# Patient Record
Sex: FEMALE | Race: WHITE | NOT HISPANIC OR LATINO | Employment: FULL TIME | URBAN - METROPOLITAN AREA
[De-identification: names, ages, dates, MRNs, and addresses within clinical notes are randomized per-mention and may not be internally consistent; named-entity substitution may affect disease eponyms.]

---

## 2017-02-01 ENCOUNTER — PATIENT MESSAGE (OUTPATIENT)
Dept: DERMATOLOGY | Facility: CLINIC | Age: 32
End: 2017-02-01

## 2017-03-14 ENCOUNTER — OFFICE VISIT (OUTPATIENT)
Dept: PODIATRY | Facility: CLINIC | Age: 32
End: 2017-03-14
Payer: COMMERCIAL

## 2017-03-14 ENCOUNTER — OFFICE VISIT (OUTPATIENT)
Dept: DERMATOLOGY | Facility: CLINIC | Age: 32
End: 2017-03-14
Payer: COMMERCIAL

## 2017-03-14 VITALS
HEART RATE: 87 BPM | WEIGHT: 171 LBS | BODY MASS INDEX: 29.19 KG/M2 | DIASTOLIC BLOOD PRESSURE: 85 MMHG | HEIGHT: 64 IN | RESPIRATION RATE: 18 BRPM | SYSTOLIC BLOOD PRESSURE: 136 MMHG

## 2017-03-14 DIAGNOSIS — M79.671 PAIN OF RIGHT HEEL: ICD-10-CM

## 2017-03-14 DIAGNOSIS — L70.0 ACNE VULGARIS: ICD-10-CM

## 2017-03-14 DIAGNOSIS — Q82.8 POROKERATOSIS: Primary | ICD-10-CM

## 2017-03-14 PROCEDURE — 99212 OFFICE O/P EST SF 10 MIN: CPT | Mod: S$GLB,,, | Performed by: PATHOLOGY

## 2017-03-14 PROCEDURE — 99203 OFFICE O/P NEW LOW 30 MIN: CPT | Mod: S$GLB,,, | Performed by: PODIATRIST

## 2017-03-14 PROCEDURE — 99999 PR PBB SHADOW E&M-EST. PATIENT-LVL III: CPT | Mod: PBBFAC,,, | Performed by: PODIATRIST

## 2017-03-14 PROCEDURE — 99999 PR PBB SHADOW E&M-EST. PATIENT-LVL II: CPT | Mod: PBBFAC,,, | Performed by: PATHOLOGY

## 2017-03-14 RX ORDER — DOXYCYCLINE HYCLATE 100 MG
100 TABLET ORAL DAILY
Qty: 30 TABLET | Refills: 3 | Status: SHIPPED | OUTPATIENT
Start: 2017-03-14 | End: 2017-09-08 | Stop reason: ALTCHOICE

## 2017-03-14 NOTE — PROGRESS NOTES
Subjective:       Patient ID:  Bushra Olmos is a 31 y.o. female who presents for   Chief Complaint   Patient presents with    Acne     Refill on doxycycline      HPI Comments: Patient reported that flares are not related to her periods.  She started flaring after stopping the doxycycline.  She has regular periods, no hirsutism, not on OCP.    Acne  - Follow-up  Symptom course: improving (has occasional flares)  Currently using: doxycycline 100 mg daily, now stopped 1 month ago, retin A 0.025% cream three times a week, and still doing the BP cream although she is allergic, using obagi with salicylic acid as a wash.  Affected locations: face, chest and back  Signs / symptoms: asymptomatic        Review of Systems   Constitutional: Negative for fever, chills, weight loss, weight gain, fatigue, night sweats and malaise.   Skin: Negative for itching, rash, daily sunscreen use, activity-related sunscreen use and recent sunburn.   Hematologic/Lymphatic: Does not bruise/bleed easily.        Objective:    Physical Exam   Constitutional: She appears well-developed and well-nourished. No distress.   Neurological: She is alert and oriented to person, place, and time. She is not disoriented.   Psychiatric: She has a normal mood and affect.   Skin:   Areas Examined (abnormalities noted in diagram):   Head / Face Inspection Performed  Neck Inspection Performed  Chest / Axilla Inspection Performed  Back Inspection Performed  RUE Inspected  LUE Inspection Performed                   Diagram Legend     Erythematous scaling macule/papule c/w actinic keratosis       Vascular papule c/w angioma      Pigmented verrucoid papule/plaque c/w seborrheic keratosis      Yellow umbilicated papule c/w sebaceous hyperplasia      Irregularly shaped tan macule c/w lentigo     1-2 mm smooth white papules consistent with Milia      Movable subcutaneous cyst with punctum c/w epidermal inclusion cyst      Subcutaneous movable cyst c/w pilar cyst       Firm pink to brown papule c/w dermatofibroma      Pedunculated fleshy papule(s) c/w skin tag(s)      Evenly pigmented macule c/w junctional nevus     Mildly variegated pigmented, slightly irregular-bordered macule c/w mildly atypical nevus      Flesh colored to evenly pigmented papule c/w intradermal nevus       Pink pearly papule/plaque c/w basal cell carcinoma      Erythematous hyperkeratotic cursted plaque c/w SCC      Surgical scar with no sign of skin cancer recurrence      Open and closed comedones      Inflammatory papules and pustules      Verrucoid papule consistent consistent with wart     Erythematous eczematous patches and plaques     Dystrophic onycholytic nail with subungual debris c/w onychomycosis     Umbilicated papule    Erythematous-base heme-crusted tan verrucoid plaque consistent with inflamed seborrheic keratosis     Erythematous Silvery Scaling Plaque c/w Psoriasis     See annotation      Assessment / Plan:        Acne vulgaris - inflammatory.  -     doxycycline (VIBRA-TABS) 100 MG tablet; Take 1 tablet (100 mg total) by mouth once daily.  Dispense: 30 tablet; Refill: 3    - continue tretinoin 0.025% cream qhs for now.  Will likely increase strength of retinoid, add aczone or switch to epiduo forte at f/u visit when we eventually d/c doxy.         Return in about 3 months (around 6/14/2017), or if symptoms worsen or fail to improve.

## 2017-03-14 NOTE — MR AVS SNAPSHOT
[facility]  [address]  [place] [address]  Phone: [phone]  Fax: [phone]                  Bushra Olmos   3/14/2017 9:00 AM   Office Visit    Description:  Female : 1985   Provider:  Costa Renteria MD   Department:  [facility]           Reason for Visit     Acne           Diagnoses this Visit        Comments    Acne vulgaris                To Do List           Future Appointments        Provider Department Dept Phone    2017 9:20 AM Costa Renteria MD [facility] [phone]      Goals (5 Years of Data)     None      Follow-Up and Disposition     Return in about 3 months (around 2017), or if symptoms worsen or fail to improve.       These Medications        Disp Refills Start End    doxycycline (VIBRA-TABS) 100 MG tablet 30 tablet 3 3/14/2017     Take 1 tablet (100 mg total) by mouth once daily. - Oral    Pharmacy: [facility] - [place], [place] - [address] Ph #: [phone]         [facility]     [facility] Nurse Care Line -  Assistance  Registered nurses in the [facility] provide clinical advisement, health education, appointment booking, and other advisory services.  Call for this free service at [phone].             Medications           Message regarding Medications     Verify the changes and/or additions to your medication regime listed below are the same as discussed with your clinician today.  If any of these changes or additions are incorrect, please notify your healthcare provider.        START taking these NEW medications        Refills    doxycycline (VIBRA-TABS) 100 MG tablet 3    Sig: Take 1 tablet (100 mg total) by mouth once daily.    Class: Normal    Route: Oral           Verify that the below list of medications is an accurate representation of the medications you are currently taking.  If none reported, the list may be blank. If  incorrect, please contact your healthcare provider. Carry this list with you in case of emergency.           Current Medications     doxycycline (VIBRA-TABS) 100 MG tablet Take 1 tablet (100 mg total) by mouth once daily.           Clinical Reference Information           Allergies as of 3/14/2017     Benzoyl Peroxide    Benzyl Salicylate    Penicillins      Immunizations Administered on Date of Encounter - 3/14/2017     None      Smoking Cessation     If you would like to quit smoking:   You may be eligible for free services if you are a [place] resident and started smoking cigarettes before September 1, 1988.  Call the Smoking Cessation Trust ([facility]) toll free at ([phone] [phone] or ([phone] 673-5291.   Call 3-512-QUIT-NOW if you do not meet the above criteria.            Language Assistance Services     ATTENTION: Language assistance services are available, free of charge. Please call [phone].      ATENCIÓN: Si chad aceves, tiene a pierson disposición servicios gratuitos de asistencia lingüística. Llame al [phone].     CHÚ Ý: N?u b?n nói Ti?ng Vi?t, có các d?ch v? h? tr? ngôn ng? mi?n phí dành cho b?n. G?i s? [phone].         [facility] complies with applicable Federal civil rights laws and does not discriminate on the basis of race, color, national origin, age, disability, or sex.

## 2017-03-14 NOTE — PROGRESS NOTES
Subjective:      Patient ID: Bushra Olmos is a 31 y.o. female.    Chief Complaint: PCP ( Dominguez Hitchcock MD 4/12/16); Foot Problem (Rt ffoot pain); and Callouses    Bushra is a 31 y.o. female who presents to the podiatry clinic  with complaint of  R foot pain. Onset of the symptoms was several months ago. Precipitating event: none known, but possibly stepping on glass in december . Current symptoms include: ability to bear weight, but with some pain and worsening symptoms after a period of activity. Aggravating factors: any weight bearing. Symptoms have been intermittent. Patient has had no prior foot problems. Evaluation to date: none. Treatment to date: none.         There is no problem list on file for this patient.      Current Outpatient Prescriptions on File Prior to Visit   Medication Sig Dispense Refill    [DISCONTINUED] doxycycline (VIBRA-TABS) 100 MG tablet Take 1 tablet once daily with food and water. Take at least 1 hour before lying down. 30 tablet 0    [DISCONTINUED] GLUC/CHND/OM3/DHA/EPA/FISH/STR (GLUCOSAMINE CHONDROITIN PLUS ORAL) Take by mouth.      [DISCONTINUED] multivitamin (THERAGRAN) per tablet Take 1 tablet by mouth once daily.      [DISCONTINUED] tretinoin (RETIN-A) 0.025 % cream Apply pea-sized amount to face nightly 45 g 1     No current facility-administered medications on file prior to visit.        Review of patient's allergies indicates:   Allergen Reactions    Benzoyl peroxide Swelling     whelps    Benzyl salicylate Rash     acne    Penicillins Other (See Comments)     M,om stated       Past Surgical History:   Procedure Laterality Date    ADENOIDECTOMY  age 16    biopsy of breast  Right 09/2014    CHOLECYSTECTOMY  2013    Laparoscopic       Family History   Problem Relation Age of Onset    Lupus Mother     Kidney disease Mother     Rheum arthritis Mother     Hypertension Father     Breast cancer Paternal Aunt     Cataracts Paternal Grandmother     Colon cancer  "Paternal Grandmother     Cataracts Paternal Grandfather     Cancer Paternal Grandfather      Lung cancer- smoker    Amblyopia Neg Hx     Blindness Neg Hx     Glaucoma Neg Hx     Macular degeneration Neg Hx     Retinal detachment Neg Hx     Strabismus Neg Hx     Stroke Neg Hx     Ovarian cancer Neg Hx     Diabetes Neg Hx     Melanoma Neg Hx     Psoriasis Neg Hx        Social History     Social History    Marital status: Single     Spouse name: N/A    Number of children: N/A    Years of education: N/A     Occupational History    Not on file.     Social History Main Topics    Smoking status: Current Some Day Smoker     Packs/day: 0.50     Years: 13.00     Types: Cigarettes    Smokeless tobacco: Never Used    Alcohol use 0.6 oz/week     1 Glasses of wine per week    Drug use: No    Sexual activity: Not Currently     Partners: Male     Birth control/ protection: None, Condom      Comment: Liza of 4 years: Huang     Other Topics Concern    Not on file     Social History Narrative             Review of Systems   Constitution: Negative for chills, decreased appetite and fever.   Cardiovascular: Negative for leg swelling.   Musculoskeletal: Negative for arthritis, joint pain, joint swelling and myalgias.   Gastrointestinal: Negative for nausea and vomiting.   Neurological: Negative for loss of balance, numbness and paresthesias.           Objective:       Vitals:    03/14/17 0728   BP: 136/85   Pulse: 87   Resp: 18   Weight: 77.6 kg (171 lb)   Height: 5' 4" (1.626 m)   PainSc:   2   PainLoc: Foot        Physical Exam   Constitutional: She is oriented to person, place, and time. She appears well-developed and well-nourished.   Cardiovascular:   Pulses:       Dorsalis pedis pulses are 2+ on the right side, and 2+ on the left side.        Posterior tibial pulses are 2+ on the right side, and 2+ on the left side.   Musculoskeletal: She exhibits no edema or tenderness.        Right ankle: Normal.       "  Left ankle: Normal.        Right foot: There is no swelling, no crepitus and no deformity.        Left foot: There is no swelling, no crepitus and no deformity.   Adequate joint range of motion without pain, limitation, nor crepitation Bilateral feet and ankle joints. Muscle strength is 5/5 in all groups bilaterally.         Lymphadenopathy:   No palpable lymph nodes   Neurological: She is alert and oriented to person, place, and time. She has normal strength.   Skin: Skin is warm, dry and intact. No rash noted. No erythema. Nails show no clubbing.   Nucleated solitary  hyperkeratotic lesion noted to Plantar R heel . There is TTP to lesion . Divergent skin lines are noted      Psychiatric: She has a normal mood and affect. Her behavior is normal.   Vitals reviewed.            Assessment:       Encounter Diagnoses   Name Primary?    Porokeratosis Yes    Pain of left heel          Plan:       Bushra was seen today for pcp, foot problem and callouses.    Diagnoses and all orders for this visit:    Porokeratosis    Pain of left heel      I counseled the patient on her conditions, their implications and medical management.    The affected area was cleansed with an alcohol prep pad. Next utilizing a mechanical   the hyperkeratotic tissues were filed. Attention was then directed to the nucleated center where this area was carefully excised. No pinpoint bleeding was encountered.      Discussed use of OTC wart medications as per packing instructions     Use pumice stone to region after bathing 2-3x/week to decrease callus build up       .

## 2017-03-25 ENCOUNTER — PATIENT MESSAGE (OUTPATIENT)
Dept: PODIATRY | Facility: CLINIC | Age: 32
End: 2017-03-25

## 2017-03-27 ENCOUNTER — OFFICE VISIT (OUTPATIENT)
Dept: PODIATRY | Facility: CLINIC | Age: 32
End: 2017-03-27
Payer: COMMERCIAL

## 2017-03-27 VITALS
DIASTOLIC BLOOD PRESSURE: 71 MMHG | HEIGHT: 64 IN | SYSTOLIC BLOOD PRESSURE: 125 MMHG | WEIGHT: 169 LBS | HEART RATE: 77 BPM | RESPIRATION RATE: 18 BRPM | BODY MASS INDEX: 28.85 KG/M2

## 2017-03-27 DIAGNOSIS — B07.0 PLANTAR WART OF BOTH FEET: Primary | ICD-10-CM

## 2017-03-27 DIAGNOSIS — M79.671 PAIN OF BOTH HEELS: ICD-10-CM

## 2017-03-27 DIAGNOSIS — M79.672 PAIN OF BOTH HEELS: ICD-10-CM

## 2017-03-27 PROCEDURE — 99999 PR PBB SHADOW E&M-EST. PATIENT-LVL III: CPT | Mod: PBBFAC,,, | Performed by: PODIATRIST

## 2017-03-27 PROCEDURE — 99213 OFFICE O/P EST LOW 20 MIN: CPT | Mod: S$GLB,,, | Performed by: PODIATRIST

## 2017-03-27 NOTE — PROGRESS NOTES
Subjective:      Patient ID: Bushra Olmos is a 31 y.o. female.    Chief Complaint: Follow-up (foot pain both feet ) and Callouses (both feet )    uBshra is a 31 y.o. female who presents to the podiatry clinic  with complaint of  R foot pain. Onset of the symptoms was several months ago. Precipitating event: none known, but possibly stepping on glass in december . Current symptoms include: ability to bear weight, but with some pain and worsening symptoms after a period of activity. Aggravating factors: any weight bearing. Symptoms have been intermittent. Patient has had no prior foot problems. Evaluation to date: none. Treatment to date: none.     3/27/17: pt reports continued pain and now new onset L heel callus    Patient Active Problem List   Diagnosis    Acne vulgaris       Current Outpatient Prescriptions on File Prior to Visit   Medication Sig Dispense Refill    doxycycline (VIBRA-TABS) 100 MG tablet Take 1 tablet (100 mg total) by mouth once daily. 30 tablet 3     No current facility-administered medications on file prior to visit.        Review of patient's allergies indicates:   Allergen Reactions    Benzoyl peroxide Swelling     whelps    Benzyl salicylate Rash     acne    Penicillins Other (See Comments)     M,om stated       Past Surgical History:   Procedure Laterality Date    ADENOIDECTOMY  age 16    biopsy of breast  Right 09/2014    CHOLECYSTECTOMY  2013    Laparoscopic       Family History   Problem Relation Age of Onset    Lupus Mother     Kidney disease Mother     Rheum arthritis Mother     Hypertension Father     Breast cancer Paternal Aunt     Cataracts Paternal Grandmother     Colon cancer Paternal Grandmother     Cataracts Paternal Grandfather     Cancer Paternal Grandfather      Lung cancer- smoker    Amblyopia Neg Hx     Blindness Neg Hx     Glaucoma Neg Hx     Macular degeneration Neg Hx     Retinal detachment Neg Hx     Strabismus Neg Hx     Stroke Neg Hx      "Ovarian cancer Neg Hx     Diabetes Neg Hx     Melanoma Neg Hx     Psoriasis Neg Hx        Social History     Social History    Marital status: Single     Spouse name: N/A    Number of children: N/A    Years of education: N/A     Occupational History    Not on file.     Social History Main Topics    Smoking status: Current Some Day Smoker     Packs/day: 0.50     Years: 13.00     Types: Cigarettes    Smokeless tobacco: Never Used    Alcohol use 0.6 oz/week     1 Glasses of wine per week    Drug use: No    Sexual activity: Not Currently     Partners: Male     Birth control/ protection: None, Condom      Comment: Liza of 4 years: Huang     Other Topics Concern    Not on file     Social History Narrative             Review of Systems   Constitution: Negative for chills, decreased appetite and fever.   Cardiovascular: Negative for leg swelling.   Skin: Negative for color change, itching and poor wound healing.   Musculoskeletal: Negative for arthritis, joint pain, joint swelling and myalgias.   Gastrointestinal: Negative for nausea and vomiting.   Neurological: Negative for loss of balance, numbness and paresthesias.           Objective:       Vitals:    03/27/17 1117   BP: 125/71   Pulse: 77   Resp: 18   Weight: 76.7 kg (169 lb)   Height: 5' 4" (1.626 m)   PainSc:   4   PainLoc: Foot        Physical Exam   Constitutional: She is oriented to person, place, and time. She appears well-developed and well-nourished.   Cardiovascular:   Pulses:       Dorsalis pedis pulses are 2+ on the right side, and 2+ on the left side.        Posterior tibial pulses are 2+ on the right side, and 2+ on the left side.   Musculoskeletal: She exhibits no edema or tenderness.        Right ankle: Normal.        Left ankle: Normal.        Right foot: There is no swelling, no crepitus and no deformity.        Left foot: There is no swelling, no crepitus and no deformity.   Adequate joint range of motion without pain, limitation, " nor crepitation Bilateral feet and ankle joints. Muscle strength is 5/5 in all groups bilaterally.         Lymphadenopathy:   No palpable lymph nodes   Neurological: She is alert and oriented to person, place, and time. She has normal strength.   Skin: Skin is warm, dry and intact. No rash noted. No erythema. Nails show no clubbing.   Nucleated solitary  hyperkeratotic lesion noted to Plantar b/l heel . There is TTP to lesion . Divergent skin lines are noted      Psychiatric: She has a normal mood and affect. Her behavior is normal.   Vitals reviewed.            Assessment:       Encounter Diagnoses   Name Primary?    Plantar wart of both feet Yes    Pain of both heels          Plan:       Bushra was seen today for follow-up and callouses.    Diagnoses and all orders for this visit:    Plantar wart of both feet    Pain of both heels      I counseled the patient on her conditions, their implications and medical management.      With patients verbal permission the area was cleansed with alcohol prep pad. Next with a No 15 scalpel the wart was debrided until pin point bleeding was noted. This was controlled with silver nitrate. Patient will begin topical sal acid treatments       Discussed use of OTC wart medications as per packing instructions     .

## 2017-04-17 ENCOUNTER — PATIENT MESSAGE (OUTPATIENT)
Dept: PODIATRY | Facility: CLINIC | Age: 32
End: 2017-04-17

## 2017-05-24 ENCOUNTER — PATIENT MESSAGE (OUTPATIENT)
Dept: DERMATOLOGY | Facility: CLINIC | Age: 32
End: 2017-05-24

## 2017-05-24 DIAGNOSIS — L70.0 ACNE VULGARIS: Primary | ICD-10-CM

## 2017-05-24 RX ORDER — TRETINOIN 0.1 MG/G
GEL TOPICAL NIGHTLY
Qty: 45 G | Refills: 3 | Status: SHIPPED | OUTPATIENT
Start: 2017-05-24 | End: 2017-06-02 | Stop reason: SDUPTHER

## 2017-05-30 ENCOUNTER — TELEPHONE (OUTPATIENT)
Dept: DERMATOLOGY | Facility: CLINIC | Age: 32
End: 2017-05-30

## 2017-05-30 NOTE — TELEPHONE ENCOUNTER
I did a PA for Tretinoin 0.1% micro gel and it was approved by [facility] from 05-25-17 to 5-24-18. I faxed the approval letter to [facility].  (Pt ins id # [id number])

## 2017-06-02 DIAGNOSIS — L70.0 ACNE VULGARIS: ICD-10-CM

## 2017-06-02 RX ORDER — TRETINOIN 0.1 MG/G
GEL TOPICAL NIGHTLY
Qty: 45 G | Refills: 3 | Status: SHIPPED | OUTPATIENT
Start: 2017-06-02 | End: 2017-09-08 | Stop reason: ALTCHOICE

## 2017-06-12 ENCOUNTER — PATIENT MESSAGE (OUTPATIENT)
Dept: DERMATOLOGY | Facility: CLINIC | Age: 32
End: 2017-06-12

## 2017-06-16 DIAGNOSIS — L70.0 ACNE VULGARIS: Primary | ICD-10-CM

## 2017-06-16 RX ORDER — DAPSONE 75 MG/G
GEL TOPICAL
Qty: 60 G | Refills: 3 | Status: SHIPPED | OUTPATIENT
Start: 2017-06-16 | End: 2018-03-09

## 2017-08-17 ENCOUNTER — PATIENT MESSAGE (OUTPATIENT)
Dept: DERMATOLOGY | Facility: CLINIC | Age: 32
End: 2017-08-17

## 2017-08-17 DIAGNOSIS — L70.0 ACNE VULGARIS: Primary | ICD-10-CM

## 2017-08-17 RX ORDER — SPIRONOLACTONE 50 MG/1
TABLET, FILM COATED ORAL
Qty: 60 TABLET | Refills: 3 | Status: SHIPPED | OUTPATIENT
Start: 2017-08-17 | End: 2017-08-18 | Stop reason: SDUPTHER

## 2017-08-18 DIAGNOSIS — L70.0 ACNE VULGARIS: ICD-10-CM

## 2017-08-18 RX ORDER — SPIRONOLACTONE 50 MG/1
TABLET, FILM COATED ORAL
Qty: 60 TABLET | Refills: 3 | Status: SHIPPED | OUTPATIENT
Start: 2017-08-18 | End: 2017-09-08 | Stop reason: ALTCHOICE

## 2017-09-08 ENCOUNTER — OFFICE VISIT (OUTPATIENT)
Dept: URGENT CARE | Facility: CLINIC | Age: 32
End: 2017-09-08
Payer: COMMERCIAL

## 2017-09-08 VITALS
TEMPERATURE: 97 F | BODY MASS INDEX: 28.68 KG/M2 | RESPIRATION RATE: 18 BRPM | HEIGHT: 64 IN | WEIGHT: 168 LBS | DIASTOLIC BLOOD PRESSURE: 81 MMHG | OXYGEN SATURATION: 98 % | HEART RATE: 79 BPM | SYSTOLIC BLOOD PRESSURE: 128 MMHG

## 2017-09-08 DIAGNOSIS — B34.9 VIRAL SYNDROME: Primary | ICD-10-CM

## 2017-09-08 PROCEDURE — 3008F BODY MASS INDEX DOCD: CPT | Mod: S$GLB,,, | Performed by: NURSE PRACTITIONER

## 2017-09-08 PROCEDURE — 96372 THER/PROPH/DIAG INJ SC/IM: CPT | Mod: S$GLB,,, | Performed by: NURSE PRACTITIONER

## 2017-09-08 PROCEDURE — 99213 OFFICE O/P EST LOW 20 MIN: CPT | Mod: 25,S$GLB,, | Performed by: NURSE PRACTITIONER

## 2017-09-08 RX ORDER — BETAMETHASONE SODIUM PHOSPHATE AND BETAMETHASONE ACETATE 3; 3 MG/ML; MG/ML
6 INJECTION, SUSPENSION INTRA-ARTICULAR; INTRALESIONAL; INTRAMUSCULAR; SOFT TISSUE
Status: COMPLETED | OUTPATIENT
Start: 2017-09-08 | End: 2017-09-08

## 2017-09-08 RX ORDER — FLUTICASONE PROPIONATE 50 MCG
2 SPRAY, SUSPENSION (ML) NASAL DAILY
Qty: 1 BOTTLE | Refills: 2 | Status: SHIPPED | OUTPATIENT
Start: 2017-09-08 | End: 2018-03-09

## 2017-09-08 RX ADMIN — BETAMETHASONE SODIUM PHOSPHATE AND BETAMETHASONE ACETATE 6 MG: 3; 3 INJECTION, SUSPENSION INTRA-ARTICULAR; INTRALESIONAL; INTRAMUSCULAR; SOFT TISSUE at 10:09

## 2017-09-08 NOTE — PROGRESS NOTES
"Subjective:       Patient ID: Bushra Kahn is a 32 y.o. female.    Vitals:  height is 5' 4" (1.626 m) and weight is 76.2 kg (168 lb). Her temperature is 97.1 °F (36.2 °C). Her blood pressure is 128/81 and her pulse is 79. Her respiration is 18 and oxygen saturation is 98%.     Chief Complaint: Sore Throat    This is a 32 y.o. female with Past Medical History:  No date: Acid reflux   who presents today with a chief complaint of Sore Throat.  Reports having suffering on and off x 2 weeks. Taking otc meds with no relief.       Sore Throat    This is a new problem. The current episode started 1 to 4 weeks ago. The problem has been gradually improving. There has been no fever. The pain is mild. Associated symptoms include congestion, coughing and a hoarse voice. Pertinent negatives include no abdominal pain, ear pain, headaches or shortness of breath.     Review of Systems   Constitution: Positive for malaise/fatigue. Negative for chills and fever.   HENT: Positive for congestion, hoarse voice and sore throat. Negative for ear pain.    Eyes: Negative for discharge and redness.   Cardiovascular: Negative for chest pain, dyspnea on exertion and leg swelling.   Respiratory: Positive for cough. Negative for shortness of breath, sputum production and wheezing.    Musculoskeletal: Negative for myalgias.   Gastrointestinal: Negative for abdominal pain and nausea.   Neurological: Negative for headaches.       Objective:      Physical Exam   Constitutional: She is oriented to person, place, and time. She appears well-developed and well-nourished.   HENT:   Head: Normocephalic and atraumatic.   Right Ear: Hearing, tympanic membrane, external ear and ear canal normal.   Left Ear: Hearing, tympanic membrane, external ear and ear canal normal.   Nose: Mucosal edema and rhinorrhea present.   Mouth/Throat: Oropharynx is clear and moist. No oropharyngeal exudate.   Cardiovascular: Normal rate, regular rhythm and normal heart " sounds.    Pulmonary/Chest: Effort normal and breath sounds normal.   Abdominal: Soft. Bowel sounds are normal. There is no tenderness.   Musculoskeletal: She exhibits no edema.   Neurological: She is alert and oriented to person, place, and time.   Skin: Skin is warm and dry.   Psychiatric: She has a normal mood and affect. Her behavior is normal. Judgment and thought content normal.   Nursing note and vitals reviewed.      Assessment:       1. Viral syndrome        Plan:         1. Viral syndrome  No a/b need. Advised on fluids, otc.   - betamethasone acetate-betamethasone sodium phosphate injection 6 mg; Inject 1 mL (6 mg total) into the muscle one time.  - fluticasone (FLONASE) 50 mcg/actuation nasal spray; 2 sprays by Each Nare route once daily.  Dispense: 1 Bottle; Refill: 2

## 2017-09-11 ENCOUNTER — TELEPHONE (OUTPATIENT)
Dept: URGENT CARE | Facility: CLINIC | Age: 32
End: 2017-09-11

## 2017-10-29 ENCOUNTER — PATIENT MESSAGE (OUTPATIENT)
Dept: OBSTETRICS AND GYNECOLOGY | Facility: CLINIC | Age: 32
End: 2017-10-29

## 2018-02-07 ENCOUNTER — OFFICE VISIT (OUTPATIENT)
Dept: OBSTETRICS AND GYNECOLOGY | Facility: CLINIC | Age: 33
End: 2018-02-07
Payer: COMMERCIAL

## 2018-02-07 VITALS
BODY MASS INDEX: 31.09 KG/M2 | DIASTOLIC BLOOD PRESSURE: 74 MMHG | WEIGHT: 182.13 LBS | HEIGHT: 64 IN | SYSTOLIC BLOOD PRESSURE: 126 MMHG

## 2018-02-07 DIAGNOSIS — Z01.419 ENCOUNTER FOR GYNECOLOGICAL EXAMINATION WITHOUT ABNORMAL FINDING: Primary | ICD-10-CM

## 2018-02-07 PROCEDURE — 99395 PREV VISIT EST AGE 18-39: CPT | Mod: S$GLB,,, | Performed by: OBSTETRICS & GYNECOLOGY

## 2018-02-07 PROCEDURE — 99999 PR PBB SHADOW E&M-EST. PATIENT-LVL III: CPT | Mod: PBBFAC,,, | Performed by: OBSTETRICS & GYNECOLOGY

## 2018-02-07 RX ORDER — PHENTERMINE HYDROCHLORIDE 37.5 MG/1
37.5 TABLET ORAL
COMMUNITY
End: 2018-03-09

## 2018-02-07 NOTE — PROGRESS NOTES
Subjective:       Patient ID: Bushra Kahn is a 32 y.o. female.    Chief Complaint:  Well Woman      History of Present Illness  HPI    Bushra Kahn is a 32 y.o. female  here for her annual GYN exam.    She describes her periods as regular, normal flow, lasting 3 days.   denies break through bleeding.   denies vaginal itching or irritation.  Denies vaginal discharge.  She is sexually active. She has had 1 partner for 5 years ( in ).  She uses condoms for contraception.   History of abnormal pap: No  Last Pap: approximate date  and was normal  denies domestic violence. She does feel safe at home.     Past Medical History:   Diagnosis Date    Acid reflux      Past Surgical History:   Procedure Laterality Date    ADENOIDECTOMY  age 16    biopsy of breast  Right 2014    CHOLECYSTECTOMY  2013    Laparoscopic     Social History     Social History    Marital status: Single     Spouse name: N/A    Number of children: N/A    Years of education: N/A     Occupational History    Not on file.     Social History Main Topics    Smoking status: Current Some Day Smoker     Packs/day: 0.10     Years: 13.00     Types: Cigarettes    Smokeless tobacco: Never Used    Alcohol use 0.6 oz/week     1 Glasses of wine per week    Drug use: No    Sexual activity: Yes     Partners: Male     Birth control/ protection: None, Condom      Comment:  2016, together since : Huang     Other Topics Concern    Not on file     Social History Narrative    No narrative on file     Family History   Problem Relation Age of Onset    Lupus Mother     Kidney disease Mother     Rheum arthritis Mother     Hypertension Father     Breast cancer Paternal Aunt 50    Cataracts Paternal Grandmother     Colon cancer Paternal Grandmother     Cataracts Paternal Grandfather     Cancer Paternal Grandfather      Lung cancer- smoker    Amblyopia Neg Hx     Blindness Neg Hx     Glaucoma Neg Hx   "   Macular degeneration Neg Hx     Retinal detachment Neg Hx     Strabismus Neg Hx     Stroke Neg Hx     Ovarian cancer Neg Hx     Diabetes Neg Hx     Melanoma Neg Hx     Psoriasis Neg Hx      OB History      Para Term  AB Living    0 0 0 0 0 0    SAB TAB Ectopic Multiple Live Births    0 0 0 0            /74   Ht 5' 4" (1.626 m)   Wt 82.6 kg (182 lb 1.6 oz)   LMP 2018 (Within Days)   BMI 31.26 kg/m²         GYN & OB History  Patient's last menstrual period was 2018 (within days).   Date of Last Pap: 10/12/2016    OB History    Para Term  AB Living   0 0 0 0 0 0   SAB TAB Ectopic Multiple Live Births   0 0 0 0               Review of Systems  Review of Systems   Constitutional: Negative for activity change, appetite change, fatigue and unexpected weight change.   HENT: Negative.    Eyes: Negative for visual disturbance.   Respiratory: Negative for shortness of breath and wheezing.    Cardiovascular: Negative for chest pain, palpitations and leg swelling.   Gastrointestinal: Negative for abdominal pain, bloating and blood in stool.   Endocrine: Negative for diabetes and hair loss.   Genitourinary: Negative for decreased libido and dyspareunia.   Musculoskeletal: Negative for back pain and joint swelling.   Skin:  Negative for no acne and hair changes.   Neurological: Negative for headaches.   Hematological: Does not bruise/bleed easily.   Psychiatric/Behavioral: Negative for depression and sleep disturbance. The patient is not nervous/anxious.    Breast: Negative for breast pain and nipple discharge          Objective:    Physical Exam:   Constitutional: She is oriented to person, place, and time. She appears well-developed and well-nourished.    HENT:   Head: Normocephalic and atraumatic.    Eyes: EOM are normal. Pupils are equal, round, and reactive to light.    Neck: Normal range of motion. Neck supple.    Cardiovascular: Normal rate and regular rhythm.   "   Pulmonary/Chest: Effort normal and breath sounds normal.   BREASTS: Symmetrical, no skin changes or visible lesions.  No palpable masses, nipple discharge bilaterally.          Abdominal: Soft. Bowel sounds are normal.     Genitourinary: Pelvic exam was performed with patient supine.   Genitourinary Comments: PELVIC: Normal external genitalia without lesions.  Normal hair distribution.  Adequate perineal body, normal urethral meatus.  Vagina moist and well rugated without lesions or discharge.  Cervix pink, without lesions, discharge or tenderness.  No significant cystocele or rectocele.  Bimanual exam shows uterus to be normal size, regular, mobile and nontender.  Adnexa without masses or tenderness.               Musculoskeletal: Normal range of motion and moves all extremeties.       Neurological: She is alert and oriented to person, place, and time.    Skin: Skin is warm and dry.    Psychiatric: She has a normal mood and affect.          Assessment:        1. Encounter for gynecological examination without abnormal finding                Plan:        1. Encounter for gynecological examination without abnormal finding     COUNSELING:  The patient was counseled today on osteoporosis prevention, calcium supplementation, and regular weight bearing exercise. Advised to take PNV with 800mcg Folic acid if not using regular contraception.  The patient was also counseled today on ACS PAP guidelines, with recommendations for yearly pelvic exams unless their uterus, cervix, and ovaries were removed for benign reasons; in that case, examinations every 3-5 years are recommended. The patient was also counseled regarding monthly breast self-examination, routine STD screening for at-risk populations, prophylactic immunizations for transmitted infections such as HPV, Pertussis, or Influenza as appropriate, and yearly mammograms when indicated by ACS guidelines. She was advised to see her primary care physician for all other  health maintenance.   FOLLOW-UP with me for next routine visit.         Follow-up in about 1 year (around 2/7/2019).

## 2018-03-09 ENCOUNTER — LAB VISIT (OUTPATIENT)
Dept: LAB | Facility: HOSPITAL | Age: 33
End: 2018-03-09
Attending: INTERNAL MEDICINE
Payer: COMMERCIAL

## 2018-03-09 ENCOUNTER — OFFICE VISIT (OUTPATIENT)
Dept: INTERNAL MEDICINE | Facility: CLINIC | Age: 33
End: 2018-03-09
Payer: COMMERCIAL

## 2018-03-09 VITALS
WEIGHT: 172.19 LBS | SYSTOLIC BLOOD PRESSURE: 125 MMHG | DIASTOLIC BLOOD PRESSURE: 63 MMHG | HEART RATE: 68 BPM | BODY MASS INDEX: 29.4 KG/M2 | TEMPERATURE: 98 F | HEIGHT: 64 IN

## 2018-03-09 DIAGNOSIS — Z00.00 WELLNESS EXAMINATION: ICD-10-CM

## 2018-03-09 DIAGNOSIS — Z00.00 WELLNESS EXAMINATION: Primary | ICD-10-CM

## 2018-03-09 DIAGNOSIS — M25.511 PAIN OF BOTH SHOULDER JOINTS: ICD-10-CM

## 2018-03-09 DIAGNOSIS — M25.512 PAIN OF BOTH SHOULDER JOINTS: ICD-10-CM

## 2018-03-09 LAB
25(OH)D3+25(OH)D2 SERPL-MCNC: 35 NG/ML
ALBUMIN SERPL BCP-MCNC: 4.5 G/DL
ALP SERPL-CCNC: 47 U/L
ALT SERPL W/O P-5'-P-CCNC: 41 U/L
ANION GAP SERPL CALC-SCNC: 12 MMOL/L
AST SERPL-CCNC: 26 U/L
B-HCG UR QL: NEGATIVE
BASOPHILS # BLD AUTO: 0.03 K/UL
BASOPHILS NFR BLD: 0.6 %
BILIRUB SERPL-MCNC: 0.7 MG/DL
BUN SERPL-MCNC: 13 MG/DL
CALCIUM SERPL-MCNC: 9.7 MG/DL
CCP AB SER IA-ACNC: <0.5 U/ML
CHLORIDE SERPL-SCNC: 106 MMOL/L
CHOLEST SERPL-MCNC: 148 MG/DL
CHOLEST/HDLC SERPL: 3 {RATIO}
CO2 SERPL-SCNC: 21 MMOL/L
CREAT SERPL-MCNC: 0.9 MG/DL
CRP SERPL-MCNC: 1.5 MG/L
CTP QC/QA: YES
DIFFERENTIAL METHOD: NORMAL
EOSINOPHIL # BLD AUTO: 0.2 K/UL
EOSINOPHIL NFR BLD: 3.4 %
ERYTHROCYTE [DISTWIDTH] IN BLOOD BY AUTOMATED COUNT: 12.6 %
ERYTHROCYTE [SEDIMENTATION RATE] IN BLOOD BY WESTERGREN METHOD: 1 MM/HR
EST. GFR  (AFRICAN AMERICAN): >60 ML/MIN/1.73 M^2
EST. GFR  (NON AFRICAN AMERICAN): >60 ML/MIN/1.73 M^2
ESTIMATED AVG GLUCOSE: 88 MG/DL
GLUCOSE SERPL-MCNC: 88 MG/DL
HBA1C MFR BLD HPLC: 4.7 %
HCG INTACT+B SERPL-ACNC: <1.2 MIU/ML
HCT VFR BLD AUTO: 37.6 %
HDLC SERPL-MCNC: 49 MG/DL
HDLC SERPL: 33.1 %
HGB BLD-MCNC: 12.3 G/DL
IMM GRANULOCYTES # BLD AUTO: 0.02 K/UL
IMM GRANULOCYTES NFR BLD AUTO: 0.4 %
LDLC SERPL CALC-MCNC: 84.2 MG/DL
LYMPHOCYTES # BLD AUTO: 1.6 K/UL
LYMPHOCYTES NFR BLD: 31 %
MCH RBC QN AUTO: 27.9 PG
MCHC RBC AUTO-ENTMCNC: 32.7 G/DL
MCV RBC AUTO: 85 FL
MONOCYTES # BLD AUTO: 0.3 K/UL
MONOCYTES NFR BLD: 5.7 %
NEUTROPHILS # BLD AUTO: 3.1 K/UL
NEUTROPHILS NFR BLD: 58.9 %
NONHDLC SERPL-MCNC: 99 MG/DL
NRBC BLD-RTO: 0 /100 WBC
PLATELET # BLD AUTO: 199 K/UL
PMV BLD AUTO: 11.5 FL
POTASSIUM SERPL-SCNC: 3.8 MMOL/L
PROT SERPL-MCNC: 7.3 G/DL
RBC # BLD AUTO: 4.41 M/UL
RHEUMATOID FACT SERPL-ACNC: <10 IU/ML
SODIUM SERPL-SCNC: 139 MMOL/L
TRIGL SERPL-MCNC: 74 MG/DL
TSH SERPL DL<=0.005 MIU/L-ACNC: 0.79 UIU/ML
WBC # BLD AUTO: 5.23 K/UL

## 2018-03-09 PROCEDURE — 84702 CHORIONIC GONADOTROPIN TEST: CPT

## 2018-03-09 PROCEDURE — 86235 NUCLEAR ANTIGEN ANTIBODY: CPT | Mod: 59

## 2018-03-09 PROCEDURE — 80053 COMPREHEN METABOLIC PANEL: CPT

## 2018-03-09 PROCEDURE — 81025 URINE PREGNANCY TEST: CPT | Mod: S$GLB,,, | Performed by: INTERNAL MEDICINE

## 2018-03-09 PROCEDURE — 86140 C-REACTIVE PROTEIN: CPT

## 2018-03-09 PROCEDURE — 85651 RBC SED RATE NONAUTOMATED: CPT

## 2018-03-09 PROCEDURE — 83036 HEMOGLOBIN GLYCOSYLATED A1C: CPT

## 2018-03-09 PROCEDURE — 85025 COMPLETE CBC W/AUTO DIFF WBC: CPT

## 2018-03-09 PROCEDURE — 84443 ASSAY THYROID STIM HORMONE: CPT

## 2018-03-09 PROCEDURE — 86039 ANTINUCLEAR ANTIBODIES (ANA): CPT

## 2018-03-09 PROCEDURE — 86038 ANTINUCLEAR ANTIBODIES: CPT

## 2018-03-09 PROCEDURE — 82306 VITAMIN D 25 HYDROXY: CPT

## 2018-03-09 PROCEDURE — 99395 PREV VISIT EST AGE 18-39: CPT | Mod: S$GLB,,, | Performed by: INTERNAL MEDICINE

## 2018-03-09 PROCEDURE — 86431 RHEUMATOID FACTOR QUANT: CPT

## 2018-03-09 PROCEDURE — 99999 PR PBB SHADOW E&M-EST. PATIENT-LVL III: CPT | Mod: PBBFAC,,, | Performed by: INTERNAL MEDICINE

## 2018-03-09 PROCEDURE — 36415 COLL VENOUS BLD VENIPUNCTURE: CPT | Mod: PO

## 2018-03-09 PROCEDURE — 80061 LIPID PANEL: CPT

## 2018-03-09 PROCEDURE — 86200 CCP ANTIBODY: CPT

## 2018-03-09 RX ORDER — GLUCOSAMINE/CHONDRO SU A 500-400 MG
2 TABLET ORAL DAILY
COMMUNITY
End: 2018-07-26

## 2018-03-12 LAB
ANA SER QL IF: POSITIVE
ANA TITR SER IF: NORMAL {TITER}

## 2018-03-13 ENCOUNTER — TELEPHONE (OUTPATIENT)
Dept: INTERNAL MEDICINE | Facility: CLINIC | Age: 33
End: 2018-03-13

## 2018-03-13 LAB
ANTI SM ANTIBODY: 2.98 EU
ANTI SM/RNP ANTIBODY: 2.61 EU
ANTI-SM INTERPRETATION: NEGATIVE
ANTI-SM/RNP INTERPRETATION: NEGATIVE
ANTI-SSA ANTIBODY: 3.66 EU
ANTI-SSA INTERPRETATION: NEGATIVE
ANTI-SSB ANTIBODY: 0.86 EU
ANTI-SSB INTERPRETATION: NEGATIVE
DSDNA AB SER-ACNC: NORMAL [IU]/ML

## 2018-03-13 NOTE — TELEPHONE ENCOUNTER
----- Message from Ysabel Chacon sent at 3/13/2018  9:47 AM CDT -----  Contact: [facility] [phone]  Requesting a call about a B27 test that was ordered.    Please call and advise.    Thank You

## 2018-03-19 NOTE — PROGRESS NOTES
Subjective:       Patient ID: Bushra Kahn is a 32 y.o. female.    Chief Complaint: Temporomandibular Joint Pain (shoulders, elbows, knees, wrist/ feels like back is bruised)    HPIPt with pain in joints and back - mother has rheumatoid arthritis, brother with ankylosing spondylitis.  She has postive HLA B27 in past.  Pt also reports increased GERD sx - take s PPI never scoped.  Review of Systems   Constitutional: Negative for activity change and unexpected weight change.   HENT: Negative for hearing loss, rhinorrhea and trouble swallowing.    Eyes: Negative for discharge and visual disturbance.   Respiratory: Negative for chest tightness, shortness of breath (PND or orthopnea) and wheezing.    Cardiovascular: Negative for chest pain and palpitations.   Gastrointestinal: Negative for abdominal pain, blood in stool, constipation, diarrhea, nausea and vomiting.   Endocrine: Negative for polydipsia and polyuria.   Genitourinary: Negative for difficulty urinating, dysuria, hematuria and menstrual problem.   Musculoskeletal: Positive for arthralgias and back pain. Negative for joint swelling and neck pain.   Neurological: Negative for seizures, syncope, weakness and headaches.   Psychiatric/Behavioral: Negative for confusion and dysphoric mood.       Objective:      Physical Exam   Constitutional: She is oriented to person, place, and time. She appears well-developed and well-nourished. No distress.   HENT:   Head: Normocephalic.   Mouth/Throat: Oropharynx is clear and moist.   Eyes: EOM are normal. Pupils are equal, round, and reactive to light.   Neck: Neck supple. No JVD present. No thyromegaly present.   Cardiovascular: Normal rate, regular rhythm, normal heart sounds and intact distal pulses.  Exam reveals no gallop and no friction rub.    No murmur heard.  Pulmonary/Chest: Effort normal and breath sounds normal. She has no wheezes. She has no rales.   Abdominal: Soft. Bowel sounds are normal. She exhibits no  distension and no mass. There is no tenderness. There is no rebound and no guarding.   Musculoskeletal: She exhibits no edema.   Lymphadenopathy:     She has no cervical adenopathy.   Neurological: She is alert and oriented to person, place, and time. She has normal reflexes.   Skin: Skin is warm and dry.   Psychiatric: She has a normal mood and affect. Her behavior is normal. Judgment and thought content normal.       Assessment:       1. Wellness examination    2. Pain of both shoulder joints        Plan:   Wellness examination  -     CBC auto differential; Future; Expected date: 03/09/2018  -     Comprehensive metabolic panel; Future; Expected date: 03/09/2018  -     Lipid panel; Future; Expected date: 03/09/2018  -     TSH; Future; Expected date: 03/09/2018  -     Hemoglobin A1c; Future; Expected date: 03/09/2018  -     Vitamin D; Future; Expected date: 03/09/2018  -     Sedimentation rate, manual; Future; Expected date: 03/09/2018  -     C-reactive protein; Future; Expected date: 03/09/2018  -     BK; Future; Expected date: 03/09/2018  -     Rheumatoid factor; Future; Expected date: 03/09/2018  -     Cyclic citrul peptide antibody, IgG; Future; Expected date: 03/09/2018  -     POCT Urine Pregnancy  -     hCG, quantitative; Future; Expected date: 03/09/2018  -     HLA B27 ANTIGEN; Future; Expected date: 03/09/2018  -     Case request GI: ESOPHAGOGASTRODUODENOSCOPY (EGD)    Pain of both shoulder joints    Other orders  -     PNV133-ferrous fumarate-FA (PRENATAL)  mg-mcg Tab; Take 1 tablet by mouth once daily.  Dispense: 30 tablet; Refill: 6    Probably needs to see Rheum again due to persistent sx

## 2018-03-29 ENCOUNTER — TELEPHONE (OUTPATIENT)
Dept: ENDOSCOPY | Facility: HOSPITAL | Age: 33
End: 2018-03-29

## 2018-07-02 ENCOUNTER — SURGERY (OUTPATIENT)
Age: 33
End: 2018-07-02

## 2018-07-02 ENCOUNTER — ANESTHESIA EVENT (OUTPATIENT)
Dept: ENDOSCOPY | Facility: HOSPITAL | Age: 33
End: 2018-07-02
Payer: COMMERCIAL

## 2018-07-02 ENCOUNTER — HOSPITAL ENCOUNTER (OUTPATIENT)
Facility: HOSPITAL | Age: 33
Discharge: HOME | End: 2018-07-02
Attending: INTERNAL MEDICINE | Admitting: INTERNAL MEDICINE
Payer: COMMERCIAL

## 2018-07-02 ENCOUNTER — LAB VISIT (OUTPATIENT)
Dept: LAB | Facility: HOSPITAL | Age: 33
End: 2018-07-02
Attending: INTERNAL MEDICINE
Payer: COMMERCIAL

## 2018-07-02 ENCOUNTER — TELEPHONE (OUTPATIENT)
Dept: ENDOSCOPY | Facility: HOSPITAL | Age: 33
End: 2018-07-02

## 2018-07-02 ENCOUNTER — ANESTHESIA (OUTPATIENT)
Dept: ENDOSCOPY | Facility: HOSPITAL | Age: 33
End: 2018-07-02
Payer: COMMERCIAL

## 2018-07-02 VITALS
BODY MASS INDEX: 29.02 KG/M2 | HEART RATE: 69 BPM | RESPIRATION RATE: 16 BRPM | TEMPERATURE: 98 F | HEIGHT: 64 IN | OXYGEN SATURATION: 100 % | DIASTOLIC BLOOD PRESSURE: 69 MMHG | WEIGHT: 170 LBS | SYSTOLIC BLOOD PRESSURE: 134 MMHG

## 2018-07-02 DIAGNOSIS — Z79.899 ENCOUNTER FOR MONITORING LONG-TERM PROTON PUMP INHIBITOR THERAPY: ICD-10-CM

## 2018-07-02 DIAGNOSIS — Z51.81 ENCOUNTER FOR MONITORING LONG-TERM PROTON PUMP INHIBITOR THERAPY: ICD-10-CM

## 2018-07-02 DIAGNOSIS — K22.10 EROSIVE ESOPHAGITIS: ICD-10-CM

## 2018-07-02 DIAGNOSIS — K44.9 HIATAL HERNIA: ICD-10-CM

## 2018-07-02 DIAGNOSIS — R10.13 EPIGASTRIC PAIN: ICD-10-CM

## 2018-07-02 DIAGNOSIS — R10.13 EPIGASTRIC PAIN: Primary | ICD-10-CM

## 2018-07-02 DIAGNOSIS — Z00.00 WELLNESS EXAMINATION: ICD-10-CM

## 2018-07-02 LAB
25(OH)D3+25(OH)D2 SERPL-MCNC: 33 NG/ML
ALBUMIN SERPL BCP-MCNC: 3.9 G/DL
ALP SERPL-CCNC: 50 U/L
ALT SERPL W/O P-5'-P-CCNC: 19 U/L
ANION GAP SERPL CALC-SCNC: 6 MMOL/L
AST SERPL-CCNC: 19 U/L
B-HCG UR QL: NEGATIVE
BASOPHILS # BLD AUTO: 0.03 K/UL
BASOPHILS NFR BLD: 0.4 %
BILIRUB DIRECT SERPL-MCNC: 0.2 MG/DL
BILIRUB SERPL-MCNC: 0.5 MG/DL
BUN SERPL-MCNC: 9 MG/DL
CALCIUM SERPL-MCNC: 9.1 MG/DL
CHLORIDE SERPL-SCNC: 109 MMOL/L
CO2 SERPL-SCNC: 25 MMOL/L
CREAT SERPL-MCNC: 0.8 MG/DL
CTP QC/QA: YES
DIFFERENTIAL METHOD: NORMAL
EOSINOPHIL # BLD AUTO: 0.4 K/UL
EOSINOPHIL NFR BLD: 5.6 %
ERYTHROCYTE [DISTWIDTH] IN BLOOD BY AUTOMATED COUNT: 13 %
EST. GFR  (AFRICAN AMERICAN): >60 ML/MIN/1.73 M^2
EST. GFR  (NON AFRICAN AMERICAN): >60 ML/MIN/1.73 M^2
FERRITIN SERPL-MCNC: 124 NG/ML
GLUCOSE SERPL-MCNC: 87 MG/DL
HCT VFR BLD AUTO: 40.3 %
HGB BLD-MCNC: 13.4 G/DL
IMM GRANULOCYTES # BLD AUTO: 0.02 K/UL
IMM GRANULOCYTES NFR BLD AUTO: 0.3 %
IRON SERPL-MCNC: 69 UG/DL
LIPASE SERPL-CCNC: 17 U/L
LYMPHOCYTES # BLD AUTO: 2.2 K/UL
LYMPHOCYTES NFR BLD: 30 %
MAGNESIUM SERPL-MCNC: 2.2 MG/DL
MCH RBC QN AUTO: 29.5 PG
MCHC RBC AUTO-ENTMCNC: 33.3 G/DL
MCV RBC AUTO: 89 FL
MONOCYTES # BLD AUTO: 0.4 K/UL
MONOCYTES NFR BLD: 5.3 %
NEUTROPHILS # BLD AUTO: 4.2 K/UL
NEUTROPHILS NFR BLD: 58.4 %
NRBC BLD-RTO: 0 /100 WBC
PLATELET # BLD AUTO: 220 K/UL
PMV BLD AUTO: 10.5 FL
POTASSIUM SERPL-SCNC: 4 MMOL/L
PROT SERPL-MCNC: 6.8 G/DL
RBC # BLD AUTO: 4.55 M/UL
SATURATED IRON: 18 %
SODIUM SERPL-SCNC: 140 MMOL/L
TOTAL IRON BINDING CAPACITY: 391 UG/DL
TRANSFERRIN SERPL-MCNC: 264 MG/DL
VIT B12 SERPL-MCNC: 475 PG/ML
WBC # BLD AUTO: 7.16 K/UL

## 2018-07-02 PROCEDURE — 25000003 PHARM REV CODE 250: Performed by: INTERNAL MEDICINE

## 2018-07-02 PROCEDURE — D9220A PRA ANESTHESIA: Mod: ANES,,, | Performed by: ANESTHESIOLOGY

## 2018-07-02 PROCEDURE — 82607 VITAMIN B-12: CPT

## 2018-07-02 PROCEDURE — 83735 ASSAY OF MAGNESIUM: CPT

## 2018-07-02 PROCEDURE — 37000008 HC ANESTHESIA 1ST 15 MINUTES: Performed by: INTERNAL MEDICINE

## 2018-07-02 PROCEDURE — 43239 EGD BIOPSY SINGLE/MULTIPLE: CPT | Mod: ,,, | Performed by: INTERNAL MEDICINE

## 2018-07-02 PROCEDURE — 85025 COMPLETE CBC W/AUTO DIFF WBC: CPT

## 2018-07-02 PROCEDURE — D9220A PRA ANESTHESIA: Mod: CRNA,,, | Performed by: NURSE ANESTHETIST, CERTIFIED REGISTERED

## 2018-07-02 PROCEDURE — 80048 BASIC METABOLIC PNL TOTAL CA: CPT

## 2018-07-02 PROCEDURE — 37000009 HC ANESTHESIA EA ADD 15 MINS: Performed by: INTERNAL MEDICINE

## 2018-07-02 PROCEDURE — 88305 TISSUE EXAM BY PATHOLOGIST: CPT | Performed by: PATHOLOGY

## 2018-07-02 PROCEDURE — 83690 ASSAY OF LIPASE: CPT

## 2018-07-02 PROCEDURE — 82728 ASSAY OF FERRITIN: CPT

## 2018-07-02 PROCEDURE — 27201012 HC FORCEPS, HOT/COLD, DISP: Performed by: INTERNAL MEDICINE

## 2018-07-02 PROCEDURE — 43239 EGD BIOPSY SINGLE/MULTIPLE: CPT | Performed by: INTERNAL MEDICINE

## 2018-07-02 PROCEDURE — 80076 HEPATIC FUNCTION PANEL: CPT

## 2018-07-02 PROCEDURE — 88305 TISSUE EXAM BY PATHOLOGIST: CPT | Mod: 26,,, | Performed by: PATHOLOGY

## 2018-07-02 PROCEDURE — 63600175 PHARM REV CODE 636 W HCPCS: Performed by: NURSE ANESTHETIST, CERTIFIED REGISTERED

## 2018-07-02 PROCEDURE — 83540 ASSAY OF IRON: CPT

## 2018-07-02 PROCEDURE — 82306 VITAMIN D 25 HYDROXY: CPT

## 2018-07-02 PROCEDURE — 81025 URINE PREGNANCY TEST: CPT | Performed by: INTERNAL MEDICINE

## 2018-07-02 PROCEDURE — 36415 COLL VENOUS BLD VENIPUNCTURE: CPT

## 2018-07-02 RX ORDER — PROPOFOL 10 MG/ML
VIAL (ML) INTRAVENOUS CONTINUOUS PRN
Status: DISCONTINUED | OUTPATIENT
Start: 2018-07-02 | End: 2018-07-02

## 2018-07-02 RX ORDER — PANTOPRAZOLE SODIUM 40 MG/1
40 TABLET, DELAYED RELEASE ORAL
Qty: 90 TABLET | Refills: 3 | Status: SHIPPED | OUTPATIENT
Start: 2018-07-02 | End: 2019-09-04 | Stop reason: SDUPTHER

## 2018-07-02 RX ORDER — PROPOFOL 10 MG/ML
VIAL (ML) INTRAVENOUS
Status: DISCONTINUED | OUTPATIENT
Start: 2018-07-02 | End: 2018-07-02

## 2018-07-02 RX ORDER — LIDOCAINE HCL/PF 100 MG/5ML
SYRINGE (ML) INTRAVENOUS
Status: DISCONTINUED | OUTPATIENT
Start: 2018-07-02 | End: 2018-07-02

## 2018-07-02 RX ORDER — SODIUM CHLORIDE 9 MG/ML
INJECTION, SOLUTION INTRAVENOUS CONTINUOUS
Status: DISCONTINUED | OUTPATIENT
Start: 2018-07-02 | End: 2018-07-02 | Stop reason: HOSPADM

## 2018-07-02 RX ORDER — PANTOPRAZOLE SODIUM 40 MG/1
40 TABLET, DELAYED RELEASE ORAL
Qty: 90 TABLET | Refills: 3 | Status: SHIPPED | OUTPATIENT
Start: 2018-07-02 | End: 2018-07-02 | Stop reason: SDUPTHER

## 2018-07-02 RX ADMIN — PROPOFOL 100 MG: 10 INJECTION, EMULSION INTRAVENOUS at 11:07

## 2018-07-02 RX ADMIN — PROPOFOL 125 MCG/KG/MIN: 10 INJECTION, EMULSION INTRAVENOUS at 11:07

## 2018-07-02 RX ADMIN — LIDOCAINE HYDROCHLORIDE 40 MG: 20 INJECTION, SOLUTION INTRAVENOUS at 11:07

## 2018-07-02 RX ADMIN — SODIUM CHLORIDE: 0.9 INJECTION, SOLUTION INTRAVENOUS at 10:07

## 2018-07-02 NOTE — ANESTHESIA POSTPROCEDURE EVALUATION
"Anesthesia Post Evaluation    Patient: Bushra Kahn    Procedure(s) Performed: Procedure(s) (LRB):  ESOPHAGOGASTRODUODENOSCOPY (EGD) (N/A)    Final Anesthesia Type: general  Patient location during evaluation: GI PACU  Patient participation: Yes- Able to Participate  Level of consciousness: awake and alert, awake and oriented  Post-procedure vital signs: reviewed and stable  Pain management: adequate  Airway patency: patent  PONV status at discharge: No PONV  Anesthetic complications: no      Cardiovascular status: blood pressure returned to baseline, stable and hemodynamically stable  Respiratory status: unassisted, spontaneous ventilation and room air  Hydration status: euvolemic  Follow-up not needed.        Visit Vitals  /69   Pulse 69   Temp 36.8 °C (98.2 °F)   Resp 16   Ht 5' 4" (1.626 m)   Wt 77.1 kg (170 lb)   LMP 06/17/2018   SpO2 100%   Breastfeeding? No   BMI 29.18 kg/m²       Pain/Caity Score: Pain Assessment Performed: Yes (7/2/2018 12:20 PM)  Presence of Pain: denies (7/2/2018 12:20 PM)  Caity Score: 10 (7/2/2018 12:02 PM)      "

## 2018-07-02 NOTE — TELEPHONE ENCOUNTER
Labs scheduled for today.  's assistant, Katlyn, will be in contact with her regarding an appointment.

## 2018-07-02 NOTE — ANESTHESIA PREPROCEDURE EVALUATION
07/02/2018  Bushra Kahn is a 33 y.o., female.   Past Medical History:   Diagnosis Date    Acid reflux      Past Surgical History:   Procedure Laterality Date    ADENOIDECTOMY  age 16    biopsy of breast  Right 09/2014    CHOLECYSTECTOMY  2013    Laparoscopic         Anesthesia Evaluation    I have reviewed the Patient Summary Reports.     I have reviewed the Medications.     Review of Systems  Anesthesia Hx:  Denies Hx of Anesthetic complications  Neg history of prior surgery. Denies Family Hx of Anesthesia complications.   Denies Personal Hx of Anesthesia complications.   Cardiovascular:   Exercise tolerance: good        Physical Exam  General:  Well nourished    Airway/Jaw/Neck:  Airway Findings: Mouth Opening: Normal Tongue: Normal  General Airway Assessment: Adult  Mallampati: I  TM Distance: Normal, at least 6 cm         Dental:  DENTAL FINDINGS: Normal   Chest/Lungs:  Chest/Lungs Clear    Heart/Vascular:  Heart Findings: Normal       Mental Status:  Mental Status Findings:  Alert and Oriented         Anesthesia Plan  Type of Anesthesia, risks & benefits discussed:  Anesthesia Type:  general  Patient's Preference:   Intra-op Monitoring Plan: standard ASA monitors  Intra-op Monitoring Plan Comments:   Post Op Pain Control Plan:   Post Op Pain Control Plan Comments:   Induction:   IV  Beta Blocker:  Patient is not currently on a Beta-Blocker (No further documentation required).       Informed Consent: Patient understands risks and agrees with Anesthesia plan.  Questions answered. Anesthesia consent signed with patient.  ASA Score: 1     Day of Surgery Review of History & Physical:    H&P update referred to the provider.         Ready For Surgery From Anesthesia Perspective.

## 2018-07-02 NOTE — H&P
[facility]  History & Physical    Subjective:      Chief Complaint/Reason for Admission:     EGD    Bushra Kahn is a 33 y.o. female.    Past Medical History:   Diagnosis Date    Acid reflux      Past Surgical History:   Procedure Laterality Date    ADENOIDECTOMY  age 16    biopsy of breast  Right 09/2014    CHOLECYSTECTOMY  2013    Laparoscopic     Family History   Problem Relation Age of Onset    Lupus Mother     Kidney disease Mother     Rheum arthritis Mother     Hypertension Father     Sleep apnea Father     Breast cancer Paternal Aunt 50    Cataracts Paternal Grandmother     Colon cancer Paternal Grandmother     Cataracts Paternal Grandfather     Cancer Paternal Grandfather         Lung cancer- smoker    Amblyopia Neg Hx     Blindness Neg Hx     Glaucoma Neg Hx     Macular degeneration Neg Hx     Retinal detachment Neg Hx     Strabismus Neg Hx     Stroke Neg Hx     Ovarian cancer Neg Hx     Diabetes Neg Hx     Melanoma Neg Hx     Psoriasis Neg Hx      Social History   Substance Use Topics    Smoking status: Current Some Day Smoker     Packs/day: 0.10     Years: 13.00     Types: Cigarettes    Smokeless tobacco: Never Used    Alcohol use 0.6 oz/week     1 Glasses of wine per week       PTA Medications   Medication Sig    glucosamine-chondroitin 500-400 mg tablet Take 2 tablets by mouth once daily.    PNV133-ferrous fumarate-FA (PRENATAL)  mg-mcg Tab Take 1 tablet by mouth once daily.     Review of patient's allergies indicates:   Allergen Reactions    Benzyl salicylate Rash     acne    Penicillins Other (See Comments)     M,om stated        Review of Systems   Constitutional: Negative for chills, fever and weight loss.   Respiratory: Negative for shortness of breath and wheezing.    Cardiovascular: Negative for chest pain.   Gastrointestinal: Negative for abdominal pain, constipation and diarrhea.       Objective:      Vital Signs (Most  Recent)  Temp: 99.1 °F (37.3 °C) (07/02/18 1019)  Pulse: 81 (07/02/18 1019)  Resp: 18 (07/02/18 1019)  BP: 112/67 (07/02/18 1019)  SpO2: 99 % (07/02/18 1019)    Vital Signs Range (Last 24H):  Temp:  [99.1 °F (37.3 °C)]   Pulse:  [81]   Resp:  [18]   BP: (112)/(67)   SpO2:  [99 %]     Physical Exam   Constitutional: She appears well-developed and well-nourished.   Cardiovascular: Normal rate.    Pulmonary/Chest: Effort normal and breath sounds normal.   Abdominal: Soft. Bowel sounds are normal.   Skin: Skin is warm and dry.   Psychiatric: She has a normal mood and affect. Her behavior is normal. Judgment and thought content normal.         Assessment:      Active Hospital Problems    Diagnosis  POA    Wellness examination [Z00.00]  Not Applicable      Resolved Hospital Problems    Diagnosis Date Resolved POA   No resolved problems to display.       Plan:    EGD direct access for epigastric pain and GERD

## 2018-07-02 NOTE — TRANSFER OF CARE
"Anesthesia Transfer of Care Note    Patient: Bushra Kahn    Procedure(s) Performed: Procedure(s) (LRB):  ESOPHAGOGASTRODUODENOSCOPY (EGD) (N/A)    Patient location: PACU    Anesthesia Type: general    Transport from OR: Transported from OR on room air with adequate spontaneous ventilation    Post pain: adequate analgesia    Post assessment: no apparent anesthetic complications and tolerated procedure well    Post vital signs: stable    Level of consciousness: awake, alert and oriented    Nausea/Vomiting: no nausea/vomiting    Complications: none    Transfer of care protocol was followed      Last vitals:   Visit Vitals  /67 (BP Location: Left arm, Patient Position: Lying)   Pulse 81   Temp 37.3 °C (99.1 °F) (Temporal)   Resp 18   Ht 5' 4" (1.626 m)   Wt 77.1 kg (170 lb)   LMP 06/17/2018   SpO2 99%   Breastfeeding? No   BMI 29.18 kg/m²     "

## 2018-07-02 NOTE — [employer] PATIENT INSTRUCTIONS
Discharge Summary/Instructions after an Endoscopic Procedure  Patient Name: Bushra Kahn  Patient MRN: [id number]  Patient YOB: 1985 Monday, July 02, 2018  Aurelio James MD  RESTRICTIONS:  During your procedure today, you received medications for sedation.  These   medications may affect your judgment, balance and coordination.  Therefore,   for 24 hours, you have the following restrictions:   - DO NOT drive a car, operate machinery, make legal/financial decisions,   sign important papers or drink alcohol.    ACTIVITY:  Today: no heavy lifting, straining or running due to procedural   sedation/anesthesia.  The following day: return to full activity including work.  DIET:  Eat and drink normally unless instructed otherwise.     TREATMENT FOR COMMON SIDE EFFECTS:  - Mild abdominal pain, nausea, belching, bloating or excessive gas:  rest,   eat lightly and use a heating pad.  - Sore Throat: treat with throat lozenges and/or gargle with warm salt   water.  - Because air was used during the procedure, expelling large amounts of air   from your rectum or belching is normal.  - If a bowel prep was taken, you may not have a bowel movement for 1-3 days.    This is normal.  SYMPTOMS TO WATCH FOR AND REPORT TO YOUR PHYSICIAN:  1. Abdominal pain or bloating, other than gas cramps.  2. Chest pain.  3. Back pain.  4. Signs of infection such as: chills or fever occurring within 24 hours   after the procedure.  5. Rectal bleeding, which would show as bright red, maroon, or black stools.   (A tablespoon of blood from the rectum is not serious, especially if   hemorrhoids are present.)  6. Vomiting.  7. Weakness or dizziness.  GO DIRECTLY TO THE NEAREST EMERGENCY ROOM IF YOU HAVE ANY OF THE FOLLOWING:      Difficulty breathing              Chills and/or fever over 101 F   Persistent vomiting and/or vomiting blood   Severe abdominal pain   Severe chest pain   Black, tarry stools   Bleeding- more than one  tablespoon   Any other symptom or condition that you feel may need urgent attention  Your doctor recommends these additional instructions:  If any biopsies were taken, your doctors clinic will contact you in 1 to 2   weeks with any results.  - Discharge patient to home.   - Follow an antireflux regimen.   - Await pathology results.   - Telephone endoscopist for pathology results in 2 weeks.   - Use Protonix (pantoprazole) 40 mg by mouth onec daily. Best taken 45   minutes before your first protein meal of the day - Breakfast.  - Repeat upper endoscopy in 8 weeks to check healing.   - Refer to a gastroenterologist at the next available appointment.   - The findings and recommendations were discussed with the patient.   - Return to referring physician.  For questions, problems or results please call your physician - Aurelio James MD at Work:  [phone].  [facility], EMERGENCY ROOM PHONE NUMBER: [phone]  IF A COMPLICATION OR EMERGENCY SITUATION ARISES AND YOU ARE UNABLE TO REACH   YOUR PHYSICIAN - GO DIRECTLY TO THE EMERGENCY ROOM.  Aurelio James MD  7/2/2018 11:51:21 AM  This report has been verified and signed electronically.  [employer]

## 2018-07-09 ENCOUNTER — TELEPHONE (OUTPATIENT)
Dept: ENDOSCOPY | Facility: HOSPITAL | Age: 33
End: 2018-07-09

## 2018-07-21 DIAGNOSIS — E61.1 IRON DEFICIENCY: Primary | ICD-10-CM

## 2018-07-21 RX ORDER — FERROUS SULFATE 325(65) MG
325 TABLET ORAL EVERY 12 HOURS
Qty: 60 TABLET | Refills: 4 | Status: SHIPPED | OUTPATIENT
Start: 2018-07-21 | End: 2020-10-05

## 2018-07-24 ENCOUNTER — TELEPHONE (OUTPATIENT)
Dept: GASTROENTEROLOGY | Facility: CLINIC | Age: 33
End: 2018-07-24

## 2018-07-24 NOTE — TELEPHONE ENCOUNTER
----- Message from Aurelio James MD sent at 7/21/2018  3:48 PM CDT -----  Katlyn - please tell patient that they are iron deficient but not anaemic and recommend that they take ferrous sulfate one 325mg pill every 12 hours for next 4 months and repeat fasting hemoglobin and Ferritin in 8 weeks - Orders placed.

## 2018-07-25 ENCOUNTER — TELEPHONE (OUTPATIENT)
Dept: GASTROENTEROLOGY | Facility: CLINIC | Age: 33
End: 2018-07-25

## 2018-07-25 NOTE — TELEPHONE ENCOUNTER
----- Message from Denita Casiano sent at 7/25/2018 10:55 AM CDT -----  Contact: self [phone]  Patient Returning Call from [facility]    Who Left Message for Patient: Katlyn  Communication Preference: self [phone]  Additional Information:

## 2018-07-26 ENCOUNTER — OFFICE VISIT (OUTPATIENT)
Dept: GASTROENTEROLOGY | Facility: CLINIC | Age: 33
End: 2018-07-26
Payer: COMMERCIAL

## 2018-07-26 VITALS
HEART RATE: 71 BPM | DIASTOLIC BLOOD PRESSURE: 78 MMHG | HEIGHT: 64 IN | SYSTOLIC BLOOD PRESSURE: 113 MMHG | WEIGHT: 165.81 LBS | BODY MASS INDEX: 28.31 KG/M2

## 2018-07-26 DIAGNOSIS — K20.90 ESOPHAGITIS: Primary | ICD-10-CM

## 2018-07-26 PROCEDURE — 99213 OFFICE O/P EST LOW 20 MIN: CPT | Mod: S$GLB,,, | Performed by: INTERNAL MEDICINE

## 2018-07-26 PROCEDURE — 99999 PR PBB SHADOW E&M-EST. PATIENT-LVL III: CPT | Mod: PBBFAC,,, | Performed by: INTERNAL MEDICINE

## 2018-07-26 PROCEDURE — 3008F BODY MASS INDEX DOCD: CPT | Mod: CPTII,S$GLB,, | Performed by: INTERNAL MEDICINE

## 2018-07-26 NOTE — PROGRESS NOTES
[facility]ology Clinic    Reason for visit: There were no encounter diagnoses.  Referring Provider/PCP: Hector Pedroza Jr, MD    History of Present Illness:  Bushra Kahn is a 33 y.o. female with a history of esophagitis who is presenting for initial evaluation of esophagitis.    She was seen by her PCP on 3/9/18 with lab work due to complaint of arthralgia of multiple joints remarkable for hgb 12.3, MCV 85. Due to GERD symptoms she was referred for endoscopy. She underwent EGD on 7/2/18 with Dr. James which was remarkable for grade C esophagitis for which she was recommended for PPI daily and follow-up EGD in 8 weeks. Lab work was remarkable for Ferritin 124, Fe 69, TIBC 391, Iron Sat 18% - for which she was placed on iron supplementation (has not yet started taking).    She notes that her symptoms first began ~3/2018 when she had a 2 week episode of abdominal pain (mid-epigastric 3-4/10) nausea and vomiting. She was seen by her PCP and referred for EGD evaluation. She notes that her symptoms improved since then but does continue to have intermittent dull aching mid-epigastric pain.    She does not feel she has had significant improvement since her recent EGD. She notes that her symptoms will be worsened intermittently, but can be provoked by fatty food or spicy meals (including yesterday with meatballs). She denies any other specific food trigger such as lactose. She notes that she is continuing to have heartburn symptoms 4-5/week for which she is taking the PPI daily and ~daily zantac. She will take the PPI first thing in the AM (4AM) but will not eat breakfast until 9AM (will drink sparking water and coffee in the AM (consuming 1-2c/day coffee). Denies any significant NSAIDS with exception of ibuprofen hansa-menses.    On review of history, she notes that she had a similar, but more intense, pain in 2013 with associated elevated LFT for which she was evaluated and underwent CCY with improvement  of her symptoms.    PEndoHx:  - EGD: (7/2/18) Dr. James. Patchy mildly erythematous mucosa in gastric body, pre-pyloric and pylorus. 2 cm hiatal hernia. LA Grade C esophagitis. Negative celiac. Negative h.pylori. Stomach benign mucosa.    Review of Systems:  Constitutional: no fever, chills or change in weight   Eyes: no visual changes   ENT: no sore throat or dysphagia  Respiratory: no cough or shortness of breath   Cardiovascular: no chest pain or palpitations   Gastrointestinal: as per HPI  Hematologic/Lymphatic: no easy bruising or lymphadenopathy   Musculoskeletal: no arthralgias or myalgias   Neurological: no change in mental status  Behavioral/Psych: no change in mood    Medical History:  Past Medical History:   Diagnosis Date    Acid reflux        Past Surgical History:   Procedure Laterality Date    ADENOIDECTOMY  age 16    biopsy of breast  Right 09/2014    CHOLECYSTECTOMY  2013    Laparoscopic    ESOPHAGOGASTRODUODENOSCOPY N/A 7/2/2018    Procedure: ESOPHAGOGASTRODUODENOSCOPY (EGD);  Surgeon: Aurelio James MD;  Location: [address]);  Service: Endoscopy;  Laterality: N/A;       Family History   Problem Relation Age of Onset    Lupus Mother     Kidney disease Mother     Rheum arthritis Mother     Hypertension Father     Sleep apnea Father     Breast cancer Paternal Aunt 50    Cataracts Paternal Grandmother     Colon cancer Paternal Grandmother     Cataracts Paternal Grandfather     Cancer Paternal Grandfather         Lung cancer- smoker    Amblyopia Neg Hx     Blindness Neg Hx     Glaucoma Neg Hx     Macular degeneration Neg Hx     Retinal detachment Neg Hx     Strabismus Neg Hx     Stroke Neg Hx     Ovarian cancer Neg Hx     Diabetes Neg Hx     Melanoma Neg Hx     Psoriasis Neg Hx    Paternal grandmother with colon cancer (70-80 years), father with polyp (is unaware of the details), paternal aunt with breast cancer (50s). No family history of esophageal, gastric,  or SI cancer. No history of pancreatic, kidney, ureter, bladder cancer.     Social History     Social History    Marital status:      Spouse name: N/A    Number of children: N/A    Years of education: N/A     Occupational History    Not on file.     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 No drug use, no smoking.    Current Outpatient Prescriptions on File Prior to Visit   Medication Sig Dispense Refill    ferrous sulfate 325 mg (65 mg iron) Tab tablet Take 1 tablet (325 mg total) by mouth every 12 (twelve) hours. 60 tablet 4    pantoprazole (PROTONIX) 40 MG tablet Take 1 tablet (40 mg total) by mouth before breakfast. 90 tablet 3    PNV133-ferrous fumarate-FA (PRENATAL)  mg-mcg Tab Take 1 tablet by mouth once daily. 30 tablet 6    [DISCONTINUED] glucosamine-chondroitin 500-400 mg tablet Take 2 tablets by mouth once daily.       No current facility-administered medications on file prior to visit.        Review of patient's allergies indicates:   Allergen Reactions    Benzyl salicylate Rash     acne    Penicillins Other (See Comments)     M,om stated       Physical Exam:  General: Alert and Oriented x3, no distress. Pleasant, wearing och[employer]ner scrubs  Vitals:    07/26/18 1343   BP: 113/78   Pulse: 71     HEENT: Normocephalic, Atraumatic. No scleral icterus.  Lymph: No cervical lymphadenopathy  Resp: Good air entry bilaterally, no adventitious sounds  Cardiac: S1 and S2  normal  Abdomen: Normoactive bowel sounds. Non-distended. Normal tympany. Soft. Tender to palpation mildly tender mid-epigastric. No peritoneal signs.  Extremities: No peripheral edema. Normal bilateral pedal and radial pulses.  Neurologic: No gross neurological Deficits  Psych: Calm, cooperative. Normal mood and affect.        Laboratory:  Lab Results   Component Value Date     07/02/2018    K 4.0 07/02/2018     07/02/2018    CO2 25 07/02/2018    BUN 9 07/02/2018    CREATININE 0.8 07/02/2018    CALCIUM 9.1 07/02/2018    ANIONGAP 6 (L) 07/02/2018    ESTGFRAFRICA >60.0 07/02/2018    EGFRNONAA >60.0 07/02/2018       Lab Results   Component Value Date    ALT 19 07/02/2018    AST 19 07/02/2018    ALKPHOS 50 (L) 07/02/2018    BILITOT 0.5 07/02/2018       Lab Results   Component Value Date    WBC 7.16 07/02/2018    HGB 13.4 07/02/2018    HCT 40.3 07/02/2018    MCV 89 07/02/2018     07/02/2018     Lab Results   Component Value Date    FERRITIN 124 07/02/2018     Lab Results   Component Value Date    IRON 69 07/02/2018    TIBC 391 07/02/2018    FERRITIN 124 07/02/2018         Microbiology:  No Pertinent Microbiology    Imaging:  No Pertinent Imaging    Assessment:  Bushra Kahn is a 33 y.o. female with GERD and mid-epigastric pain seen for direct-access EGD 7/2/18 which was remarkable for Grade C esophagitis.    Plan:  1. Repeat EGD in 8 weeks to re-evaluate esophagitis as greater than grade B and to exclude underlying Brooks's  2. Discussed proper technique of taking PPI (45 minutes prior to protein meal). As she has not been taking properly will start by proper technique before considering increased frequency.  3. Discussed that depending the type of polyp her father had on his colonoscopy she may require earlier screening (i.e. If 1st degree relative <60 with advanced polyp). She will speak with her father and try to obtain path report/colonoscopy report and bring to us for review.  4. Follow-up  in 6 months      Fredo Krishnan MD  Gastroenterology Fellow (PGY IV)  Pager: [phone]    No orders of the defined types were placed in this encounter.

## 2018-07-27 ENCOUNTER — PATIENT MESSAGE (OUTPATIENT)
Dept: GASTROENTEROLOGY | Facility: CLINIC | Age: 33
End: 2018-07-27

## 2018-07-27 ENCOUNTER — DOCUMENTATION ONLY (OUTPATIENT)
Dept: ENDOSCOPY | Facility: HOSPITAL | Age: 33
End: 2018-07-27

## 2018-07-27 ENCOUNTER — TELEPHONE (OUTPATIENT)
Dept: ENDOSCOPY | Facility: HOSPITAL | Age: 33
End: 2018-07-27

## 2018-07-27 NOTE — PROGRESS NOTES
Sound like you and your sibling should start Colorectal Cancer screening at age 40 with colonoscopies and every 5 years if normal.  ===View-only below this line===      ----- Message -----     From: Bushra Kahn     Sent: 7/27/2018  4:09 AM CDT       To: Aurelio James MD  Subject: Non-Urgent Medical    Following up with father's history. He had multiple polyps years ago - all removed and none were cancerous. He had to go for screening every 2 years, but since there were none found on the most recent scope, he now has to go every 3 years. Thanks!

## 2018-07-27 NOTE — PROGRESS NOTES
I was present with Fredo Krishnan MD the fellow during the above evaluation, including history and exam.  I discussed the case with the fellow and agree with the findings and plan as documented in the fellow's note.

## 2018-07-30 ENCOUNTER — PATIENT MESSAGE (OUTPATIENT)
Dept: HEPATOLOGY | Facility: HOSPITAL | Age: 33
End: 2018-07-30

## 2018-08-10 ENCOUNTER — PATIENT MESSAGE (OUTPATIENT)
Dept: INTERNAL MEDICINE | Facility: CLINIC | Age: 33
End: 2018-08-10

## 2018-08-27 ENCOUNTER — PATIENT MESSAGE (OUTPATIENT)
Dept: INTERNAL MEDICINE | Facility: CLINIC | Age: 33
End: 2018-08-27

## 2018-08-27 ENCOUNTER — LAB VISIT (OUTPATIENT)
Dept: LAB | Facility: HOSPITAL | Age: 33
End: 2018-08-27
Attending: INTERNAL MEDICINE
Payer: COMMERCIAL

## 2018-08-27 ENCOUNTER — OFFICE VISIT (OUTPATIENT)
Dept: INTERNAL MEDICINE | Facility: CLINIC | Age: 33
End: 2018-08-27
Payer: COMMERCIAL

## 2018-08-27 VITALS
HEART RATE: 69 BPM | WEIGHT: 169.31 LBS | DIASTOLIC BLOOD PRESSURE: 74 MMHG | HEIGHT: 64 IN | OXYGEN SATURATION: 99 % | BODY MASS INDEX: 28.91 KG/M2 | TEMPERATURE: 98 F | SYSTOLIC BLOOD PRESSURE: 136 MMHG

## 2018-08-27 DIAGNOSIS — F41.9 ANXIETY: ICD-10-CM

## 2018-08-27 DIAGNOSIS — F41.9 ANXIETY: Primary | ICD-10-CM

## 2018-08-27 LAB
HCG INTACT+B SERPL-ACNC: <1.2 MIU/ML
TSH SERPL DL<=0.005 MIU/L-ACNC: 1.41 UIU/ML

## 2018-08-27 PROCEDURE — 99999 PR PBB SHADOW E&M-EST. PATIENT-LVL IV: CPT | Mod: PBBFAC,,, | Performed by: INTERNAL MEDICINE

## 2018-08-27 PROCEDURE — 99213 OFFICE O/P EST LOW 20 MIN: CPT | Mod: S$GLB,,, | Performed by: INTERNAL MEDICINE

## 2018-08-27 PROCEDURE — 36415 COLL VENOUS BLD VENIPUNCTURE: CPT | Mod: PO

## 2018-08-27 PROCEDURE — 84443 ASSAY THYROID STIM HORMONE: CPT

## 2018-08-27 PROCEDURE — 84702 CHORIONIC GONADOTROPIN TEST: CPT

## 2018-08-27 PROCEDURE — 3008F BODY MASS INDEX DOCD: CPT | Mod: CPTII,S$GLB,, | Performed by: INTERNAL MEDICINE

## 2018-08-27 RX ORDER — ALPRAZOLAM 0.25 MG/1
TABLET ORAL
Qty: 20 TABLET | Refills: 0 | Status: SHIPPED | OUTPATIENT
Start: 2018-08-27 | End: 2021-02-11

## 2018-08-27 RX ORDER — SERTRALINE HYDROCHLORIDE 50 MG/1
50 TABLET, FILM COATED ORAL DAILY
Qty: 30 TABLET | Refills: 6 | Status: SHIPPED | OUTPATIENT
Start: 2018-08-27 | End: 2019-04-02 | Stop reason: SDUPTHER

## 2018-08-28 ENCOUNTER — TELEPHONE (OUTPATIENT)
Dept: INTERNAL MEDICINE | Facility: CLINIC | Age: 33
End: 2018-08-28

## 2018-08-28 NOTE — PROGRESS NOTES
Subjective:       Patient ID: Bushra Kahn is a 33 y.o. female.    Chief Complaint: Anxiety    HPIPt is anxious as  is working and going to school.  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  Feels panic attacks a few days per week - shaky, sweaty can't make a decision. Some trouble sleeping.  Review of Systems   Constitutional: Negative for activity change and unexpected weight change.   HENT: Negative for hearing loss, rhinorrhea and trouble swallowing.    Eyes: Negative for discharge and visual disturbance.   Respiratory: Negative for chest tightness, shortness of breath (PND or orthopnea) and wheezing.    Cardiovascular: Negative for chest pain and palpitations.   Gastrointestinal: Negative for abdominal pain, blood in stool, constipation, diarrhea, nausea and vomiting.   Endocrine: Negative for polydipsia and polyuria.   Genitourinary: Negative for difficulty urinating, dysuria, hematuria and menstrual problem.   Musculoskeletal: Negative for arthralgias, joint swelling and neck pain.   Neurological: Negative for seizures, syncope, weakness and headaches.   Psychiatric/Behavioral: Negative for confusion and dysphoric mood.       Objective:      Physical Exam   Constitutional: She is oriented to person, place, and time. She appears well-developed and well-nourished. No distress.   HENT:   Head: Normocephalic.   Mouth/Throat: Oropharynx is clear and moist.   Neck: Neck supple. No JVD present. No thyromegaly present.   Cardiovascular: Normal rate, regular rhythm, normal heart sounds and intact distal pulses. Exam reveals no gallop and no friction rub.   No murmur heard.  Pulmonary/Chest: Effort normal and breath sounds normal. She has no wheezes. She has no rales.   Abdominal: Soft. Bowel sounds are normal. She exhibits no distension and no mass. There is no tenderness. There is no rebound and no guarding.   Musculoskeletal: She exhibits no edema.    Lymphadenopathy:     She has no cervical adenopathy.   Neurological: She is alert and oriented to person, place, and time. She has normal reflexes.   Skin: Skin is warm and dry.   Psychiatric: She has a normal mood and affect. Her behavior is normal. Judgment and thought content normal.       Assessment:       1. Anxiety        Plan:   Anxiety  -     TSH; Future; Expected date: 08/27/2018  -     hCG, quantitative; Future; Expected date: 08/27/2018  -     Ambulatory consult to Psychiatry    -     Start sertraline (ZOLOFT) 50 MG tablet; Take 1 tablet (50 mg total) by mouth once daily.  Dispense: 30 tablet; Refill: 6  -     ALPRAZolam (XANAX) 0.25 MG tablet; Half - one tablet daily at outset of panic attack  Dispense: 20 tablet; Refill: 0

## 2018-10-01 PROBLEM — Z00.00 WELLNESS EXAMINATION: Status: RESOLVED | Noted: 2018-07-02 | Resolved: 2018-10-01

## 2018-11-01 ENCOUNTER — OFFICE VISIT (OUTPATIENT)
Dept: PSYCHIATRY | Facility: CLINIC | Age: 33
End: 2018-11-01
Payer: COMMERCIAL

## 2018-11-01 DIAGNOSIS — R69 DIAGNOSIS UNKNOWN: Primary | ICD-10-CM

## 2018-11-01 PROCEDURE — 90834 PSYTX W PT 45 MINUTES: CPT | Mod: S$GLB,,, | Performed by: SOCIAL WORKER

## 2018-11-06 ENCOUNTER — OFFICE VISIT (OUTPATIENT)
Dept: INTERNAL MEDICINE | Facility: CLINIC | Age: 33
End: 2018-11-06
Payer: COMMERCIAL

## 2018-11-06 VITALS
OXYGEN SATURATION: 98 % | WEIGHT: 178.56 LBS | BODY MASS INDEX: 30.48 KG/M2 | HEIGHT: 64 IN | TEMPERATURE: 98 F | SYSTOLIC BLOOD PRESSURE: 124 MMHG | HEART RATE: 62 BPM | DIASTOLIC BLOOD PRESSURE: 72 MMHG

## 2018-11-06 DIAGNOSIS — J40 BRONCHITIS: Primary | ICD-10-CM

## 2018-11-06 PROCEDURE — 99213 OFFICE O/P EST LOW 20 MIN: CPT | Mod: S$GLB,,, | Performed by: INTERNAL MEDICINE

## 2018-11-06 PROCEDURE — 99999 PR PBB SHADOW E&M-EST. PATIENT-LVL IV: CPT | Mod: PBBFAC,,, | Performed by: INTERNAL MEDICINE

## 2018-11-06 PROCEDURE — 3008F BODY MASS INDEX DOCD: CPT | Mod: CPTII,S$GLB,, | Performed by: INTERNAL MEDICINE

## 2018-11-06 RX ORDER — AZITHROMYCIN 250 MG/1
TABLET, FILM COATED ORAL
Qty: 6 TABLET | Refills: 0 | Status: SHIPPED | OUTPATIENT
Start: 2018-11-06 | End: 2019-02-08 | Stop reason: ALTCHOICE

## 2018-11-09 NOTE — PROGRESS NOTES
Subjective:       Patient ID: Bushra Kahn is a 33 y.o. female.    Chief Complaint: Follow-up    HPIPt feeling much better with lexapro - not having the panic attacks.  She is seeing a counselor.  Not suicidal or homicidal.  No CP or SOB.  Plans pregnancy next year.  Cough in the last two weeks.  Quit tobacco.  Gained weight probably from stopping smoking - will monitor.  Review of Systems   Respiratory: Negative for shortness of breath (PND or orthopnea).    Cardiovascular: Negative for chest pain (arm pain or jaw pain).   Gastrointestinal: Negative for abdominal pain, diarrhea, nausea and vomiting.   Genitourinary: Negative for dysuria.   Neurological: Negative for seizures, syncope and headaches.       Objective:      Physical Exam   Constitutional: She is oriented to person, place, and time. She appears well-developed and well-nourished. No distress.   HENT:   Head: Normocephalic.   Mouth/Throat: Oropharynx is clear and moist.   Neck: Neck supple. No JVD present. No thyromegaly present.   Cardiovascular: Normal rate, regular rhythm, normal heart sounds and intact distal pulses. Exam reveals no gallop and no friction rub.   No murmur heard.  Pulmonary/Chest: Effort normal and breath sounds normal. She has no wheezes. She has no rales.   Abdominal: Soft. Bowel sounds are normal. She exhibits no distension and no mass. There is no tenderness. There is no rebound and no guarding.   Musculoskeletal: She exhibits no edema.   Lymphadenopathy:     She has no cervical adenopathy.   Neurological: She is alert and oriented to person, place, and time. She has normal reflexes.   Skin: Skin is warm and dry.   Psychiatric: She has a normal mood and affect. Her behavior is normal. Judgment and thought content normal.       Assessment:       1. Bronchitis        Plan:   Bronchitis  -     azithromycin (Z-HOLGER) 250 MG tablet; Two today then one daily thereafter  Dispense: 6 tablet; Refill: 0    Anxiety  Continue sertraline for  6 months and counseling   Start prenatals or at least folic acid in prep for conception next fall  Keep gyn appt in Feb to discuss sertraline and any risks with pregnancy should she need it then    RTC 3 month to discuss weaning off of it

## 2018-11-13 ENCOUNTER — PATIENT MESSAGE (OUTPATIENT)
Dept: INTERNAL MEDICINE | Facility: CLINIC | Age: 33
End: 2018-11-13

## 2019-02-08 ENCOUNTER — OFFICE VISIT (OUTPATIENT)
Dept: OBSTETRICS AND GYNECOLOGY | Facility: CLINIC | Age: 34
End: 2019-02-08
Payer: COMMERCIAL

## 2019-02-08 VITALS
DIASTOLIC BLOOD PRESSURE: 72 MMHG | HEIGHT: 64 IN | BODY MASS INDEX: 31.44 KG/M2 | WEIGHT: 184.13 LBS | SYSTOLIC BLOOD PRESSURE: 120 MMHG

## 2019-02-08 DIAGNOSIS — Z01.419 ENCOUNTER FOR GYNECOLOGICAL EXAMINATION WITHOUT ABNORMAL FINDING: Primary | ICD-10-CM

## 2019-02-08 DIAGNOSIS — Z12.4 PAP SMEAR FOR CERVICAL CANCER SCREENING: ICD-10-CM

## 2019-02-08 PROCEDURE — 88175 CYTOPATH C/V AUTO FLUID REDO: CPT

## 2019-02-08 PROCEDURE — 87624 HPV HI-RISK TYP POOLED RSLT: CPT

## 2019-02-08 PROCEDURE — 99999 PR PBB SHADOW E&M-EST. PATIENT-LVL III: ICD-10-PCS | Mod: PBBFAC,,, | Performed by: OBSTETRICS & GYNECOLOGY

## 2019-02-08 PROCEDURE — 99395 PR PREVENTIVE VISIT,EST,18-39: ICD-10-PCS | Mod: S$GLB,,, | Performed by: OBSTETRICS & GYNECOLOGY

## 2019-02-08 PROCEDURE — 99395 PREV VISIT EST AGE 18-39: CPT | Mod: S$GLB,,, | Performed by: OBSTETRICS & GYNECOLOGY

## 2019-02-08 PROCEDURE — 99999 PR PBB SHADOW E&M-EST. PATIENT-LVL III: CPT | Mod: PBBFAC,,, | Performed by: OBSTETRICS & GYNECOLOGY

## 2019-02-08 NOTE — PATIENT INSTRUCTIONS
Preparing for Pregnancy  Even before you become pregnant, your health matters to your future baby. Adopt good health habits today. And take care of any medical problems you have before becoming pregnant.  Remember: As soon as you know you are pregnant, get regular prenatal care.   Things to consider  Read through the list below. The more items that describe you, the healthier you may be.  · I eat a balanced diet with fruits, vegetables, and whole grains  · I keep physically active.  · I have my health problems under control.  · My weight is about right.  · I dont smoke.  · I dont use recreational drugs.  · I dont have a drinking problem.  Think about the following:  · Who will help you through pregnancy and with childcare?  · Do you have health insurance?  · Do you have the money needed to cover childcare and other day-to-day child expenses?  · Will you be able to take the time you need away from your job for maternity needs and childcare?  Adopt a healthy lifestyle  Each of the following tips can improve your health as you prepare for pregnancy:  · Take a daily vitamin supplement that contains iron and folic acid (a vitamin that reduces the chance of some birth defects)   · Eat a high-fiber diet rich in fruits and vegetables.  · Exercise 3 or more times a week and at least 150 minutes weekly.  · Get within 15 pounds of your ideal weight.  The first weeks of pregnancy are the most important time in a babys development. Before you become pregnant:  · Dont use recreational drugs.  · Dont drink alcohol.  · Dont smoke.  Working with your healthcare provider  Your healthcare provider can help answer any questions you may have. Do you know when to stop taking birth control pills? Are any over-the-counter medicines safe for pregnant women? You can also ask about special care you may need if you have any of the following:  · Sexually transmitted diseases (STDs), like herpes or chlamydia  · Diabetes  · High blood  pressure  · Other chronic health problems   Date Last Reviewed: 8/16/2015  © 7759-2356 [employer]. [address] [place] [address], [place] [address]. All rights reserved. This information is not intended as a substitute for professional medical care. Always follow your healthcare professional's instructions.

## 2019-02-08 NOTE — PROGRESS NOTES
Subjective:       Patient ID: Bushra Kahn is a 33 y.o. female.    Chief Complaint:  Well Woman      History of Present Illness  HPI     Bushra Kahn is a 33 y.o. female  here for her annual GYN exam.  She and her spouse are considering trying for pregnancy soon.  She describes her periods as regular, light flow, lasting 7 days.   denies break through bleeding.   denies vaginal itching or irritation.  Denies vaginal discharge.  She is sexually active. She has had 1 partner for 7 years .  She uses condoms for contraception.   History of abnormal pap: No  Last Pap: approximate date  and was normal  Last MMG: None  Last Colonoscopy:  None  denies domestic violence. She does feel safe at home.     Past Medical History:   Diagnosis Date    Acid reflux      Past Surgical History:   Procedure Laterality Date    ADENOIDECTOMY  age 16    biopsy of breast  Right 2014    CHOLECYSTECTOMY      Laparoscopic    ESOPHAGOGASTRODUODENOSCOPY (EGD) N/A 2018    Performed by [facility] at [facility] ([facility]     Social History     Socioeconomic History    Marital status:      Spouse name: Not on file    Number of children: Not on file    Years of education: Not on file    Highest education level: Not on file   Social Needs    Financial resource strain: Not on file    Food insecurity - worry: Not on file    Food insecurity - inability: Not on file    Transportation needs - medical: Not on file    Transportation needs - non-medical: Not on file   Occupational History    Not on file   Tobacco Use    Smoking status: Former Smoker     Packs/day: 0.10     Years: 13.00     Pack years: 1.30     Types: Cigarettes     Last attempt to quit: 8/15/2018     Years since quittin.4    Smokeless tobacco: Never Used   Substance and Sexual Activity    Alcohol use: Yes     Alcohol/week: 0.6 oz     Types: 1 Glasses of wine per week    Drug use: No    Sexual activity: Yes     Partners:  "Male     Birth control/protection: None, Condom     Comment:  2016, together since : Huang   Other Topics Concern    Are you pregnant or think you may be? Not Asked    Breast-feeding Not Asked   Social History Narrative    Not on file     Family History   Problem Relation Age of Onset    Lupus Mother     Kidney disease Mother     Rheum arthritis Mother     Hypertension Father     Sleep apnea Father     Breast cancer Paternal Aunt 50    Cataracts Paternal Grandmother     Colon cancer Paternal Grandmother     Cataracts Paternal Grandfather     Cancer Paternal Grandfather         Lung cancer- smoker    Amblyopia Neg Hx     Blindness Neg Hx     Glaucoma Neg Hx     Macular degeneration Neg Hx     Retinal detachment Neg Hx     Strabismus Neg Hx     Stroke Neg Hx     Ovarian cancer Neg Hx     Diabetes Neg Hx     Melanoma Neg Hx     Psoriasis Neg Hx      OB History      Para Term  AB Living    0 0 0 0 0 0    SAB TAB Ectopic Multiple Live Births    0 0 0 0            /72 (BP Location: Left arm)   Ht 5' 4" (1.626 m)   Wt 83.5 kg (184 lb 1.6 oz)   LMP 2019 (Exact Date)   BMI 31.60 kg/m²         GYN & OB History  Patient's last menstrual period was 2019 (exact date).   Date of Last Pap: 10/12/2016    OB History    Para Term  AB Living   0 0 0 0 0 0   SAB TAB Ectopic Multiple Live Births   0 0 0 0               Review of Systems  Review of Systems   Constitutional: Negative for activity change, appetite change, fatigue and unexpected weight change.   HENT: Negative.    Eyes: Negative for visual disturbance.   Respiratory: Negative for shortness of breath and wheezing.    Cardiovascular: Negative for chest pain, palpitations and leg swelling.   Gastrointestinal: Negative for abdominal pain, bloating and blood in stool.   Endocrine: Negative for diabetes and hair loss.   Genitourinary: Negative for decreased libido, dyspareunia, " menorrhagia and menstrual problem.   Musculoskeletal: Negative for back pain and joint swelling.   Neurological: Negative for headaches.   Hematological: Does not bruise/bleed easily.   Psychiatric/Behavioral: Negative for depression and sleep disturbance. The patient is not nervous/anxious.    Breast: Negative for mastodynia and nipple discharge          Objective:      Physical Exam:   Constitutional: She is oriented to person, place, and time. She appears well-developed and well-nourished.    HENT:   Head: Normocephalic and atraumatic.    Eyes: EOM are normal. Pupils are equal, round, and reactive to light.    Neck: Normal range of motion. Neck supple.    Cardiovascular: Normal rate and regular rhythm.     Pulmonary/Chest: Effort normal and breath sounds normal.   BREASTS:  no mass, no tenderness, no deformity and no retraction. Right breast exhibits no inverted nipple, no mass, no nipple discharge, no skin change, no tenderness, no bleeding and no swelling. Left breast exhibits no inverted nipple, no mass, no nipple discharge, no skin change, no tenderness, no bleeding and no swelling. Breasts are symmetrical.              Abdominal: Soft. Bowel sounds are normal.     Genitourinary: Pelvic exam was performed with patient supine.   Genitourinary Comments: PELVIC: Normal external genitalia without lesions.  Normal hair distribution.  Adequate perineal body, normal urethral meatus.  Vagina moist and well rugated without lesions or discharge.  Cervix pink, without lesions, discharge or tenderness.  No significant cystocele or rectocele.  Bimanual exam shows uterus to be normal size, regular, mobile and nontender.  Adnexa without masses or tenderness.               Musculoskeletal: Normal range of motion and moves all extremeties.       Neurological: She is alert and oriented to person, place, and time.    Skin: Skin is warm and dry.    Psychiatric: She has a normal mood and affect.              Assessment:        1.  Encounter for gynecological examination without abnormal finding    2. Pap smear for cervical cancer screening                Plan:        1. Encounter for gynecological examination without abnormal finding  COUNSELING:  The patient was counseled today on osteoporosis prevention, calcium supplementation, and regular weight bearing exercise. The patient was also counseled today on ACS PAP guidelines, with recommendations for yearly pelvic exams unless their uterus, cervix, and ovaries were removed for benign reasons; in that case, examinations every 3-5 years are recommended. The patient was also counseled regarding monthly breast self-examination, routine STD screening for at-risk populations, prophylactic immunizations for transmitted infections such as HPV, Pertussis, or Influenza as appropriate, and yearly mammograms when indicated by ACS guidelines. She was advised to see her primary care physician for all other health maintenance.   FOLLOW-UP with me for next routine visit.     Counseled on optimal timing for pregnancy, rubella screen,  decreased alcohol and caffeine consumption, and decreased intake of seafood most likely to contain mercury.  Recommend daily 800mcg of folic acid.advised to stop Xanax, ok to continue Zoloft.      2. Pap smear for cervical cancer screening  - Liquid-based pap smear, screening  - HPV High Risk Genotypes, PCR       Follow-up in about 1 year (around 2/8/2020), or if symptoms worsen or fail to improve.

## 2019-02-13 LAB
HPV HR 12 DNA CVX QL NAA+PROBE: NEGATIVE
HPV16 AG SPEC QL: NEGATIVE
HPV18 DNA SPEC QL NAA+PROBE: NEGATIVE

## 2019-02-14 ENCOUNTER — PATIENT MESSAGE (OUTPATIENT)
Dept: OBSTETRICS AND GYNECOLOGY | Facility: CLINIC | Age: 34
End: 2019-02-14

## 2019-02-21 ENCOUNTER — OFFICE VISIT (OUTPATIENT)
Dept: OPTOMETRY | Facility: CLINIC | Age: 34
End: 2019-02-21
Payer: COMMERCIAL

## 2019-02-21 DIAGNOSIS — Z46.0 FITTING AND ADJUSTMENT OF SPECTACLES AND CONTACT LENSES: Primary | ICD-10-CM

## 2019-02-21 DIAGNOSIS — H52.13 MYOPIA OF BOTH EYES: Primary | ICD-10-CM

## 2019-02-21 PROCEDURE — 99999 PR PBB SHADOW E&M-EST. PATIENT-LVL II: ICD-10-PCS | Mod: PBBFAC,,, | Performed by: OPTOMETRIST

## 2019-02-21 PROCEDURE — 92310 CONTACT LENS FITTING OU: CPT | Mod: S$GLB,,, | Performed by: OPTOMETRIST

## 2019-02-21 PROCEDURE — 92015 PR REFRACTION: ICD-10-PCS | Mod: S$GLB,,, | Performed by: OPTOMETRIST

## 2019-02-21 PROCEDURE — 92004 COMPRE OPH EXAM NEW PT 1/>: CPT | Mod: S$GLB,,, | Performed by: OPTOMETRIST

## 2019-02-21 PROCEDURE — 92310 PR CONTACT LENS FITTING (NO CHANGE): ICD-10-PCS | Mod: S$GLB,,, | Performed by: OPTOMETRIST

## 2019-02-21 PROCEDURE — 92004 PR EYE EXAM, NEW PATIENT,COMPREHESV: ICD-10-PCS | Mod: S$GLB,,, | Performed by: OPTOMETRIST

## 2019-02-21 PROCEDURE — 92015 DETERMINE REFRACTIVE STATE: CPT | Mod: S$GLB,,, | Performed by: OPTOMETRIST

## 2019-02-21 PROCEDURE — 99999 PR PBB SHADOW E&M-EST. PATIENT-LVL II: CPT | Mod: PBBFAC,,, | Performed by: OPTOMETRIST

## 2019-02-21 NOTE — PROGRESS NOTES
HPI     BANDAR 01/2015  Patient wears contacts and needs updated RX today.  Contacts   comfortable and vision good.  Glasses about 8 yrs. Old still seem fine.    Not using any drops. Wearing AV2    Last edited by Mauro Caraballo, OD on 2/21/2019  9:01 AM. (History)        ROS     Negative for: Constitutional, Gastrointestinal, Neurological, Skin,   Genitourinary, Musculoskeletal, HENT, Endocrine, Cardiovascular, Eyes,   Respiratory, Psychiatric, Allergic/Imm, Heme/Lymph    Last edited by Mauro Caraballo, OD on 2/21/2019  9:01 AM. (History)        Assessment /Plan     For exam results, see Encounter Report.    Myopia of both eyes      1. Pt wearing AV2--discussed that brand to be discontinued, so switched her today to OASYS w good fit  2. Think she is over-minussed in current power, so will give less minus today  3. Hx overwear/SHANNON (see my prev notes).  Discussed importance of DW ONLY, and exchanging Cls MONTHLY    PLAN:    1. DISP trial CLs--OASYS in new [place]  2. Continue Daily Wear schedule.  NO SLEEPING IN CONTACT LENSES. Clean and disinfect nightly.  exchange monthly.    3. Pt will message me if she prefers her old chase, and I will rewrite CLRx in computer.  Otherwise if happy will call for CLRx, rtc 1 yr

## 2019-04-02 ENCOUNTER — PATIENT MESSAGE (OUTPATIENT)
Dept: INTERNAL MEDICINE | Facility: CLINIC | Age: 34
End: 2019-04-02

## 2019-04-02 RX ORDER — SERTRALINE HYDROCHLORIDE 50 MG/1
50 TABLET, FILM COATED ORAL DAILY
Qty: 30 TABLET | Refills: 6 | Status: SHIPPED | OUTPATIENT
Start: 2019-04-02 | End: 2019-11-04 | Stop reason: SDUPTHER

## 2019-04-02 NOTE — TELEPHONE ENCOUNTER
Hi Dr. Ferrari,   I went to refill my Zoloft 50 mg and there are no refills. Can you please approve the renewal? I know we were waiting to see what the gyno said about it with pregnancy (for when and if I decided to get pregnant) and she advised me to stay on it. Thank you!   Bushra Kahn

## 2019-04-04 ENCOUNTER — PATIENT MESSAGE (OUTPATIENT)
Dept: OPTOMETRY | Facility: CLINIC | Age: 34
End: 2019-04-04

## 2019-05-14 ENCOUNTER — PATIENT MESSAGE (OUTPATIENT)
Dept: INTERNAL MEDICINE | Facility: CLINIC | Age: 34
End: 2019-05-14

## 2019-05-14 ENCOUNTER — TELEPHONE (OUTPATIENT)
Dept: INTERNAL MEDICINE | Facility: CLINIC | Age: 34
End: 2019-05-14

## 2019-05-14 RX ORDER — AZITHROMYCIN 500 MG/1
500 TABLET, FILM COATED ORAL DAILY
Qty: 10 TABLET | Refills: 0 | Status: SHIPPED | OUTPATIENT
Start: 2019-05-14 | End: 2019-05-25

## 2019-08-20 ENCOUNTER — TELEPHONE (OUTPATIENT)
Dept: INTERNAL MEDICINE | Facility: CLINIC | Age: 34
End: 2019-08-20

## 2019-08-20 ENCOUNTER — HOSPITAL ENCOUNTER (OUTPATIENT)
Dept: RADIOLOGY | Facility: HOSPITAL | Age: 34
Discharge: HOME | End: 2019-08-20
Attending: INTERNAL MEDICINE
Payer: COMMERCIAL

## 2019-08-20 ENCOUNTER — PATIENT MESSAGE (OUTPATIENT)
Dept: INTERNAL MEDICINE | Facility: CLINIC | Age: 34
End: 2019-08-20

## 2019-08-20 DIAGNOSIS — T14.90XA INJURY: ICD-10-CM

## 2019-08-20 DIAGNOSIS — T14.90XA INJURY: Primary | ICD-10-CM

## 2019-08-20 PROCEDURE — 73110 X-RAY EXAM OF WRIST: CPT | Mod: TC,RT

## 2019-08-20 PROCEDURE — 73130 XR HAND COMPLETE 3 VIEW RIGHT: ICD-10-PCS | Mod: 26,RT,, | Performed by: RADIOLOGY

## 2019-08-20 PROCEDURE — 73130 X-RAY EXAM OF HAND: CPT | Mod: TC,RT

## 2019-08-20 PROCEDURE — 73110 XR WRIST COMPLETE 3 VIEWS RIGHT: ICD-10-PCS | Mod: 26,RT,, | Performed by: RADIOLOGY

## 2019-08-20 PROCEDURE — 73110 X-RAY EXAM OF WRIST: CPT | Mod: 26,RT,, | Performed by: RADIOLOGY

## 2019-08-20 PROCEDURE — 73130 X-RAY EXAM OF HAND: CPT | Mod: 26,RT,, | Performed by: RADIOLOGY

## 2019-08-20 NOTE — TELEPHONE ENCOUNTER
I spoke with patient her LMP wast around 07/28/19, she does not think she is pregnant. I scheduled the x-Ray for today at 2:00pmat the imaging center

## 2019-09-04 DIAGNOSIS — K22.10 EROSIVE ESOPHAGITIS: Primary | ICD-10-CM

## 2019-09-04 DIAGNOSIS — R10.13 EPIGASTRIC PAIN: ICD-10-CM

## 2019-09-04 DIAGNOSIS — K44.9 HIATAL HERNIA: ICD-10-CM

## 2019-09-04 DIAGNOSIS — K22.10 EROSIVE ESOPHAGITIS: ICD-10-CM

## 2019-09-04 DIAGNOSIS — Z79.899 ENCOUNTER FOR MONITORING LONG-TERM PROTON PUMP INHIBITOR THERAPY: ICD-10-CM

## 2019-09-04 DIAGNOSIS — Z51.81 ENCOUNTER FOR MONITORING LONG-TERM PROTON PUMP INHIBITOR THERAPY: ICD-10-CM

## 2019-09-04 RX ORDER — PANTOPRAZOLE SODIUM 40 MG/1
40 TABLET, DELAYED RELEASE ORAL
Qty: 90 TABLET | Refills: 3 | Status: SHIPPED | OUTPATIENT
Start: 2019-09-04 | End: 2020-02-10

## 2019-09-04 NOTE — TELEPHONE ENCOUNTER
Called pt re scheduling (past due for her yearly PPI labs orders placed and for her follow up EGD to check for her erosive esophagitis healing), no answer.  Lm for pt to return call.

## 2019-09-17 ENCOUNTER — PATIENT MESSAGE (OUTPATIENT)
Dept: INTERNAL MEDICINE | Facility: CLINIC | Age: 34
End: 2019-09-17

## 2019-09-18 ENCOUNTER — PATIENT MESSAGE (OUTPATIENT)
Dept: INTERNAL MEDICINE | Facility: CLINIC | Age: 34
End: 2019-09-18

## 2019-09-18 RX ORDER — SULFAMETHOXAZOLE AND TRIMETHOPRIM 800; 160 MG/1; MG/1
1 TABLET ORAL 2 TIMES DAILY
Qty: 14 TABLET | Refills: 0 | Status: SHIPPED | OUTPATIENT
Start: 2019-09-18 | End: 2019-09-25

## 2019-09-18 RX ORDER — PHENAZOPYRIDINE HYDROCHLORIDE 100 MG/1
100 TABLET, FILM COATED ORAL 3 TIMES DAILY PRN
Qty: 6 TABLET | Refills: 0 | Status: SHIPPED | OUTPATIENT
Start: 2019-09-18 | End: 2019-09-28

## 2019-09-18 NOTE — TELEPHONE ENCOUNTER
Burinng with urination - no back pain or fever - just finished cycle yesterday.  Bactrim and pyridium sent - call for lack of improvement.

## 2019-11-04 RX ORDER — SERTRALINE HYDROCHLORIDE 50 MG/1
50 TABLET, FILM COATED ORAL DAILY
Qty: 30 TABLET | Refills: 6 | Status: CANCELLED | OUTPATIENT
Start: 2019-11-04 | End: 2020-11-03

## 2019-11-05 ENCOUNTER — PATIENT MESSAGE (OUTPATIENT)
Dept: INTERNAL MEDICINE | Facility: CLINIC | Age: 34
End: 2019-11-05

## 2019-11-05 RX ORDER — SERTRALINE HYDROCHLORIDE 50 MG/1
50 TABLET, FILM COATED ORAL DAILY
Qty: 30 TABLET | Refills: 1 | Status: SHIPPED | OUTPATIENT
Start: 2019-11-05 | End: 2020-01-16 | Stop reason: SDUPTHER

## 2020-01-15 ENCOUNTER — PATIENT MESSAGE (OUTPATIENT)
Dept: INTERNAL MEDICINE | Facility: CLINIC | Age: 35
End: 2020-01-15

## 2020-01-15 RX ORDER — SERTRALINE HYDROCHLORIDE 50 MG/1
50 TABLET, FILM COATED ORAL DAILY
Qty: 30 TABLET | Refills: 1 | Status: CANCELLED | OUTPATIENT
Start: 2020-01-15 | End: 2021-01-14

## 2020-01-16 ENCOUNTER — PATIENT MESSAGE (OUTPATIENT)
Dept: INTERNAL MEDICINE | Facility: CLINIC | Age: 35
End: 2020-01-16

## 2020-01-16 RX ORDER — SERTRALINE HYDROCHLORIDE 50 MG/1
50 TABLET, FILM COATED ORAL DAILY
Qty: 30 TABLET | Refills: 1 | Status: CANCELLED | OUTPATIENT
Start: 2020-01-16 | End: 2021-01-15

## 2020-01-16 RX ORDER — SERTRALINE HYDROCHLORIDE 50 MG/1
50 TABLET, FILM COATED ORAL DAILY
Qty: 30 TABLET | Refills: 1 | Status: SHIPPED | OUTPATIENT
Start: 2020-01-16 | End: 2020-03-16 | Stop reason: SDUPTHER

## 2020-01-23 ENCOUNTER — PATIENT OUTREACH (OUTPATIENT)
Dept: ADMINISTRATIVE | Facility: HOSPITAL | Age: 35
End: 2020-01-23

## 2020-02-03 ENCOUNTER — OFFICE VISIT (OUTPATIENT)
Dept: OBSTETRICS AND GYNECOLOGY | Facility: CLINIC | Age: 35
End: 2020-02-03
Attending: OBSTETRICS & GYNECOLOGY
Payer: COMMERCIAL

## 2020-02-03 ENCOUNTER — PATIENT OUTREACH (OUTPATIENT)
Dept: ADMINISTRATIVE | Facility: OTHER | Age: 35
End: 2020-02-03

## 2020-02-03 VITALS
WEIGHT: 194.69 LBS | DIASTOLIC BLOOD PRESSURE: 62 MMHG | SYSTOLIC BLOOD PRESSURE: 120 MMHG | BODY MASS INDEX: 33.24 KG/M2 | HEIGHT: 64 IN

## 2020-02-03 DIAGNOSIS — Z01.419 ENCOUNTER FOR GYNECOLOGICAL EXAMINATION WITHOUT ABNORMAL FINDING: Primary | ICD-10-CM

## 2020-02-03 PROCEDURE — 99395 PREV VISIT EST AGE 18-39: CPT | Mod: S$GLB,,, | Performed by: OBSTETRICS & GYNECOLOGY

## 2020-02-03 PROCEDURE — 99395 PR PREVENTIVE VISIT,EST,18-39: ICD-10-PCS | Mod: S$GLB,,, | Performed by: OBSTETRICS & GYNECOLOGY

## 2020-02-03 NOTE — PATIENT INSTRUCTIONS
Preparing for Pregnancy  Even before you become pregnant, your health matters to your future baby. Adopt good health habits today. And take care of any medical problems you have before becoming pregnant.  Remember: As soon as you know you are pregnant, get regular prenatal care.   Things to consider  Read through the list below. The more items that describe you, the healthier you may be.  · I eat a balanced diet with fruits, vegetables, and whole grains  · I keep physically active.  · I have my health problems under control.  · My weight is about right.  · I dont smoke.  · I dont use recreational drugs.  · I dont have a drinking problem.  Think about the following:  · Who will help you through pregnancy and with childcare?  · Do you have health insurance?  · Do you have the money needed to cover childcare and other day-to-day child expenses?  · Will you be able to take the time you need away from your job for maternity needs and childcare?  Adopt a healthy lifestyle  Each of the following tips can improve your health as you prepare for pregnancy:  · Take a daily vitamin supplement that contains iron and folic acid (a vitamin that reduces the chance of some birth defects)   · Eat a high-fiber diet rich in fruits and vegetables.  · Exercise 3 or more times a week and at least 150 minutes weekly.  · Get within 15 pounds of your ideal weight.  The first weeks of pregnancy are the most important time in a babys development. Before you become pregnant:  · Dont use recreational drugs.  · Dont drink alcohol.  · Dont smoke.  Working with your healthcare provider  Your healthcare provider can help answer any questions you may have. Do you know when to stop taking birth control pills? Are any over-the-counter medicines safe for pregnant women? You can also ask about special care you may need if you have any of the following:  · Sexually transmitted diseases (STDs), like herpes or chlamydia  · Diabetes  · High blood  pressure  · Other chronic health problems   Date Last Reviewed: 8/16/2015  © 8195-5382 [employer]. [address] [place] [address], [place] [address]. All rights reserved. This information is not intended as a substitute for professional medical care. Always follow your healthcare professional's instructions.        Preparing for Pregnancy  Even before you become pregnant, your health matters to your future baby. Adopt good health habits today. And take care of any medical problems you have before becoming pregnant.  Remember: As soon as you know you are pregnant, get regular prenatal care.   Things to consider  Read through the list below. The more items that describe you, the healthier you may be.  · I eat a balanced diet with fruits, vegetables, and whole grains  · I keep physically active.  · I have my health problems under control.  · My weight is about right.  · I dont smoke.  · I dont use recreational drugs.  · I dont have a drinking problem.  Think about the following:  · Who will help you through pregnancy and with childcare?  · Do you have health insurance?  · Do you have the money needed to cover childcare and other day-to-day child expenses?  · Will you be able to take the time you need away from your job for maternity needs and childcare?  Adopt a healthy lifestyle  Each of the following tips can improve your health as you prepare for pregnancy:  · Take a daily vitamin supplement that contains iron and folic acid (a vitamin that reduces the chance of some birth defects)   · Eat a high-fiber diet rich in fruits and vegetables.  · Exercise 3 or more times a week and at least 150 minutes weekly.  · Get within 15 pounds of your ideal weight.  The first weeks of pregnancy are the most important time in a babys development. Before you become pregnant:  · Dont use recreational drugs.  · Dont drink alcohol.  · Dont smoke.  Working with your healthcare provider  Your healthcare provider can help  answer any questions you may have. Do you know when to stop taking birth control pills? Are any over-the-counter medicines safe for pregnant women? You can also ask about special care you may need if you have any of the following:  · Sexually transmitted diseases (STDs), like herpes or chlamydia  · Diabetes  · High blood pressure  · Other chronic health problems   Date Last Reviewed: 8/16/2015  © 4613-9175 [employer]. [address] [place] [address], [place] [address]. All rights reserved. This information is not intended as a substitute for professional medical care. Always follow your healthcare professional's instructions.

## 2020-02-03 NOTE — PROGRESS NOTES
Subjective:       Patient ID: Busrha Kahn is a 34 y.o. female.    Chief Complaint:  Annual Exam      History of Present Illness  HPI    Bushra Kahn is a 34 y.o. female  here for her annual GYN exam. She and her spouse are planning on trying for pregnancy, stopped preventing last month..   She describes her periods as regular, normal flow, lasting 7 days.   denies break through bleeding.   denies vaginal itching or irritation.  Denies vaginal discharge.  She is sexually active. She has had 1 partner for the past 8 years .  She uses no method for contraception.   History of abnormal pap: No  Last Pap: approximate date 2019 and was normal  denies domestic violence. She does feel safe at home.     Past Medical History:   Diagnosis Date    Acid reflux      Past Surgical History:   Procedure Laterality Date    ADENOIDECTOMY  age 16    biopsy of breast  Right 2014    CHOLECYSTECTOMY  2013    Laparoscopic    ESOPHAGOGASTRODUODENOSCOPY N/A 2018    Procedure: ESOPHAGOGASTRODUODENOSCOPY (EGD);  Surgeon: Aurelio James MD;  Location: [address];  Service: Endoscopy;  Laterality: N/A;     Social History     Socioeconomic History    Marital status:      Spouse name: Not on file    Number of children: Not on file    Years of education: Not on file    Highest education level: Not on file   Occupational History    Not on file   Social Needs    Financial resource strain: Not on file    Food insecurity:     Worry: Not on file     Inability: Not on file    Transportation needs:     Medical: Not on file     Non-medical: Not on file   Tobacco Use    Smoking status: Former Smoker     Packs/day: 0.10     Years: 13.00     Pack years: 1.30     Types: Cigarettes     Last attempt to quit: 8/15/2018     Years since quittin.4    Smokeless tobacco: Never Used   Substance and Sexual Activity    Alcohol use: Yes     Alcohol/week: 1.0 standard drinks     Types: 1 Glasses of wine per  "week    Drug use: No    Sexual activity: Yes     Partners: Male     Birth control/protection: None     Comment:  2016, together since : Huang   Lifestyle    Physical activity:     Days per week: Not on file     Minutes per session: Not on file    Stress: Not on file   Relationships    Social connections:     Talks on phone: Not on file     Gets together: Not on file     Attends Shinto service: Not on file     Active member of club or organization: Not on file     Attends meetings of clubs or organizations: Not on file     Relationship status: Not on file   Other Topics Concern    Are you pregnant or think you may be? Not Asked    Breast-feeding Not Asked   Social History Narrative    Not on file     Family History   Problem Relation Age of Onset    Lupus Mother     Kidney disease Mother     Rheum arthritis Mother     Hypertension Father     Sleep apnea Father     Breast cancer Paternal Aunt 50    Cataracts Paternal Grandmother     Colon cancer Paternal Grandmother     Cataracts Paternal Grandfather     Cancer Paternal Grandfather         Lung cancer- smoker    Amblyopia Neg Hx     Blindness Neg Hx     Glaucoma Neg Hx     Macular degeneration Neg Hx     Retinal detachment Neg Hx     Strabismus Neg Hx     Stroke Neg Hx     Ovarian cancer Neg Hx     Diabetes Neg Hx     Melanoma Neg Hx     Psoriasis Neg Hx      OB History        0    Para   0    Term   0       0    AB   0    Living   0       SAB   0    TAB   0    Ectopic   0    Multiple   0    Live Births                     /62   Ht 5' 4" (1.626 m)   Wt 88.3 kg (194 lb 10.7 oz)   LMP 2020   BMI 33.41 kg/m²         GYN & OB History  Patient's last menstrual period was 2020.   Date of Last Pap: 2/15/2019    OB History    Para Term  AB Living   0 0 0 0 0 0   SAB TAB Ectopic Multiple Live Births   0 0 0 0         Review of Systems  Review of Systems   Constitutional: " Negative for activity change, appetite change, fatigue and unexpected weight change.   HENT: Negative.    Eyes: Negative for visual disturbance.   Respiratory: Negative for shortness of breath and wheezing.    Cardiovascular: Negative for chest pain, palpitations and leg swelling.   Gastrointestinal: Negative for abdominal pain, bloating and blood in stool.   Endocrine: Negative for diabetes and hair loss.   Genitourinary: Negative for decreased libido, dyspareunia, menorrhagia and menstrual problem.   Musculoskeletal: Negative for back pain and joint swelling.   Integumentary:  Negative for acne, hair changes and nipple discharge.   Neurological: Negative for headaches.   Hematological: Does not bruise/bleed easily.   Psychiatric/Behavioral: Negative for depression and sleep disturbance. The patient is nervous/anxious.    Breast: Negative for mastodynia and nipple discharge          Objective:      Physical Exam:   Constitutional: She is oriented to person, place, and time. She appears well-developed and well-nourished.    HENT:   Head: Normocephalic and atraumatic.    Eyes: Pupils are equal, round, and reactive to light. EOM are normal.    Neck: Normal range of motion. Neck supple.    Cardiovascular: Normal rate and regular rhythm.     Pulmonary/Chest: Effort normal and breath sounds normal.   BREASTS:  no mass, no tenderness, no deformity and no retraction. Right breast exhibits no inverted nipple, no mass, no nipple discharge, no skin change, no tenderness, no bleeding and no swelling. Left breast exhibits no inverted nipple, no mass, no nipple discharge, no skin change, no tenderness, no bleeding and no swelling. Breasts are symmetrical.              Abdominal: Soft. Bowel sounds are normal.     Genitourinary: Pelvic exam was performed with patient supine.   Genitourinary Comments: PELVIC: Normal external genitalia without lesions.  Normal hair distribution.  Adequate perineal body, normal urethral meatus.   Vagina moist and well rugated without lesions or discharge.  Cervix pink, without lesions, discharge or tenderness.  No significant cystocele or rectocele.  Bimanual exam shows uterus to be normal size, regular, mobile and nontender.  Adnexa without masses or tenderness.               Musculoskeletal: Normal range of motion and moves all extremeties.       Neurological: She is alert and oriented to person, place, and time.    Skin: Skin is warm and dry.    Psychiatric: She has a normal mood and affect.              Assessment:        1. Encounter for gynecological examination without abnormal finding                Plan:        1. Encounter for gynecological examination without abnormal finding    Counseled on optimal timing for pregnancy, rubella screen, cystic fibrosis carrier screening, decreased alcohol and caffeine consumption, and decreased intake of seafood most likely to contain mercury.  Recommend daily 800mcg of folic acid.         Follow up in about 1 year (around 2/3/2021).

## 2020-02-04 ENCOUNTER — OFFICE VISIT (OUTPATIENT)
Dept: URGENT CARE | Facility: CLINIC | Age: 35
End: 2020-02-04
Payer: COMMERCIAL

## 2020-02-04 VITALS
RESPIRATION RATE: 17 BRPM | OXYGEN SATURATION: 98 % | DIASTOLIC BLOOD PRESSURE: 72 MMHG | WEIGHT: 194 LBS | HEIGHT: 64 IN | TEMPERATURE: 98 F | BODY MASS INDEX: 33.12 KG/M2 | SYSTOLIC BLOOD PRESSURE: 106 MMHG | HEART RATE: 84 BPM

## 2020-02-04 DIAGNOSIS — J02.9 SORE THROAT: Primary | ICD-10-CM

## 2020-02-04 LAB
CTP QC/QA: YES
MOLECULAR STREP A: NEGATIVE

## 2020-02-04 PROCEDURE — 87651 POCT STREP A MOLECULAR: ICD-10-PCS | Mod: QW,S$GLB,, | Performed by: FAMILY MEDICINE

## 2020-02-04 PROCEDURE — 87651 STREP A DNA AMP PROBE: CPT | Mod: QW,S$GLB,, | Performed by: FAMILY MEDICINE

## 2020-02-04 PROCEDURE — 99214 OFFICE O/P EST MOD 30 MIN: CPT | Mod: S$GLB,,, | Performed by: FAMILY MEDICINE

## 2020-02-04 PROCEDURE — 99214 PR OFFICE/OUTPT VISIT, EST, LEVL IV, 30-39 MIN: ICD-10-PCS | Mod: S$GLB,,, | Performed by: FAMILY MEDICINE

## 2020-02-04 NOTE — PROGRESS NOTES
"Subjective:       Patient ID: Bushra Kahn is a 34 y.o. female.    Vitals:  height is 5' 4" (1.626 m) and weight is 88 kg (194 lb). Her oral temperature is 97.5 °F (36.4 °C). Her blood pressure is 106/72 and her pulse is 84. Her respiration is 17 and oxygen saturation is 98%.     Chief Complaint: Sore Throat    This is a 34 y.o. female who presents today with a chief complaint of   Sore throat, and sinus drainage. Started yesterday. Pt taking Advil and warm salt water gargles     Sore Throat    This is a new problem. The current episode started yesterday. The problem has been unchanged. Neither side of throat is experiencing more pain than the other. There has been no fever. The pain is at a severity of 4/10. The pain is moderate. Associated symptoms include a hoarse voice. Pertinent negatives include no congestion, coughing, ear pain, shortness of breath, stridor or vomiting. She has had no exposure to strep or mono. She has tried NSAIDs and gargles for the symptoms. The treatment provided mild relief.       Constitution: Negative for chills, sweating, fatigue and fever.   HENT: Positive for postnasal drip and sore throat. Negative for ear pain, congestion, sinus pain, sinus pressure and voice change.    Neck: Negative for painful lymph nodes.   Eyes: Negative for eye redness.   Respiratory: Negative for chest tightness, cough, sputum production, bloody sputum, COPD, shortness of breath, stridor, wheezing and asthma.    Gastrointestinal: Negative for nausea and vomiting.   Musculoskeletal: Negative for muscle ache.   Skin: Negative for rash.   Allergic/Immunologic: Negative for seasonal allergies and asthma.   Hematologic/Lymphatic: Negative for swollen lymph nodes.       Objective:      Physical Exam   Constitutional: She appears well-developed and well-nourished.   HENT:   Head: Normocephalic and atraumatic.   Mouth/Throat: Posterior oropharyngeal erythema present. No oropharyngeal exudate.   Eyes: Pupils " are equal, round, and reactive to light. EOM are normal.   Neck: Normal range of motion. Neck supple.   Cardiovascular: Normal rate, regular rhythm and normal heart sounds.   Pulmonary/Chest: Effort normal and breath sounds normal.   Lymphadenopathy:     She has cervical adenopathy.   Nursing note and vitals reviewed.    Results for orders placed or performed in visit on 02/04/20   POCT Strep A, Molecular   Result Value Ref Range    Molecular Strep A, POC Negative Negative     Acceptable Yes          Assessment:       1. Sore throat        Plan:         Sore throat  -     POCT Strep A, Molecular  -     benzocaine-menthol (SORE THROAT, BENZOCAINE-MENTH,) 6-10 mg lozenge; Take 1 lozenge by mouth every 2 (two) hours as needed for Pain.  Dispense: 18 tablet; Refill: 0

## 2020-02-04 NOTE — PATIENT INSTRUCTIONS

## 2020-02-07 ENCOUNTER — TELEPHONE (OUTPATIENT)
Dept: URGENT CARE | Facility: CLINIC | Age: 35
End: 2020-02-07

## 2020-02-10 ENCOUNTER — OFFICE VISIT (OUTPATIENT)
Dept: INTERNAL MEDICINE | Facility: CLINIC | Age: 35
End: 2020-02-10
Payer: COMMERCIAL

## 2020-02-10 VITALS
WEIGHT: 195.13 LBS | SYSTOLIC BLOOD PRESSURE: 108 MMHG | BODY MASS INDEX: 33.31 KG/M2 | OXYGEN SATURATION: 95 % | TEMPERATURE: 98 F | DIASTOLIC BLOOD PRESSURE: 68 MMHG | HEART RATE: 101 BPM | HEIGHT: 64 IN

## 2020-02-10 DIAGNOSIS — Z00.00 WELLNESS EXAMINATION: Primary | ICD-10-CM

## 2020-02-10 LAB
BILIRUB UR QL STRIP: NEGATIVE
CLARITY UR REFRACT.AUTO: ABNORMAL
COLOR UR AUTO: YELLOW
GLUCOSE UR QL STRIP: NEGATIVE
HGB UR QL STRIP: NEGATIVE
KETONES UR QL STRIP: NEGATIVE
LEUKOCYTE ESTERASE UR QL STRIP: NEGATIVE
MICROSCOPIC COMMENT: NORMAL
NITRITE UR QL STRIP: NEGATIVE
PH UR STRIP: 5 [PH] (ref 5–8)
PROT UR QL STRIP: NEGATIVE
RBC #/AREA URNS AUTO: 0 /HPF (ref 0–4)
SP GR UR STRIP: 1.01 (ref 1–1.03)
SQUAMOUS #/AREA URNS AUTO: 0 /HPF
URN SPEC COLLECT METH UR: ABNORMAL
WBC #/AREA URNS AUTO: 0 /HPF (ref 0–5)

## 2020-02-10 PROCEDURE — 99395 PREV VISIT EST AGE 18-39: CPT | Mod: S$GLB,,, | Performed by: INTERNAL MEDICINE

## 2020-02-10 PROCEDURE — 99999 PR PBB SHADOW E&M-EST. PATIENT-LVL IV: CPT | Mod: PBBFAC,,, | Performed by: INTERNAL MEDICINE

## 2020-02-10 PROCEDURE — 81001 URINALYSIS AUTO W/SCOPE: CPT

## 2020-02-10 PROCEDURE — 99999 PR PBB SHADOW E&M-EST. PATIENT-LVL IV: ICD-10-PCS | Mod: PBBFAC,,, | Performed by: INTERNAL MEDICINE

## 2020-02-10 PROCEDURE — 99395 PR PREVENTIVE VISIT,EST,18-39: ICD-10-PCS | Mod: S$GLB,,, | Performed by: INTERNAL MEDICINE

## 2020-02-10 RX ORDER — AZITHROMYCIN 250 MG/1
TABLET, FILM COATED ORAL
Qty: 6 TABLET | Refills: 0 | Status: SHIPPED | OUTPATIENT
Start: 2020-02-10 | End: 2020-02-20 | Stop reason: ALTCHOICE

## 2020-02-10 NOTE — PROGRESS NOTES
Subjective:       Patient ID: Bushra Kahn is a 34 y.o. female.    Chief Complaint: Follow-up    HPIPt is trying to get pregnant - last month is the first month not using condoms.  Cycle is due this week- NEg home UPT yesterday.  Had cold with sore throat now just L ear pain.  Has pain across her feet and in lower back and R hip. No numbness or weakness - no bowel or bladder changes.  Some stress urinary incontinence with coughing.  Has looser bowels since hortensia and occasionally has bowel incontinence if she cannot make it to the restroom particularly after a fatty meal.    Review of Systems   Constitutional: Negative for activity change.   Eyes: Negative for discharge.   Respiratory: Negative for shortness of breath (PND or orthopnea) and wheezing.    Cardiovascular: Negative for chest pain and palpitations.   Gastrointestinal: Negative for abdominal pain, blood in stool, constipation, diarrhea, nausea and vomiting.   Genitourinary: Negative for difficulty urinating, dysuria and hematuria.   Neurological: Negative for seizures, syncope and headaches.   Psychiatric/Behavioral: Negative for dysphoric mood.       Objective:      Physical Exam   Constitutional: She is oriented to person, place, and time. She appears well-developed and well-nourished. No distress.   HENT:   Head: Normocephalic.   Mouth/Throat: Oropharynx is clear and moist.   Neck: Neck supple. No JVD present. No thyromegaly present.   Cardiovascular: Normal rate, regular rhythm, normal heart sounds and intact distal pulses. Exam reveals no gallop and no friction rub.   No murmur heard.  Pulmonary/Chest: Effort normal and breath sounds normal. She has no wheezes. She has no rales.   Abdominal: Soft. Bowel sounds are normal. She exhibits no distension and no mass. There is no tenderness. There is no rebound and no guarding.   Musculoskeletal: She exhibits no edema.   Some mild pain on flexion and ROM of R hip   Lymphadenopathy:     She has no  cervical adenopathy.   Neurological: She is alert and oriented to person, place, and time. She has normal reflexes.   Skin: Skin is warm and dry.   Psychiatric: She has a normal mood and affect. Her behavior is normal. Judgment and thought content normal.       Assessment:       1. Wellness examination        Plan:   Wellness examination  -     CBC auto differential; Future; Expected date: 02/10/2020  -     Comprehensive metabolic panel; Future; Expected date: 02/10/2020  -     Lipid panel; Future; Expected date: 02/10/2020  -     TSH; Future; Expected date: 02/10/2020  -     Hemoglobin A1c; Future; Expected date: 02/10/2020  -     Vitamin D; Future; Expected date: 02/10/2020  -     hCG, quantitative; Future; Expected date: 02/10/2020  -     Ambulatory referral/consult to ENT; Future; Expected date: 02/17/2020  -     Sedimentation rate; Future; Expected date: 02/10/2020  -     C-reactive protein; Future; Expected date: 02/10/2020  -     BK Screen w/Reflex; Future; Expected date: 02/10/2020  -     Rheumatoid factor; Future; Expected date: 02/10/2020  -     Cyclic citrul peptide antibody, IgG; Future; Expected date: 02/10/2020  -     Urinalysis  Otitis MEdia  -     azithromycin (Z-HOLGER) 250 MG tablet; Two today then one daily thereafter  Dispense: 6 tablet; Refill: 0

## 2020-02-11 ENCOUNTER — LAB VISIT (OUTPATIENT)
Dept: LAB | Facility: HOSPITAL | Age: 35
End: 2020-02-11
Attending: INTERNAL MEDICINE
Payer: COMMERCIAL

## 2020-02-11 DIAGNOSIS — Z00.00 WELLNESS EXAMINATION: ICD-10-CM

## 2020-02-11 LAB
25(OH)D3+25(OH)D2 SERPL-MCNC: 30 NG/ML (ref 30–96)
ALBUMIN SERPL BCP-MCNC: 3.9 G/DL (ref 3.5–5.2)
ALP SERPL-CCNC: 60 U/L (ref 55–135)
ALT SERPL W/O P-5'-P-CCNC: 27 U/L (ref 10–44)
ANION GAP SERPL CALC-SCNC: 8 MMOL/L (ref 8–16)
AST SERPL-CCNC: 19 U/L (ref 10–40)
BASOPHILS # BLD AUTO: 0.04 K/UL (ref 0–0.2)
BASOPHILS NFR BLD: 0.6 % (ref 0–1.9)
BILIRUB SERPL-MCNC: 0.4 MG/DL (ref 0.1–1)
BUN SERPL-MCNC: 12 MG/DL (ref 6–20)
CALCIUM SERPL-MCNC: 9.3 MG/DL (ref 8.7–10.5)
CCP AB SER IA-ACNC: 0.5 U/ML
CHLORIDE SERPL-SCNC: 106 MMOL/L (ref 95–110)
CHOLEST SERPL-MCNC: 188 MG/DL (ref 120–199)
CHOLEST/HDLC SERPL: 3.3 {RATIO} (ref 2–5)
CO2 SERPL-SCNC: 25 MMOL/L (ref 23–29)
CREAT SERPL-MCNC: 0.8 MG/DL (ref 0.5–1.4)
CRP SERPL-MCNC: 4.3 MG/L (ref 0–8.2)
DIFFERENTIAL METHOD: ABNORMAL
EOSINOPHIL # BLD AUTO: 0.5 K/UL (ref 0–0.5)
EOSINOPHIL NFR BLD: 6.7 % (ref 0–8)
ERYTHROCYTE [DISTWIDTH] IN BLOOD BY AUTOMATED COUNT: 12.5 % (ref 11.5–14.5)
ERYTHROCYTE [SEDIMENTATION RATE] IN BLOOD BY WESTERGREN METHOD: 15 MM/HR (ref 0–36)
EST. GFR  (AFRICAN AMERICAN): >60 ML/MIN/1.73 M^2
EST. GFR  (NON AFRICAN AMERICAN): >60 ML/MIN/1.73 M^2
ESTIMATED AVG GLUCOSE: 97 MG/DL (ref 68–131)
GLUCOSE SERPL-MCNC: 90 MG/DL (ref 70–110)
HBA1C MFR BLD HPLC: 5 % (ref 4–5.6)
HCG INTACT+B SERPL-ACNC: <1.2 MIU/ML
HCT VFR BLD AUTO: 40.2 % (ref 37–48.5)
HDLC SERPL-MCNC: 57 MG/DL (ref 40–75)
HDLC SERPL: 30.3 % (ref 20–50)
HGB BLD-MCNC: 12.7 G/DL (ref 12–16)
IMM GRANULOCYTES # BLD AUTO: 0.02 K/UL (ref 0–0.04)
IMM GRANULOCYTES NFR BLD AUTO: 0.3 % (ref 0–0.5)
LDLC SERPL CALC-MCNC: 96.6 MG/DL (ref 63–159)
LYMPHOCYTES # BLD AUTO: 1.8 K/UL (ref 1–4.8)
LYMPHOCYTES NFR BLD: 27.2 % (ref 18–48)
MCH RBC QN AUTO: 28.6 PG (ref 27–31)
MCHC RBC AUTO-ENTMCNC: 31.6 G/DL (ref 32–36)
MCV RBC AUTO: 91 FL (ref 82–98)
MONOCYTES # BLD AUTO: 0.4 K/UL (ref 0.3–1)
MONOCYTES NFR BLD: 5.4 % (ref 4–15)
NEUTROPHILS # BLD AUTO: 4 K/UL (ref 1.8–7.7)
NEUTROPHILS NFR BLD: 59.8 % (ref 38–73)
NONHDLC SERPL-MCNC: 131 MG/DL
NRBC BLD-RTO: 0 /100 WBC
PLATELET # BLD AUTO: 254 K/UL (ref 150–350)
PMV BLD AUTO: 10.2 FL (ref 9.2–12.9)
POTASSIUM SERPL-SCNC: 3.9 MMOL/L (ref 3.5–5.1)
PROT SERPL-MCNC: 7.4 G/DL (ref 6–8.4)
RBC # BLD AUTO: 4.44 M/UL (ref 4–5.4)
RHEUMATOID FACT SERPL-ACNC: <10 IU/ML (ref 0–15)
SODIUM SERPL-SCNC: 139 MMOL/L (ref 136–145)
TRIGL SERPL-MCNC: 172 MG/DL (ref 30–150)
TSH SERPL DL<=0.005 MIU/L-ACNC: 1.03 UIU/ML (ref 0.4–4)
WBC # BLD AUTO: 6.7 K/UL (ref 3.9–12.7)

## 2020-02-11 PROCEDURE — 84443 ASSAY THYROID STIM HORMONE: CPT

## 2020-02-11 PROCEDURE — 80061 LIPID PANEL: CPT

## 2020-02-11 PROCEDURE — 85652 RBC SED RATE AUTOMATED: CPT

## 2020-02-11 PROCEDURE — 82306 VITAMIN D 25 HYDROXY: CPT

## 2020-02-11 PROCEDURE — 86039 ANTINUCLEAR ANTIBODIES (ANA): CPT

## 2020-02-11 PROCEDURE — 86140 C-REACTIVE PROTEIN: CPT

## 2020-02-11 PROCEDURE — 86200 CCP ANTIBODY: CPT

## 2020-02-11 PROCEDURE — 85025 COMPLETE CBC W/AUTO DIFF WBC: CPT

## 2020-02-11 PROCEDURE — 84702 CHORIONIC GONADOTROPIN TEST: CPT

## 2020-02-11 PROCEDURE — 36415 COLL VENOUS BLD VENIPUNCTURE: CPT

## 2020-02-11 PROCEDURE — 86431 RHEUMATOID FACTOR QUANT: CPT

## 2020-02-11 PROCEDURE — 86038 ANTINUCLEAR ANTIBODIES: CPT

## 2020-02-11 PROCEDURE — 80053 COMPREHEN METABOLIC PANEL: CPT

## 2020-02-11 PROCEDURE — 86235 NUCLEAR ANTIGEN ANTIBODY: CPT | Mod: 59

## 2020-02-11 PROCEDURE — 83036 HEMOGLOBIN GLYCOSYLATED A1C: CPT

## 2020-02-12 ENCOUNTER — TELEPHONE (OUTPATIENT)
Dept: INTERNAL MEDICINE | Facility: CLINIC | Age: 35
End: 2020-02-12

## 2020-02-12 ENCOUNTER — PATIENT MESSAGE (OUTPATIENT)
Dept: OBSTETRICS AND GYNECOLOGY | Facility: CLINIC | Age: 35
End: 2020-02-12

## 2020-02-12 ENCOUNTER — PATIENT MESSAGE (OUTPATIENT)
Dept: INTERNAL MEDICINE | Facility: CLINIC | Age: 35
End: 2020-02-12

## 2020-02-12 ENCOUNTER — HOSPITAL ENCOUNTER (OUTPATIENT)
Dept: RADIOLOGY | Facility: HOSPITAL | Age: 35
Discharge: HOME | End: 2020-02-12
Attending: INTERNAL MEDICINE
Payer: COMMERCIAL

## 2020-02-12 DIAGNOSIS — Z51.6 ALLERGY DESENSITIZATION THERAPY: ICD-10-CM

## 2020-02-12 DIAGNOSIS — M25.551 PAIN OF RIGHT HIP JOINT: ICD-10-CM

## 2020-02-12 DIAGNOSIS — M25.551 PAIN OF RIGHT HIP JOINT: Primary | ICD-10-CM

## 2020-02-12 PROCEDURE — 73502 XR HIP 2 VIEW RIGHT: ICD-10-PCS | Mod: 26,RT,, | Performed by: RADIOLOGY

## 2020-02-12 PROCEDURE — 73502 X-RAY EXAM HIP UNI 2-3 VIEWS: CPT | Mod: TC,RT

## 2020-02-12 PROCEDURE — 73502 X-RAY EXAM HIP UNI 2-3 VIEWS: CPT | Mod: 26,RT,, | Performed by: RADIOLOGY

## 2020-02-13 LAB
ANA SER QL IF: POSITIVE
ANA TITR SER IF: NORMAL {TITER}

## 2020-02-17 ENCOUNTER — OFFICE VISIT (OUTPATIENT)
Dept: OTOLARYNGOLOGY | Facility: CLINIC | Age: 35
End: 2020-02-17
Payer: COMMERCIAL

## 2020-02-17 VITALS
SYSTOLIC BLOOD PRESSURE: 119 MMHG | WEIGHT: 196.63 LBS | TEMPERATURE: 97 F | DIASTOLIC BLOOD PRESSURE: 86 MMHG | HEART RATE: 71 BPM | HEIGHT: 64 IN | BODY MASS INDEX: 33.57 KG/M2

## 2020-02-17 DIAGNOSIS — H69.92 EUSTACHIAN TUBE DYSFUNCTION, LEFT: Primary | ICD-10-CM

## 2020-02-17 DIAGNOSIS — T75.3XXA MOTION SICKNESS, INITIAL ENCOUNTER: ICD-10-CM

## 2020-02-17 LAB
ANTI SM ANTIBODY: 0.25 RATIO (ref 0–0.99)
ANTI SM/RNP ANTIBODY: 0.18 RATIO (ref 0–0.99)
ANTI-SM INTERPRETATION: NEGATIVE
ANTI-SM/RNP INTERPRETATION: NEGATIVE
ANTI-SSA ANTIBODY: 0.12 RATIO (ref 0–0.99)
ANTI-SSA INTERPRETATION: NEGATIVE
ANTI-SSB ANTIBODY: 0.09 RATIO (ref 0–0.99)
ANTI-SSB INTERPRETATION: NEGATIVE
DSDNA AB SER-ACNC: NORMAL [IU]/ML

## 2020-02-17 PROCEDURE — 99999 PR PBB SHADOW E&M-EST. PATIENT-LVL III: ICD-10-PCS | Mod: PBBFAC,,, | Performed by: OTOLARYNGOLOGY

## 2020-02-17 PROCEDURE — 99999 PR PBB SHADOW E&M-EST. PATIENT-LVL III: CPT | Mod: PBBFAC,,, | Performed by: OTOLARYNGOLOGY

## 2020-02-17 PROCEDURE — 3008F BODY MASS INDEX DOCD: CPT | Mod: CPTII,S$GLB,, | Performed by: OTOLARYNGOLOGY

## 2020-02-17 PROCEDURE — 99203 OFFICE O/P NEW LOW 30 MIN: CPT | Mod: S$GLB,,, | Performed by: OTOLARYNGOLOGY

## 2020-02-17 PROCEDURE — 3008F PR BODY MASS INDEX (BMI) DOCUMENTED: ICD-10-PCS | Mod: CPTII,S$GLB,, | Performed by: OTOLARYNGOLOGY

## 2020-02-17 PROCEDURE — 99203 PR OFFICE/OUTPT VISIT, NEW, LEVL III, 30-44 MIN: ICD-10-PCS | Mod: S$GLB,,, | Performed by: OTOLARYNGOLOGY

## 2020-02-17 NOTE — PROGRESS NOTES
Otolaryngology Clinic Note    Bushra Kahn  Encounter Date: 2/17/2020   YOB: 1985  Referring Physician: Elsa Ferrari Md  [address] [place] [address]   PCP: Elsa Ferrari MD    Chief Complaint:   Chief Complaint   Patient presents with    Establish Care    Dizziness     occurs when turning head       HPI: Bushra Kahn is a 34 y.o. female here for dizziness. Reports she has always had problems with motion sickness her entire life.Triggers including riding in a plane, car, boat. Other triggers include scrolling through things quickly or watching moving car. Associated nausea and vomiting. Denies any associated tinnitus, hearing loss, aural fullness. Does report ear infection last week. Given z-pack. No headaches. She denies any sensation of motion or spinning. No issues unless in the above vehicles/modes of transportation or not looking at quickly moving objects. Does not have dizziness other than with motion or when watching things move quickly. Primarily here today to check ears. Last time she was on a plane she wore in ear ear plugs and did not get sick. First time she did not get sick.    Review of Systems   Constitutional: Negative for chills, fever and weight loss.   HENT: Negative for congestion, ear pain, hearing loss and sinus pain.    Eyes: Negative for blurred vision and double vision.   Respiratory: Negative for cough and shortness of breath.    Cardiovascular: Negative for chest pain and palpitations.   Gastrointestinal: Negative for nausea and vomiting.   Musculoskeletal: Negative for joint pain and neck pain.   Skin: Negative for rash.   Neurological: Positive for dizziness (motion sickness). Negative for focal weakness and headaches.   Psychiatric/Behavioral: Negative for depression. The patient is not nervous/anxious.         Review of patient's allergies indicates:   Allergen Reactions    Benzyl salicylate Rash     acne    Penicillins Other (See  Comments)     M,om stated    Sudafed [pseudoephedrine hcl] Other (See Comments)     Emotional lability       Past Medical History:   Diagnosis Date    Acid reflux        Past Surgical History:   Procedure Laterality Date    ADENOIDECTOMY  age 16    biopsy of breast  Right 2014    CHOLECYSTECTOMY      Laparoscopic    ESOPHAGOGASTRODUODENOSCOPY N/A 2018    Procedure: ESOPHAGOGASTRODUODENOSCOPY (EGD);  Surgeon: Aurelio James MD;  Location: [address];  Service: Endoscopy;  Laterality: N/A;       Social History     Socioeconomic History    Marital status:      Spouse name: Not on file    Number of children: Not on file    Years of education: Not on file    Highest education level: Not on file   Occupational History    Not on file   Social Needs    Financial resource strain: Not very hard    Food insecurity:     Worry: Never true     Inability: Never true    Transportation needs:     Medical: No     Non-medical: No   Tobacco Use    Smoking status: Former Smoker     Packs/day: 0.10     Years: 13.00     Pack years: 1.30     Types: Cigarettes     Last attempt to quit: 8/15/2018     Years since quittin.5    Smokeless tobacco: Never Used   Substance and Sexual Activity    Alcohol use: Yes     Alcohol/week: 1.0 standard drinks     Types: 1 Glasses of wine per week     Frequency: Monthly or less     Drinks per session: 3 or 4     Binge frequency: Less than monthly    Drug use: No    Sexual activity: Yes     Partners: Male     Birth control/protection: None     Comment:  2016, together since : [place]   Lifestyle    Physical activity:     Days per week: 1 day     Minutes per session: 10 min    Stress: Not at all   Relationships    Social connections:     Talks on phone: More than three times a week     Gets together: Once a week     Attends Alevism service: Not on file     Active member of club or organization: No     Attends meetings of clubs or  organizations: 1 to 4 times per year     Relationship status:    Other Topics Concern    Are you pregnant or think you may be? Not Asked    Breast-feeding Not Asked   Social History Narrative    Not on file       Family History   Problem Relation Age of Onset    Lupus Mother     Kidney disease Mother     Rheum arthritis Mother     Hypertension Father     Sleep apnea Father     Breast cancer Paternal Aunt 50    Cataracts Paternal Grandmother     Colon cancer Paternal Grandmother     Cataracts Paternal Grandfather     Cancer Paternal Grandfather         Lung cancer- smoker    Amblyopia Neg Hx     Blindness Neg Hx     Glaucoma Neg Hx     Macular degeneration Neg Hx     Retinal detachment Neg Hx     Strabismus Neg Hx     Stroke Neg Hx     Ovarian cancer Neg Hx     Diabetes Neg Hx     Melanoma Neg Hx     Psoriasis Neg Hx        Outpatient Encounter Medications as of 2/17/2020   Medication Sig Dispense Refill    PNV133-ferrous fumarate-FA (PRENATAL)  mg-mcg Tab Take 1 tablet by mouth once daily. 30 tablet 6    sertraline (ZOLOFT) 50 MG tablet Take 1 tablet (50 mg total) by mouth once daily. 30 tablet 1    ALPRAZolam (XANAX) 0.25 MG tablet take ½ to 1 tablet by mouth daily at outset of panic attack (Patient not taking: Reported on 2/17/2020) 20 tablet 0    azithromycin (Z-HOLGER) 250 MG tablet Two today then one daily thereafter (Patient not taking: Reported on 2/17/2020) 6 tablet 0    benzocaine-menthol (SORE THROAT, BENZOCAINE-MENTH,) 6-10 mg lozenge Take 1 lozenge by mouth every 2 (two) hours as needed for Pain. (Patient not taking: Reported on 2/10/2020) 18 tablet 0    ferrous sulfate 325 mg (65 mg iron) Tab tablet Take 1 tablet (325 mg total) by mouth every 12 (twelve) hours. 60 tablet 4     No facility-administered encounter medications on file as of 2/17/2020.        Physical Exam:    Vitals:    02/17/20 1304   BP: 119/86   Pulse: 71   Temp: 97.1 °F (36.2 °C)        Constitutional  General Appearance: well nourished, well-developed, alert, oriented, in no acute distress  Communication: ability, understanding, voice quality normal  Head and Face  Inspection: normocephalic, atraumatic, no scars, lesions or masses    Palpation: no stepoffs, sinus tenderness or masses  Parotid glands: no masses, stones, swelling or tenderness  Eyes  Ocular Motility / Alignment: normal alignment, motility, no proptosis, enophthalmus or nystagmus  Conjunctiva: not injected  Eyelids: no entropion or ectropion, no edema  Ears  Hearing: speech reception thresholds grossly normal; Pritchard: left Rinne: AC>BC  bilaterally  Vestibular exam: no nystagmus, negative Romberg, negative Fakuda step test, negative head thrust, negative Grimes-Hallpike bilaterally  External Ears: no auricle lesions, non-tender, mobile to palpation  Otoscopy:  Right Ear: no tympanic membrane lesions, perforations, or effusion, normal EAC  Left Ear: no tympanic membrane lesions, perforations, or effusion, normal EAC  Nose  External Nose: no lesions, tenderness, trauma or deformity  Intranasal Exam: no edema, erythema, discharge, mass or obstruction  Oral Cavity / Oropharynx  Lips: upper and lower lips pink and moist  Teeth: dentition good  Gingiva: healthy  Oral Mucosa: moist, no mucosal lesions  Floor of Mouth: normal, no lesions  Tongue: moist, normal mobility, no lesions  Palate: soft and hard palates without lesions or ulcers  Oropharynx: tonsils and walls without erythema, exudate, lesions, fullness or obstruction  Neck  Inspection and Palpation: no erythema, induration, emphysema, tenderness or masses  Larynx and Trachea: normal position and mobility   Thyroid: no tenderness, enlargement or nodules  Submandibular Glands: no masses or tenderness  Lymphatic:  Anterior, Posterior, Submandibular, Submental, Supraclavicular: no lymphadenopathy present  Chest / Respiratory  Chest: no stridor or retractions, normal effort and  expansion  Cardiovascular:  Pulses: 2+ radial pulses, regular rhythm and rate  Auscultation: deferred  Neurological  Cranial Nerves: grossly intact  General: no focal deficits  Psychiatric  Orientation: oriented to time, place and person  Mood and Affect: no depression, anxiety or agitation  Extremities  Inspection: moves all extremities well    Procedures:  No notes on file    Pertinent Data:  ? LABS:   ? AUDIO:  ? PATH:      I personally reviewed the following pertinent data at today's visit:    Imaging:   ? XRAY:  ? Ultrasound:  ? CT Scan:  ? MRI Scan:  ? PET/CT Scan:    I personally reviewed the following images:    Summary of Outside Records:      Assessment and Plan:  Bushra Kahn is a 34 y.o. female with     Eustachian tube dysfunction, left    Motion sickness, initial encounter  -     Ambulatory referral/consult to ENT    Tymps today with slight negative pressure on the left. Audiogram deferred since no otologic symptoms. Discussed symptoms that seem consistent with motion intolerance. Recommended trial of home vestibular exercises to see if they help. Recommend meclizine or scopolamine patches as needed for travel. If she has dizziness or develops vertigo that is not from motion, recommend return for re-evaluation.           MD Cristina HermanNorthern [facility]

## 2020-02-17 NOTE — PATIENT INSTRUCTIONS
Gaetano's Head Exercises    Cawthorne exercises are used to reinforce the associations between your balance mechanism and your eyes. The following exercises may help decrease the sensation of unsteadiness for certain types of dizziness. Virtually all patients using these exercises will note improved balance, but it may take a few weeks. Just as Physical Therapy may increase strength, balance can also be strengthened by doing these exercises repeatedly. It is important to start slowly because quick head movements can make anyone lightheaded at first. Slowly increase the speed and duration of exercises as tolerated.    These exercises are to be carried out for 15 minutes twice a day, increasing to 30 minutes.    Eye Exercises:  Look up, then down - at first slowly, then quickly - 20 times  Look from one side to the other - at first slowly, then quickly - 20 times  Focus on finger at arm's length moving one foot closer and back again - 20 times    Head Exercises:  Bend head forward then backward with eyes open - slowly, later quickly - 20 times  Turn head from one side to other side - slowly, then quickly - 20 times  As dizziness decreases these exercises should be done with eyes closed.    Sitting:  While sitting shrug shoulders - 20 times  Turn shoulders to right, then to left - 20 times  Bend forward and  objects from ground and sit up - 20 times    Standing:  Change from sitting to standing and back again - 20 times with eyes open  Repeat with eyes closed  Throw a small rubber ball from hand to hand above eye level.  Throw ball from hand to hand under one knee    Moving About:  Walk across room with eyes open, then closed - 10 times  Walk up and down a slope with eyes open, then closed - 10 times  Walk up and down steps with eyes open, then closed - 10 times  Any game involving stooping or turning is good

## 2020-02-18 ENCOUNTER — PATIENT OUTREACH (OUTPATIENT)
Dept: ADMINISTRATIVE | Facility: OTHER | Age: 35
End: 2020-02-18

## 2020-02-20 ENCOUNTER — OFFICE VISIT (OUTPATIENT)
Dept: ALLERGY | Facility: CLINIC | Age: 35
End: 2020-02-20
Payer: COMMERCIAL

## 2020-02-20 ENCOUNTER — PATIENT MESSAGE (OUTPATIENT)
Dept: ALLERGY | Facility: CLINIC | Age: 35
End: 2020-02-20

## 2020-02-20 VITALS — HEIGHT: 64 IN | WEIGHT: 195.31 LBS | BODY MASS INDEX: 33.34 KG/M2

## 2020-02-20 DIAGNOSIS — L30.9 DERMATITIS: Primary | ICD-10-CM

## 2020-02-20 DIAGNOSIS — Z88.0 PENICILLIN ALLERGY: ICD-10-CM

## 2020-02-20 PROCEDURE — 99999 PR PBB SHADOW E&M-EST. PATIENT-LVL III: CPT | Mod: PBBFAC,,, | Performed by: ALLERGY & IMMUNOLOGY

## 2020-02-20 PROCEDURE — 99243 PR OFFICE CONSULTATION,LEVEL III: ICD-10-PCS | Mod: S$GLB,,, | Performed by: ALLERGY & IMMUNOLOGY

## 2020-02-20 PROCEDURE — 99999 PR PBB SHADOW E&M-EST. PATIENT-LVL III: ICD-10-PCS | Mod: PBBFAC,,, | Performed by: ALLERGY & IMMUNOLOGY

## 2020-02-20 PROCEDURE — 99243 OFF/OP CNSLTJ NEW/EST LOW 30: CPT | Mod: S$GLB,,, | Performed by: ALLERGY & IMMUNOLOGY

## 2020-02-20 NOTE — PROGRESS NOTES
Subjective:       Patient ID: Bushra Kahn is a 34 y.o. female.    Chief Complaint:  Allergies (evaluate PCN allergy)      35 yo woman presents for consult from Dr Elsa Ferrari for PCN allergy. She does not know details abut her mom told her that as a child she had a rash, thinks was hives from PCN. she does not know if had any swelling or if needed to be hospitalized. Does not think was any blisters. She has no other antibiotic allergies. She had crazy emotional behavior from sudafed so avoids. She has no food, insect or latex allergy. No asthma or eczema. occ has rhinitis but no chronic sneeze, runny nose or congestion. No other medical issues but she is trying to get pregnant so would like to know if she can take PCN if needed.      Environmental History: see history section for home environment  Review of Systems   Constitutional: Negative for appetite change, chills, fatigue and fever.   HENT: Negative for congestion, ear discharge, ear pain, facial swelling, nosebleeds, postnasal drip, rhinorrhea, sinus pressure, sneezing, sore throat, trouble swallowing and voice change.    Eyes: Negative for discharge, redness, itching and visual disturbance.   Respiratory: Negative for cough, choking, chest tightness, shortness of breath and wheezing.    Cardiovascular: Negative for chest pain, palpitations and leg swelling.   Gastrointestinal: Negative for abdominal distention, abdominal pain, constipation, diarrhea, nausea and vomiting.   Genitourinary: Negative for difficulty urinating.   Musculoskeletal: Negative for arthralgias, gait problem, joint swelling and myalgias.   Skin: Negative for color change and rash.   Neurological: Negative for dizziness, syncope, weakness, light-headedness and headaches.   Hematological: Negative for adenopathy. Does not bruise/bleed easily.   Psychiatric/Behavioral: Negative for agitation, behavioral problems, confusion and sleep disturbance. The patient is not  nervous/anxious.         Objective:      Physical Exam   Constitutional: She is oriented to person, place, and time. She appears well-developed and well-nourished. No distress.   HENT:   Head: Normocephalic and atraumatic.   Right Ear: Hearing, tympanic membrane, external ear and ear canal normal.   Left Ear: Hearing, tympanic membrane, external ear and ear canal normal.   Nose: No mucosal edema, rhinorrhea, sinus tenderness or septal deviation. No epistaxis. Right sinus exhibits no maxillary sinus tenderness and no frontal sinus tenderness. Left sinus exhibits no maxillary sinus tenderness and no frontal sinus tenderness.   Mouth/Throat: Uvula is midline, oropharynx is clear and moist and mucous membranes are normal. No uvula swelling.   Eyes: Conjunctivae are normal. Right eye exhibits no discharge. Left eye exhibits no discharge.   Neck: Normal range of motion. No thyromegaly present.   Cardiovascular: Normal rate, regular rhythm and normal heart sounds.   No murmur heard.  Pulmonary/Chest: Effort normal and breath sounds normal. No respiratory distress. She has no wheezes.   Abdominal: Soft. She exhibits no distension. There is no tenderness.   Musculoskeletal: Normal range of motion. She exhibits no edema or tenderness.   Lymphadenopathy:     She has no cervical adenopathy.   Neurological: She is alert and oriented to person, place, and time.   Skin: Skin is warm and dry. No rash noted. No erythema.   Psychiatric: She has a normal mood and affect. Her behavior is normal. Judgment and thought content normal.   Nursing note and vitals reviewed.      Laboratory:   none performed   Assessment:       1. Dermatitis    2. Penicillin allergy         Plan:       1. advised pt unclear history to know if was IgE mediated allergy but after avoiding for over 10 years high chance no longer sensitized to PCN. will plan for skin test and if negative challenge. Risks and benefits reviewed. Pt will call to schedule when she has  her work schedule available  2. Dr Ferrari notified of completed consult via [employer]

## 2020-02-20 NOTE — LETTER
February 20, 2020      Elsa Ferrari MD  [address]  [facility] [address]           [place] - All[facility]  [address]  [place] [address]  Phone: [phone]          Patient: Bushra Kahn   MR Number: [id number]   YOB: 1985   Date of Visit: 2/20/2020       Dear Dr. Elsa Ferrari:    Thank you for referring Bushra Kahn to me for evaluation. Attached you will find relevant portions of my assessment and plan of care.    If you have questions, please do not hesitate to call me. I look forward to following Bushra Kahn along with you.    Sincerely,    Hillary Bergman MD    Enclosure  CC:  No Recipients    If you would like to receive this communication electronically, please contact [email or web address] or [phone] to request more information on [employer] access.    For providers and/or their staff who would like to refer a patient to [facility], please contact us through our one-stop-shop provider referral line, [facility], at [phone].    If you feel you have received this communication in error or would no longer like to receive these types of communications, please e-mail [email or web address]

## 2020-02-21 ENCOUNTER — PATIENT MESSAGE (OUTPATIENT)
Dept: ALLERGY | Facility: CLINIC | Age: 35
End: 2020-02-21

## 2020-02-27 ENCOUNTER — PATIENT OUTREACH (OUTPATIENT)
Dept: ADMINISTRATIVE | Facility: OTHER | Age: 35
End: 2020-02-27

## 2020-02-28 ENCOUNTER — OFFICE VISIT (OUTPATIENT)
Dept: ALLERGY | Facility: CLINIC | Age: 35
End: 2020-02-28
Payer: COMMERCIAL

## 2020-02-28 VITALS — HEART RATE: 82 BPM | OXYGEN SATURATION: 98 % | HEIGHT: 64 IN | BODY MASS INDEX: 33.91 KG/M2 | WEIGHT: 198.63 LBS

## 2020-02-28 DIAGNOSIS — Z88.0 PENICILLIN ALLERGY: ICD-10-CM

## 2020-02-28 DIAGNOSIS — L30.9 DERMATITIS: Primary | ICD-10-CM

## 2020-02-28 PROCEDURE — 95018 ALL TSTG PERQ&IQ DRUGS/BIOL: CPT | Mod: S$GLB,,, | Performed by: ALLERGY & IMMUNOLOGY

## 2020-02-28 PROCEDURE — 95018 PR PERQ&IC ALLG TEST DRUGS/BIOL: ICD-10-PCS | Mod: S$GLB,,, | Performed by: ALLERGY & IMMUNOLOGY

## 2020-02-28 PROCEDURE — 95076 INGEST CHALLENGE INI 120 MIN: CPT | Mod: S$GLB,,, | Performed by: ALLERGY & IMMUNOLOGY

## 2020-02-28 PROCEDURE — 95076 PR INGESTION CHALLENGE TEST; INITIAL 120 MIN: ICD-10-PCS | Mod: S$GLB,,, | Performed by: ALLERGY & IMMUNOLOGY

## 2020-02-28 PROCEDURE — 99213 OFFICE O/P EST LOW 20 MIN: CPT | Mod: S$GLB,,, | Performed by: ALLERGY & IMMUNOLOGY

## 2020-02-28 PROCEDURE — 99999 PR PBB SHADOW E&M-EST. PATIENT-LVL III: CPT | Mod: PBBFAC,,, | Performed by: ALLERGY & IMMUNOLOGY

## 2020-02-28 PROCEDURE — 99999 PR PBB SHADOW E&M-EST. PATIENT-LVL III: ICD-10-PCS | Mod: PBBFAC,,, | Performed by: ALLERGY & IMMUNOLOGY

## 2020-02-28 PROCEDURE — 99213 PR OFFICE/OUTPT VISIT, EST, LEVL III, 20-29 MIN: ICD-10-PCS | Mod: S$GLB,,, | Performed by: ALLERGY & IMMUNOLOGY

## 2020-02-28 NOTE — PROGRESS NOTES
Subjective:       Patient ID: Bushra Kahn is a 34 y.o. female.    Chief Complaint:  Allergy Testing (pcn challenge)      33 yo woman presents for continued evaluation of PCN allergy. She was last seen 2/20/2020. She does not know details abut her mom told her that as a child she had a rash, thinks was hives from PCN. she does not know if had any swelling or if needed to be hospitalized. Does not think was any blisters. She has no other antibiotic allergies.    Prior History taken 2/20/2020: consult from Dr Elsa Ferrari for PCN allergy. She does not know details abut her mom told her that as a child she had a rash, thinks was hives from PCN. she does not know if had any swelling or if needed to be hospitalized. Does not think was any blisters. She has no other antibiotic allergies. She had crazy emotional behavior from sudafed so avoids. She has no food, insect or latex allergy. No asthma or eczema. occ has rhinitis but no chronic sneeze, runny nose or congestion. No other medical issues but she is trying to get pregnant so would like to know if she can take PCN if needed.      Environmental History: see history section for home environment  Review of Systems   Constitutional: Negative for appetite change, chills, fatigue and fever.   HENT: Negative for congestion, ear discharge, ear pain, facial swelling, nosebleeds, postnasal drip, rhinorrhea, sinus pressure, sneezing, sore throat, trouble swallowing and voice change.    Eyes: Negative for discharge, redness, itching and visual disturbance.   Respiratory: Negative for cough, choking, chest tightness, shortness of breath and wheezing.    Cardiovascular: Negative for chest pain, palpitations and leg swelling.   Gastrointestinal: Negative for abdominal distention, abdominal pain, constipation, diarrhea, nausea and vomiting.   Genitourinary: Negative for difficulty urinating.   Musculoskeletal: Negative for arthralgias, gait problem, joint swelling and  myalgias.   Skin: Negative for color change and rash.   Neurological: Negative for dizziness, syncope, weakness, light-headedness and headaches.   Hematological: Negative for adenopathy. Does not bruise/bleed easily.   Psychiatric/Behavioral: Negative for agitation, behavioral problems, confusion and sleep disturbance. The patient is not nervous/anxious.         Objective:      Physical Exam   Constitutional: She is oriented to person, place, and time. She appears well-developed and well-nourished. No distress.   HENT:   Head: Normocephalic and atraumatic.   Right Ear: Hearing, tympanic membrane, external ear and ear canal normal.   Left Ear: Hearing, tympanic membrane, external ear and ear canal normal.   Nose: No mucosal edema, rhinorrhea, sinus tenderness or septal deviation. No epistaxis. Right sinus exhibits no maxillary sinus tenderness and no frontal sinus tenderness. Left sinus exhibits no maxillary sinus tenderness and no frontal sinus tenderness.   Mouth/Throat: Uvula is midline, oropharynx is clear and moist and mucous membranes are normal. No uvula swelling.   Eyes: Conjunctivae are normal. Right eye exhibits no discharge. Left eye exhibits no discharge.   Neck: Normal range of motion. No thyromegaly present.   Cardiovascular: Normal rate, regular rhythm and normal heart sounds.   No murmur heard.  Pulmonary/Chest: Effort normal and breath sounds normal. No respiratory distress. She has no wheezes.   Abdominal: Soft. She exhibits no distension. There is no tenderness.   Musculoskeletal: Normal range of motion. She exhibits no edema or tenderness.   Lymphadenopathy:     She has no cervical adenopathy.   Neurological: She is alert and oriented to person, place, and time.   Skin: Skin is warm and dry. No rash noted. No erythema.   Psychiatric: She has a normal mood and affect. Her behavior is normal. Judgment and thought content normal.   Nursing note and vitals reviewed.      Laboratory:   none performed       Skin testing with penicillin: This type of testing assesses the risk for an IgE-mediated, immediate-type hypersensitivity reaction following administration of penicillin. The spectrum of symptoms that this test will assess the risk for includes: urticaria, angioedema, laryngeal edema, bronchospasm and shock. This test does not assess the risk for symptoms produced by non-immunologic mechanisms or other immunologic mechanisms including serum sickness, interstitial nephritis, immunologic cytopenias, Damon-Grant syndrome, erythema multiforme, toxic epidermal necrolysis, etc.    Prick test at 1405 with 3+ histamine and negative prepen, penG, and saline   Intradermals done at 1425 with 3+ histamine and negative prepen, penG, and saline    Amoxicillin challenge  Total time: 70 minutes  The risks and benefits of an ingestion challenge with amoxicillin was reviewed with the patient. After verbal consent was obtained, 750mg of amoxicillin PO was administered. The patient was observed for an hour with no change in physical exam.    Results:  No immediate type hypersensitivity to amoxicillin  Interpretation: The patient has no evidence of immediate-type hypersensitivity to amoxicillin, and should be able to tolerate penicillin-class antibiotics without an increased risk of anaphylactic reaction. The patient was counseled that delayed-type allergy may take more than 24 hours to develop. While these reactions are not life-threatening, please call the on-call allergy fellow in the event that any symptoms develop.   Assessment:       1. Dermatitis    2. Penicillin allergy         Plan:       1. The patient has no evidence of immediate-type hypersensitivity to amoxicillin, and should be able to tolerate penicillin-class antibiotics without an increased risk of anaphylactic reaction. The patient was counseled that delayed-type allergy may take more than 24 hours to develop. While these reactions are not life-threatening,  please call the on-call allergy fellow in the event that any symptoms develop. Note: this does not determine the patient's risk for future development of penicillin-specific IgE

## 2020-03-16 RX ORDER — SERTRALINE HYDROCHLORIDE 50 MG/1
50 TABLET, FILM COATED ORAL DAILY
Qty: 30 TABLET | Refills: 1 | Status: SHIPPED | OUTPATIENT
Start: 2020-03-16 | End: 2020-05-18 | Stop reason: SDUPTHER

## 2020-03-23 ENCOUNTER — PATIENT MESSAGE (OUTPATIENT)
Dept: OBSTETRICS AND GYNECOLOGY | Facility: CLINIC | Age: 35
End: 2020-03-23

## 2020-04-02 ENCOUNTER — TELEPHONE (OUTPATIENT)
Dept: INTERNAL MEDICINE | Facility: CLINIC | Age: 35
End: 2020-04-02

## 2020-04-02 ENCOUNTER — PATIENT MESSAGE (OUTPATIENT)
Dept: INTERNAL MEDICINE | Facility: CLINIC | Age: 35
End: 2020-04-02

## 2020-04-02 RX ORDER — BENZONATATE 100 MG/1
100 CAPSULE ORAL 3 TIMES DAILY PRN
Qty: 30 CAPSULE | Refills: 0 | Status: SHIPPED | OUTPATIENT
Start: 2020-04-02 | End: 2020-04-12

## 2020-04-02 RX ORDER — AZITHROMYCIN 250 MG/1
TABLET, FILM COATED ORAL
Qty: 6 TABLET | Refills: 0 | Status: SHIPPED | OUTPATIENT
Start: 2020-04-02 | End: 2020-10-05

## 2020-04-02 NOTE — TELEPHONE ENCOUNTER
Pt with ear ache and slight cough had similar a few months ago that resolved with reymundo jones - call for SOB.  No fever or fatigue - denies pregnancy - not trying this month due to COVID.

## 2020-04-21 DIAGNOSIS — Z01.84 ANTIBODY RESPONSE EXAMINATION: ICD-10-CM

## 2020-04-22 ENCOUNTER — LAB VISIT (OUTPATIENT)
Dept: LAB | Facility: HOSPITAL | Age: 35
End: 2020-04-22
Attending: INTERNAL MEDICINE
Payer: COMMERCIAL

## 2020-04-22 DIAGNOSIS — Z01.84 ANTIBODY RESPONSE EXAMINATION: ICD-10-CM

## 2020-04-22 LAB — SARS-COV-2 IGG SERPL QL IA: NEGATIVE

## 2020-04-22 PROCEDURE — 86769 SARS-COV-2 COVID-19 ANTIBODY: CPT

## 2020-04-22 PROCEDURE — 36415 COLL VENOUS BLD VENIPUNCTURE: CPT

## 2020-05-18 RX ORDER — SERTRALINE HYDROCHLORIDE 50 MG/1
50 TABLET, FILM COATED ORAL DAILY
Qty: 30 TABLET | Refills: 1 | Status: SHIPPED | OUTPATIENT
Start: 2020-05-18 | End: 2020-07-13 | Stop reason: SDUPTHER

## 2020-05-18 NOTE — TELEPHONE ENCOUNTER
Care Due:                  Date            Visit Type   Department     Provider  --------------------------------------------------------------------------------    Last Visit: None Found      None         None Found  Next Visit: None Scheduled  None         None Found                                                            Last  Test          Frequency    Reason                     Performed    Due Date  --------------------------------------------------------------------------------    Office Visit  6 months...  ALPRAZolam, ferrous,       Not Found    Overdue                             sertraline...............    Powered by [employer]. Reference number: [id number]. 5/18/2020 11:28:55 AM   [place]

## 2020-06-16 ENCOUNTER — TELEPHONE (OUTPATIENT)
Dept: OBSTETRICS AND GYNECOLOGY | Facility: CLINIC | Age: 35
End: 2020-06-16

## 2020-06-16 DIAGNOSIS — N92.6 MENSTRUAL DISORDER: Primary | ICD-10-CM

## 2020-06-16 NOTE — TELEPHONE ENCOUNTER
----- Message from Lora Chavarria sent at 6/16/2020  3:08 PM CDT -----  Regarding: missed call  Pt is returning a missed call.    Pt call [phone]

## 2020-06-17 ENCOUNTER — LAB VISIT (OUTPATIENT)
Dept: LAB | Facility: HOSPITAL | Age: 35
End: 2020-06-17
Attending: OBSTETRICS & GYNECOLOGY
Payer: COMMERCIAL

## 2020-06-17 DIAGNOSIS — N92.6 MENSTRUAL DISORDER: ICD-10-CM

## 2020-06-17 LAB — HCG INTACT+B SERPL-ACNC: <1.2 MIU/ML

## 2020-06-17 PROCEDURE — 36415 COLL VENOUS BLD VENIPUNCTURE: CPT

## 2020-06-17 PROCEDURE — 84702 CHORIONIC GONADOTROPIN TEST: CPT

## 2020-07-13 RX ORDER — SERTRALINE HYDROCHLORIDE 50 MG/1
50 TABLET, FILM COATED ORAL DAILY
Qty: 30 TABLET | Refills: 1 | Status: SHIPPED | OUTPATIENT
Start: 2020-07-13 | End: 2020-09-24 | Stop reason: SDUPTHER

## 2020-08-12 ENCOUNTER — PATIENT OUTREACH (OUTPATIENT)
Dept: ADMINISTRATIVE | Facility: OTHER | Age: 35
End: 2020-08-12

## 2020-08-12 ENCOUNTER — OFFICE VISIT (OUTPATIENT)
Dept: OBSTETRICS AND GYNECOLOGY | Facility: CLINIC | Age: 35
End: 2020-08-12
Payer: COMMERCIAL

## 2020-08-12 DIAGNOSIS — N91.2 AMENORRHEA: Primary | ICD-10-CM

## 2020-08-12 DIAGNOSIS — Z32.01 POSITIVE PREGNANCY TEST: ICD-10-CM

## 2020-08-12 PROCEDURE — 99213 OFFICE O/P EST LOW 20 MIN: CPT | Mod: 95,,, | Performed by: NURSE PRACTITIONER

## 2020-08-12 PROCEDURE — 99213 PR OFFICE/OUTPT VISIT, EST, LEVL III, 20-29 MIN: ICD-10-PCS | Mod: 95,,, | Performed by: NURSE PRACTITIONER

## 2020-08-12 NOTE — PROGRESS NOTES
The patient location is: work  The chief complaint leading to consultation is: pregnancy questions     Visit type: audiovisual    Face to Face time with patient: 15 minutes   20 minutes of total time spent on the encounter, which includes face to face time and non-face to face time preparing to see the patient (eg, review of tests), Obtaining and/or reviewing separately obtained history, Documenting clinical information in the electronic or other health record, Independently interpreting results (not separately reported) and communicating results to the patient/family/caregiver, or Care coordination (not separately reported).         Each patient to whom he or she provides medical services by telemedicine is:  (1) informed of the relationship between the physician and patient and the respective role of any other health care provider with respect to management of the patient; and (2) notified that he or she may decline to receive medical services by telemedicine and may withdraw from such care at any time.    Notes:     CC: Positive Pregnancy Test    HISTORY OF PRESENT ILLNESS:    Bushra Kahn is a 35 y.o. female, ,  Presents today for a routine exam complaining of amenorrhea and positive home urine pregnancy test.  No LMP recorded.   She is not currently on any contraception.  Pt with h/o acid reflux.  She has some questions regarding facial products and vitamin supplementation.   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       ROS:  GENERAL: No weight changes. No swelling. No fatigue. No fever.  CARDIOVASCULAR: No chest pain. No shortness of breath. No leg cramps.   NEUROLOGICAL: No headaches. No vision changes.  BREASTS: No pain. No lumps. No discharge.  ABDOMEN: No pain. No diarrhea. No constipation.  REPRODUCTIVE: No abnormal bleeding.   VULVA: No pain. No lesions. No itching.  VAGINA: No relaxation. No itching. No odor. No discharge. No lesions.  URINARY: No incontinence. No nocturia. No  frequency. No dysuria.    MEDICATIONS AND ALLERGIES:  Reviewed    PE:  AFFECT: Calm, alert and oriented X 3. Interactive during exam  GENERAL: Appears well-nourished, well-developed, in no acute distress.  Deferred - telemedicine visit         ASSESSMENT/PLAN:  Amenorrhea  Positive urine pregnancy test (TED: 4/7/20, EGA: 6w0d based on LMP)    -  Routine prenatal care      Discussed avoidance of Retional containing products   Okay to use Benzol peroxide and salicylic acid products sparingly     Dating US in 2 weeks   FOLLOW-UP in 6 weeks with Dr.Parise Bushra Beatty, NP    OB/GYN

## 2020-08-17 RX ORDER — PYRIDOXINE HCL (VITAMIN B6) 25 MG
25 TABLET ORAL DAILY
Qty: 30 TABLET | Refills: 6 | Status: SHIPPED | OUTPATIENT
Start: 2020-08-17 | End: 2020-10-05

## 2020-08-18 ENCOUNTER — TELEPHONE (OUTPATIENT)
Dept: OBSTETRICS AND GYNECOLOGY | Facility: CLINIC | Age: 35
End: 2020-08-18

## 2020-08-18 DIAGNOSIS — Z36.3 ENCOUNTER FOR ANTENATAL SCREENING FOR MALFORMATION USING ULTRASOUND: Primary | ICD-10-CM

## 2020-09-01 ENCOUNTER — LAB VISIT (OUTPATIENT)
Dept: LAB | Facility: OTHER | Age: 35
End: 2020-09-01
Attending: NURSE PRACTITIONER
Payer: COMMERCIAL

## 2020-09-01 ENCOUNTER — OFFICE VISIT (OUTPATIENT)
Dept: OBSTETRICS AND GYNECOLOGY | Facility: CLINIC | Age: 35
End: 2020-09-01
Payer: COMMERCIAL

## 2020-09-01 ENCOUNTER — PROCEDURE VISIT (OUTPATIENT)
Dept: OBSTETRICS AND GYNECOLOGY | Facility: CLINIC | Age: 35
End: 2020-09-01
Payer: COMMERCIAL

## 2020-09-01 VITALS
HEIGHT: 64 IN | BODY MASS INDEX: 36.66 KG/M2 | WEIGHT: 214.75 LBS | DIASTOLIC BLOOD PRESSURE: 70 MMHG | SYSTOLIC BLOOD PRESSURE: 116 MMHG

## 2020-09-01 DIAGNOSIS — Z32.01 POSITIVE PREGNANCY TEST: ICD-10-CM

## 2020-09-01 DIAGNOSIS — Z36.3 ENCOUNTER FOR ANTENATAL SCREENING FOR MALFORMATION USING ULTRASOUND: ICD-10-CM

## 2020-09-01 DIAGNOSIS — N91.5 OLIGOMENORRHEA, UNSPECIFIED TYPE: Primary | ICD-10-CM

## 2020-09-01 DIAGNOSIS — Z36.89 ESTABLISH GESTATIONAL AGE, ULTRASOUND: ICD-10-CM

## 2020-09-01 DIAGNOSIS — N91.5 OLIGOMENORRHEA, UNSPECIFIED TYPE: ICD-10-CM

## 2020-09-01 LAB
ABO + RH BLD: NORMAL
B-HCG UR QL: POSITIVE
BASOPHILS # BLD AUTO: 0.04 K/UL (ref 0–0.2)
BASOPHILS NFR BLD: 0.4 % (ref 0–1.9)
BLD GP AB SCN CELLS X3 SERPL QL: NORMAL
CTP QC/QA: YES
DIFFERENTIAL METHOD: ABNORMAL
EOSINOPHIL # BLD AUTO: 0.2 K/UL (ref 0–0.5)
EOSINOPHIL NFR BLD: 2.2 % (ref 0–8)
ERYTHROCYTE [DISTWIDTH] IN BLOOD BY AUTOMATED COUNT: 13.3 % (ref 11.5–14.5)
HCT VFR BLD AUTO: 40.3 % (ref 37–48.5)
HGB BLD-MCNC: 12.8 G/DL (ref 12–16)
IMM GRANULOCYTES # BLD AUTO: 0.05 K/UL (ref 0–0.04)
IMM GRANULOCYTES NFR BLD AUTO: 0.5 % (ref 0–0.5)
LYMPHOCYTES # BLD AUTO: 1.6 K/UL (ref 1–4.8)
LYMPHOCYTES NFR BLD: 15.1 % (ref 18–48)
MCH RBC QN AUTO: 28.3 PG (ref 27–31)
MCHC RBC AUTO-ENTMCNC: 31.8 G/DL (ref 32–36)
MCV RBC AUTO: 89 FL (ref 82–98)
MONOCYTES # BLD AUTO: 0.6 K/UL (ref 0.3–1)
MONOCYTES NFR BLD: 5.2 % (ref 4–15)
NEUTROPHILS # BLD AUTO: 8.3 K/UL (ref 1.8–7.7)
NEUTROPHILS NFR BLD: 76.6 % (ref 38–73)
NRBC BLD-RTO: 0 /100 WBC
PLATELET # BLD AUTO: 270 K/UL (ref 150–350)
PMV BLD AUTO: 10.5 FL (ref 9.2–12.9)
RBC # BLD AUTO: 4.53 M/UL (ref 4–5.4)
WBC # BLD AUTO: 10.77 K/UL (ref 3.9–12.7)

## 2020-09-01 PROCEDURE — 87086 URINE CULTURE/COLONY COUNT: CPT

## 2020-09-01 PROCEDURE — 87491 CHLMYD TRACH DNA AMP PROBE: CPT

## 2020-09-01 PROCEDURE — 87088 URINE BACTERIA CULTURE: CPT

## 2020-09-01 PROCEDURE — 76801 OB US < 14 WKS SINGLE FETUS: CPT | Mod: S$GLB,,, | Performed by: OBSTETRICS & GYNECOLOGY

## 2020-09-01 PROCEDURE — 99214 PR OFFICE/OUTPT VISIT, EST, LEVL IV, 30-39 MIN: ICD-10-PCS | Mod: S$GLB,,, | Performed by: NURSE PRACTITIONER

## 2020-09-01 PROCEDURE — 86703 HIV-1/HIV-2 1 RESULT ANTBDY: CPT

## 2020-09-01 PROCEDURE — 99999 PR PBB SHADOW E&M-EST. PATIENT-LVL III: CPT | Mod: PBBFAC,,, | Performed by: NURSE PRACTITIONER

## 2020-09-01 PROCEDURE — 76801 PR US, OB <14WKS, TRANSABD, SINGLE GESTATION: ICD-10-PCS | Mod: S$GLB,,, | Performed by: OBSTETRICS & GYNECOLOGY

## 2020-09-01 PROCEDURE — 86850 RBC ANTIBODY SCREEN: CPT

## 2020-09-01 PROCEDURE — 3008F PR BODY MASS INDEX (BMI) DOCUMENTED: ICD-10-PCS | Mod: CPTII,S$GLB,, | Performed by: NURSE PRACTITIONER

## 2020-09-01 PROCEDURE — 87186 SC STD MICRODIL/AGAR DIL: CPT

## 2020-09-01 PROCEDURE — 3008F BODY MASS INDEX DOCD: CPT | Mod: CPTII,S$GLB,, | Performed by: NURSE PRACTITIONER

## 2020-09-01 PROCEDURE — 36415 COLL VENOUS BLD VENIPUNCTURE: CPT

## 2020-09-01 PROCEDURE — 99999 PR PBB SHADOW E&M-EST. PATIENT-LVL III: ICD-10-PCS | Mod: PBBFAC,,, | Performed by: NURSE PRACTITIONER

## 2020-09-01 PROCEDURE — 87077 CULTURE AEROBIC IDENTIFY: CPT

## 2020-09-01 PROCEDURE — 99214 OFFICE O/P EST MOD 30 MIN: CPT | Mod: S$GLB,,, | Performed by: NURSE PRACTITIONER

## 2020-09-01 PROCEDURE — 86762 RUBELLA ANTIBODY: CPT

## 2020-09-01 PROCEDURE — 87340 HEPATITIS B SURFACE AG IA: CPT

## 2020-09-01 PROCEDURE — 86592 SYPHILIS TEST NON-TREP QUAL: CPT

## 2020-09-01 PROCEDURE — 85025 COMPLETE CBC W/AUTO DIFF WBC: CPT

## 2020-09-01 RX ORDER — MAGNESIUM 30 MG
TABLET ORAL ONCE
COMMUNITY
End: 2020-10-05

## 2020-09-01 NOTE — PROGRESS NOTES
"  CC: Positive Pregnancy Test    HISTORY OF PRESENT ILLNESS:    Bushra Kahn is a 35 y.o. female, ,  Presents today for a routine exam complaining of amenorrhea and positive home urine pregnancy test.  Patient's last menstrual period was 2020 (exact date).   She is not currently on any contraception.  Reports nausea and vomiting daily. Reports breast tenderness. Denies vaginal bleeding and pelvic pain.  Medical h/o reflux and anxiety (on Zolofy 50 mg daily- she is considering weaning).   Works as RN cardiac short stay.       ROS:  GENERAL: No weight changes. No swelling. No fatigue. No fever.  CARDIOVASCULAR: No chest pain. No shortness of breath. No leg cramps.   NEUROLOGICAL: No headaches. No vision changes.  BREASTS: No pain. No lumps. No discharge.  ABDOMEN: No pain. No diarrhea. No constipation.  REPRODUCTIVE: No abnormal bleeding.   VULVA: No pain. No lesions. No itching.  VAGINA: No relaxation. No itching. No odor. No discharge. No lesions.  URINARY: No incontinence. No nocturia. No frequency. No dysuria.    MEDICATIONS AND ALLERGIES:  Reviewed        COMPREHENSIVE GYN HISTORY:  PAP History: Denies abnormal Paps.  Infection History: Denies STDs. Denies PID.  Benign History: Denies uterine fibroids. Denies ovarian cysts. Denies endometriosis. Denies other conditions.  Cancer History: Denies cervical cancer. Denies uterine cancer or hyperplasia. Denies ovarian cancer. Denies vulvar cancer or pre-cancer. Denies vaginal cancer or pre-cancer. Denies breast cancer. Denies colon cancer.  Sexual Activity History: Reports currently being sexually active  Menstrual History: None.  Contraception: None    /70   Ht 5' 4" (1.626 m)   Wt 97.4 kg (214 lb 11.7 oz)   LMP 2020 (Exact Date)   BMI 36.86 kg/m²     PE:  AFFECT: Calm, alert and oriented X 3. Interactive during exam  GENERAL: Appears well-nourished, well-developed, in no acute distress.  HEAD: Normocephalic, atruamatic  TEETH: Good " dentition.  THYROID: No thyromegally   BREASTS: No masses, skin changes, nipple discharge or adenopathy bilaterally.  SKIN: Normal for race, warm, & dry. No lesions or rashes.  LUNGS: Easy and unlabored, clear to auscultation bilaterally.  HEART: Regular rate and rhythm   ABDOMEN: Soft and nontender without masses or organomegally.  VULVA: No lesions, masses or tenderness.  VAGINA: Moist and well rugated without lesions or discharge.  CERVIX: Moist and pink without lesions, discharge or tenderness.      UTERUS SIZE: 8 week size, nontender and without masses.  ADNEXA: No masses or tenderness.  ESTIMATE OF PELVIC CAPACITY: Adequate  EXTREMITIES: No cyanosis, clubbing or edema. No calf tenderness.  LYMPH NODES: No axillary or inguinal adenopathy.    PROCEDURES:  UPT Positive  Genprobe        ASSESSMENT/PLAN:  Amenorrhea  Positive urine pregnancy test (TED: 21, EGA: 8w6d based on LMP)    -  Routine prenatal care    Nausea and vomiting in pregnancy    -  Education regarding lifestyle and dietary modifications    -  Advised use of B6/Unisom. Pt will notify us if no relief/worsening symptoms, will consider Zofran if needed.      1st TRIMESTER COUNSELING: Discussed all, booklet provided:  Common complaints of pregnancy  HIV and other routine prenatal tests including  genetic screening  Risk factors identified by prenatal history  Oriented to practice - discussed anticipated course of prenatal care & indications for Ultrasound  Childbirth classes/Hospital facilities   Nutrition and weight gain counseling  Toxoplasmosis precautions (Cats/Raw Meat)  Sexual activity and exercise  Environmental/Work hazards  Travel  Tobacco (Ask, Advise, Assess, Assist, and Arrange), as well as alcohol and drug use  Use of any medications (Including supplements, Vitamins, Herbs, or OTC Drugs)  Domestic violence  Seat belt use      TERATOLOGY COUNSELING:   Discussed indications and options for aneuploidy screening - pamphlets  given      -  Pt declines testing  Dating US later today   FOLLOW-UP in 4 weeks with Dr. Tyrese Beatty, NP    OB/GYN

## 2020-09-02 PROBLEM — O09.899 RUBELLA NON-IMMUNE STATUS, ANTEPARTUM: Status: ACTIVE | Noted: 2020-09-02

## 2020-09-02 PROBLEM — Z28.39 RUBELLA NON-IMMUNE STATUS, ANTEPARTUM: Status: ACTIVE | Noted: 2020-09-02

## 2020-09-02 LAB
HBV SURFACE AG SERPL QL IA: NEGATIVE
HIV 1+2 AB+HIV1 P24 AG SERPL QL IA: NEGATIVE
RPR SER QL: NORMAL
RUBV IGG SER-ACNC: 8.6 IU/ML
RUBV IGG SER-IMP: ABNORMAL

## 2020-09-04 ENCOUNTER — TELEPHONE (OUTPATIENT)
Dept: OBSTETRICS AND GYNECOLOGY | Facility: CLINIC | Age: 35
End: 2020-09-04

## 2020-09-04 LAB
BACTERIA UR CULT: ABNORMAL
BACTERIA UR CULT: ABNORMAL

## 2020-09-08 ENCOUNTER — PATIENT MESSAGE (OUTPATIENT)
Dept: OBSTETRICS AND GYNECOLOGY | Facility: CLINIC | Age: 35
End: 2020-09-08

## 2020-09-08 RX ORDER — NITROFURANTOIN 25; 75 MG/1; MG/1
100 CAPSULE ORAL 2 TIMES DAILY
Qty: 14 CAPSULE | Refills: 0 | Status: SHIPPED | OUTPATIENT
Start: 2020-09-08 | End: 2020-09-16

## 2020-09-16 ENCOUNTER — PATIENT MESSAGE (OUTPATIENT)
Dept: OPTOMETRY | Facility: CLINIC | Age: 35
End: 2020-09-16

## 2020-09-18 ENCOUNTER — PATIENT MESSAGE (OUTPATIENT)
Dept: OBSTETRICS AND GYNECOLOGY | Facility: CLINIC | Age: 35
End: 2020-09-18

## 2020-09-24 RX ORDER — SERTRALINE HYDROCHLORIDE 50 MG/1
50 TABLET, FILM COATED ORAL DAILY
Qty: 30 TABLET | Refills: 1 | Status: SHIPPED | OUTPATIENT
Start: 2020-09-24 | End: 2021-02-11

## 2020-09-28 ENCOUNTER — PATIENT MESSAGE (OUTPATIENT)
Dept: OBSTETRICS AND GYNECOLOGY | Facility: CLINIC | Age: 35
End: 2020-09-28

## 2020-10-01 LAB
C TRACH DNA SPEC QL NAA+PROBE: NOT DETECTED
N GONORRHOEA DNA SPEC QL NAA+PROBE: NOT DETECTED

## 2020-10-04 ENCOUNTER — PATIENT OUTREACH (OUTPATIENT)
Dept: ADMINISTRATIVE | Facility: OTHER | Age: 35
End: 2020-10-04

## 2020-10-04 NOTE — PROGRESS NOTES
Care Everywhere: updated  Immunization: updated  Health Maintenance: updated  Media Review:   Legacy Review:   Order placed:   Upcoming appts:

## 2020-10-05 ENCOUNTER — PATIENT MESSAGE (OUTPATIENT)
Dept: ADMINISTRATIVE | Facility: OTHER | Age: 35
End: 2020-10-05

## 2020-10-05 ENCOUNTER — PATIENT MESSAGE (OUTPATIENT)
Dept: OBSTETRICS AND GYNECOLOGY | Facility: CLINIC | Age: 35
End: 2020-10-05

## 2020-10-05 ENCOUNTER — INITIAL PRENATAL (OUTPATIENT)
Dept: OBSTETRICS AND GYNECOLOGY | Facility: CLINIC | Age: 35
End: 2020-10-05
Payer: COMMERCIAL

## 2020-10-05 ENCOUNTER — TELEPHONE (OUTPATIENT)
Dept: OBSTETRICS AND GYNECOLOGY | Facility: CLINIC | Age: 35
End: 2020-10-05

## 2020-10-05 VITALS
BODY MASS INDEX: 37.69 KG/M2 | SYSTOLIC BLOOD PRESSURE: 128 MMHG | WEIGHT: 219.56 LBS | DIASTOLIC BLOOD PRESSURE: 82 MMHG

## 2020-10-05 DIAGNOSIS — Z34.00 SUPERVISION OF NORMAL FIRST PREGNANCY, ANTEPARTUM: Primary | ICD-10-CM

## 2020-10-05 DIAGNOSIS — R05.9 COUGH: ICD-10-CM

## 2020-10-05 DIAGNOSIS — R05.9 COUGH: Primary | ICD-10-CM

## 2020-10-05 DIAGNOSIS — Z23 NEEDS FLU SHOT: ICD-10-CM

## 2020-10-05 PROCEDURE — 99999 PR PBB SHADOW E&M-EST. PATIENT-LVL III: CPT | Mod: PBBFAC,,, | Performed by: NURSE PRACTITIONER

## 2020-10-05 PROCEDURE — 0502F PR SUBSEQUENT PRENATAL CARE: ICD-10-PCS | Mod: CPTII,S$GLB,, | Performed by: NURSE PRACTITIONER

## 2020-10-05 PROCEDURE — 99999 PR PBB SHADOW E&M-EST. PATIENT-LVL III: ICD-10-PCS | Mod: PBBFAC,,, | Performed by: NURSE PRACTITIONER

## 2020-10-05 PROCEDURE — 87086 URINE CULTURE/COLONY COUNT: CPT

## 2020-10-05 PROCEDURE — 0502F SUBSEQUENT PRENATAL CARE: CPT | Mod: CPTII,S$GLB,, | Performed by: NURSE PRACTITIONER

## 2020-10-05 NOTE — PROGRESS NOTES
Here for routine OB appt at 13w2d, with complaints of sore throat, ear pain and cough.  Denies any fever or SOB.  Discussed OTC Zyrtec or Claritin.  Will get tested for COVID d/t cough. She is weaning off Zoloft- feels short tempered and overwhelmed at times. Discussed slowing the weaning process.  She is still having nausea and vomiting- has decreased from first trimester. Denies VB and cramping.  Pain, bleeding, and PTL precautions given.  [facility] anatomy scan ordered.   Enrolled in [facility].   Flu vaccine.   F/U scheduled 4 weeks

## 2020-10-05 NOTE — TELEPHONE ENCOUNTER
----- Message from Jarrett Flores sent at 10/5/2020 12:39 PM CDT -----  Name of Who is Calling: HERMILA VILLALOBOS  What is the request in detail: Patient is calling to speak to the nurse or doctor in regards to going take the Covid-19 testing and turned around because she was told it was not pre-procedure so she can not have it done there. Please advise Can the clinic reply by [facility]: yes What Number [facility]: [phone]

## 2020-10-05 NOTE — TELEPHONE ENCOUNTER
Called patient. No answer. Left voice message for patient to call the office.     COVID order has been changed to future.

## 2020-10-06 ENCOUNTER — LAB VISIT (OUTPATIENT)
Dept: INTERNAL MEDICINE | Facility: CLINIC | Age: 35
End: 2020-10-06
Payer: COMMERCIAL

## 2020-10-06 ENCOUNTER — TELEPHONE (OUTPATIENT)
Dept: MATERNAL FETAL MEDICINE | Facility: CLINIC | Age: 35
End: 2020-10-06

## 2020-10-06 ENCOUNTER — IMMUNIZATION (OUTPATIENT)
Dept: PHARMACY | Facility: CLINIC | Age: 35
End: 2020-10-06
Payer: COMMERCIAL

## 2020-10-06 DIAGNOSIS — R05.9 COUGH: ICD-10-CM

## 2020-10-06 LAB — SARS-COV-2 RNA RESP QL NAA+PROBE: NOT DETECTED

## 2020-10-06 PROCEDURE — U0003 INFECTIOUS AGENT DETECTION BY NUCLEIC ACID (DNA OR RNA); SEVERE ACUTE RESPIRATORY SYNDROME CORONAVIRUS 2 (SARS-COV-2) (CORONAVIRUS DISEASE [COVID-19]), AMPLIFIED PROBE TECHNIQUE, MAKING USE OF HIGH THROUGHPUT TECHNOLOGIES AS DESCRIBED BY CMS-2020-01-R: HCPCS

## 2020-10-07 ENCOUNTER — TELEPHONE (OUTPATIENT)
Dept: PRIMARY CARE CLINIC | Facility: CLINIC | Age: 35
End: 2020-10-07

## 2020-10-07 LAB
BACTERIA UR CULT: NORMAL
BACTERIA UR CULT: NORMAL

## 2020-10-16 ENCOUNTER — OFFICE VISIT (OUTPATIENT)
Dept: OPTOMETRY | Facility: CLINIC | Age: 35
End: 2020-10-16
Payer: COMMERCIAL

## 2020-10-16 DIAGNOSIS — H52.13 MYOPIA OF BOTH EYES: Primary | ICD-10-CM

## 2020-10-16 DIAGNOSIS — Z46.0 FITTING AND ADJUSTMENT OF SPECTACLES AND CONTACT LENSES: Primary | ICD-10-CM

## 2020-10-16 PROCEDURE — 92012 INTRM OPH EXAM EST PATIENT: CPT | Mod: S$GLB,,, | Performed by: OPTOMETRIST

## 2020-10-16 PROCEDURE — 99999 PR PBB SHADOW E&M-EST. PATIENT-LVL I: CPT | Mod: PBBFAC,,, | Performed by: OPTOMETRIST

## 2020-10-16 PROCEDURE — 92015 DETERMINE REFRACTIVE STATE: CPT | Mod: S$GLB,,, | Performed by: OPTOMETRIST

## 2020-10-16 PROCEDURE — 92310 CONTACT LENS FITTING OU: CPT | Mod: CSM,,, | Performed by: OPTOMETRIST

## 2020-10-16 PROCEDURE — 99999 PR PBB SHADOW E&M-EST. PATIENT-LVL I: ICD-10-PCS | Mod: PBBFAC,,, | Performed by: OPTOMETRIST

## 2020-10-16 PROCEDURE — 92015 PR REFRACTION: ICD-10-PCS | Mod: S$GLB,,, | Performed by: OPTOMETRIST

## 2020-10-16 PROCEDURE — 92310 PR CONTACT LENS FITTING (NO CHANGE): ICD-10-PCS | Mod: CSM,,, | Performed by: OPTOMETRIST

## 2020-10-16 PROCEDURE — 92012 PR EYE EXAM, EST PATIENT,INTERMED: ICD-10-PCS | Mod: S$GLB,,, | Performed by: OPTOMETRIST

## 2020-10-16 PROCEDURE — 99999 PR PBB SHADOW E&M-EST. PATIENT-LVL II: ICD-10-PCS | Mod: PBBFAC,,, | Performed by: OPTOMETRIST

## 2020-10-16 PROCEDURE — 99999 PR PBB SHADOW E&M-EST. PATIENT-LVL II: CPT | Mod: PBBFAC,,, | Performed by: OPTOMETRIST

## 2020-10-16 NOTE — PROGRESS NOTES
HPI     DLS; 2/21/19  Pt states she is 14 weeks pregnant.. pt states no [facility] problems with her   contacts ([facility]). But didn't not fill olivia glasses Rx from last year and   states the older ones make her dizzy but didn't bring with her.   No f/f  Visine gtts   Wearing [facility] CLs    Last edited by Mauro Caraballo, OD on 10/16/2020  9:44 AM. (History)        ROS     Negative for: Constitutional, Gastrointestinal, Neurological, Skin,   Genitourinary, Musculoskeletal, HENT, Endocrine, Cardiovascular, Eyes,   Respiratory, Psychiatric, Allergic/Imm, Heme/Lymph    Last edited by Mauro Caraballo, OD on 10/16/2020  9:44 AM. (History)        Assessment /Plan     For exam results, see Encounter Report.    Myopia of both eyes      1. Good fit/VA w [facility]--pt happy  2. DFE deferred today due to pregnancy    PLAN:    1. Wrote new spex/CLRx  2. Continue Daily Wear schedule.  NO SLEEPING IN CONTACT LENSES. Clean and disinfect nightly.  exchange monthly.    3. rtc 1 yr

## 2020-11-11 ENCOUNTER — ROUTINE PRENATAL (OUTPATIENT)
Dept: OBSTETRICS AND GYNECOLOGY | Facility: CLINIC | Age: 35
End: 2020-11-11
Payer: COMMERCIAL

## 2020-11-11 VITALS
BODY MASS INDEX: 39.47 KG/M2 | DIASTOLIC BLOOD PRESSURE: 76 MMHG | WEIGHT: 229.94 LBS | SYSTOLIC BLOOD PRESSURE: 118 MMHG

## 2020-11-11 DIAGNOSIS — F41.9 ANXIETY: ICD-10-CM

## 2020-11-11 DIAGNOSIS — Z3A.18 18 WEEKS GESTATION OF PREGNANCY: Primary | ICD-10-CM

## 2020-11-11 PROCEDURE — 99999 PR PBB SHADOW E&M-EST. PATIENT-LVL III: CPT | Mod: PBBFAC,,, | Performed by: OBSTETRICS & GYNECOLOGY

## 2020-11-11 PROCEDURE — 99999 PR PBB SHADOW E&M-EST. PATIENT-LVL III: ICD-10-PCS | Mod: PBBFAC,,, | Performed by: OBSTETRICS & GYNECOLOGY

## 2020-11-11 PROCEDURE — 0502F SUBSEQUENT PRENATAL CARE: CPT | Mod: CPTII,S$GLB,, | Performed by: OBSTETRICS & GYNECOLOGY

## 2020-11-11 PROCEDURE — 0502F PR SUBSEQUENT PRENATAL CARE: ICD-10-PCS | Mod: CPTII,S$GLB,, | Performed by: OBSTETRICS & GYNECOLOGY

## 2020-11-11 NOTE — PROGRESS NOTES
HERE for routine OB visit at 18 4/7 wks, with NO complaints.  Denies vaginal bleeding, cramping/ ctx, or LOF.   Anxiety and feeling sad after weaning down on zoloft.  May start back on zoloft Rx.   F/U in four weeks

## 2020-11-21 ENCOUNTER — PATIENT MESSAGE (OUTPATIENT)
Dept: OBSTETRICS AND GYNECOLOGY | Facility: CLINIC | Age: 35
End: 2020-11-21

## 2020-11-23 ENCOUNTER — PATIENT MESSAGE (OUTPATIENT)
Dept: OBSTETRICS AND GYNECOLOGY | Facility: CLINIC | Age: 35
End: 2020-11-23

## 2020-11-24 ENCOUNTER — PROCEDURE VISIT (OUTPATIENT)
Dept: MATERNAL FETAL MEDICINE | Facility: CLINIC | Age: 35
End: 2020-11-24
Payer: COMMERCIAL

## 2020-11-24 DIAGNOSIS — Z34.00 SUPERVISION OF NORMAL FIRST PREGNANCY, ANTEPARTUM: ICD-10-CM

## 2020-11-24 DIAGNOSIS — Z36.89 ENCOUNTER FOR ULTRASOUND TO ASSESS FETAL GROWTH: Primary | ICD-10-CM

## 2020-11-24 DIAGNOSIS — O99.210 OBESITY IN PREGNANCY: ICD-10-CM

## 2020-11-24 PROCEDURE — 76811 PR US, OB FETAL EVAL & EXAM, TRANSABDOM,FIRST GESTATION: ICD-10-PCS | Mod: S$GLB,,, | Performed by: OBSTETRICS & GYNECOLOGY

## 2020-11-24 PROCEDURE — 76811 OB US DETAILED SNGL FETUS: CPT | Mod: S$GLB,,, | Performed by: OBSTETRICS & GYNECOLOGY

## 2020-11-25 ENCOUNTER — CLINICAL SUPPORT (OUTPATIENT)
Dept: URGENT CARE | Facility: CLINIC | Age: 35
End: 2020-11-25
Payer: COMMERCIAL

## 2020-11-25 DIAGNOSIS — J02.9 SORE THROAT: ICD-10-CM

## 2020-11-25 DIAGNOSIS — R51.9 NONINTRACTABLE HEADACHE, UNSPECIFIED CHRONICITY PATTERN, UNSPECIFIED HEADACHE TYPE: Primary | ICD-10-CM

## 2020-11-25 DIAGNOSIS — R09.81 HEAD CONGESTION: ICD-10-CM

## 2020-11-25 DIAGNOSIS — R05.9 COUGH: ICD-10-CM

## 2020-11-25 DIAGNOSIS — R51.9 NONINTRACTABLE HEADACHE, UNSPECIFIED CHRONICITY PATTERN, UNSPECIFIED HEADACHE TYPE: ICD-10-CM

## 2020-11-25 LAB
CTP QC/QA: YES
SARS-COV-2 RDRP RESP QL NAA+PROBE: NEGATIVE

## 2020-11-25 PROCEDURE — U0002: ICD-10-PCS | Mod: QW,S$GLB,, | Performed by: INTERNAL MEDICINE

## 2020-11-25 PROCEDURE — U0002 COVID-19 LAB TEST NON-CDC: HCPCS | Mod: QW,S$GLB,, | Performed by: INTERNAL MEDICINE

## 2020-11-27 ENCOUNTER — TELEPHONE (OUTPATIENT)
Dept: PRIMARY CARE CLINIC | Facility: OTHER | Age: 35
End: 2020-11-27

## 2020-11-30 ENCOUNTER — PATIENT MESSAGE (OUTPATIENT)
Dept: INTERNAL MEDICINE | Facility: CLINIC | Age: 35
End: 2020-11-30

## 2020-12-04 ENCOUNTER — PATIENT MESSAGE (OUTPATIENT)
Dept: INTERNAL MEDICINE | Facility: CLINIC | Age: 35
End: 2020-12-04

## 2020-12-28 ENCOUNTER — PATIENT MESSAGE (OUTPATIENT)
Dept: OBSTETRICS AND GYNECOLOGY | Facility: CLINIC | Age: 35
End: 2020-12-28

## 2020-12-28 DIAGNOSIS — Z3A.25 25 WEEKS GESTATION OF PREGNANCY: Primary | ICD-10-CM

## 2020-12-30 ENCOUNTER — PROCEDURE VISIT (OUTPATIENT)
Dept: MATERNAL FETAL MEDICINE | Facility: CLINIC | Age: 35
End: 2020-12-30
Payer: COMMERCIAL

## 2020-12-30 DIAGNOSIS — Z36.89 ENCOUNTER FOR ULTRASOUND TO ASSESS FETAL GROWTH: ICD-10-CM

## 2020-12-30 DIAGNOSIS — O99.891 CURRENT MATERNAL CONDITION AFFECTING PREGNANCY: ICD-10-CM

## 2020-12-30 DIAGNOSIS — Z3A.25 25 WEEKS GESTATION OF PREGNANCY: ICD-10-CM

## 2020-12-30 PROCEDURE — 76816 PR  US,PREGNANT UTERUS,F/U,TRANSABD APP: ICD-10-PCS | Mod: S$GLB,,, | Performed by: OBSTETRICS & GYNECOLOGY

## 2020-12-30 PROCEDURE — 76816 OB US FOLLOW-UP PER FETUS: CPT | Mod: S$GLB,,, | Performed by: OBSTETRICS & GYNECOLOGY

## 2021-01-11 ENCOUNTER — LAB VISIT (OUTPATIENT)
Dept: LAB | Facility: OTHER | Age: 36
End: 2021-01-11
Attending: OBSTETRICS & GYNECOLOGY
Payer: COMMERCIAL

## 2021-01-11 ENCOUNTER — ROUTINE PRENATAL (OUTPATIENT)
Dept: OBSTETRICS AND GYNECOLOGY | Facility: CLINIC | Age: 36
End: 2021-01-11
Payer: COMMERCIAL

## 2021-01-11 VITALS
BODY MASS INDEX: 41.36 KG/M2 | WEIGHT: 240.94 LBS | DIASTOLIC BLOOD PRESSURE: 72 MMHG | SYSTOLIC BLOOD PRESSURE: 118 MMHG

## 2021-01-11 DIAGNOSIS — Z3A.27 27 WEEKS GESTATION OF PREGNANCY: Primary | ICD-10-CM

## 2021-01-11 DIAGNOSIS — Z23 NEED FOR TDAP VACCINATION: ICD-10-CM

## 2021-01-11 DIAGNOSIS — Z3A.25 25 WEEKS GESTATION OF PREGNANCY: ICD-10-CM

## 2021-01-11 LAB
BASOPHILS # BLD AUTO: 0.02 K/UL (ref 0–0.2)
BASOPHILS NFR BLD: 0.2 % (ref 0–1.9)
DIFFERENTIAL METHOD: ABNORMAL
EOSINOPHIL # BLD AUTO: 0.2 K/UL (ref 0–0.5)
EOSINOPHIL NFR BLD: 1.9 % (ref 0–8)
ERYTHROCYTE [DISTWIDTH] IN BLOOD BY AUTOMATED COUNT: 14.1 % (ref 11.5–14.5)
GLUCOSE SERPL-MCNC: 123 MG/DL (ref 70–140)
HCT VFR BLD AUTO: 35.7 % (ref 37–48.5)
HGB BLD-MCNC: 11.1 G/DL (ref 12–16)
IMM GRANULOCYTES # BLD AUTO: 0.11 K/UL (ref 0–0.04)
IMM GRANULOCYTES NFR BLD AUTO: 1 % (ref 0–0.5)
LYMPHOCYTES # BLD AUTO: 1.1 K/UL (ref 1–4.8)
LYMPHOCYTES NFR BLD: 10 % (ref 18–48)
MCH RBC QN AUTO: 27.8 PG (ref 27–31)
MCHC RBC AUTO-ENTMCNC: 31.1 G/DL (ref 32–36)
MCV RBC AUTO: 89 FL (ref 82–98)
MONOCYTES # BLD AUTO: 0.4 K/UL (ref 0.3–1)
MONOCYTES NFR BLD: 3.7 % (ref 4–15)
NEUTROPHILS # BLD AUTO: 9.1 K/UL (ref 1.8–7.7)
NEUTROPHILS NFR BLD: 83.2 % (ref 38–73)
NRBC BLD-RTO: 0 /100 WBC
PLATELET # BLD AUTO: 248 K/UL (ref 150–350)
PMV BLD AUTO: 10.3 FL (ref 9.2–12.9)
RBC # BLD AUTO: 4 M/UL (ref 4–5.4)
WBC # BLD AUTO: 10.99 K/UL (ref 3.9–12.7)

## 2021-01-11 PROCEDURE — 36415 COLL VENOUS BLD VENIPUNCTURE: CPT

## 2021-01-11 PROCEDURE — 90471 TDAP VACCINE GREATER THAN OR EQUAL TO 7YO IM: ICD-10-PCS | Mod: S$GLB,,, | Performed by: OBSTETRICS & GYNECOLOGY

## 2021-01-11 PROCEDURE — 99999 PR PBB SHADOW E&M-EST. PATIENT-LVL III: CPT | Mod: PBBFAC,,, | Performed by: OBSTETRICS & GYNECOLOGY

## 2021-01-11 PROCEDURE — 82950 GLUCOSE TEST: CPT

## 2021-01-11 PROCEDURE — 90715 TDAP VACCINE GREATER THAN OR EQUAL TO 7YO IM: ICD-10-PCS | Mod: S$GLB,,, | Performed by: OBSTETRICS & GYNECOLOGY

## 2021-01-11 PROCEDURE — 90715 TDAP VACCINE 7 YRS/> IM: CPT | Mod: S$GLB,,, | Performed by: OBSTETRICS & GYNECOLOGY

## 2021-01-11 PROCEDURE — 90471 IMMUNIZATION ADMIN: CPT | Mod: S$GLB,,, | Performed by: OBSTETRICS & GYNECOLOGY

## 2021-01-11 PROCEDURE — 99999 PR PBB SHADOW E&M-EST. PATIENT-LVL III: ICD-10-PCS | Mod: PBBFAC,,, | Performed by: OBSTETRICS & GYNECOLOGY

## 2021-01-11 PROCEDURE — 0502F SUBSEQUENT PRENATAL CARE: CPT | Mod: CPTII,S$GLB,, | Performed by: OBSTETRICS & GYNECOLOGY

## 2021-01-11 PROCEDURE — 0502F PR SUBSEQUENT PRENATAL CARE: ICD-10-PCS | Mod: CPTII,S$GLB,, | Performed by: OBSTETRICS & GYNECOLOGY

## 2021-01-11 PROCEDURE — 85025 COMPLETE CBC W/AUTO DIFF WBC: CPT

## 2021-01-21 ENCOUNTER — PATIENT MESSAGE (OUTPATIENT)
Dept: INTERNAL MEDICINE | Facility: CLINIC | Age: 36
End: 2021-01-21

## 2021-01-21 ENCOUNTER — PATIENT MESSAGE (OUTPATIENT)
Dept: OBSTETRICS AND GYNECOLOGY | Facility: CLINIC | Age: 36
End: 2021-01-21

## 2021-01-22 ENCOUNTER — PATIENT MESSAGE (OUTPATIENT)
Dept: OBSTETRICS AND GYNECOLOGY | Facility: CLINIC | Age: 36
End: 2021-01-22

## 2021-01-22 ENCOUNTER — LAB VISIT (OUTPATIENT)
Dept: LAB | Facility: HOSPITAL | Age: 36
End: 2021-01-22
Attending: OBSTETRICS & GYNECOLOGY
Payer: COMMERCIAL

## 2021-01-22 DIAGNOSIS — N39.0 URINARY TRACT INFECTION WITHOUT HEMATURIA, SITE UNSPECIFIED: ICD-10-CM

## 2021-01-22 DIAGNOSIS — N39.0 URINARY TRACT INFECTION WITHOUT HEMATURIA, SITE UNSPECIFIED: Primary | ICD-10-CM

## 2021-01-22 LAB
BILIRUB UR QL STRIP: NEGATIVE
CLARITY UR REFRACT.AUTO: CLEAR
COLOR UR AUTO: YELLOW
GLUCOSE UR QL STRIP: NEGATIVE
HGB UR QL STRIP: NEGATIVE
KETONES UR QL STRIP: NEGATIVE
LEUKOCYTE ESTERASE UR QL STRIP: NEGATIVE
NITRITE UR QL STRIP: NEGATIVE
PH UR STRIP: 5 [PH] (ref 5–8)
PROT UR QL STRIP: NEGATIVE
SP GR UR STRIP: 1.02 (ref 1–1.03)
URN SPEC COLLECT METH UR: NORMAL

## 2021-01-22 PROCEDURE — 87077 CULTURE AEROBIC IDENTIFY: CPT | Mod: 59

## 2021-01-22 PROCEDURE — 87086 URINE CULTURE/COLONY COUNT: CPT

## 2021-01-22 PROCEDURE — 87088 URINE BACTERIA CULTURE: CPT

## 2021-01-22 PROCEDURE — 87186 SC STD MICRODIL/AGAR DIL: CPT | Mod: 59

## 2021-01-22 PROCEDURE — 81003 URINALYSIS AUTO W/O SCOPE: CPT

## 2021-01-22 RX ORDER — NITROFURANTOIN 25; 75 MG/1; MG/1
100 CAPSULE ORAL 2 TIMES DAILY
Qty: 10 CAPSULE | Refills: 0 | Status: SHIPPED | OUTPATIENT
Start: 2021-01-22 | End: 2021-01-27

## 2021-01-26 ENCOUNTER — PATIENT MESSAGE (OUTPATIENT)
Dept: OBSTETRICS AND GYNECOLOGY | Facility: CLINIC | Age: 36
End: 2021-01-26

## 2021-01-26 ENCOUNTER — TELEPHONE (OUTPATIENT)
Dept: INTERNAL MEDICINE | Facility: CLINIC | Age: 36
End: 2021-01-26

## 2021-01-26 ENCOUNTER — PATIENT MESSAGE (OUTPATIENT)
Dept: INTERNAL MEDICINE | Facility: CLINIC | Age: 36
End: 2021-01-26

## 2021-01-26 DIAGNOSIS — O12.00 EDEMA DURING PREGNANCY, ANTEPARTUM: Primary | ICD-10-CM

## 2021-01-26 LAB
BACTERIA UR CULT: ABNORMAL
BACTERIA UR CULT: ABNORMAL

## 2021-01-27 ENCOUNTER — PATIENT MESSAGE (OUTPATIENT)
Dept: INTERNAL MEDICINE | Facility: CLINIC | Age: 36
End: 2021-01-27

## 2021-02-11 ENCOUNTER — ROUTINE PRENATAL (OUTPATIENT)
Dept: OBSTETRICS AND GYNECOLOGY | Facility: CLINIC | Age: 36
End: 2021-02-11
Payer: COMMERCIAL

## 2021-02-11 ENCOUNTER — TELEPHONE (OUTPATIENT)
Dept: OBSTETRICS AND GYNECOLOGY | Facility: CLINIC | Age: 36
End: 2021-02-11

## 2021-02-11 VITALS
SYSTOLIC BLOOD PRESSURE: 118 MMHG | WEIGHT: 252.44 LBS | BODY MASS INDEX: 43.33 KG/M2 | DIASTOLIC BLOOD PRESSURE: 70 MMHG

## 2021-02-11 DIAGNOSIS — Z34.00 SUPERVISION OF NORMAL FIRST PREGNANCY, ANTEPARTUM: Primary | ICD-10-CM

## 2021-02-11 DIAGNOSIS — O26.893 HEARTBURN DURING PREGNANCY IN THIRD TRIMESTER: ICD-10-CM

## 2021-02-11 DIAGNOSIS — R12 HEARTBURN DURING PREGNANCY IN THIRD TRIMESTER: ICD-10-CM

## 2021-02-11 PROCEDURE — 87086 URINE CULTURE/COLONY COUNT: CPT

## 2021-02-11 PROCEDURE — 87186 SC STD MICRODIL/AGAR DIL: CPT

## 2021-02-11 PROCEDURE — 99999 PR PBB SHADOW E&M-EST. PATIENT-LVL II: ICD-10-PCS | Mod: PBBFAC,,, | Performed by: NURSE PRACTITIONER

## 2021-02-11 PROCEDURE — 0502F SUBSEQUENT PRENATAL CARE: CPT | Mod: CPTII,S$GLB,, | Performed by: NURSE PRACTITIONER

## 2021-02-11 PROCEDURE — 99999 PR PBB SHADOW E&M-EST. PATIENT-LVL II: CPT | Mod: PBBFAC,,, | Performed by: NURSE PRACTITIONER

## 2021-02-11 PROCEDURE — 87077 CULTURE AEROBIC IDENTIFY: CPT

## 2021-02-11 PROCEDURE — 0502F PR SUBSEQUENT PRENATAL CARE: ICD-10-PCS | Mod: CPTII,S$GLB,, | Performed by: NURSE PRACTITIONER

## 2021-02-11 PROCEDURE — 87088 URINE BACTERIA CULTURE: CPT

## 2021-02-11 RX ORDER — PANTOPRAZOLE SODIUM 20 MG/1
20 TABLET, DELAYED RELEASE ORAL DAILY
Qty: 30 TABLET | Refills: 4 | Status: SHIPPED | OUTPATIENT
Start: 2021-02-11 | End: 2021-04-14

## 2021-02-12 ENCOUNTER — PATIENT MESSAGE (OUTPATIENT)
Dept: OBSTETRICS AND GYNECOLOGY | Facility: CLINIC | Age: 36
End: 2021-02-12

## 2021-02-12 ENCOUNTER — TELEPHONE (OUTPATIENT)
Dept: OBSTETRICS AND GYNECOLOGY | Facility: CLINIC | Age: 36
End: 2021-02-12

## 2021-02-14 LAB — BACTERIA UR CULT: ABNORMAL

## 2021-02-15 ENCOUNTER — PATIENT MESSAGE (OUTPATIENT)
Dept: OBSTETRICS AND GYNECOLOGY | Facility: CLINIC | Age: 36
End: 2021-02-15

## 2021-02-15 ENCOUNTER — PATIENT MESSAGE (OUTPATIENT)
Dept: MATERNAL FETAL MEDICINE | Facility: CLINIC | Age: 36
End: 2021-02-15

## 2021-02-15 DIAGNOSIS — N39.0 URINARY TRACT INFECTION WITHOUT HEMATURIA, SITE UNSPECIFIED: Primary | ICD-10-CM

## 2021-02-15 RX ORDER — NITROFURANTOIN 25; 75 MG/1; MG/1
100 CAPSULE ORAL 2 TIMES DAILY
Qty: 10 CAPSULE | Refills: 0 | Status: SHIPPED | OUTPATIENT
Start: 2021-02-15 | End: 2021-02-20

## 2021-02-17 ENCOUNTER — PROCEDURE VISIT (OUTPATIENT)
Dept: MATERNAL FETAL MEDICINE | Facility: CLINIC | Age: 36
End: 2021-02-17
Payer: COMMERCIAL

## 2021-02-17 DIAGNOSIS — Z36.89 ENCOUNTER FOR ULTRASOUND TO ASSESS FETAL GROWTH: ICD-10-CM

## 2021-02-17 PROCEDURE — 76816 PR  US,PREGNANT UTERUS,F/U,TRANSABD APP: ICD-10-PCS | Mod: S$GLB,,, | Performed by: OBSTETRICS & GYNECOLOGY

## 2021-02-17 PROCEDURE — 76816 OB US FOLLOW-UP PER FETUS: CPT | Mod: S$GLB,,, | Performed by: OBSTETRICS & GYNECOLOGY

## 2021-02-22 ENCOUNTER — PATIENT MESSAGE (OUTPATIENT)
Dept: OBSTETRICS AND GYNECOLOGY | Facility: CLINIC | Age: 36
End: 2021-02-22

## 2021-02-23 ENCOUNTER — PATIENT MESSAGE (OUTPATIENT)
Dept: INTERNAL MEDICINE | Facility: CLINIC | Age: 36
End: 2021-02-23

## 2021-03-05 ENCOUNTER — ROUTINE PRENATAL (OUTPATIENT)
Dept: OBSTETRICS AND GYNECOLOGY | Facility: CLINIC | Age: 36
End: 2021-03-05
Payer: COMMERCIAL

## 2021-03-05 VITALS
DIASTOLIC BLOOD PRESSURE: 82 MMHG | SYSTOLIC BLOOD PRESSURE: 128 MMHG | WEIGHT: 260.81 LBS | BODY MASS INDEX: 44.77 KG/M2

## 2021-03-05 DIAGNOSIS — Z34.00 SUPERVISION OF NORMAL FIRST PREGNANCY, ANTEPARTUM: Primary | ICD-10-CM

## 2021-03-05 DIAGNOSIS — Z36.89 ENCOUNTER FOR ULTRASOUND TO ASSESS FETAL GROWTH: ICD-10-CM

## 2021-03-05 PROCEDURE — 99999 PR PBB SHADOW E&M-EST. PATIENT-LVL II: ICD-10-PCS | Mod: PBBFAC,,, | Performed by: NURSE PRACTITIONER

## 2021-03-05 PROCEDURE — 0502F PR SUBSEQUENT PRENATAL CARE: ICD-10-PCS | Mod: CPTII,S$GLB,, | Performed by: NURSE PRACTITIONER

## 2021-03-05 PROCEDURE — 0502F SUBSEQUENT PRENATAL CARE: CPT | Mod: CPTII,S$GLB,, | Performed by: NURSE PRACTITIONER

## 2021-03-05 PROCEDURE — 99999 PR PBB SHADOW E&M-EST. PATIENT-LVL II: CPT | Mod: PBBFAC,,, | Performed by: NURSE PRACTITIONER

## 2021-03-05 PROCEDURE — 87086 URINE CULTURE/COLONY COUNT: CPT | Performed by: NURSE PRACTITIONER

## 2021-03-06 LAB — BACTERIA UR CULT: NORMAL

## 2021-03-11 ENCOUNTER — PATIENT MESSAGE (OUTPATIENT)
Dept: PEDIATRICS | Facility: CLINIC | Age: 36
End: 2021-03-11

## 2021-03-12 ENCOUNTER — ROUTINE PRENATAL (OUTPATIENT)
Dept: OBSTETRICS AND GYNECOLOGY | Facility: CLINIC | Age: 36
End: 2021-03-12
Payer: COMMERCIAL

## 2021-03-12 ENCOUNTER — LAB VISIT (OUTPATIENT)
Dept: INTERNAL MEDICINE | Facility: CLINIC | Age: 36
End: 2021-03-12
Payer: COMMERCIAL

## 2021-03-12 ENCOUNTER — OFFICE VISIT (OUTPATIENT)
Dept: PEDIATRICS | Facility: CLINIC | Age: 36
End: 2021-03-12
Payer: COMMERCIAL

## 2021-03-12 VITALS — BODY MASS INDEX: 44.5 KG/M2 | DIASTOLIC BLOOD PRESSURE: 84 MMHG | WEIGHT: 259.25 LBS | SYSTOLIC BLOOD PRESSURE: 132 MMHG

## 2021-03-12 DIAGNOSIS — Z76.81 EXPECTANT PARENT PREBIRTH PEDIATRICIAN VISIT: Primary | ICD-10-CM

## 2021-03-12 DIAGNOSIS — Z3A.35 35 WEEKS GESTATION OF PREGNANCY: Primary | ICD-10-CM

## 2021-03-12 DIAGNOSIS — Z3A.35 35 WEEKS GESTATION OF PREGNANCY: ICD-10-CM

## 2021-03-12 LAB
BASOPHILS # BLD AUTO: 0.03 K/UL (ref 0–0.2)
BASOPHILS NFR BLD: 0.2 % (ref 0–1.9)
DIFFERENTIAL METHOD: ABNORMAL
EOSINOPHIL # BLD AUTO: 0.2 K/UL (ref 0–0.5)
EOSINOPHIL NFR BLD: 1.4 % (ref 0–8)
ERYTHROCYTE [DISTWIDTH] IN BLOOD BY AUTOMATED COUNT: 15.3 % (ref 11.5–14.5)
HCT VFR BLD AUTO: 35.3 % (ref 37–48.5)
HGB BLD-MCNC: 11.5 G/DL (ref 12–16)
IMM GRANULOCYTES # BLD AUTO: 0.17 K/UL (ref 0–0.04)
IMM GRANULOCYTES NFR BLD AUTO: 1.2 % (ref 0–0.5)
LYMPHOCYTES # BLD AUTO: 1.2 K/UL (ref 1–4.8)
LYMPHOCYTES NFR BLD: 9 % (ref 18–48)
MCH RBC QN AUTO: 27.1 PG (ref 27–31)
MCHC RBC AUTO-ENTMCNC: 32.6 G/DL (ref 32–36)
MCV RBC AUTO: 83 FL (ref 82–98)
MONOCYTES # BLD AUTO: 0.6 K/UL (ref 0.3–1)
MONOCYTES NFR BLD: 4.1 % (ref 4–15)
NEUTROPHILS # BLD AUTO: 11.6 K/UL (ref 1.8–7.7)
NEUTROPHILS NFR BLD: 84.1 % (ref 38–73)
NRBC BLD-RTO: 0 /100 WBC
PLATELET # BLD AUTO: 245 K/UL (ref 150–350)
PMV BLD AUTO: 10.8 FL (ref 9.2–12.9)
RBC # BLD AUTO: 4.24 M/UL (ref 4–5.4)
WBC # BLD AUTO: 13.78 K/UL (ref 3.9–12.7)

## 2021-03-12 PROCEDURE — 99999 PR PBB SHADOW E&M-EST. PATIENT-LVL III: CPT | Mod: PBBFAC,,, | Performed by: OBSTETRICS & GYNECOLOGY

## 2021-03-12 PROCEDURE — 85025 COMPLETE CBC W/AUTO DIFF WBC: CPT | Performed by: OBSTETRICS & GYNECOLOGY

## 2021-03-12 PROCEDURE — 86592 SYPHILIS TEST NON-TREP QUAL: CPT | Performed by: OBSTETRICS & GYNECOLOGY

## 2021-03-12 PROCEDURE — 86703 HIV-1/HIV-2 1 RESULT ANTBDY: CPT | Performed by: OBSTETRICS & GYNECOLOGY

## 2021-03-12 PROCEDURE — 99999 PR PBB SHADOW E&M-EST. PATIENT-LVL III: ICD-10-PCS | Mod: PBBFAC,,, | Performed by: OBSTETRICS & GYNECOLOGY

## 2021-03-12 PROCEDURE — 0502F SUBSEQUENT PRENATAL CARE: CPT | Mod: CPTII,S$GLB,, | Performed by: OBSTETRICS & GYNECOLOGY

## 2021-03-12 PROCEDURE — 99999 PR PBB SHADOW E&M-EST. PATIENT-LVL II: ICD-10-PCS | Mod: PBBFAC,,, | Performed by: PEDIATRICS

## 2021-03-12 PROCEDURE — 87081 CULTURE SCREEN ONLY: CPT | Performed by: OBSTETRICS & GYNECOLOGY

## 2021-03-12 PROCEDURE — 99499 UNLISTED E&M SERVICE: CPT | Mod: S$GLB,,, | Performed by: PEDIATRICS

## 2021-03-12 PROCEDURE — 0502F PR SUBSEQUENT PRENATAL CARE: ICD-10-PCS | Mod: CPTII,S$GLB,, | Performed by: OBSTETRICS & GYNECOLOGY

## 2021-03-12 PROCEDURE — 99499 NO LOS: ICD-10-PCS | Mod: S$GLB,,, | Performed by: PEDIATRICS

## 2021-03-12 PROCEDURE — 99999 PR PBB SHADOW E&M-EST. PATIENT-LVL II: CPT | Mod: PBBFAC,,, | Performed by: PEDIATRICS

## 2021-03-12 RX ORDER — OMEPRAZOLE 20 MG/1
20 CAPSULE, DELAYED RELEASE ORAL DAILY
Status: ON HOLD | COMMUNITY
End: 2022-10-11 | Stop reason: HOSPADM

## 2021-03-13 LAB — RPR SER QL: NORMAL

## 2021-03-15 LAB
BACTERIA SPEC AEROBE CULT: NORMAL
HIV 1+2 AB+HIV1 P24 AG SERPL QL IA: NEGATIVE

## 2021-03-17 ENCOUNTER — ROUTINE PRENATAL (OUTPATIENT)
Dept: OBSTETRICS AND GYNECOLOGY | Facility: CLINIC | Age: 36
End: 2021-03-17
Payer: COMMERCIAL

## 2021-03-17 VITALS
SYSTOLIC BLOOD PRESSURE: 130 MMHG | DIASTOLIC BLOOD PRESSURE: 80 MMHG | WEIGHT: 269.63 LBS | BODY MASS INDEX: 46.28 KG/M2

## 2021-03-17 DIAGNOSIS — O26.86 PUPP (PRURITIC URTICARIAL PAPULES AND PLAQUES OF PREGNANCY): ICD-10-CM

## 2021-03-17 DIAGNOSIS — Z34.00 SUPERVISION OF NORMAL FIRST PREGNANCY, ANTEPARTUM: Primary | ICD-10-CM

## 2021-03-17 PROCEDURE — 0502F PR SUBSEQUENT PRENATAL CARE: ICD-10-PCS | Mod: CPTII,S$GLB,, | Performed by: NURSE PRACTITIONER

## 2021-03-17 PROCEDURE — 99999 PR PBB SHADOW E&M-EST. PATIENT-LVL II: ICD-10-PCS | Mod: PBBFAC,,, | Performed by: NURSE PRACTITIONER

## 2021-03-17 PROCEDURE — 99999 PR PBB SHADOW E&M-EST. PATIENT-LVL II: CPT | Mod: PBBFAC,,, | Performed by: NURSE PRACTITIONER

## 2021-03-17 PROCEDURE — 0502F SUBSEQUENT PRENATAL CARE: CPT | Mod: CPTII,S$GLB,, | Performed by: NURSE PRACTITIONER

## 2021-03-17 RX ORDER — BETAMETHASONE DIPROPIONATE 0.5 MG/ML
LOTION, AUGMENTED TOPICAL 2 TIMES DAILY
Qty: 60 ML | Refills: 1 | Status: SHIPPED | OUTPATIENT
Start: 2021-03-17 | End: 2021-05-06

## 2021-03-18 ENCOUNTER — PATIENT MESSAGE (OUTPATIENT)
Dept: OBSTETRICS AND GYNECOLOGY | Facility: CLINIC | Age: 36
End: 2021-03-18

## 2021-03-19 ENCOUNTER — PATIENT MESSAGE (OUTPATIENT)
Dept: OBSTETRICS AND GYNECOLOGY | Facility: CLINIC | Age: 36
End: 2021-03-19

## 2021-03-22 ENCOUNTER — ROUTINE PRENATAL (OUTPATIENT)
Dept: OBSTETRICS AND GYNECOLOGY | Facility: CLINIC | Age: 36
End: 2021-03-22
Payer: COMMERCIAL

## 2021-03-22 ENCOUNTER — HOSPITAL ENCOUNTER (INPATIENT)
Facility: OTHER | Age: 36
LOS: 5 days | Discharge: HOME | End: 2021-03-27
Attending: OBSTETRICS & GYNECOLOGY | Admitting: OBSTETRICS & GYNECOLOGY
Payer: COMMERCIAL

## 2021-03-22 ENCOUNTER — ANESTHESIA (OUTPATIENT)
Dept: OBSTETRICS AND GYNECOLOGY | Facility: OTHER | Age: 36
End: 2021-03-22
Payer: COMMERCIAL

## 2021-03-22 ENCOUNTER — PATIENT MESSAGE (OUTPATIENT)
Dept: OBSTETRICS AND GYNECOLOGY | Facility: CLINIC | Age: 36
End: 2021-03-22

## 2021-03-22 ENCOUNTER — ANESTHESIA EVENT (OUTPATIENT)
Dept: OBSTETRICS AND GYNECOLOGY | Facility: OTHER | Age: 36
End: 2021-03-22
Payer: COMMERCIAL

## 2021-03-22 ENCOUNTER — PROCEDURE VISIT (OUTPATIENT)
Dept: MATERNAL FETAL MEDICINE | Facility: CLINIC | Age: 36
End: 2021-03-22
Payer: COMMERCIAL

## 2021-03-22 VITALS
WEIGHT: 272.25 LBS | SYSTOLIC BLOOD PRESSURE: 197 MMHG | BODY MASS INDEX: 46.73 KG/M2 | DIASTOLIC BLOOD PRESSURE: 92 MMHG | SYSTOLIC BLOOD PRESSURE: 160 MMHG | DIASTOLIC BLOOD PRESSURE: 92 MMHG

## 2021-03-22 DIAGNOSIS — O16.3 ELEVATED BLOOD PRESSURE AFFECTING PREGNANCY IN THIRD TRIMESTER, ANTEPARTUM: Primary | ICD-10-CM

## 2021-03-22 DIAGNOSIS — O16.3 SEVERE HYPERTENSION AFFECTING PREGNANCY IN THIRD TRIMESTER: ICD-10-CM

## 2021-03-22 DIAGNOSIS — Z36.89 ENCOUNTER FOR ULTRASOUND TO ASSESS FETAL GROWTH: ICD-10-CM

## 2021-03-22 DIAGNOSIS — O14.13 SEVERE PRE-ECLAMPSIA IN THIRD TRIMESTER: Primary | ICD-10-CM

## 2021-03-22 DIAGNOSIS — Z3A.37 37 WEEKS GESTATION OF PREGNANCY: ICD-10-CM

## 2021-03-22 LAB
ABO + RH BLD: NORMAL
ALBUMIN SERPL BCP-MCNC: 2.6 G/DL (ref 3.5–5.2)
ALP SERPL-CCNC: 178 U/L (ref 55–135)
ALT SERPL W/O P-5'-P-CCNC: 43 U/L (ref 10–44)
ANION GAP SERPL CALC-SCNC: 9 MMOL/L (ref 8–16)
AST SERPL-CCNC: 35 U/L (ref 10–40)
BASOPHILS # BLD AUTO: 0.02 K/UL (ref 0–0.2)
BASOPHILS NFR BLD: 0.2 % (ref 0–1.9)
BILIRUB SERPL-MCNC: 0.4 MG/DL (ref 0.1–1)
BLD GP AB SCN CELLS X3 SERPL QL: NORMAL
BUN SERPL-MCNC: 7 MG/DL (ref 6–20)
CALCIUM SERPL-MCNC: 8.2 MG/DL (ref 8.7–10.5)
CHLORIDE SERPL-SCNC: 108 MMOL/L (ref 95–110)
CO2 SERPL-SCNC: 20 MMOL/L (ref 23–29)
CREAT SERPL-MCNC: 0.6 MG/DL (ref 0.5–1.4)
CREAT UR-MCNC: 64.7 MG/DL (ref 15–325)
DIFFERENTIAL METHOD: ABNORMAL
EOSINOPHIL # BLD AUTO: 0.2 K/UL (ref 0–0.5)
EOSINOPHIL NFR BLD: 1.4 % (ref 0–8)
ERYTHROCYTE [DISTWIDTH] IN BLOOD BY AUTOMATED COUNT: 15.5 % (ref 11.5–14.5)
EST. GFR  (AFRICAN AMERICAN): >60 ML/MIN/1.73 M^2
EST. GFR  (NON AFRICAN AMERICAN): >60 ML/MIN/1.73 M^2
GLUCOSE SERPL-MCNC: 84 MG/DL (ref 70–110)
HCT VFR BLD AUTO: 30.2 % (ref 37–48.5)
HGB BLD-MCNC: 9.7 G/DL (ref 12–16)
IMM GRANULOCYTES # BLD AUTO: 0.24 K/UL (ref 0–0.04)
IMM GRANULOCYTES NFR BLD AUTO: 1.8 % (ref 0–0.5)
LYMPHOCYTES # BLD AUTO: 1 K/UL (ref 1–4.8)
LYMPHOCYTES NFR BLD: 7.7 % (ref 18–48)
MCH RBC QN AUTO: 26.8 PG (ref 27–31)
MCHC RBC AUTO-ENTMCNC: 32.1 G/DL (ref 32–36)
MCV RBC AUTO: 83 FL (ref 82–98)
MONOCYTES # BLD AUTO: 0.6 K/UL (ref 0.3–1)
MONOCYTES NFR BLD: 4.5 % (ref 4–15)
NEUTROPHILS # BLD AUTO: 11.2 K/UL (ref 1.8–7.7)
NEUTROPHILS NFR BLD: 84.4 % (ref 38–73)
NRBC BLD-RTO: 0 /100 WBC
PLATELET # BLD AUTO: 234 K/UL (ref 150–350)
PMV BLD AUTO: 10.2 FL (ref 9.2–12.9)
POTASSIUM SERPL-SCNC: 3.7 MMOL/L (ref 3.5–5.1)
PROT SERPL-MCNC: 6.2 G/DL (ref 6–8.4)
PROT UR-MCNC: 64 MG/DL (ref 0–15)
PROT/CREAT UR: 0.99 MG/G{CREAT} (ref 0–0.2)
RBC # BLD AUTO: 3.62 M/UL (ref 4–5.4)
SARS-COV-2 RDRP RESP QL NAA+PROBE: NEGATIVE
SODIUM SERPL-SCNC: 137 MMOL/L (ref 136–145)
WBC # BLD AUTO: 13.3 K/UL (ref 3.9–12.7)

## 2021-03-22 PROCEDURE — 63600175 PHARM REV CODE 636 W HCPCS: Performed by: STUDENT IN AN ORGANIZED HEALTH CARE EDUCATION/TRAINING PROGRAM

## 2021-03-22 PROCEDURE — U0002 COVID-19 LAB TEST NON-CDC: HCPCS | Performed by: OBSTETRICS & GYNECOLOGY

## 2021-03-22 PROCEDURE — 80053 COMPREHEN METABOLIC PANEL: CPT | Performed by: STUDENT IN AN ORGANIZED HEALTH CARE EDUCATION/TRAINING PROGRAM

## 2021-03-22 PROCEDURE — 99999 PR PBB SHADOW E&M-EST. PATIENT-LVL II: CPT | Mod: PBBFAC,,, | Performed by: NURSE PRACTITIONER

## 2021-03-22 PROCEDURE — 11000001 HC ACUTE MED/SURG PRIVATE ROOM

## 2021-03-22 PROCEDURE — 01968 PR INSERT CATH,ART,PERCUT,SHORTTERM: ICD-10-PCS | Mod: QY,,, | Performed by: ANESTHESIOLOGY

## 2021-03-22 PROCEDURE — 99999 PR PBB SHADOW E&M-EST. PATIENT-LVL II: ICD-10-PCS | Mod: PBBFAC,,, | Performed by: NURSE PRACTITIONER

## 2021-03-22 PROCEDURE — 25000003 PHARM REV CODE 250: Performed by: ANESTHESIOLOGY

## 2021-03-22 PROCEDURE — 36415 COLL VENOUS BLD VENIPUNCTURE: CPT | Performed by: OBSTETRICS & GYNECOLOGY

## 2021-03-22 PROCEDURE — 82570 ASSAY OF URINE CREATININE: CPT | Performed by: STUDENT IN AN ORGANIZED HEALTH CARE EDUCATION/TRAINING PROGRAM

## 2021-03-22 PROCEDURE — 85025 COMPLETE CBC W/AUTO DIFF WBC: CPT | Performed by: STUDENT IN AN ORGANIZED HEALTH CARE EDUCATION/TRAINING PROGRAM

## 2021-03-22 PROCEDURE — 76819 FETAL BIOPHYS PROFIL W/O NST: CPT | Mod: S$GLB,,, | Performed by: OBSTETRICS & GYNECOLOGY

## 2021-03-22 PROCEDURE — 25000003 PHARM REV CODE 250: Performed by: STUDENT IN AN ORGANIZED HEALTH CARE EDUCATION/TRAINING PROGRAM

## 2021-03-22 PROCEDURE — 76816 PR  US,PREGNANT UTERUS,F/U,TRANSABD APP: ICD-10-PCS | Mod: S$GLB,,, | Performed by: OBSTETRICS & GYNECOLOGY

## 2021-03-22 PROCEDURE — 99285 EMERGENCY DEPT VISIT HI MDM: CPT | Mod: 25

## 2021-03-22 PROCEDURE — 76820 UMBILICAL ARTERY ECHO: CPT | Mod: S$GLB,,, | Performed by: OBSTETRICS & GYNECOLOGY

## 2021-03-22 PROCEDURE — C1751 CATH, INF, PER/CENT/MIDLINE: HCPCS | Performed by: ANESTHESIOLOGY

## 2021-03-22 PROCEDURE — 0502F PR SUBSEQUENT PRENATAL CARE: ICD-10-PCS | Mod: CPTII,S$GLB,, | Performed by: NURSE PRACTITIONER

## 2021-03-22 PROCEDURE — 25000003 PHARM REV CODE 250

## 2021-03-22 PROCEDURE — 59514 CESAREAN DELIVERY ONLY: CPT | Mod: QY,,, | Performed by: ANESTHESIOLOGY

## 2021-03-22 PROCEDURE — 01968 ANES/ANALG CS DLVR NEURAXIAL: CPT | Mod: QY,,, | Performed by: ANESTHESIOLOGY

## 2021-03-22 PROCEDURE — 59514 PRA REAN DELIVERY ONLY: ICD-10-PCS | Mod: QY,,, | Performed by: ANESTHESIOLOGY

## 2021-03-22 PROCEDURE — 27200710 HC EPIDURAL INFUSION PUMP SET: Performed by: ANESTHESIOLOGY

## 2021-03-22 PROCEDURE — 76816 OB US FOLLOW-UP PER FETUS: CPT | Mod: S$GLB,,, | Performed by: OBSTETRICS & GYNECOLOGY

## 2021-03-22 PROCEDURE — 0502F SUBSEQUENT PRENATAL CARE: CPT | Mod: CPTII,S$GLB,, | Performed by: NURSE PRACTITIONER

## 2021-03-22 PROCEDURE — 59025 FETAL NON-STRESS TEST: CPT

## 2021-03-22 PROCEDURE — 76819 PR US, OB, FETAL BIOPHYSICAL, W/O NST: ICD-10-PCS | Mod: S$GLB,,, | Performed by: OBSTETRICS & GYNECOLOGY

## 2021-03-22 PROCEDURE — 86900 BLOOD TYPING SEROLOGIC ABO: CPT | Performed by: OBSTETRICS & GYNECOLOGY

## 2021-03-22 PROCEDURE — 76820 PR US, OB DOPPLER, FETAL UMBILICAL ARTERY ECHO: ICD-10-PCS | Mod: S$GLB,,, | Performed by: OBSTETRICS & GYNECOLOGY

## 2021-03-22 RX ORDER — NIFEDIPINE 10 MG/1
10 CAPSULE ORAL ONCE
Status: COMPLETED | OUTPATIENT
Start: 2021-03-22 | End: 2021-03-22

## 2021-03-22 RX ORDER — FENTANYL CITRATE 50 UG/ML
INJECTION, SOLUTION INTRAMUSCULAR; INTRAVENOUS
Status: DISCONTINUED | OUTPATIENT
Start: 2021-03-22 | End: 2021-03-24

## 2021-03-22 RX ORDER — DIPHENHYDRAMINE HYDROCHLORIDE 50 MG/ML
12.5 INJECTION INTRAMUSCULAR; INTRAVENOUS ONCE
Status: COMPLETED | OUTPATIENT
Start: 2021-03-22 | End: 2021-03-22

## 2021-03-22 RX ORDER — ACETAMINOPHEN 500 MG
TABLET ORAL
Status: COMPLETED
Start: 2021-03-22 | End: 2021-03-22

## 2021-03-22 RX ORDER — MAGNESIUM SULFATE HEPTAHYDRATE 40 MG/ML
2 INJECTION, SOLUTION INTRAVENOUS CONTINUOUS
Status: DISCONTINUED | OUTPATIENT
Start: 2021-03-22 | End: 2021-03-25

## 2021-03-22 RX ORDER — SODIUM CHLORIDE 9 MG/ML
INJECTION, SOLUTION INTRAVENOUS
Status: DISCONTINUED | OUTPATIENT
Start: 2021-03-22 | End: 2021-03-24

## 2021-03-22 RX ORDER — PANTOPRAZOLE SODIUM 40 MG/1
40 TABLET, DELAYED RELEASE ORAL DAILY
Status: DISCONTINUED | OUTPATIENT
Start: 2021-03-23 | End: 2021-03-22

## 2021-03-22 RX ORDER — OXYTOCIN/RINGER'S LACTATE 30/500 ML
95 PLASTIC BAG, INJECTION (ML) INTRAVENOUS ONCE
Status: COMPLETED | OUTPATIENT
Start: 2021-03-22 | End: 2021-03-24

## 2021-03-22 RX ORDER — NALBUPHINE HYDROCHLORIDE 10 MG/ML
2.5 INJECTION, SOLUTION INTRAMUSCULAR; INTRAVENOUS; SUBCUTANEOUS ONCE
Status: COMPLETED | OUTPATIENT
Start: 2021-03-22 | End: 2021-03-22

## 2021-03-22 RX ORDER — MAGNESIUM SULFATE HEPTAHYDRATE 40 MG/ML
4 INJECTION, SOLUTION INTRAVENOUS ONCE
Status: COMPLETED | OUTPATIENT
Start: 2021-03-22 | End: 2021-03-22

## 2021-03-22 RX ORDER — SODIUM CITRATE AND CITRIC ACID MONOHYDRATE 334; 500 MG/5ML; MG/5ML
30 SOLUTION ORAL ONCE
Status: COMPLETED | OUTPATIENT
Start: 2021-03-22 | End: 2021-03-24

## 2021-03-22 RX ORDER — CALCIUM CARBONATE 200(500)MG
500 TABLET,CHEWABLE ORAL 3 TIMES DAILY PRN
Status: DISCONTINUED | OUTPATIENT
Start: 2021-03-22 | End: 2021-03-24

## 2021-03-22 RX ORDER — SODIUM CHLORIDE, SODIUM LACTATE, POTASSIUM CHLORIDE, CALCIUM CHLORIDE 600; 310; 30; 20 MG/100ML; MG/100ML; MG/100ML; MG/100ML
INJECTION, SOLUTION INTRAVENOUS CONTINUOUS
Status: DISCONTINUED | OUTPATIENT
Start: 2021-03-22 | End: 2021-03-22

## 2021-03-22 RX ORDER — SIMETHICONE 80 MG
1 TABLET,CHEWABLE ORAL 4 TIMES DAILY PRN
Status: DISCONTINUED | OUTPATIENT
Start: 2021-03-22 | End: 2021-03-24

## 2021-03-22 RX ORDER — FENTANYL/BUPIVACAINE/NS/PF 2MCG/ML-.1
PLASTIC BAG, INJECTION (ML) INJECTION
Status: COMPLETED
Start: 2021-03-22 | End: 2021-03-23

## 2021-03-22 RX ORDER — SODIUM CHLORIDE, SODIUM LACTATE, POTASSIUM CHLORIDE, CALCIUM CHLORIDE 600; 310; 30; 20 MG/100ML; MG/100ML; MG/100ML; MG/100ML
INJECTION, SOLUTION INTRAVENOUS CONTINUOUS
Status: DISCONTINUED | OUTPATIENT
Start: 2021-03-22 | End: 2021-03-24

## 2021-03-22 RX ORDER — PROCHLORPERAZINE EDISYLATE 5 MG/ML
5 INJECTION INTRAMUSCULAR; INTRAVENOUS EVERY 6 HOURS PRN
Status: DISCONTINUED | OUTPATIENT
Start: 2021-03-22 | End: 2021-03-24

## 2021-03-22 RX ORDER — OXYTOCIN/RINGER'S LACTATE 30/500 ML
334 PLASTIC BAG, INJECTION (ML) INTRAVENOUS ONCE
Status: DISCONTINUED | OUTPATIENT
Start: 2021-03-22 | End: 2021-03-24

## 2021-03-22 RX ORDER — NALBUPHINE HYDROCHLORIDE 10 MG/ML
2.5 INJECTION, SOLUTION INTRAMUSCULAR; INTRAVENOUS; SUBCUTANEOUS ONCE
Status: DISCONTINUED | OUTPATIENT
Start: 2021-03-22 | End: 2021-03-27 | Stop reason: HOSPADM

## 2021-03-22 RX ORDER — FENTANYL CITRATE 50 UG/ML
INJECTION, SOLUTION INTRAMUSCULAR; INTRAVENOUS
Status: COMPLETED
Start: 2021-03-22 | End: 2021-03-22

## 2021-03-22 RX ORDER — CALCIUM GLUCONATE 98 MG/ML
1 INJECTION, SOLUTION INTRAVENOUS
Status: DISCONTINUED | OUTPATIENT
Start: 2021-03-22 | End: 2021-03-27 | Stop reason: HOSPADM

## 2021-03-22 RX ORDER — BUPIVACAINE HYDROCHLORIDE 2.5 MG/ML
INJECTION, SOLUTION EPIDURAL; INFILTRATION; INTRACAUDAL
Status: DISPENSED
Start: 2021-03-22 | End: 2021-03-23

## 2021-03-22 RX ORDER — FAMOTIDINE 10 MG/ML
20 INJECTION INTRAVENOUS ONCE
Status: COMPLETED | OUTPATIENT
Start: 2021-03-22 | End: 2021-03-23

## 2021-03-22 RX ORDER — ONDANSETRON 8 MG/1
8 TABLET, ORALLY DISINTEGRATING ORAL EVERY 8 HOURS PRN
Status: DISCONTINUED | OUTPATIENT
Start: 2021-03-22 | End: 2021-03-24

## 2021-03-22 RX ORDER — LABETALOL HYDROCHLORIDE 5 MG/ML
20 INJECTION, SOLUTION INTRAVENOUS ONCE
Status: DISCONTINUED | OUTPATIENT
Start: 2021-03-22 | End: 2021-03-23

## 2021-03-22 RX ORDER — ACETAMINOPHEN 500 MG
1000 TABLET ORAL ONCE
Status: COMPLETED | OUTPATIENT
Start: 2021-03-22 | End: 2021-03-24

## 2021-03-22 RX ORDER — LIDOCAINE HYDROCHLORIDE AND EPINEPHRINE 15; 5 MG/ML; UG/ML
INJECTION, SOLUTION EPIDURAL
Status: DISCONTINUED | OUTPATIENT
Start: 2021-03-22 | End: 2021-03-24

## 2021-03-22 RX ORDER — OXYTOCIN/RINGER'S LACTATE 30/500 ML
0-30 PLASTIC BAG, INJECTION (ML) INTRAVENOUS CONTINUOUS
Status: DISCONTINUED | OUTPATIENT
Start: 2021-03-23 | End: 2021-03-24

## 2021-03-22 RX ORDER — FENTANYL/BUPIVACAINE/NS/PF 2MCG/ML-.1
10 PLASTIC BAG, INJECTION (ML) INJECTION CONTINUOUS
Status: DISCONTINUED | OUTPATIENT
Start: 2021-03-22 | End: 2021-03-24

## 2021-03-22 RX ADMIN — LIDOCAINE HYDROCHLORIDE,EPINEPHRINE BITARTRATE 3 ML: 15; .005 INJECTION, SOLUTION EPIDURAL; INFILTRATION; INTRACAUDAL; PERINEURAL at 07:03

## 2021-03-22 RX ADMIN — Medication 2 MILLI-UNITS/MIN: at 11:03

## 2021-03-22 RX ADMIN — ACETAMINOPHEN 1000 MG: 500 TABLET ORAL at 06:03

## 2021-03-22 RX ADMIN — MAGNESIUM SULFATE HEPTAHYDRATE 2 G/HR: 40 INJECTION, SOLUTION INTRAVENOUS at 05:03

## 2021-03-22 RX ADMIN — FENTANYL CITRATE 100 MCG: 50 INJECTION, SOLUTION INTRAMUSCULAR; INTRAVENOUS at 07:03

## 2021-03-22 RX ADMIN — Medication 5 ML: at 07:03

## 2021-03-22 RX ADMIN — Medication 1000 MG: at 06:03

## 2021-03-22 RX ADMIN — SODIUM CHLORIDE, SODIUM LACTATE, POTASSIUM CHLORIDE, AND CALCIUM CHLORIDE 500 ML: .6; .31; .03; .02 INJECTION, SOLUTION INTRAVENOUS at 10:03

## 2021-03-22 RX ADMIN — MISOPROSTOL 50 MCG: 100 TABLET ORAL at 06:03

## 2021-03-22 RX ADMIN — DIPHENHYDRAMINE HYDROCHLORIDE 12.5 MG: 50 INJECTION INTRAMUSCULAR; INTRAVENOUS at 09:03

## 2021-03-22 RX ADMIN — NALBUPHINE HYDROCHLORIDE 2.5 MG: 10 INJECTION, SOLUTION INTRAMUSCULAR; INTRAVENOUS; SUBCUTANEOUS at 08:03

## 2021-03-22 RX ADMIN — MAGNESIUM SULFATE HEPTAHYDRATE 4 G: 40 INJECTION, SOLUTION INTRAVENOUS at 05:03

## 2021-03-22 RX ADMIN — NIFEDIPINE 10 MG: 10 CAPSULE ORAL at 04:03

## 2021-03-23 PROCEDURE — 25000003 PHARM REV CODE 250: Performed by: OBSTETRICS & GYNECOLOGY

## 2021-03-23 PROCEDURE — 25000003 PHARM REV CODE 250: Performed by: STUDENT IN AN ORGANIZED HEALTH CARE EDUCATION/TRAINING PROGRAM

## 2021-03-23 PROCEDURE — 63600175 PHARM REV CODE 636 W HCPCS: Performed by: STUDENT IN AN ORGANIZED HEALTH CARE EDUCATION/TRAINING PROGRAM

## 2021-03-23 PROCEDURE — 63600175 PHARM REV CODE 636 W HCPCS: Performed by: ANESTHESIOLOGY

## 2021-03-23 PROCEDURE — 25000003 PHARM REV CODE 250: Performed by: ANESTHESIOLOGY

## 2021-03-23 PROCEDURE — 11000001 HC ACUTE MED/SURG PRIVATE ROOM

## 2021-03-23 RX ORDER — DIPHENHYDRAMINE HYDROCHLORIDE 50 MG/ML
12.5 INJECTION INTRAMUSCULAR; INTRAVENOUS ONCE
Status: COMPLETED | OUTPATIENT
Start: 2021-03-23 | End: 2021-03-23

## 2021-03-23 RX ORDER — BUPIVACAINE HYDROCHLORIDE 2.5 MG/ML
INJECTION, SOLUTION EPIDURAL; INFILTRATION; INTRACAUDAL
Status: DISPENSED
Start: 2021-03-23 | End: 2021-03-24

## 2021-03-23 RX ORDER — LIDOCAINE HCL/EPINEPHRINE/PF 2%-1:200K
VIAL (ML) INJECTION
Status: DISCONTINUED | OUTPATIENT
Start: 2021-03-23 | End: 2021-03-24

## 2021-03-23 RX ORDER — FAMOTIDINE 10 MG/ML
20 INJECTION INTRAVENOUS DAILY
Status: DISCONTINUED | OUTPATIENT
Start: 2021-03-23 | End: 2021-03-27 | Stop reason: HOSPADM

## 2021-03-23 RX ORDER — BUPIVACAINE HYDROCHLORIDE 2.5 MG/ML
INJECTION, SOLUTION EPIDURAL; INFILTRATION; INTRACAUDAL
Status: DISPENSED
Start: 2021-03-23 | End: 2021-03-23

## 2021-03-23 RX ORDER — LABETALOL HYDROCHLORIDE 5 MG/ML
40 INJECTION, SOLUTION INTRAVENOUS ONCE
Status: COMPLETED | OUTPATIENT
Start: 2021-03-23 | End: 2021-03-23

## 2021-03-23 RX ORDER — DIPHENHYDRAMINE HYDROCHLORIDE 50 MG/ML
25 INJECTION INTRAMUSCULAR; INTRAVENOUS EVERY 6 HOURS PRN
Status: DISCONTINUED | OUTPATIENT
Start: 2021-03-23 | End: 2021-03-24

## 2021-03-23 RX ORDER — BUPIVACAINE HYDROCHLORIDE 2.5 MG/ML
INJECTION, SOLUTION INFILTRATION; PERINEURAL
Status: DISCONTINUED | OUTPATIENT
Start: 2021-03-23 | End: 2021-03-24

## 2021-03-23 RX ORDER — FENTANYL CITRATE 50 UG/ML
INJECTION, SOLUTION INTRAMUSCULAR; INTRAVENOUS
Status: COMPLETED
Start: 2021-03-23 | End: 2021-03-23

## 2021-03-23 RX ORDER — LABETALOL HYDROCHLORIDE 5 MG/ML
20 INJECTION, SOLUTION INTRAVENOUS ONCE
Status: COMPLETED | OUTPATIENT
Start: 2021-03-23 | End: 2021-03-23

## 2021-03-23 RX ORDER — NIFEDIPINE 10 MG/1
10 CAPSULE ORAL ONCE
Status: COMPLETED | OUTPATIENT
Start: 2021-03-23 | End: 2021-03-23

## 2021-03-23 RX ORDER — FENTANYL/BUPIVACAINE/NS/PF 2MCG/ML-.1
PLASTIC BAG, INJECTION (ML) INJECTION
Status: COMPLETED
Start: 2021-03-23 | End: 2021-03-23

## 2021-03-23 RX ORDER — MISOPROSTOL 100 MCG
25 TABLET ORAL
Status: DISPENSED | OUTPATIENT
Start: 2021-03-23 | End: 2021-03-24

## 2021-03-23 RX ADMIN — ONDANSETRON 8 MG: 8 TABLET, ORALLY DISINTEGRATING ORAL at 12:03

## 2021-03-23 RX ADMIN — BUPIVACAINE HYDROCHLORIDE 5 ML: 2.5 INJECTION, SOLUTION INFILTRATION; PERINEURAL at 02:03

## 2021-03-23 RX ADMIN — FENTANYL CITRATE 100 MCG: 50 INJECTION, SOLUTION INTRAMUSCULAR; INTRAVENOUS at 03:03

## 2021-03-23 RX ADMIN — BUPIVACAINE HYDROCHLORIDE 1 ML: 2.5 INJECTION, SOLUTION INFILTRATION; PERINEURAL at 12:03

## 2021-03-23 RX ADMIN — MAGNESIUM SULFATE HEPTAHYDRATE 2 G/HR: 40 INJECTION, SOLUTION INTRAVENOUS at 10:03

## 2021-03-23 RX ADMIN — Medication 12 ML/HR: at 03:03

## 2021-03-23 RX ADMIN — LABETALOL HYDROCHLORIDE 20 MG: 5 INJECTION INTRAVENOUS at 09:03

## 2021-03-23 RX ADMIN — PROCHLORPERAZINE EDISYLATE 5 MG: 5 INJECTION INTRAMUSCULAR; INTRAVENOUS at 02:03

## 2021-03-23 RX ADMIN — PROCHLORPERAZINE EDISYLATE 5 MG: 5 INJECTION INTRAMUSCULAR; INTRAVENOUS at 10:03

## 2021-03-23 RX ADMIN — Medication 10 ML: at 08:03

## 2021-03-23 RX ADMIN — Medication 8 ML: at 03:03

## 2021-03-23 RX ADMIN — LABETALOL HYDROCHLORIDE 40 MG: 5 INJECTION INTRAVENOUS at 09:03

## 2021-03-23 RX ADMIN — SODIUM CHLORIDE, SODIUM LACTATE, POTASSIUM CHLORIDE, AND CALCIUM CHLORIDE: .6; .31; .03; .02 INJECTION, SOLUTION INTRAVENOUS at 07:03

## 2021-03-23 RX ADMIN — FENTANYL CITRATE 100 MCG: 50 INJECTION, SOLUTION INTRAMUSCULAR; INTRAVENOUS at 10:03

## 2021-03-23 RX ADMIN — CALCIUM CARBONATE (ANTACID) CHEW TAB 500 MG 500 MG: 500 CHEW TAB at 12:03

## 2021-03-23 RX ADMIN — Medication 2 MILLI-UNITS/MIN: at 09:03

## 2021-03-23 RX ADMIN — Medication 25 MCG: at 04:03

## 2021-03-23 RX ADMIN — LIDOCAINE HYDROCHLORIDE,EPINEPHRINE BITARTRATE 10 ML: 20; .005 INJECTION, SOLUTION EPIDURAL; INFILTRATION; INTRACAUDAL; PERINEURAL at 10:03

## 2021-03-23 RX ADMIN — NIFEDIPINE 10 MG: 10 CAPSULE ORAL at 12:03

## 2021-03-23 RX ADMIN — FAMOTIDINE 20 MG: 10 INJECTION INTRAVENOUS at 02:03

## 2021-03-23 RX ADMIN — DIPHENHYDRAMINE HYDROCHLORIDE 25 MG: 50 INJECTION, SOLUTION INTRAMUSCULAR; INTRAVENOUS at 11:03

## 2021-03-23 RX ADMIN — DIPHENHYDRAMINE HYDROCHLORIDE 12.5 MG: 50 INJECTION INTRAMUSCULAR; INTRAVENOUS at 04:03

## 2021-03-24 PROBLEM — Z98.891 S/P CESAREAN SECTION: Status: ACTIVE | Noted: 2021-03-24

## 2021-03-24 LAB
ALBUMIN SERPL BCP-MCNC: 2.1 G/DL (ref 3.5–5.2)
ALP SERPL-CCNC: 179 U/L (ref 55–135)
ALT SERPL W/O P-5'-P-CCNC: 36 U/L (ref 10–44)
ANION GAP SERPL CALC-SCNC: 8 MMOL/L (ref 8–16)
AST SERPL-CCNC: 26 U/L (ref 10–40)
BASOPHILS # BLD AUTO: 0.04 K/UL (ref 0–0.2)
BASOPHILS NFR BLD: 0.2 % (ref 0–1.9)
BILIRUB SERPL-MCNC: 0.7 MG/DL (ref 0.1–1)
BUN SERPL-MCNC: 7 MG/DL (ref 6–20)
CALCIUM SERPL-MCNC: 6.8 MG/DL (ref 8.7–10.5)
CHLORIDE SERPL-SCNC: 102 MMOL/L (ref 95–110)
CO2 SERPL-SCNC: 21 MMOL/L (ref 23–29)
CREAT SERPL-MCNC: 0.7 MG/DL (ref 0.5–1.4)
DIFFERENTIAL METHOD: ABNORMAL
EOSINOPHIL # BLD AUTO: 0.1 K/UL (ref 0–0.5)
EOSINOPHIL NFR BLD: 0.4 % (ref 0–8)
ERYTHROCYTE [DISTWIDTH] IN BLOOD BY AUTOMATED COUNT: 15.8 % (ref 11.5–14.5)
EST. GFR  (AFRICAN AMERICAN): >60 ML/MIN/1.73 M^2
EST. GFR  (NON AFRICAN AMERICAN): >60 ML/MIN/1.73 M^2
GLUCOSE SERPL-MCNC: 87 MG/DL (ref 70–110)
HCT VFR BLD AUTO: 31.4 % (ref 37–48.5)
HGB BLD-MCNC: 10.1 G/DL (ref 12–16)
IMM GRANULOCYTES # BLD AUTO: 0.13 K/UL (ref 0–0.04)
IMM GRANULOCYTES NFR BLD AUTO: 0.6 % (ref 0–0.5)
LYMPHOCYTES # BLD AUTO: 0.6 K/UL (ref 1–4.8)
LYMPHOCYTES NFR BLD: 2.7 % (ref 18–48)
MCH RBC QN AUTO: 26.7 PG (ref 27–31)
MCHC RBC AUTO-ENTMCNC: 32.2 G/DL (ref 32–36)
MCV RBC AUTO: 83 FL (ref 82–98)
MONOCYTES # BLD AUTO: 0.5 K/UL (ref 0.3–1)
MONOCYTES NFR BLD: 2.4 % (ref 4–15)
NEUTROPHILS # BLD AUTO: 20 K/UL (ref 1.8–7.7)
NEUTROPHILS NFR BLD: 93.7 % (ref 38–73)
NRBC BLD-RTO: 0 /100 WBC
PLATELET # BLD AUTO: 250 K/UL (ref 150–350)
PMV BLD AUTO: 10 FL (ref 9.2–12.9)
POTASSIUM SERPL-SCNC: 3.8 MMOL/L (ref 3.5–5.1)
PROT SERPL-MCNC: 5.4 G/DL (ref 6–8.4)
RBC # BLD AUTO: 3.78 M/UL (ref 4–5.4)
SODIUM SERPL-SCNC: 131 MMOL/L (ref 136–145)
WBC # BLD AUTO: 21.35 K/UL (ref 3.9–12.7)

## 2021-03-24 PROCEDURE — 63600175 PHARM REV CODE 636 W HCPCS: Performed by: STUDENT IN AN ORGANIZED HEALTH CARE EDUCATION/TRAINING PROGRAM

## 2021-03-24 PROCEDURE — 25000003 PHARM REV CODE 250: Performed by: ANESTHESIOLOGY

## 2021-03-24 PROCEDURE — 25000003 PHARM REV CODE 250: Performed by: STUDENT IN AN ORGANIZED HEALTH CARE EDUCATION/TRAINING PROGRAM

## 2021-03-24 PROCEDURE — 36004724 HC OB OR TIME LEV III - 1ST 15 MIN: Performed by: OBSTETRICS & GYNECOLOGY

## 2021-03-24 PROCEDURE — 80053 COMPREHEN METABOLIC PANEL: CPT | Performed by: STUDENT IN AN ORGANIZED HEALTH CARE EDUCATION/TRAINING PROGRAM

## 2021-03-24 PROCEDURE — 63600175 PHARM REV CODE 636 W HCPCS: Performed by: OBSTETRICS & GYNECOLOGY

## 2021-03-24 PROCEDURE — 25000003 PHARM REV CODE 250: Performed by: OBSTETRICS & GYNECOLOGY

## 2021-03-24 PROCEDURE — 27000181 HC CABLE, IUPC

## 2021-03-24 PROCEDURE — 36415 COLL VENOUS BLD VENIPUNCTURE: CPT | Performed by: STUDENT IN AN ORGANIZED HEALTH CARE EDUCATION/TRAINING PROGRAM

## 2021-03-24 PROCEDURE — 59510 PR FULL ROUT OBSTE CARE,CESAREAN DELIV: ICD-10-PCS | Mod: AT,,, | Performed by: OBSTETRICS & GYNECOLOGY

## 2021-03-24 PROCEDURE — 71000039 HC RECOVERY, EACH ADD'L HOUR: Performed by: OBSTETRICS & GYNECOLOGY

## 2021-03-24 PROCEDURE — 11000001 HC ACUTE MED/SURG PRIVATE ROOM

## 2021-03-24 PROCEDURE — 62326 NJX INTERLAMINAR LMBR/SAC: CPT | Performed by: ANESTHESIOLOGY

## 2021-03-24 PROCEDURE — 25000003 PHARM REV CODE 250

## 2021-03-24 PROCEDURE — 36004725 HC OB OR TIME LEV III - EA ADD 15 MIN: Performed by: OBSTETRICS & GYNECOLOGY

## 2021-03-24 PROCEDURE — 72100003 HC LABOR CARE, EA. ADDL. 8 HRS

## 2021-03-24 PROCEDURE — 37000008 HC ANESTHESIA 1ST 15 MINUTES: Performed by: OBSTETRICS & GYNECOLOGY

## 2021-03-24 PROCEDURE — 71000033 HC RECOVERY, INTIAL HOUR: Performed by: OBSTETRICS & GYNECOLOGY

## 2021-03-24 PROCEDURE — 37000009 HC ANESTHESIA EA ADD 15 MINS: Performed by: OBSTETRICS & GYNECOLOGY

## 2021-03-24 PROCEDURE — 72100002 HC LABOR CARE, 1ST 8 HOURS

## 2021-03-24 PROCEDURE — 59510 CESAREAN DELIVERY: CPT | Mod: AT,,, | Performed by: OBSTETRICS & GYNECOLOGY

## 2021-03-24 PROCEDURE — 85025 COMPLETE CBC W/AUTO DIFF WBC: CPT | Performed by: STUDENT IN AN ORGANIZED HEALTH CARE EDUCATION/TRAINING PROGRAM

## 2021-03-24 RX ORDER — FENTANYL CITRATE 50 UG/ML
INJECTION, SOLUTION INTRAMUSCULAR; INTRAVENOUS
Status: COMPLETED
Start: 2021-03-24 | End: 2021-03-24

## 2021-03-24 RX ORDER — MORPHINE SULFATE 0.5 MG/ML
INJECTION, SOLUTION EPIDURAL; INTRATHECAL; INTRAVENOUS
Status: DISCONTINUED | OUTPATIENT
Start: 2021-03-24 | End: 2021-03-24

## 2021-03-24 RX ORDER — OXYCODONE AND ACETAMINOPHEN 10; 325 MG/1; MG/1
1 TABLET ORAL EVERY 4 HOURS PRN
Status: DISCONTINUED | OUTPATIENT
Start: 2021-03-25 | End: 2021-03-27 | Stop reason: HOSPADM

## 2021-03-24 RX ORDER — MISOPROSTOL 200 UG/1
800 TABLET ORAL
Status: DISCONTINUED | OUTPATIENT
Start: 2021-03-24 | End: 2021-03-27 | Stop reason: HOSPADM

## 2021-03-24 RX ORDER — ONDANSETRON 2 MG/ML
4 INJECTION INTRAMUSCULAR; INTRAVENOUS EVERY 6 HOURS PRN
Status: DISPENSED | OUTPATIENT
Start: 2021-03-24 | End: 2021-03-25

## 2021-03-24 RX ORDER — OXYTOCIN/RINGER'S LACTATE 30/500 ML
95 PLASTIC BAG, INJECTION (ML) INTRAVENOUS ONCE
Status: DISCONTINUED | OUTPATIENT
Start: 2021-03-24 | End: 2021-03-27 | Stop reason: HOSPADM

## 2021-03-24 RX ORDER — DEXAMETHASONE SODIUM PHOSPHATE 4 MG/ML
INJECTION, SOLUTION INTRA-ARTICULAR; INTRALESIONAL; INTRAMUSCULAR; INTRAVENOUS; SOFT TISSUE
Status: DISCONTINUED | OUTPATIENT
Start: 2021-03-24 | End: 2021-03-24

## 2021-03-24 RX ORDER — FENTANYL/BUPIVACAINE/NS/PF 2MCG/ML-.1
PLASTIC BAG, INJECTION (ML) INJECTION
Status: COMPLETED
Start: 2021-03-24 | End: 2021-03-24

## 2021-03-24 RX ORDER — OXYTOCIN 10 [USP'U]/ML
INJECTION, SOLUTION INTRAMUSCULAR; INTRAVENOUS
Status: DISCONTINUED | OUTPATIENT
Start: 2021-03-24 | End: 2021-03-24

## 2021-03-24 RX ORDER — KETOROLAC TROMETHAMINE 30 MG/ML
INJECTION, SOLUTION INTRAMUSCULAR; INTRAVENOUS
Status: DISCONTINUED
Start: 2021-03-24 | End: 2021-03-24 | Stop reason: WASHOUT

## 2021-03-24 RX ORDER — AMOXICILLIN 250 MG
1 CAPSULE ORAL NIGHTLY PRN
Status: DISCONTINUED | OUTPATIENT
Start: 2021-03-24 | End: 2021-03-27 | Stop reason: HOSPADM

## 2021-03-24 RX ORDER — ACETAMINOPHEN 500 MG
TABLET ORAL
Status: COMPLETED
Start: 2021-03-24 | End: 2021-03-24

## 2021-03-24 RX ORDER — MISOPROSTOL 100 UG/1
TABLET ORAL
Status: DISCONTINUED | OUTPATIENT
Start: 2021-03-24 | End: 2021-03-24

## 2021-03-24 RX ORDER — FENTANYL/BUPIVACAINE/NS/PF 2MCG/ML-.1
PLASTIC BAG, INJECTION (ML) INJECTION CONTINUOUS PRN
Status: DISCONTINUED | OUTPATIENT
Start: 2021-03-24 | End: 2021-03-24

## 2021-03-24 RX ORDER — OXYCODONE AND ACETAMINOPHEN 5; 325 MG/1; MG/1
1 TABLET ORAL EVERY 4 HOURS PRN
Status: DISCONTINUED | OUTPATIENT
Start: 2021-03-25 | End: 2021-03-27 | Stop reason: HOSPADM

## 2021-03-24 RX ORDER — DOCUSATE SODIUM 100 MG/1
200 CAPSULE, LIQUID FILLED ORAL 2 TIMES DAILY
Status: DISCONTINUED | OUTPATIENT
Start: 2021-03-24 | End: 2021-03-27 | Stop reason: HOSPADM

## 2021-03-24 RX ORDER — SODIUM CHLORIDE, SODIUM LACTATE, POTASSIUM CHLORIDE, CALCIUM CHLORIDE 600; 310; 30; 20 MG/100ML; MG/100ML; MG/100ML; MG/100ML
INJECTION, SOLUTION INTRAVENOUS CONTINUOUS
Status: ACTIVE | OUTPATIENT
Start: 2021-03-24 | End: 2021-03-25

## 2021-03-24 RX ORDER — HYDROCORTISONE 25 MG/G
CREAM TOPICAL 3 TIMES DAILY PRN
Status: DISCONTINUED | OUTPATIENT
Start: 2021-03-24 | End: 2021-03-27 | Stop reason: HOSPADM

## 2021-03-24 RX ORDER — DIPHENOXYLATE HYDROCHLORIDE AND ATROPINE SULFATE 2.5; .025 MG/1; MG/1
2 TABLET ORAL 4 TIMES DAILY PRN
Status: DISCONTINUED | OUTPATIENT
Start: 2021-03-24 | End: 2021-03-27 | Stop reason: HOSPADM

## 2021-03-24 RX ORDER — DIPHENOXYLATE HYDROCHLORIDE AND ATROPINE SULFATE 2.5; .025 MG/1; MG/1
1 TABLET ORAL 4 TIMES DAILY PRN
Status: DISCONTINUED | OUTPATIENT
Start: 2021-03-24 | End: 2021-03-27 | Stop reason: HOSPADM

## 2021-03-24 RX ORDER — ACETAMINOPHEN 325 MG/1
650 TABLET ORAL
Status: COMPLETED | OUTPATIENT
Start: 2021-03-24 | End: 2021-03-25

## 2021-03-24 RX ORDER — SIMETHICONE 80 MG
1 TABLET,CHEWABLE ORAL EVERY 6 HOURS PRN
Status: DISCONTINUED | OUTPATIENT
Start: 2021-03-24 | End: 2021-03-27 | Stop reason: HOSPADM

## 2021-03-24 RX ORDER — OXYCODONE HYDROCHLORIDE 5 MG/1
5 TABLET ORAL EVERY 4 HOURS PRN
Status: DISPENSED | OUTPATIENT
Start: 2021-03-24 | End: 2021-03-25

## 2021-03-24 RX ORDER — CARBOPROST TROMETHAMINE 250 UG/ML
250 INJECTION, SOLUTION INTRAMUSCULAR
Status: DISCONTINUED | OUTPATIENT
Start: 2021-03-24 | End: 2021-03-27 | Stop reason: HOSPADM

## 2021-03-24 RX ORDER — ADHESIVE BANDAGE
30 BANDAGE TOPICAL 2 TIMES DAILY PRN
Status: DISCONTINUED | OUTPATIENT
Start: 2021-03-25 | End: 2021-03-27 | Stop reason: HOSPADM

## 2021-03-24 RX ORDER — IBUPROFEN 600 MG/1
600 TABLET ORAL EVERY 6 HOURS
Status: DISCONTINUED | OUTPATIENT
Start: 2021-03-25 | End: 2021-03-27 | Stop reason: HOSPADM

## 2021-03-24 RX ORDER — KETOROLAC TROMETHAMINE 30 MG/ML
30 INJECTION, SOLUTION INTRAMUSCULAR; INTRAVENOUS EVERY 6 HOURS
Status: DISPENSED | OUTPATIENT
Start: 2021-03-24 | End: 2021-03-25

## 2021-03-24 RX ORDER — TRANEXAMIC ACID 100 MG/ML
INJECTION, SOLUTION INTRAVENOUS
Status: DISCONTINUED | OUTPATIENT
Start: 2021-03-24 | End: 2021-03-24

## 2021-03-24 RX ORDER — PROCHLORPERAZINE EDISYLATE 5 MG/ML
5 INJECTION INTRAMUSCULAR; INTRAVENOUS EVERY 6 HOURS PRN
Status: DISCONTINUED | OUTPATIENT
Start: 2021-03-25 | End: 2021-03-27 | Stop reason: HOSPADM

## 2021-03-24 RX ORDER — ONDANSETRON HYDROCHLORIDE 2 MG/ML
INJECTION, SOLUTION INTRAMUSCULAR; INTRAVENOUS
Status: DISCONTINUED | OUTPATIENT
Start: 2021-03-24 | End: 2021-03-24

## 2021-03-24 RX ORDER — FENTANYL CITRATE 50 UG/ML
INJECTION, SOLUTION INTRAMUSCULAR; INTRAVENOUS
Status: DISCONTINUED | OUTPATIENT
Start: 2021-03-24 | End: 2021-03-24

## 2021-03-24 RX ORDER — CARBOPROST TROMETHAMINE 250 UG/ML
INJECTION, SOLUTION INTRAMUSCULAR
Status: DISCONTINUED | OUTPATIENT
Start: 2021-03-24 | End: 2021-03-24

## 2021-03-24 RX ORDER — MUPIROCIN 20 MG/G
1 OINTMENT TOPICAL 2 TIMES DAILY
Status: DISCONTINUED | OUTPATIENT
Start: 2021-03-24 | End: 2021-03-27 | Stop reason: HOSPADM

## 2021-03-24 RX ORDER — MISOPROSTOL 200 UG/1
200 TABLET ORAL EVERY 6 HOURS
Status: COMPLETED | OUTPATIENT
Start: 2021-03-24 | End: 2021-03-25

## 2021-03-24 RX ORDER — CEFAZOLIN SODIUM 1 G/3ML
INJECTION, POWDER, FOR SOLUTION INTRAMUSCULAR; INTRAVENOUS
Status: DISCONTINUED | OUTPATIENT
Start: 2021-03-24 | End: 2021-03-24

## 2021-03-24 RX ORDER — OXYCODONE HYDROCHLORIDE 5 MG/1
10 TABLET ORAL EVERY 4 HOURS PRN
Status: DISPENSED | OUTPATIENT
Start: 2021-03-24 | End: 2021-03-25

## 2021-03-24 RX ORDER — ONDANSETRON 8 MG/1
8 TABLET, ORALLY DISINTEGRATING ORAL EVERY 8 HOURS PRN
Status: DISCONTINUED | OUTPATIENT
Start: 2021-03-25 | End: 2021-03-27 | Stop reason: HOSPADM

## 2021-03-24 RX ORDER — BISACODYL 10 MG
10 SUPPOSITORY, RECTAL RECTAL ONCE AS NEEDED
Status: DISCONTINUED | OUTPATIENT
Start: 2021-03-24 | End: 2021-03-27 | Stop reason: HOSPADM

## 2021-03-24 RX ORDER — SODIUM CHLORIDE, SODIUM LACTATE, POTASSIUM CHLORIDE, CALCIUM CHLORIDE 600; 310; 30; 20 MG/100ML; MG/100ML; MG/100ML; MG/100ML
INJECTION, SOLUTION INTRAVENOUS CONTINUOUS PRN
Status: DISCONTINUED | OUTPATIENT
Start: 2021-03-24 | End: 2021-03-24

## 2021-03-24 RX ORDER — DIPHENHYDRAMINE HCL 25 MG
25 CAPSULE ORAL EVERY 4 HOURS PRN
Status: DISCONTINUED | OUTPATIENT
Start: 2021-03-24 | End: 2021-03-27 | Stop reason: HOSPADM

## 2021-03-24 RX ORDER — PRENATAL WITH FERROUS FUM AND FOLIC ACID 3080; 920; 120; 400; 22; 1.84; 3; 20; 10; 1; 12; 200; 27; 25; 2 [IU]/1; [IU]/1; MG/1; [IU]/1; MG/1; MG/1; MG/1; MG/1; MG/1; MG/1; UG/1; MG/1; MG/1; MG/1; MG/1
1 TABLET ORAL DAILY
Status: DISCONTINUED | OUTPATIENT
Start: 2021-03-25 | End: 2021-03-27 | Stop reason: HOSPADM

## 2021-03-24 RX ORDER — MISOPROSTOL 100 MCG
50 TABLET ORAL EVERY 6 HOURS
Status: DISCONTINUED | OUTPATIENT
Start: 2021-03-24 | End: 2021-03-24

## 2021-03-24 RX ADMIN — MISOPROSTOL 200 MCG: 200 TABLET ORAL at 08:03

## 2021-03-24 RX ADMIN — FENTANYL CITRATE 100 MCG: 50 INJECTION, SOLUTION INTRAMUSCULAR; INTRAVENOUS at 10:03

## 2021-03-24 RX ADMIN — SODIUM CHLORIDE, SODIUM LACTATE, POTASSIUM CHLORIDE, AND CALCIUM CHLORIDE: .6; .31; .03; .02 INJECTION, SOLUTION INTRAVENOUS at 07:03

## 2021-03-24 RX ADMIN — Medication 2 ML/HR: at 12:03

## 2021-03-24 RX ADMIN — OXYCODONE 10 MG: 5 TABLET ORAL at 04:03

## 2021-03-24 RX ADMIN — PROMETHAZINE HYDROCHLORIDE 12.5 MG: 25 INJECTION INTRAMUSCULAR; INTRAVENOUS at 11:03

## 2021-03-24 RX ADMIN — MAGNESIUM SULFATE HEPTAHYDRATE 2 G/HR: 40 INJECTION, SOLUTION INTRAVENOUS at 12:03

## 2021-03-24 RX ADMIN — Medication 10 ML: at 03:03

## 2021-03-24 RX ADMIN — DIPHENOXYLATE HYDROCHLORIDE AND ATROPINE SULFATE 2 TABLET: 2.5; .025 TABLET ORAL at 12:03

## 2021-03-24 RX ADMIN — ONDANSETRON 4 MG: 2 INJECTION INTRAMUSCULAR; INTRAVENOUS at 11:03

## 2021-03-24 RX ADMIN — MISOPROSTOL 800 MCG: 100 TABLET ORAL at 11:03

## 2021-03-24 RX ADMIN — CARBOPROST TROMETHAMINE 250 MCG: 250 INJECTION, SOLUTION INTRAMUSCULAR at 11:03

## 2021-03-24 RX ADMIN — LIDOCAINE HYDROCHLORIDE,EPINEPHRINE BITARTRATE 5 ML: 20; .005 INJECTION, SOLUTION EPIDURAL; INFILTRATION; INTRACAUDAL; PERINEURAL at 10:03

## 2021-03-24 RX ADMIN — DIPHENHYDRAMINE HYDROCHLORIDE 25 MG: 25 CAPSULE ORAL at 08:03

## 2021-03-24 RX ADMIN — LIDOCAINE HYDROCHLORIDE,EPINEPHRINE BITARTRATE 10 ML: 20; .005 INJECTION, SOLUTION EPIDURAL; INFILTRATION; INTRACAUDAL; PERINEURAL at 08:03

## 2021-03-24 RX ADMIN — FAMOTIDINE 20 MG: 10 INJECTION, SOLUTION INTRAVENOUS at 10:03

## 2021-03-24 RX ADMIN — DOCUSATE SODIUM 200 MG: 100 CAPSULE, LIQUID FILLED ORAL at 08:03

## 2021-03-24 RX ADMIN — SODIUM CHLORIDE, SODIUM LACTATE, POTASSIUM CHLORIDE, AND CALCIUM CHLORIDE: 600; 310; 30; 20 INJECTION, SOLUTION INTRAVENOUS at 10:03

## 2021-03-24 RX ADMIN — TRANEXAMIC ACID 500 MG: 100 INJECTION, SOLUTION INTRAVENOUS at 11:03

## 2021-03-24 RX ADMIN — Medication 95 MILLI-UNITS/MIN: at 12:03

## 2021-03-24 RX ADMIN — AZITHROMYCIN MONOHYDRATE 500 MG: 500 INJECTION, POWDER, LYOPHILIZED, FOR SOLUTION INTRAVENOUS at 10:03

## 2021-03-24 RX ADMIN — OXYTOCIN 15 UNITS: 10 INJECTION, SOLUTION INTRAMUSCULAR; INTRAVENOUS at 11:03

## 2021-03-24 RX ADMIN — OXYTOCIN 8 UNITS: 10 INJECTION, SOLUTION INTRAMUSCULAR; INTRAVENOUS at 11:03

## 2021-03-24 RX ADMIN — DIPHENHYDRAMINE HYDROCHLORIDE 25 MG: 50 INJECTION, SOLUTION INTRAMUSCULAR; INTRAVENOUS at 12:03

## 2021-03-24 RX ADMIN — CEFAZOLIN 3 G: 330 INJECTION, POWDER, FOR SOLUTION INTRAMUSCULAR; INTRAVENOUS at 11:03

## 2021-03-24 RX ADMIN — SODIUM CITRATE AND CITRIC ACID MONOHYDRATE 30 ML: 500; 334 SOLUTION ORAL at 10:03

## 2021-03-24 RX ADMIN — Medication 1000 MG: at 10:03

## 2021-03-24 RX ADMIN — MORPHINE SULFATE 1 MG: 0.5 INJECTION, SOLUTION EPIDURAL; INTRATHECAL; INTRAVENOUS at 11:03

## 2021-03-24 RX ADMIN — OXYTOCIN 16 UNITS: 10 INJECTION, SOLUTION INTRAMUSCULAR; INTRAVENOUS at 11:03

## 2021-03-24 RX ADMIN — LIDOCAINE HYDROCHLORIDE,EPINEPHRINE BITARTRATE 5 ML: 20; .005 INJECTION, SOLUTION EPIDURAL; INFILTRATION; INTRACAUDAL; PERINEURAL at 11:03

## 2021-03-24 RX ADMIN — KETOROLAC TROMETHAMINE 30 MG: 30 INJECTION, SOLUTION INTRAMUSCULAR; INTRAVENOUS at 08:03

## 2021-03-24 RX ADMIN — DEXAMETHASONE SODIUM PHOSPHATE 4 MG: 4 INJECTION INTRA-ARTICULAR; INTRALESIONAL; INTRAMUSCULAR; INTRAVENOUS; SOFT TISSUE at 11:03

## 2021-03-24 RX ADMIN — MAGNESIUM SULFATE HEPTAHYDRATE 2 G/HR: 40 INJECTION, SOLUTION INTRAVENOUS at 05:03

## 2021-03-24 RX ADMIN — OXYTOCIN 1 UNITS: 10 INJECTION, SOLUTION INTRAMUSCULAR; INTRAVENOUS at 11:03

## 2021-03-24 RX ADMIN — DIPHENHYDRAMINE HYDROCHLORIDE 25 MG: 50 INJECTION, SOLUTION INTRAMUSCULAR; INTRAVENOUS at 07:03

## 2021-03-24 RX ADMIN — ACETAMINOPHEN 650 MG: 325 TABLET, FILM COATED ORAL at 04:03

## 2021-03-24 RX ADMIN — ACETAMINOPHEN 1000 MG: 500 TABLET ORAL at 10:03

## 2021-03-25 LAB
BASOPHILS # BLD AUTO: 0.05 K/UL (ref 0–0.2)
BASOPHILS NFR BLD: 0.3 % (ref 0–1.9)
DIFFERENTIAL METHOD: ABNORMAL
EOSINOPHIL # BLD AUTO: 0.3 K/UL (ref 0–0.5)
EOSINOPHIL NFR BLD: 1.7 % (ref 0–8)
ERYTHROCYTE [DISTWIDTH] IN BLOOD BY AUTOMATED COUNT: 16 % (ref 11.5–14.5)
HCT VFR BLD AUTO: 29.6 % (ref 37–48.5)
HGB BLD-MCNC: 9.5 G/DL (ref 12–16)
IMM GRANULOCYTES # BLD AUTO: 0.14 K/UL (ref 0–0.04)
IMM GRANULOCYTES NFR BLD AUTO: 0.8 % (ref 0–0.5)
LYMPHOCYTES # BLD AUTO: 1.3 K/UL (ref 1–4.8)
LYMPHOCYTES NFR BLD: 7.7 % (ref 18–48)
MCH RBC QN AUTO: 26.8 PG (ref 27–31)
MCHC RBC AUTO-ENTMCNC: 32.1 G/DL (ref 32–36)
MCV RBC AUTO: 83 FL (ref 82–98)
MONOCYTES # BLD AUTO: 0.9 K/UL (ref 0.3–1)
MONOCYTES NFR BLD: 5.3 % (ref 4–15)
NEUTROPHILS # BLD AUTO: 14.2 K/UL (ref 1.8–7.7)
NEUTROPHILS NFR BLD: 84.2 % (ref 38–73)
NRBC BLD-RTO: 0 /100 WBC
PLATELET # BLD AUTO: 255 K/UL (ref 150–350)
PMV BLD AUTO: 9.5 FL (ref 9.2–12.9)
RBC # BLD AUTO: 3.55 M/UL (ref 4–5.4)
WBC # BLD AUTO: 16.91 K/UL (ref 3.9–12.7)

## 2021-03-25 PROCEDURE — 99024 PR POST-OP FOLLOW-UP VISIT: ICD-10-PCS | Mod: ,,, | Performed by: OBSTETRICS & GYNECOLOGY

## 2021-03-25 PROCEDURE — 25000003 PHARM REV CODE 250: Performed by: STUDENT IN AN ORGANIZED HEALTH CARE EDUCATION/TRAINING PROGRAM

## 2021-03-25 PROCEDURE — 36415 COLL VENOUS BLD VENIPUNCTURE: CPT | Performed by: STUDENT IN AN ORGANIZED HEALTH CARE EDUCATION/TRAINING PROGRAM

## 2021-03-25 PROCEDURE — 11000001 HC ACUTE MED/SURG PRIVATE ROOM

## 2021-03-25 PROCEDURE — 63600175 PHARM REV CODE 636 W HCPCS: Performed by: STUDENT IN AN ORGANIZED HEALTH CARE EDUCATION/TRAINING PROGRAM

## 2021-03-25 PROCEDURE — 99024 POSTOP FOLLOW-UP VISIT: CPT | Mod: ,,, | Performed by: OBSTETRICS & GYNECOLOGY

## 2021-03-25 PROCEDURE — 85025 COMPLETE CBC W/AUTO DIFF WBC: CPT | Performed by: STUDENT IN AN ORGANIZED HEALTH CARE EDUCATION/TRAINING PROGRAM

## 2021-03-25 RX ORDER — LABETALOL 200 MG/1
200 TABLET, FILM COATED ORAL EVERY 12 HOURS
Status: DISCONTINUED | OUTPATIENT
Start: 2021-03-25 | End: 2021-03-26

## 2021-03-25 RX ORDER — ENOXAPARIN SODIUM 100 MG/ML
40 INJECTION SUBCUTANEOUS EVERY 24 HOURS
Status: DISCONTINUED | OUTPATIENT
Start: 2021-03-25 | End: 2021-03-27 | Stop reason: HOSPADM

## 2021-03-25 RX ADMIN — MUPIROCIN 1 G: 20 OINTMENT TOPICAL at 08:03

## 2021-03-25 RX ADMIN — OXYCODONE HYDROCHLORIDE AND ACETAMINOPHEN 1 TABLET: 5; 325 TABLET ORAL at 06:03

## 2021-03-25 RX ADMIN — MAGNESIUM SULFATE HEPTAHYDRATE 2 G/HR: 40 INJECTION, SOLUTION INTRAVENOUS at 12:03

## 2021-03-25 RX ADMIN — LABETALOL HYDROCHLORIDE 200 MG: 200 TABLET, FILM COATED ORAL at 09:03

## 2021-03-25 RX ADMIN — ACETAMINOPHEN 650 MG: 325 TABLET, FILM COATED ORAL at 06:03

## 2021-03-25 RX ADMIN — ACETAMINOPHEN 650 MG: 325 TABLET, FILM COATED ORAL at 12:03

## 2021-03-25 RX ADMIN — PRENATAL VIT W/ FE FUMARATE-FA TAB 27-0.8 MG 1 TABLET: 27-0.8 TAB at 09:03

## 2021-03-25 RX ADMIN — FAMOTIDINE 20 MG: 10 INJECTION, SOLUTION INTRAVENOUS at 09:03

## 2021-03-25 RX ADMIN — ENOXAPARIN SODIUM 40 MG: 40 INJECTION SUBCUTANEOUS at 08:03

## 2021-03-25 RX ADMIN — OXYCODONE 5 MG: 5 TABLET ORAL at 02:03

## 2021-03-25 RX ADMIN — MISOPROSTOL 200 MCG: 200 TABLET ORAL at 02:03

## 2021-03-25 RX ADMIN — IBUPROFEN 600 MG: 600 TABLET ORAL at 06:03

## 2021-03-25 RX ADMIN — LABETALOL HYDROCHLORIDE 200 MG: 200 TABLET, FILM COATED ORAL at 08:03

## 2021-03-25 RX ADMIN — IBUPROFEN 600 MG: 600 TABLET ORAL at 11:03

## 2021-03-25 RX ADMIN — DOCUSATE SODIUM 200 MG: 100 CAPSULE, LIQUID FILLED ORAL at 09:03

## 2021-03-25 RX ADMIN — KETOROLAC TROMETHAMINE 30 MG: 30 INJECTION, SOLUTION INTRAMUSCULAR; INTRAVENOUS at 09:03

## 2021-03-25 RX ADMIN — MUPIROCIN 1 G: 20 OINTMENT TOPICAL at 09:03

## 2021-03-25 RX ADMIN — OXYCODONE HYDROCHLORIDE AND ACETAMINOPHEN 1 TABLET: 5; 325 TABLET ORAL at 10:03

## 2021-03-25 RX ADMIN — KETOROLAC TROMETHAMINE 30 MG: 30 INJECTION, SOLUTION INTRAMUSCULAR; INTRAVENOUS at 02:03

## 2021-03-25 RX ADMIN — MISOPROSTOL 200 MCG: 200 TABLET ORAL at 09:03

## 2021-03-26 PROCEDURE — 11000001 HC ACUTE MED/SURG PRIVATE ROOM

## 2021-03-26 PROCEDURE — 63600175 PHARM REV CODE 636 W HCPCS: Performed by: STUDENT IN AN ORGANIZED HEALTH CARE EDUCATION/TRAINING PROGRAM

## 2021-03-26 PROCEDURE — 99024 POSTOP FOLLOW-UP VISIT: CPT | Mod: ,,, | Performed by: OBSTETRICS & GYNECOLOGY

## 2021-03-26 PROCEDURE — 25000003 PHARM REV CODE 250: Performed by: STUDENT IN AN ORGANIZED HEALTH CARE EDUCATION/TRAINING PROGRAM

## 2021-03-26 PROCEDURE — 99024 PR POST-OP FOLLOW-UP VISIT: ICD-10-PCS | Mod: ,,, | Performed by: OBSTETRICS & GYNECOLOGY

## 2021-03-26 RX ORDER — OXYCODONE AND ACETAMINOPHEN 5; 325 MG/1; MG/1
1 TABLET ORAL EVERY 6 HOURS PRN
Qty: 30 TABLET | Refills: 0 | Status: SHIPPED | OUTPATIENT
Start: 2021-03-26 | End: 2021-04-14

## 2021-03-26 RX ORDER — IBUPROFEN 600 MG/1
600 TABLET ORAL EVERY 6 HOURS
Qty: 30 TABLET | Refills: 1 | Status: SHIPPED | OUTPATIENT
Start: 2021-03-26 | End: 2021-10-15

## 2021-03-26 RX ORDER — LABETALOL 200 MG/1
200 TABLET, FILM COATED ORAL EVERY 12 HOURS
Qty: 60 TABLET | Refills: 11 | Status: ON HOLD | OUTPATIENT
Start: 2021-03-26 | End: 2021-03-30 | Stop reason: HOSPADM

## 2021-03-26 RX ADMIN — ENOXAPARIN SODIUM 40 MG: 40 INJECTION SUBCUTANEOUS at 05:03

## 2021-03-26 RX ADMIN — MUPIROCIN 1 G: 20 OINTMENT TOPICAL at 08:03

## 2021-03-26 RX ADMIN — OXYCODONE HYDROCHLORIDE AND ACETAMINOPHEN 1 TABLET: 5; 325 TABLET ORAL at 01:03

## 2021-03-26 RX ADMIN — OXYCODONE HYDROCHLORIDE AND ACETAMINOPHEN 1 TABLET: 5; 325 TABLET ORAL at 07:03

## 2021-03-26 RX ADMIN — LABETALOL HYDROCHLORIDE 200 MG: 200 TABLET, FILM COATED ORAL at 08:03

## 2021-03-26 RX ADMIN — IBUPROFEN 600 MG: 600 TABLET ORAL at 06:03

## 2021-03-26 RX ADMIN — IBUPROFEN 600 MG: 600 TABLET ORAL at 01:03

## 2021-03-26 RX ADMIN — LABETALOL HYDROCHLORIDE 300 MG: 200 TABLET, FILM COATED ORAL at 09:03

## 2021-03-26 RX ADMIN — FAMOTIDINE 20 MG: 10 INJECTION, SOLUTION INTRAVENOUS at 08:03

## 2021-03-26 RX ADMIN — DOCUSATE SODIUM 200 MG: 100 CAPSULE, LIQUID FILLED ORAL at 09:03

## 2021-03-26 RX ADMIN — PRENATAL VIT W/ FE FUMARATE-FA TAB 27-0.8 MG 1 TABLET: 27-0.8 TAB at 08:03

## 2021-03-26 RX ADMIN — DOCUSATE SODIUM 200 MG: 100 CAPSULE, LIQUID FILLED ORAL at 08:03

## 2021-03-26 RX ADMIN — IBUPROFEN 600 MG: 600 TABLET ORAL at 11:03

## 2021-03-26 RX ADMIN — OXYCODONE HYDROCHLORIDE AND ACETAMINOPHEN 1 TABLET: 5; 325 TABLET ORAL at 05:03

## 2021-03-26 RX ADMIN — MUPIROCIN 1 G: 20 OINTMENT TOPICAL at 09:03

## 2021-03-26 RX ADMIN — OXYCODONE HYDROCHLORIDE AND ACETAMINOPHEN 1 TABLET: 5; 325 TABLET ORAL at 09:03

## 2021-03-26 RX ADMIN — OXYCODONE HYDROCHLORIDE AND ACETAMINOPHEN 1 TABLET: 5; 325 TABLET ORAL at 02:03

## 2021-03-27 VITALS
DIASTOLIC BLOOD PRESSURE: 82 MMHG | WEIGHT: 270.06 LBS | HEART RATE: 80 BPM | TEMPERATURE: 98 F | RESPIRATION RATE: 16 BRPM | HEIGHT: 64 IN | BODY MASS INDEX: 46.11 KG/M2 | OXYGEN SATURATION: 99 % | SYSTOLIC BLOOD PRESSURE: 134 MMHG

## 2021-03-27 PROBLEM — L70.0 ACNE VULGARIS: Status: RESOLVED | Noted: 2017-03-14 | Resolved: 2021-03-27

## 2021-03-27 PROCEDURE — 25000003 PHARM REV CODE 250: Performed by: STUDENT IN AN ORGANIZED HEALTH CARE EDUCATION/TRAINING PROGRAM

## 2021-03-27 PROCEDURE — 99024 PR POST-OP FOLLOW-UP VISIT: ICD-10-PCS | Mod: ,,, | Performed by: OBSTETRICS & GYNECOLOGY

## 2021-03-27 PROCEDURE — 99024 POSTOP FOLLOW-UP VISIT: CPT | Mod: ,,, | Performed by: OBSTETRICS & GYNECOLOGY

## 2021-03-27 PROCEDURE — 25000003 PHARM REV CODE 250: Performed by: OBSTETRICS & GYNECOLOGY

## 2021-03-27 RX ORDER — LABETALOL 200 MG/1
400 TABLET, FILM COATED ORAL 3 TIMES DAILY
Qty: 180 TABLET | Refills: 11 | Status: SHIPPED | OUTPATIENT
Start: 2021-03-27 | End: 2021-03-29 | Stop reason: SDUPTHER

## 2021-03-27 RX ORDER — LABETALOL 200 MG/1
400 TABLET, FILM COATED ORAL 3 TIMES DAILY
Status: DISCONTINUED | OUTPATIENT
Start: 2021-03-27 | End: 2021-03-27 | Stop reason: HOSPADM

## 2021-03-27 RX ADMIN — LABETALOL HYDROCHLORIDE 300 MG: 200 TABLET, FILM COATED ORAL at 08:03

## 2021-03-27 RX ADMIN — MUPIROCIN 1 G: 20 OINTMENT TOPICAL at 08:03

## 2021-03-27 RX ADMIN — DOCUSATE SODIUM 200 MG: 100 CAPSULE, LIQUID FILLED ORAL at 08:03

## 2021-03-27 RX ADMIN — OXYCODONE HYDROCHLORIDE AND ACETAMINOPHEN 1 TABLET: 5; 325 TABLET ORAL at 07:03

## 2021-03-27 RX ADMIN — LABETALOL HYDROCHLORIDE 400 MG: 200 TABLET, FILM COATED ORAL at 03:03

## 2021-03-27 RX ADMIN — FAMOTIDINE 20 MG: 10 INJECTION, SOLUTION INTRAVENOUS at 08:03

## 2021-03-27 RX ADMIN — OXYCODONE HYDROCHLORIDE AND ACETAMINOPHEN 1 TABLET: 5; 325 TABLET ORAL at 02:03

## 2021-03-27 RX ADMIN — IBUPROFEN 600 MG: 600 TABLET ORAL at 12:03

## 2021-03-27 RX ADMIN — OXYCODONE HYDROCHLORIDE AND ACETAMINOPHEN 1 TABLET: 5; 325 TABLET ORAL at 01:03

## 2021-03-27 RX ADMIN — IBUPROFEN 600 MG: 600 TABLET ORAL at 05:03

## 2021-03-28 ENCOUNTER — PATIENT MESSAGE (OUTPATIENT)
Dept: OBSTETRICS AND GYNECOLOGY | Facility: CLINIC | Age: 36
End: 2021-03-28

## 2021-03-29 ENCOUNTER — PATIENT MESSAGE (OUTPATIENT)
Dept: OBSTETRICS AND GYNECOLOGY | Facility: CLINIC | Age: 36
End: 2021-03-29

## 2021-03-29 ENCOUNTER — HOSPITAL ENCOUNTER (OUTPATIENT)
Facility: OTHER | Age: 36
Discharge: HOME | End: 2021-03-30
Attending: OBSTETRICS & GYNECOLOGY | Admitting: OBSTETRICS & GYNECOLOGY
Payer: COMMERCIAL

## 2021-03-29 LAB
ABO + RH BLD: NORMAL
ALBUMIN SERPL BCP-MCNC: 2.8 G/DL (ref 3.5–5.2)
ALP SERPL-CCNC: 122 U/L (ref 55–135)
ALT SERPL W/O P-5'-P-CCNC: 58 U/L (ref 10–44)
ANION GAP SERPL CALC-SCNC: 9 MMOL/L (ref 8–16)
AST SERPL-CCNC: 43 U/L (ref 10–40)
BASOPHILS # BLD AUTO: 0.03 K/UL (ref 0–0.2)
BASOPHILS NFR BLD: 0.3 % (ref 0–1.9)
BILIRUB SERPL-MCNC: 0.4 MG/DL (ref 0.1–1)
BLD GP AB SCN CELLS X3 SERPL QL: NORMAL
BUN SERPL-MCNC: 12 MG/DL (ref 6–20)
CALCIUM SERPL-MCNC: 8.6 MG/DL (ref 8.7–10.5)
CHLORIDE SERPL-SCNC: 107 MMOL/L (ref 95–110)
CO2 SERPL-SCNC: 22 MMOL/L (ref 23–29)
CREAT SERPL-MCNC: 0.7 MG/DL (ref 0.5–1.4)
DIFFERENTIAL METHOD: ABNORMAL
EOSINOPHIL # BLD AUTO: 0.4 K/UL (ref 0–0.5)
EOSINOPHIL NFR BLD: 4.3 % (ref 0–8)
ERYTHROCYTE [DISTWIDTH] IN BLOOD BY AUTOMATED COUNT: 15.7 % (ref 11.5–14.5)
EST. GFR  (AFRICAN AMERICAN): >60 ML/MIN/1.73 M^2
EST. GFR  (NON AFRICAN AMERICAN): >60 ML/MIN/1.73 M^2
GLUCOSE SERPL-MCNC: 78 MG/DL (ref 70–110)
HCT VFR BLD AUTO: 26.9 % (ref 37–48.5)
HGB BLD-MCNC: 8.3 G/DL (ref 12–16)
IMM GRANULOCYTES # BLD AUTO: 0.15 K/UL (ref 0–0.04)
IMM GRANULOCYTES NFR BLD AUTO: 1.6 % (ref 0–0.5)
LYMPHOCYTES # BLD AUTO: 1.2 K/UL (ref 1–4.8)
LYMPHOCYTES NFR BLD: 13.2 % (ref 18–48)
MCH RBC QN AUTO: 26.3 PG (ref 27–31)
MCHC RBC AUTO-ENTMCNC: 30.9 G/DL (ref 32–36)
MCV RBC AUTO: 85 FL (ref 82–98)
MONOCYTES # BLD AUTO: 0.4 K/UL (ref 0.3–1)
MONOCYTES NFR BLD: 4.3 % (ref 4–15)
NEUTROPHILS # BLD AUTO: 7 K/UL (ref 1.8–7.7)
NEUTROPHILS NFR BLD: 76.3 % (ref 38–73)
NRBC BLD-RTO: 0 /100 WBC
PLATELET # BLD AUTO: 303 K/UL (ref 150–450)
PMV BLD AUTO: 9 FL (ref 9.2–12.9)
POTASSIUM SERPL-SCNC: 4.3 MMOL/L (ref 3.5–5.1)
PROT SERPL-MCNC: 6.1 G/DL (ref 6–8.4)
RBC # BLD AUTO: 3.16 M/UL (ref 4–5.4)
SARS-COV-2 RDRP RESP QL NAA+PROBE: NEGATIVE
SODIUM SERPL-SCNC: 138 MMOL/L (ref 136–145)
WBC # BLD AUTO: 9.16 K/UL (ref 3.9–12.7)

## 2021-03-29 PROCEDURE — 96372 THER/PROPH/DIAG INJ SC/IM: CPT | Mod: 59 | Performed by: EMERGENCY MEDICINE

## 2021-03-29 PROCEDURE — 96376 TX/PRO/DX INJ SAME DRUG ADON: CPT | Performed by: EMERGENCY MEDICINE

## 2021-03-29 PROCEDURE — 25000003 PHARM REV CODE 250: Performed by: STUDENT IN AN ORGANIZED HEALTH CARE EDUCATION/TRAINING PROGRAM

## 2021-03-29 PROCEDURE — 99285 EMERGENCY DEPT VISIT HI MDM: CPT | Mod: 25

## 2021-03-29 PROCEDURE — 80053 COMPREHEN METABOLIC PANEL: CPT | Performed by: STUDENT IN AN ORGANIZED HEALTH CARE EDUCATION/TRAINING PROGRAM

## 2021-03-29 PROCEDURE — U0002 COVID-19 LAB TEST NON-CDC: HCPCS | Performed by: STUDENT IN AN ORGANIZED HEALTH CARE EDUCATION/TRAINING PROGRAM

## 2021-03-29 PROCEDURE — 86900 BLOOD TYPING SEROLOGIC ABO: CPT | Performed by: STUDENT IN AN ORGANIZED HEALTH CARE EDUCATION/TRAINING PROGRAM

## 2021-03-29 PROCEDURE — 63600175 PHARM REV CODE 636 W HCPCS: Performed by: STUDENT IN AN ORGANIZED HEALTH CARE EDUCATION/TRAINING PROGRAM

## 2021-03-29 PROCEDURE — 96374 THER/PROPH/DIAG INJ IV PUSH: CPT

## 2021-03-29 PROCEDURE — G0378 HOSPITAL OBSERVATION PER HR: HCPCS

## 2021-03-29 PROCEDURE — 85025 COMPLETE CBC W/AUTO DIFF WBC: CPT | Performed by: STUDENT IN AN ORGANIZED HEALTH CARE EDUCATION/TRAINING PROGRAM

## 2021-03-29 RX ORDER — PANTOPRAZOLE SODIUM 40 MG/1
40 TABLET, DELAYED RELEASE ORAL DAILY
Status: DISCONTINUED | OUTPATIENT
Start: 2021-03-30 | End: 2021-03-30 | Stop reason: HOSPADM

## 2021-03-29 RX ORDER — HYDROCORTISONE 25 MG/G
CREAM TOPICAL 3 TIMES DAILY PRN
Status: DISCONTINUED | OUTPATIENT
Start: 2021-03-29 | End: 2021-03-30 | Stop reason: HOSPADM

## 2021-03-29 RX ORDER — PROCHLORPERAZINE MALEATE 5 MG
10 TABLET ORAL ONCE
Status: COMPLETED | OUTPATIENT
Start: 2021-03-29 | End: 2021-03-29

## 2021-03-29 RX ORDER — ONDANSETRON 8 MG/1
8 TABLET, ORALLY DISINTEGRATING ORAL EVERY 8 HOURS PRN
Status: DISCONTINUED | OUTPATIENT
Start: 2021-03-29 | End: 2021-03-30 | Stop reason: HOSPADM

## 2021-03-29 RX ORDER — PRENATAL WITH FERROUS FUM AND FOLIC ACID 3080; 920; 120; 400; 22; 1.84; 3; 20; 10; 1; 12; 200; 27; 25; 2 [IU]/1; [IU]/1; MG/1; [IU]/1; MG/1; MG/1; MG/1; MG/1; MG/1; MG/1; UG/1; MG/1; MG/1; MG/1; MG/1
1 TABLET ORAL DAILY
Status: DISCONTINUED | OUTPATIENT
Start: 2021-03-29 | End: 2021-03-30 | Stop reason: HOSPADM

## 2021-03-29 RX ORDER — PANTOPRAZOLE SODIUM 20 MG/1
20 TABLET, DELAYED RELEASE ORAL DAILY
Status: DISCONTINUED | OUTPATIENT
Start: 2021-03-30 | End: 2021-03-29

## 2021-03-29 RX ORDER — LABETALOL 200 MG/1
400 TABLET, FILM COATED ORAL 3 TIMES DAILY
Status: DISCONTINUED | OUTPATIENT
Start: 2021-03-29 | End: 2021-03-30 | Stop reason: HOSPADM

## 2021-03-29 RX ORDER — SIMETHICONE 80 MG
1 TABLET,CHEWABLE ORAL EVERY 6 HOURS PRN
Status: DISCONTINUED | OUTPATIENT
Start: 2021-03-29 | End: 2021-03-30 | Stop reason: HOSPADM

## 2021-03-29 RX ORDER — DOCUSATE SODIUM 100 MG/1
200 CAPSULE, LIQUID FILLED ORAL 2 TIMES DAILY PRN
Status: DISCONTINUED | OUTPATIENT
Start: 2021-03-29 | End: 2021-03-30 | Stop reason: HOSPADM

## 2021-03-29 RX ORDER — HYDROCODONE BITARTRATE AND ACETAMINOPHEN 10; 325 MG/1; MG/1
1 TABLET ORAL EVERY 4 HOURS PRN
Status: DISCONTINUED | OUTPATIENT
Start: 2021-03-29 | End: 2021-03-30 | Stop reason: HOSPADM

## 2021-03-29 RX ORDER — HYDRALAZINE HYDROCHLORIDE 20 MG/ML
5 INJECTION INTRAMUSCULAR; INTRAVENOUS ONCE
Status: COMPLETED | OUTPATIENT
Start: 2021-03-29 | End: 2021-03-29

## 2021-03-29 RX ORDER — HYDRALAZINE HYDROCHLORIDE 20 MG/ML
10 INJECTION INTRAMUSCULAR; INTRAVENOUS ONCE
Status: COMPLETED | OUTPATIENT
Start: 2021-03-29 | End: 2021-03-29

## 2021-03-29 RX ORDER — NIFEDIPINE 30 MG/1
30 TABLET, EXTENDED RELEASE ORAL DAILY
Status: DISCONTINUED | OUTPATIENT
Start: 2021-03-29 | End: 2021-03-30 | Stop reason: HOSPADM

## 2021-03-29 RX ORDER — HYDRALAZINE HYDROCHLORIDE 20 MG/ML
5 INJECTION INTRAMUSCULAR; INTRAVENOUS ONCE
Status: DISCONTINUED | OUTPATIENT
Start: 2021-03-29 | End: 2021-03-29

## 2021-03-29 RX ORDER — BUTALBITAL, ACETAMINOPHEN AND CAFFEINE 50; 325; 40 MG/1; MG/1; MG/1
2 TABLET ORAL ONCE
Status: DISCONTINUED | OUTPATIENT
Start: 2021-03-29 | End: 2021-03-29

## 2021-03-29 RX ORDER — DIPHENHYDRAMINE HCL 25 MG
25 CAPSULE ORAL EVERY 4 HOURS PRN
Status: DISCONTINUED | OUTPATIENT
Start: 2021-03-29 | End: 2021-03-30 | Stop reason: HOSPADM

## 2021-03-29 RX ORDER — NIFEDIPINE 10 MG/1
10 CAPSULE ORAL EVERY 8 HOURS
Status: DISCONTINUED | OUTPATIENT
Start: 2021-03-29 | End: 2021-03-30

## 2021-03-29 RX ORDER — DIPHENHYDRAMINE HYDROCHLORIDE 50 MG/ML
25 INJECTION INTRAMUSCULAR; INTRAVENOUS EVERY 4 HOURS PRN
Status: DISCONTINUED | OUTPATIENT
Start: 2021-03-29 | End: 2021-03-30 | Stop reason: HOSPADM

## 2021-03-29 RX ORDER — LABETALOL 200 MG/1
400 TABLET, FILM COATED ORAL 3 TIMES DAILY
Qty: 180 TABLET | Refills: 11 | Status: SHIPPED | OUTPATIENT
Start: 2021-03-29 | End: 2021-08-02

## 2021-03-29 RX ORDER — PROCHLORPERAZINE EDISYLATE 5 MG/ML
5 INJECTION INTRAMUSCULAR; INTRAVENOUS EVERY 6 HOURS PRN
Status: DISCONTINUED | OUTPATIENT
Start: 2021-03-29 | End: 2021-03-30 | Stop reason: HOSPADM

## 2021-03-29 RX ORDER — HYDROCODONE BITARTRATE AND ACETAMINOPHEN 5; 325 MG/1; MG/1
1 TABLET ORAL EVERY 4 HOURS PRN
Status: DISCONTINUED | OUTPATIENT
Start: 2021-03-29 | End: 2021-03-30 | Stop reason: HOSPADM

## 2021-03-29 RX ORDER — LABETALOL 200 MG/1
600 TABLET, FILM COATED ORAL ONCE
Status: DISCONTINUED | OUTPATIENT
Start: 2021-03-29 | End: 2021-03-29

## 2021-03-29 RX ORDER — ACETAMINOPHEN 325 MG/1
650 TABLET ORAL EVERY 6 HOURS PRN
Status: DISCONTINUED | OUTPATIENT
Start: 2021-03-29 | End: 2021-03-30 | Stop reason: HOSPADM

## 2021-03-29 RX ORDER — OXYTOCIN/RINGER'S LACTATE 30/500 ML
95 PLASTIC BAG, INJECTION (ML) INTRAVENOUS ONCE
Status: DISCONTINUED | OUTPATIENT
Start: 2021-03-29 | End: 2021-03-29

## 2021-03-29 RX ORDER — IBUPROFEN 600 MG/1
600 TABLET ORAL EVERY 6 HOURS
Status: DISCONTINUED | OUTPATIENT
Start: 2021-03-29 | End: 2021-03-30 | Stop reason: HOSPADM

## 2021-03-29 RX ORDER — LABETALOL 200 MG/1
600 TABLET, FILM COATED ORAL 3 TIMES DAILY
Status: DISCONTINUED | OUTPATIENT
Start: 2021-03-29 | End: 2021-03-29

## 2021-03-29 RX ORDER — BUTALBITAL, ACETAMINOPHEN AND CAFFEINE 50; 325; 40 MG/1; MG/1; MG/1
1 TABLET ORAL ONCE
Status: COMPLETED | OUTPATIENT
Start: 2021-03-29 | End: 2021-03-29

## 2021-03-29 RX ORDER — ENOXAPARIN SODIUM 100 MG/ML
40 INJECTION SUBCUTANEOUS EVERY 12 HOURS
Status: DISCONTINUED | OUTPATIENT
Start: 2021-03-29 | End: 2021-03-30 | Stop reason: HOSPADM

## 2021-03-29 RX ADMIN — DIPHENHYDRAMINE HYDROCHLORIDE 25 MG: 25 CAPSULE ORAL at 04:03

## 2021-03-29 RX ADMIN — IBUPROFEN 600 MG: 600 TABLET ORAL at 11:03

## 2021-03-29 RX ADMIN — HYDRALAZINE HYDROCHLORIDE 10 MG: 20 INJECTION, SOLUTION INTRAMUSCULAR; INTRAVENOUS at 02:03

## 2021-03-29 RX ADMIN — PROCHLORPERAZINE MALEATE 10 MG: 5 TABLET ORAL at 07:03

## 2021-03-29 RX ADMIN — HYDROCODONE BITARTRATE AND ACETAMINOPHEN 1 TABLET: 10; 325 TABLET ORAL at 07:03

## 2021-03-29 RX ADMIN — ENOXAPARIN SODIUM 40 MG: 40 INJECTION SUBCUTANEOUS at 09:03

## 2021-03-29 RX ADMIN — LABETALOL HYDROCHLORIDE 400 MG: 200 TABLET, FILM COATED ORAL at 09:03

## 2021-03-29 RX ADMIN — HYDROCODONE BITARTRATE AND ACETAMINOPHEN 1 TABLET: 10; 325 TABLET ORAL at 11:03

## 2021-03-29 RX ADMIN — NIFEDIPINE 10 MG: 10 CAPSULE ORAL at 09:03

## 2021-03-29 RX ADMIN — NIFEDIPINE 30 MG: 30 TABLET, FILM COATED, EXTENDED RELEASE ORAL at 02:03

## 2021-03-29 RX ADMIN — HYDRALAZINE HYDROCHLORIDE 10 MG: 20 INJECTION, SOLUTION INTRAMUSCULAR; INTRAVENOUS at 01:03

## 2021-03-29 RX ADMIN — LABETALOL HYDROCHLORIDE 400 MG: 200 TABLET, FILM COATED ORAL at 02:03

## 2021-03-29 RX ADMIN — HYDRALAZINE HYDROCHLORIDE 5 MG: 20 INJECTION, SOLUTION INTRAMUSCULAR; INTRAVENOUS at 01:03

## 2021-03-29 RX ADMIN — NIFEDIPINE 10 MG: 10 CAPSULE ORAL at 04:03

## 2021-03-29 RX ADMIN — BUTALBITAL, ACETAMINOPHEN, AND CAFFEINE 1 TABLET: 50; 325; 40 TABLET ORAL at 03:03

## 2021-03-29 RX ADMIN — IBUPROFEN 600 MG: 600 TABLET ORAL at 05:03

## 2021-03-30 VITALS
SYSTOLIC BLOOD PRESSURE: 141 MMHG | RESPIRATION RATE: 18 BRPM | WEIGHT: 255.31 LBS | HEART RATE: 65 BPM | BODY MASS INDEX: 43.59 KG/M2 | OXYGEN SATURATION: 94 % | TEMPERATURE: 98 F | DIASTOLIC BLOOD PRESSURE: 69 MMHG | HEIGHT: 64 IN

## 2021-03-30 PROCEDURE — 25000003 PHARM REV CODE 250: Performed by: STUDENT IN AN ORGANIZED HEALTH CARE EDUCATION/TRAINING PROGRAM

## 2021-03-30 PROCEDURE — 96372 THER/PROPH/DIAG INJ SC/IM: CPT | Mod: 59 | Performed by: EMERGENCY MEDICINE

## 2021-03-30 PROCEDURE — 99024 POSTOP FOLLOW-UP VISIT: CPT | Mod: ,,, | Performed by: OBSTETRICS & GYNECOLOGY

## 2021-03-30 PROCEDURE — 99024 PR POST-OP FOLLOW-UP VISIT: ICD-10-PCS | Mod: ,,, | Performed by: OBSTETRICS & GYNECOLOGY

## 2021-03-30 PROCEDURE — G0378 HOSPITAL OBSERVATION PER HR: HCPCS

## 2021-03-30 PROCEDURE — 63600175 PHARM REV CODE 636 W HCPCS: Performed by: STUDENT IN AN ORGANIZED HEALTH CARE EDUCATION/TRAINING PROGRAM

## 2021-03-30 RX ORDER — NIFEDIPINE 30 MG/1
30 TABLET, EXTENDED RELEASE ORAL DAILY
Qty: 30 TABLET | Refills: 11 | Status: SHIPPED | OUTPATIENT
Start: 2021-03-30 | End: 2021-03-30

## 2021-03-30 RX ORDER — NIFEDIPINE 30 MG/1
30 TABLET, EXTENDED RELEASE ORAL DAILY
Qty: 30 TABLET | Refills: 1 | Status: SHIPPED | OUTPATIENT
Start: 2021-03-30 | End: 2021-04-27 | Stop reason: SDUPTHER

## 2021-03-30 RX ADMIN — IBUPROFEN 600 MG: 600 TABLET ORAL at 05:03

## 2021-03-30 RX ADMIN — ENOXAPARIN SODIUM 40 MG: 40 INJECTION SUBCUTANEOUS at 09:03

## 2021-03-30 RX ADMIN — LABETALOL HYDROCHLORIDE 400 MG: 200 TABLET, FILM COATED ORAL at 09:03

## 2021-03-30 RX ADMIN — PANTOPRAZOLE SODIUM 40 MG: 40 TABLET, DELAYED RELEASE ORAL at 09:03

## 2021-03-30 RX ADMIN — NIFEDIPINE 30 MG: 30 TABLET, FILM COATED, EXTENDED RELEASE ORAL at 09:03

## 2021-03-30 RX ADMIN — HYDROCODONE BITARTRATE AND ACETAMINOPHEN 1 TABLET: 10; 325 TABLET ORAL at 05:03

## 2021-04-01 ENCOUNTER — PATIENT MESSAGE (OUTPATIENT)
Dept: OBSTETRICS AND GYNECOLOGY | Facility: CLINIC | Age: 36
End: 2021-04-01

## 2021-04-05 ENCOUNTER — PATIENT MESSAGE (OUTPATIENT)
Dept: ADMINISTRATIVE | Facility: HOSPITAL | Age: 36
End: 2021-04-05

## 2021-04-07 ENCOUNTER — POSTPARTUM VISIT (OUTPATIENT)
Dept: OBSTETRICS AND GYNECOLOGY | Facility: CLINIC | Age: 36
End: 2021-04-07
Payer: COMMERCIAL

## 2021-04-07 ENCOUNTER — PATIENT MESSAGE (OUTPATIENT)
Dept: OBSTETRICS AND GYNECOLOGY | Facility: CLINIC | Age: 36
End: 2021-04-07

## 2021-04-07 VITALS
DIASTOLIC BLOOD PRESSURE: 76 MMHG | BODY MASS INDEX: 39.38 KG/M2 | HEIGHT: 64 IN | WEIGHT: 230.69 LBS | SYSTOLIC BLOOD PRESSURE: 124 MMHG

## 2021-04-07 DIAGNOSIS — Z01.30 BP CHECK: ICD-10-CM

## 2021-04-07 DIAGNOSIS — Z87.59 HISTORY OF SEVERE PRE-ECLAMPSIA: Primary | ICD-10-CM

## 2021-04-07 PROCEDURE — 99999 PR PBB SHADOW E&M-EST. PATIENT-LVL III: CPT | Mod: PBBFAC,,,

## 2021-04-07 PROCEDURE — 99999 PR PBB SHADOW E&M-EST. PATIENT-LVL III: ICD-10-PCS | Mod: PBBFAC,,,

## 2021-04-07 PROCEDURE — 99213 OFFICE O/P EST LOW 20 MIN: CPT | Mod: 24,S$GLB,, | Performed by: REGISTERED NURSE

## 2021-04-07 PROCEDURE — 99213 PR OFFICE/OUTPT VISIT, EST, LEVL III, 20-29 MIN: ICD-10-PCS | Mod: 24,S$GLB,, | Performed by: REGISTERED NURSE

## 2021-04-08 ENCOUNTER — PATIENT MESSAGE (OUTPATIENT)
Dept: OBSTETRICS AND GYNECOLOGY | Facility: CLINIC | Age: 36
End: 2021-04-08

## 2021-04-14 ENCOUNTER — PATIENT MESSAGE (OUTPATIENT)
Dept: OBSTETRICS AND GYNECOLOGY | Facility: CLINIC | Age: 36
End: 2021-04-14

## 2021-04-14 ENCOUNTER — TELEPHONE (OUTPATIENT)
Dept: OBSTETRICS AND GYNECOLOGY | Facility: CLINIC | Age: 36
End: 2021-04-14

## 2021-04-14 ENCOUNTER — LAB VISIT (OUTPATIENT)
Dept: LAB | Facility: OTHER | Age: 36
End: 2021-04-14
Payer: COMMERCIAL

## 2021-04-14 ENCOUNTER — OFFICE VISIT (OUTPATIENT)
Dept: OBSTETRICS AND GYNECOLOGY | Facility: CLINIC | Age: 36
End: 2021-04-14
Payer: COMMERCIAL

## 2021-04-14 VITALS
HEIGHT: 64 IN | WEIGHT: 228.19 LBS | BODY MASS INDEX: 38.96 KG/M2 | SYSTOLIC BLOOD PRESSURE: 124 MMHG | DIASTOLIC BLOOD PRESSURE: 74 MMHG

## 2021-04-14 DIAGNOSIS — Z87.59 HISTORY OF SEVERE PRE-ECLAMPSIA: ICD-10-CM

## 2021-04-14 DIAGNOSIS — Z87.59 HISTORY OF SEVERE PRE-ECLAMPSIA: Primary | ICD-10-CM

## 2021-04-14 DIAGNOSIS — Z01.30 BP CHECK: ICD-10-CM

## 2021-04-14 LAB
ALBUMIN SERPL BCP-MCNC: 4 G/DL (ref 3.5–5.2)
ALP SERPL-CCNC: 108 U/L (ref 55–135)
ALT SERPL W/O P-5'-P-CCNC: 99 U/L (ref 10–44)
ANION GAP SERPL CALC-SCNC: 9 MMOL/L (ref 8–16)
AST SERPL-CCNC: 60 U/L (ref 10–40)
BASOPHILS # BLD AUTO: 0.03 K/UL (ref 0–0.2)
BASOPHILS NFR BLD: 0.4 % (ref 0–1.9)
BILIRUB SERPL-MCNC: 0.6 MG/DL (ref 0.1–1)
BUN SERPL-MCNC: 12 MG/DL (ref 6–20)
CALCIUM SERPL-MCNC: 9.7 MG/DL (ref 8.7–10.5)
CHLORIDE SERPL-SCNC: 106 MMOL/L (ref 95–110)
CO2 SERPL-SCNC: 26 MMOL/L (ref 23–29)
CREAT SERPL-MCNC: 0.8 MG/DL (ref 0.5–1.4)
DIFFERENTIAL METHOD: ABNORMAL
EOSINOPHIL # BLD AUTO: 0.4 K/UL (ref 0–0.5)
EOSINOPHIL NFR BLD: 6.1 % (ref 0–8)
ERYTHROCYTE [DISTWIDTH] IN BLOOD BY AUTOMATED COUNT: 14.9 % (ref 11.5–14.5)
EST. GFR  (AFRICAN AMERICAN): >60 ML/MIN/1.73 M^2
EST. GFR  (NON AFRICAN AMERICAN): >60 ML/MIN/1.73 M^2
GLUCOSE SERPL-MCNC: 88 MG/DL (ref 70–110)
HCT VFR BLD AUTO: 37.9 % (ref 37–48.5)
HGB BLD-MCNC: 11.4 G/DL (ref 12–16)
IMM GRANULOCYTES # BLD AUTO: 0.03 K/UL (ref 0–0.04)
IMM GRANULOCYTES NFR BLD AUTO: 0.4 % (ref 0–0.5)
LYMPHOCYTES # BLD AUTO: 1.6 K/UL (ref 1–4.8)
LYMPHOCYTES NFR BLD: 22.1 % (ref 18–48)
MCH RBC QN AUTO: 24.8 PG (ref 27–31)
MCHC RBC AUTO-ENTMCNC: 30.1 G/DL (ref 32–36)
MCV RBC AUTO: 83 FL (ref 82–98)
MONOCYTES # BLD AUTO: 0.4 K/UL (ref 0.3–1)
MONOCYTES NFR BLD: 4.9 % (ref 4–15)
NEUTROPHILS # BLD AUTO: 4.7 K/UL (ref 1.8–7.7)
NEUTROPHILS NFR BLD: 66.1 % (ref 38–73)
NRBC BLD-RTO: 0 /100 WBC
PLATELET # BLD AUTO: 331 K/UL (ref 150–450)
PMV BLD AUTO: 9.9 FL (ref 9.2–12.9)
POTASSIUM SERPL-SCNC: 4.3 MMOL/L (ref 3.5–5.1)
PROT SERPL-MCNC: 7.2 G/DL (ref 6–8.4)
RBC # BLD AUTO: 4.59 M/UL (ref 4–5.4)
SODIUM SERPL-SCNC: 141 MMOL/L (ref 136–145)
WBC # BLD AUTO: 7.16 K/UL (ref 3.9–12.7)

## 2021-04-14 PROCEDURE — 99999 PR PBB SHADOW E&M-EST. PATIENT-LVL III: CPT | Mod: PBBFAC,,, | Performed by: REGISTERED NURSE

## 2021-04-14 PROCEDURE — 0503F PR POSTPARTUM CARE VISIT: ICD-10-PCS | Mod: CPTII,S$GLB,, | Performed by: REGISTERED NURSE

## 2021-04-14 PROCEDURE — 1126F AMNT PAIN NOTED NONE PRSNT: CPT | Mod: S$GLB,,, | Performed by: REGISTERED NURSE

## 2021-04-14 PROCEDURE — 3008F PR BODY MASS INDEX (BMI) DOCUMENTED: ICD-10-PCS | Mod: CPTII,S$GLB,, | Performed by: REGISTERED NURSE

## 2021-04-14 PROCEDURE — 0503F POSTPARTUM CARE VISIT: CPT | Mod: CPTII,S$GLB,, | Performed by: REGISTERED NURSE

## 2021-04-14 PROCEDURE — 36415 COLL VENOUS BLD VENIPUNCTURE: CPT | Performed by: REGISTERED NURSE

## 2021-04-14 PROCEDURE — 3008F BODY MASS INDEX DOCD: CPT | Mod: CPTII,S$GLB,, | Performed by: REGISTERED NURSE

## 2021-04-14 PROCEDURE — 1126F PR PAIN SEVERITY QUANTIFIED, NO PAIN PRESENT: ICD-10-PCS | Mod: S$GLB,,, | Performed by: REGISTERED NURSE

## 2021-04-14 PROCEDURE — 99999 PR PBB SHADOW E&M-EST. PATIENT-LVL III: ICD-10-PCS | Mod: PBBFAC,,, | Performed by: REGISTERED NURSE

## 2021-04-14 PROCEDURE — 85025 COMPLETE CBC W/AUTO DIFF WBC: CPT | Performed by: REGISTERED NURSE

## 2021-04-14 PROCEDURE — 80053 COMPREHEN METABOLIC PANEL: CPT | Performed by: REGISTERED NURSE

## 2021-04-16 ENCOUNTER — PATIENT MESSAGE (OUTPATIENT)
Dept: RESEARCH | Facility: HOSPITAL | Age: 36
End: 2021-04-16

## 2021-04-23 ENCOUNTER — PATIENT MESSAGE (OUTPATIENT)
Dept: OBSTETRICS AND GYNECOLOGY | Facility: CLINIC | Age: 36
End: 2021-04-23

## 2021-04-27 ENCOUNTER — PATIENT MESSAGE (OUTPATIENT)
Dept: OBSTETRICS AND GYNECOLOGY | Facility: CLINIC | Age: 36
End: 2021-04-27

## 2021-04-27 RX ORDER — NIFEDIPINE 30 MG/1
30 TABLET, EXTENDED RELEASE ORAL DAILY
Qty: 30 TABLET | Refills: 5 | Status: SHIPPED | OUTPATIENT
Start: 2021-04-27 | End: 2021-07-19

## 2021-05-01 ENCOUNTER — IMMUNIZATION (OUTPATIENT)
Dept: PRIMARY CARE CLINIC | Facility: CLINIC | Age: 36
End: 2021-05-01

## 2021-05-01 DIAGNOSIS — Z23 NEED FOR VACCINATION: Primary | ICD-10-CM

## 2021-05-01 PROCEDURE — 0001A PR IMMUNIZ ADMIN, SARS-COV-2 COVID-19 VACC, 30MCG/0.3ML, 1ST DOSE: ICD-10-PCS | Mod: CV19,S$GLB,, | Performed by: INTERNAL MEDICINE

## 2021-05-01 PROCEDURE — 91300 PR SARS-COV- 2 COVID-19 VACCINE, NO PRSV, 30MCG/0.3ML, IM: ICD-10-PCS | Mod: S$GLB,,, | Performed by: INTERNAL MEDICINE

## 2021-05-01 PROCEDURE — 0001A PR IMMUNIZ ADMIN, SARS-COV-2 COVID-19 VACC, 30MCG/0.3ML, 1ST DOSE: CPT | Mod: CV19,S$GLB,, | Performed by: INTERNAL MEDICINE

## 2021-05-01 PROCEDURE — 91300 PR SARS-COV- 2 COVID-19 VACCINE, NO PRSV, 30MCG/0.3ML, IM: CPT | Mod: S$GLB,,, | Performed by: INTERNAL MEDICINE

## 2021-05-01 RX ADMIN — Medication 0.3 ML: at 10:05

## 2021-05-06 ENCOUNTER — POSTPARTUM VISIT (OUTPATIENT)
Dept: OBSTETRICS AND GYNECOLOGY | Facility: CLINIC | Age: 36
End: 2021-05-06
Payer: COMMERCIAL

## 2021-05-06 VITALS
WEIGHT: 230.38 LBS | DIASTOLIC BLOOD PRESSURE: 72 MMHG | BODY MASS INDEX: 39.33 KG/M2 | HEIGHT: 64 IN | SYSTOLIC BLOOD PRESSURE: 124 MMHG

## 2021-05-06 PROCEDURE — 99999 PR PBB SHADOW E&M-EST. PATIENT-LVL III: ICD-10-PCS | Mod: PBBFAC,,, | Performed by: OBSTETRICS & GYNECOLOGY

## 2021-05-06 PROCEDURE — 99999 PR PBB SHADOW E&M-EST. PATIENT-LVL III: CPT | Mod: PBBFAC,,, | Performed by: OBSTETRICS & GYNECOLOGY

## 2021-05-06 PROCEDURE — 0503F PR POSTPARTUM CARE VISIT: ICD-10-PCS | Mod: S$GLB,,, | Performed by: OBSTETRICS & GYNECOLOGY

## 2021-05-06 PROCEDURE — 0503F POSTPARTUM CARE VISIT: CPT | Mod: S$GLB,,, | Performed by: OBSTETRICS & GYNECOLOGY

## 2021-05-10 ENCOUNTER — PATIENT MESSAGE (OUTPATIENT)
Dept: INTERNAL MEDICINE | Facility: CLINIC | Age: 36
End: 2021-05-10

## 2021-05-19 ENCOUNTER — PATIENT MESSAGE (OUTPATIENT)
Dept: OBSTETRICS AND GYNECOLOGY | Facility: CLINIC | Age: 36
End: 2021-05-19

## 2021-05-22 ENCOUNTER — IMMUNIZATION (OUTPATIENT)
Dept: INTERNAL MEDICINE | Facility: CLINIC | Age: 36
End: 2021-05-22
Payer: COMMERCIAL

## 2021-05-22 DIAGNOSIS — Z23 NEED FOR VACCINATION: Primary | ICD-10-CM

## 2021-05-22 PROCEDURE — 0002A COVID-19, MRNA, LNP-S, PF, 30 MCG/0.3 ML DOSE VACCINE: CPT | Mod: PBBFAC | Performed by: INTERNAL MEDICINE

## 2021-05-22 PROCEDURE — 91300 COVID-19, MRNA, LNP-S, PF, 30 MCG/0.3 ML DOSE VACCINE: CPT | Mod: PBBFAC | Performed by: INTERNAL MEDICINE

## 2021-05-29 ENCOUNTER — PATIENT MESSAGE (OUTPATIENT)
Dept: OBSTETRICS AND GYNECOLOGY | Facility: CLINIC | Age: 36
End: 2021-05-29

## 2021-05-31 ENCOUNTER — PATIENT MESSAGE (OUTPATIENT)
Dept: OBSTETRICS AND GYNECOLOGY | Facility: CLINIC | Age: 36
End: 2021-05-31

## 2021-06-02 ENCOUNTER — PATIENT MESSAGE (OUTPATIENT)
Dept: OBSTETRICS AND GYNECOLOGY | Facility: CLINIC | Age: 36
End: 2021-06-02

## 2021-06-08 ENCOUNTER — PATIENT MESSAGE (OUTPATIENT)
Dept: OBSTETRICS AND GYNECOLOGY | Facility: CLINIC | Age: 36
End: 2021-06-08

## 2021-06-16 ENCOUNTER — PATIENT MESSAGE (OUTPATIENT)
Dept: OBSTETRICS AND GYNECOLOGY | Facility: CLINIC | Age: 36
End: 2021-06-16

## 2021-06-29 ENCOUNTER — PATIENT MESSAGE (OUTPATIENT)
Dept: INTERNAL MEDICINE | Facility: CLINIC | Age: 36
End: 2021-06-29

## 2021-07-05 ENCOUNTER — PATIENT MESSAGE (OUTPATIENT)
Dept: OBSTETRICS AND GYNECOLOGY | Facility: CLINIC | Age: 36
End: 2021-07-05

## 2021-07-06 ENCOUNTER — PATIENT MESSAGE (OUTPATIENT)
Dept: OBSTETRICS AND GYNECOLOGY | Facility: CLINIC | Age: 36
End: 2021-07-06

## 2021-07-15 ENCOUNTER — OFFICE VISIT (OUTPATIENT)
Dept: URGENT CARE | Facility: CLINIC | Age: 36
End: 2021-07-15
Payer: COMMERCIAL

## 2021-07-15 VITALS
TEMPERATURE: 98 F | RESPIRATION RATE: 15 BRPM | SYSTOLIC BLOOD PRESSURE: 152 MMHG | HEIGHT: 64 IN | OXYGEN SATURATION: 98 % | BODY MASS INDEX: 39.27 KG/M2 | DIASTOLIC BLOOD PRESSURE: 94 MMHG | HEART RATE: 62 BPM | WEIGHT: 230 LBS

## 2021-07-15 DIAGNOSIS — H66.91 RIGHT OTITIS MEDIA, UNSPECIFIED OTITIS MEDIA TYPE: Primary | ICD-10-CM

## 2021-07-15 LAB
CTP QC/QA: YES
SARS-COV-2 RDRP RESP QL NAA+PROBE: NEGATIVE

## 2021-07-15 PROCEDURE — U0002 COVID-19 LAB TEST NON-CDC: HCPCS | Mod: QW,S$GLB,, | Performed by: FAMILY MEDICINE

## 2021-07-15 PROCEDURE — 3008F BODY MASS INDEX DOCD: CPT | Mod: CPTII,S$GLB,, | Performed by: FAMILY MEDICINE

## 2021-07-15 PROCEDURE — U0002: ICD-10-PCS | Mod: QW,S$GLB,, | Performed by: FAMILY MEDICINE

## 2021-07-15 PROCEDURE — 3008F PR BODY MASS INDEX (BMI) DOCUMENTED: ICD-10-PCS | Mod: CPTII,S$GLB,, | Performed by: FAMILY MEDICINE

## 2021-07-15 PROCEDURE — 99214 OFFICE O/P EST MOD 30 MIN: CPT | Mod: S$GLB,,, | Performed by: FAMILY MEDICINE

## 2021-07-15 PROCEDURE — 99214 PR OFFICE/OUTPT VISIT, EST, LEVL IV, 30-39 MIN: ICD-10-PCS | Mod: S$GLB,,, | Performed by: FAMILY MEDICINE

## 2021-07-15 RX ORDER — AMOXICILLIN AND CLAVULANATE POTASSIUM 875; 125 MG/1; MG/1
1 TABLET, FILM COATED ORAL 2 TIMES DAILY
Qty: 20 TABLET | Refills: 0 | Status: SHIPPED | OUTPATIENT
Start: 2021-07-15 | End: 2021-07-25

## 2021-07-18 ENCOUNTER — PATIENT OUTREACH (OUTPATIENT)
Dept: ADMINISTRATIVE | Facility: HOSPITAL | Age: 36
End: 2021-07-18

## 2021-07-19 ENCOUNTER — OFFICE VISIT (OUTPATIENT)
Dept: INTERNAL MEDICINE | Facility: CLINIC | Age: 36
End: 2021-07-19
Payer: COMMERCIAL

## 2021-07-19 ENCOUNTER — LAB VISIT (OUTPATIENT)
Dept: LAB | Facility: HOSPITAL | Age: 36
End: 2021-07-19
Attending: INTERNAL MEDICINE
Payer: COMMERCIAL

## 2021-07-19 VITALS
HEIGHT: 64 IN | SYSTOLIC BLOOD PRESSURE: 128 MMHG | WEIGHT: 228.19 LBS | BODY MASS INDEX: 38.96 KG/M2 | TEMPERATURE: 98 F | DIASTOLIC BLOOD PRESSURE: 82 MMHG | HEART RATE: 66 BPM | OXYGEN SATURATION: 98 %

## 2021-07-19 DIAGNOSIS — Z00.00 WELLNESS EXAMINATION: ICD-10-CM

## 2021-07-19 DIAGNOSIS — I10 HYPERTENSION, UNSPECIFIED TYPE: ICD-10-CM

## 2021-07-19 DIAGNOSIS — R51.9 NONINTRACTABLE HEADACHE, UNSPECIFIED CHRONICITY PATTERN, UNSPECIFIED HEADACHE TYPE: ICD-10-CM

## 2021-07-19 DIAGNOSIS — Z00.00 WELLNESS EXAMINATION: Primary | ICD-10-CM

## 2021-07-19 LAB
25(OH)D3+25(OH)D2 SERPL-MCNC: 26 NG/ML (ref 30–96)
ALBUMIN SERPL BCP-MCNC: 4 G/DL (ref 3.5–5.2)
ALBUMIN/CREAT UR: 20.5 UG/MG (ref 0–30)
ALP SERPL-CCNC: 88 U/L (ref 55–135)
ALT SERPL W/O P-5'-P-CCNC: 79 U/L (ref 10–44)
ANION GAP SERPL CALC-SCNC: 10 MMOL/L (ref 8–16)
ANION GAP SERPL CALC-SCNC: 10 MMOL/L (ref 8–16)
AST SERPL-CCNC: 43 U/L (ref 10–40)
BASOPHILS # BLD AUTO: 0.03 K/UL (ref 0–0.2)
BASOPHILS NFR BLD: 0.4 % (ref 0–1.9)
BILIRUB SERPL-MCNC: 0.6 MG/DL (ref 0.1–1)
BILIRUB UR QL STRIP: NEGATIVE
BUN SERPL-MCNC: 8 MG/DL (ref 6–20)
BUN SERPL-MCNC: 8 MG/DL (ref 6–20)
CALCIUM SERPL-MCNC: 9.5 MG/DL (ref 8.7–10.5)
CALCIUM SERPL-MCNC: 9.5 MG/DL (ref 8.7–10.5)
CHLORIDE SERPL-SCNC: 105 MMOL/L (ref 95–110)
CHLORIDE SERPL-SCNC: 105 MMOL/L (ref 95–110)
CHOLEST SERPL-MCNC: 198 MG/DL (ref 120–199)
CHOLEST/HDLC SERPL: 4.3 {RATIO} (ref 2–5)
CLARITY UR REFRACT.AUTO: CLEAR
CO2 SERPL-SCNC: 24 MMOL/L (ref 23–29)
CO2 SERPL-SCNC: 24 MMOL/L (ref 23–29)
COLOR UR AUTO: NORMAL
CREAT SERPL-MCNC: 0.9 MG/DL (ref 0.5–1.4)
CREAT SERPL-MCNC: 0.9 MG/DL (ref 0.5–1.4)
CREAT UR-MCNC: 44 MG/DL (ref 15–325)
DIFFERENTIAL METHOD: ABNORMAL
EOSINOPHIL # BLD AUTO: 0.3 K/UL (ref 0–0.5)
EOSINOPHIL NFR BLD: 4.9 % (ref 0–8)
ERYTHROCYTE [DISTWIDTH] IN BLOOD BY AUTOMATED COUNT: 15.8 % (ref 11.5–14.5)
EST. GFR  (AFRICAN AMERICAN): >60 ML/MIN/1.73 M^2
EST. GFR  (AFRICAN AMERICAN): >60 ML/MIN/1.73 M^2
EST. GFR  (NON AFRICAN AMERICAN): >60 ML/MIN/1.73 M^2
EST. GFR  (NON AFRICAN AMERICAN): >60 ML/MIN/1.73 M^2
ESTIMATED AVG GLUCOSE: 97 MG/DL (ref 68–131)
GLUCOSE SERPL-MCNC: 91 MG/DL (ref 70–110)
GLUCOSE SERPL-MCNC: 91 MG/DL (ref 70–110)
GLUCOSE UR QL STRIP: NEGATIVE
HBA1C MFR BLD: 5 % (ref 4–5.6)
HCT VFR BLD AUTO: 38.2 % (ref 37–48.5)
HDLC SERPL-MCNC: 46 MG/DL (ref 40–75)
HDLC SERPL: 23.2 % (ref 20–50)
HGB BLD-MCNC: 11.7 G/DL (ref 12–16)
HGB UR QL STRIP: NEGATIVE
IMM GRANULOCYTES # BLD AUTO: 0.02 K/UL (ref 0–0.04)
IMM GRANULOCYTES NFR BLD AUTO: 0.3 % (ref 0–0.5)
KETONES UR QL STRIP: NEGATIVE
LDLC SERPL CALC-MCNC: 86.2 MG/DL (ref 63–159)
LEUKOCYTE ESTERASE UR QL STRIP: NEGATIVE
LYMPHOCYTES # BLD AUTO: 1.3 K/UL (ref 1–4.8)
LYMPHOCYTES NFR BLD: 19.8 % (ref 18–48)
MCH RBC QN AUTO: 24.1 PG (ref 27–31)
MCHC RBC AUTO-ENTMCNC: 30.6 G/DL (ref 32–36)
MCV RBC AUTO: 79 FL (ref 82–98)
MICROALBUMIN UR DL<=1MG/L-MCNC: 9 UG/ML
MONOCYTES # BLD AUTO: 0.4 K/UL (ref 0.3–1)
MONOCYTES NFR BLD: 5.8 % (ref 4–15)
NEUTROPHILS # BLD AUTO: 4.6 K/UL (ref 1.8–7.7)
NEUTROPHILS NFR BLD: 68.8 % (ref 38–73)
NITRITE UR QL STRIP: NEGATIVE
NONHDLC SERPL-MCNC: 152 MG/DL
NRBC BLD-RTO: 0 /100 WBC
PH UR STRIP: 6 [PH] (ref 5–8)
PLATELET # BLD AUTO: 278 K/UL (ref 150–450)
PMV BLD AUTO: 11.1 FL (ref 9.2–12.9)
POTASSIUM SERPL-SCNC: 4.1 MMOL/L (ref 3.5–5.1)
POTASSIUM SERPL-SCNC: 4.1 MMOL/L (ref 3.5–5.1)
PROT SERPL-MCNC: 7.2 G/DL (ref 6–8.4)
PROT UR QL STRIP: NEGATIVE
RBC # BLD AUTO: 4.85 M/UL (ref 4–5.4)
SODIUM SERPL-SCNC: 139 MMOL/L (ref 136–145)
SODIUM SERPL-SCNC: 139 MMOL/L (ref 136–145)
SP GR UR STRIP: 1.01 (ref 1–1.03)
TRIGL SERPL-MCNC: 329 MG/DL (ref 30–150)
TSH SERPL DL<=0.005 MIU/L-ACNC: 1.11 UIU/ML (ref 0.4–4)
URN SPEC COLLECT METH UR: NORMAL
WBC # BLD AUTO: 6.67 K/UL (ref 3.9–12.7)

## 2021-07-19 PROCEDURE — 3008F BODY MASS INDEX DOCD: CPT | Mod: CPTII,S$GLB,, | Performed by: INTERNAL MEDICINE

## 2021-07-19 PROCEDURE — 84443 ASSAY THYROID STIM HORMONE: CPT | Performed by: INTERNAL MEDICINE

## 2021-07-19 PROCEDURE — 3074F SYST BP LT 130 MM HG: CPT | Mod: CPTII,S$GLB,, | Performed by: INTERNAL MEDICINE

## 2021-07-19 PROCEDURE — 1126F AMNT PAIN NOTED NONE PRSNT: CPT | Mod: CPTII,S$GLB,, | Performed by: INTERNAL MEDICINE

## 2021-07-19 PROCEDURE — 80053 COMPREHEN METABOLIC PANEL: CPT | Performed by: INTERNAL MEDICINE

## 2021-07-19 PROCEDURE — 36415 COLL VENOUS BLD VENIPUNCTURE: CPT | Performed by: INTERNAL MEDICINE

## 2021-07-19 PROCEDURE — 85025 COMPLETE CBC W/AUTO DIFF WBC: CPT | Performed by: INTERNAL MEDICINE

## 2021-07-19 PROCEDURE — 93010 ELECTROCARDIOGRAM REPORT: CPT | Mod: S$GLB,,, | Performed by: INTERNAL MEDICINE

## 2021-07-19 PROCEDURE — 93010 EKG 12-LEAD: ICD-10-PCS | Mod: S$GLB,,, | Performed by: INTERNAL MEDICINE

## 2021-07-19 PROCEDURE — 99999 PR PBB SHADOW E&M-EST. PATIENT-LVL III: ICD-10-PCS | Mod: PBBFAC,,, | Performed by: INTERNAL MEDICINE

## 2021-07-19 PROCEDURE — 99395 PR PREVENTIVE VISIT,EST,18-39: ICD-10-PCS | Mod: S$GLB,,, | Performed by: INTERNAL MEDICINE

## 2021-07-19 PROCEDURE — 99999 PR PBB SHADOW E&M-EST. PATIENT-LVL III: CPT | Mod: PBBFAC,,, | Performed by: INTERNAL MEDICINE

## 2021-07-19 PROCEDURE — 99395 PREV VISIT EST AGE 18-39: CPT | Mod: S$GLB,,, | Performed by: INTERNAL MEDICINE

## 2021-07-19 PROCEDURE — 3074F PR MOST RECENT SYSTOLIC BLOOD PRESSURE < 130 MM HG: ICD-10-PCS | Mod: CPTII,S$GLB,, | Performed by: INTERNAL MEDICINE

## 2021-07-19 PROCEDURE — 81003 URINALYSIS AUTO W/O SCOPE: CPT | Performed by: INTERNAL MEDICINE

## 2021-07-19 PROCEDURE — 1126F PR PAIN SEVERITY QUANTIFIED, NO PAIN PRESENT: ICD-10-PCS | Mod: CPTII,S$GLB,, | Performed by: INTERNAL MEDICINE

## 2021-07-19 PROCEDURE — 82306 VITAMIN D 25 HYDROXY: CPT | Performed by: INTERNAL MEDICINE

## 2021-07-19 PROCEDURE — 82043 UR ALBUMIN QUANTITATIVE: CPT | Performed by: INTERNAL MEDICINE

## 2021-07-19 PROCEDURE — 93005 EKG 12-LEAD: ICD-10-PCS | Mod: S$GLB,,, | Performed by: INTERNAL MEDICINE

## 2021-07-19 PROCEDURE — 3008F PR BODY MASS INDEX (BMI) DOCUMENTED: ICD-10-PCS | Mod: CPTII,S$GLB,, | Performed by: INTERNAL MEDICINE

## 2021-07-19 PROCEDURE — 82570 ASSAY OF URINE CREATININE: CPT | Performed by: INTERNAL MEDICINE

## 2021-07-19 PROCEDURE — 3079F PR MOST RECENT DIASTOLIC BLOOD PRESSURE 80-89 MM HG: ICD-10-PCS | Mod: CPTII,S$GLB,, | Performed by: INTERNAL MEDICINE

## 2021-07-19 PROCEDURE — 93005 ELECTROCARDIOGRAM TRACING: CPT | Mod: S$GLB,,, | Performed by: INTERNAL MEDICINE

## 2021-07-19 PROCEDURE — 3079F DIAST BP 80-89 MM HG: CPT | Mod: CPTII,S$GLB,, | Performed by: INTERNAL MEDICINE

## 2021-07-19 PROCEDURE — 83036 HEMOGLOBIN GLYCOSYLATED A1C: CPT | Performed by: INTERNAL MEDICINE

## 2021-07-19 PROCEDURE — 80061 LIPID PANEL: CPT | Performed by: INTERNAL MEDICINE

## 2021-07-19 RX ORDER — TRIAMTERENE/HYDROCHLOROTHIAZID 37.5-25 MG
1 TABLET ORAL DAILY
Qty: 90 TABLET | Refills: 3 | Status: SHIPPED | OUTPATIENT
Start: 2021-07-19 | End: 2022-04-18

## 2021-07-20 ENCOUNTER — PATIENT MESSAGE (OUTPATIENT)
Dept: INTERNAL MEDICINE | Facility: CLINIC | Age: 36
End: 2021-07-20

## 2021-07-23 ENCOUNTER — HOSPITAL ENCOUNTER (OUTPATIENT)
Dept: RADIOLOGY | Facility: HOSPITAL | Age: 36
Discharge: HOME | End: 2021-07-23
Attending: INTERNAL MEDICINE
Payer: COMMERCIAL

## 2021-07-23 DIAGNOSIS — R51.9 NONINTRACTABLE HEADACHE, UNSPECIFIED CHRONICITY PATTERN, UNSPECIFIED HEADACHE TYPE: ICD-10-CM

## 2021-07-23 PROCEDURE — 70450 CT HEAD WITHOUT CONTRAST: ICD-10-PCS | Mod: 26,,, | Performed by: RADIOLOGY

## 2021-07-23 PROCEDURE — 70450 CT HEAD/BRAIN W/O DYE: CPT | Mod: 26,,, | Performed by: RADIOLOGY

## 2021-07-23 PROCEDURE — 70450 CT HEAD/BRAIN W/O DYE: CPT | Mod: TC

## 2021-07-24 ENCOUNTER — PATIENT MESSAGE (OUTPATIENT)
Dept: INTERNAL MEDICINE | Facility: CLINIC | Age: 36
End: 2021-07-24

## 2021-07-28 ENCOUNTER — PATIENT MESSAGE (OUTPATIENT)
Dept: INTERNAL MEDICINE | Facility: CLINIC | Age: 36
End: 2021-07-28

## 2021-08-02 ENCOUNTER — OFFICE VISIT (OUTPATIENT)
Dept: INTERNAL MEDICINE | Facility: CLINIC | Age: 36
End: 2021-08-02
Payer: COMMERCIAL

## 2021-08-02 ENCOUNTER — TELEPHONE (OUTPATIENT)
Dept: OTOLARYNGOLOGY | Facility: CLINIC | Age: 36
End: 2021-08-02

## 2021-08-02 VITALS
TEMPERATURE: 98 F | HEART RATE: 95 BPM | WEIGHT: 226.44 LBS | HEIGHT: 64 IN | BODY MASS INDEX: 38.66 KG/M2 | DIASTOLIC BLOOD PRESSURE: 80 MMHG | SYSTOLIC BLOOD PRESSURE: 132 MMHG | OXYGEN SATURATION: 99 %

## 2021-08-02 DIAGNOSIS — R79.89 ELEVATED LFTS: ICD-10-CM

## 2021-08-02 DIAGNOSIS — E78.5 HYPERLIPIDEMIA, UNSPECIFIED HYPERLIPIDEMIA TYPE: Primary | ICD-10-CM

## 2021-08-02 DIAGNOSIS — H93.8X1 EAR FULLNESS, RIGHT: ICD-10-CM

## 2021-08-02 PROCEDURE — 99213 OFFICE O/P EST LOW 20 MIN: CPT | Mod: S$GLB,,, | Performed by: INTERNAL MEDICINE

## 2021-08-02 PROCEDURE — 3008F PR BODY MASS INDEX (BMI) DOCUMENTED: ICD-10-PCS | Mod: CPTII,S$GLB,, | Performed by: INTERNAL MEDICINE

## 2021-08-02 PROCEDURE — 3008F BODY MASS INDEX DOCD: CPT | Mod: CPTII,S$GLB,, | Performed by: INTERNAL MEDICINE

## 2021-08-02 PROCEDURE — 99213 PR OFFICE/OUTPT VISIT, EST, LEVL III, 20-29 MIN: ICD-10-PCS | Mod: S$GLB,,, | Performed by: INTERNAL MEDICINE

## 2021-08-02 PROCEDURE — 99999 PR PBB SHADOW E&M-EST. PATIENT-LVL IV: ICD-10-PCS | Mod: PBBFAC,,, | Performed by: INTERNAL MEDICINE

## 2021-08-02 PROCEDURE — 1126F AMNT PAIN NOTED NONE PRSNT: CPT | Mod: CPTII,S$GLB,, | Performed by: INTERNAL MEDICINE

## 2021-08-02 PROCEDURE — 3075F SYST BP GE 130 - 139MM HG: CPT | Mod: CPTII,S$GLB,, | Performed by: INTERNAL MEDICINE

## 2021-08-02 PROCEDURE — 3079F PR MOST RECENT DIASTOLIC BLOOD PRESSURE 80-89 MM HG: ICD-10-PCS | Mod: CPTII,S$GLB,, | Performed by: INTERNAL MEDICINE

## 2021-08-02 PROCEDURE — 3075F PR MOST RECENT SYSTOLIC BLOOD PRESS GE 130-139MM HG: ICD-10-PCS | Mod: CPTII,S$GLB,, | Performed by: INTERNAL MEDICINE

## 2021-08-02 PROCEDURE — 3044F HG A1C LEVEL LT 7.0%: CPT | Mod: CPTII,S$GLB,, | Performed by: INTERNAL MEDICINE

## 2021-08-02 PROCEDURE — 99999 PR PBB SHADOW E&M-EST. PATIENT-LVL IV: CPT | Mod: PBBFAC,,, | Performed by: INTERNAL MEDICINE

## 2021-08-02 PROCEDURE — 3044F PR MOST RECENT HEMOGLOBIN A1C LEVEL <7.0%: ICD-10-PCS | Mod: CPTII,S$GLB,, | Performed by: INTERNAL MEDICINE

## 2021-08-02 PROCEDURE — 3079F DIAST BP 80-89 MM HG: CPT | Mod: CPTII,S$GLB,, | Performed by: INTERNAL MEDICINE

## 2021-08-02 PROCEDURE — 1126F PR PAIN SEVERITY QUANTIFIED, NO PAIN PRESENT: ICD-10-PCS | Mod: CPTII,S$GLB,, | Performed by: INTERNAL MEDICINE

## 2021-08-02 RX ORDER — LABETALOL 200 MG/1
400 TABLET, FILM COATED ORAL EVERY 12 HOURS
Qty: 120 TABLET | Refills: 11
Start: 2021-08-02 | End: 2021-08-02 | Stop reason: SDUPTHER

## 2021-08-02 RX ORDER — AMOXICILLIN AND CLAVULANATE POTASSIUM 875; 125 MG/1; MG/1
1 TABLET, FILM COATED ORAL 2 TIMES DAILY
Qty: 14 TABLET | Refills: 0 | Status: SHIPPED | OUTPATIENT
Start: 2021-08-02 | End: 2021-08-12

## 2021-08-03 ENCOUNTER — LAB VISIT (OUTPATIENT)
Dept: LAB | Facility: HOSPITAL | Age: 36
End: 2021-08-03
Attending: INTERNAL MEDICINE
Payer: COMMERCIAL

## 2021-08-03 DIAGNOSIS — E78.5 HYPERLIPIDEMIA, UNSPECIFIED HYPERLIPIDEMIA TYPE: ICD-10-CM

## 2021-08-03 DIAGNOSIS — R79.89 ELEVATED LFTS: ICD-10-CM

## 2021-08-03 LAB
ALBUMIN SERPL BCP-MCNC: 4.2 G/DL (ref 3.5–5.2)
ALP SERPL-CCNC: 81 U/L (ref 55–135)
ALT SERPL W/O P-5'-P-CCNC: 55 U/L (ref 10–44)
ANION GAP SERPL CALC-SCNC: 7 MMOL/L (ref 8–16)
AST SERPL-CCNC: 35 U/L (ref 10–40)
BILIRUB SERPL-MCNC: 0.4 MG/DL (ref 0.1–1)
BUN SERPL-MCNC: 13 MG/DL (ref 6–20)
CALCIUM SERPL-MCNC: 9.7 MG/DL (ref 8.7–10.5)
CHLORIDE SERPL-SCNC: 103 MMOL/L (ref 95–110)
CHOLEST SERPL-MCNC: 220 MG/DL (ref 120–199)
CHOLEST/HDLC SERPL: 3.9 {RATIO} (ref 2–5)
CO2 SERPL-SCNC: 25 MMOL/L (ref 23–29)
CREAT SERPL-MCNC: 0.8 MG/DL (ref 0.5–1.4)
EST. GFR  (AFRICAN AMERICAN): >60 ML/MIN/1.73 M^2
EST. GFR  (NON AFRICAN AMERICAN): >60 ML/MIN/1.73 M^2
GLUCOSE SERPL-MCNC: 92 MG/DL (ref 70–110)
HAV IGM SERPL QL IA: NEGATIVE
HBV CORE IGM SERPL QL IA: NEGATIVE
HBV SURFACE AG SERPL QL IA: NEGATIVE
HCV AB SERPL QL IA: NEGATIVE
HDLC SERPL-MCNC: 56 MG/DL (ref 40–75)
HDLC SERPL: 25.5 % (ref 20–50)
LDLC SERPL CALC-MCNC: 137.2 MG/DL (ref 63–159)
NONHDLC SERPL-MCNC: 164 MG/DL
POTASSIUM SERPL-SCNC: 4 MMOL/L (ref 3.5–5.1)
PROT SERPL-MCNC: 7.7 G/DL (ref 6–8.4)
SODIUM SERPL-SCNC: 135 MMOL/L (ref 136–145)
TRIGL SERPL-MCNC: 134 MG/DL (ref 30–150)

## 2021-08-03 PROCEDURE — 80053 COMPREHEN METABOLIC PANEL: CPT | Performed by: INTERNAL MEDICINE

## 2021-08-03 PROCEDURE — 36415 COLL VENOUS BLD VENIPUNCTURE: CPT | Performed by: INTERNAL MEDICINE

## 2021-08-03 PROCEDURE — 80074 ACUTE HEPATITIS PANEL: CPT | Performed by: INTERNAL MEDICINE

## 2021-08-03 PROCEDURE — 80061 LIPID PANEL: CPT | Performed by: INTERNAL MEDICINE

## 2021-08-04 ENCOUNTER — OFFICE VISIT (OUTPATIENT)
Dept: PODIATRY | Facility: CLINIC | Age: 36
End: 2021-08-04
Payer: COMMERCIAL

## 2021-08-04 VITALS
WEIGHT: 226.44 LBS | HEART RATE: 81 BPM | BODY MASS INDEX: 38.66 KG/M2 | DIASTOLIC BLOOD PRESSURE: 73 MMHG | HEIGHT: 64 IN | SYSTOLIC BLOOD PRESSURE: 127 MMHG

## 2021-08-04 DIAGNOSIS — L60.0 INGROWN NAIL: Primary | ICD-10-CM

## 2021-08-04 PROCEDURE — 3078F DIAST BP <80 MM HG: CPT | Mod: CPTII,S$GLB,, | Performed by: PODIATRIST

## 2021-08-04 PROCEDURE — 99999 PR PBB SHADOW E&M-EST. PATIENT-LVL III: CPT | Mod: PBBFAC,,, | Performed by: PODIATRIST

## 2021-08-04 PROCEDURE — 3066F NEPHROPATHY DOC TX: CPT | Mod: CPTII,S$GLB,, | Performed by: PODIATRIST

## 2021-08-04 PROCEDURE — 3078F PR MOST RECENT DIASTOLIC BLOOD PRESSURE < 80 MM HG: ICD-10-PCS | Mod: CPTII,S$GLB,, | Performed by: PODIATRIST

## 2021-08-04 PROCEDURE — 3074F PR MOST RECENT SYSTOLIC BLOOD PRESSURE < 130 MM HG: ICD-10-PCS | Mod: CPTII,S$GLB,, | Performed by: PODIATRIST

## 2021-08-04 PROCEDURE — 3061F PR NEG MICROALBUMINURIA RESULT DOCUMENTED/REVIEW: ICD-10-PCS | Mod: CPTII,S$GLB,, | Performed by: PODIATRIST

## 2021-08-04 PROCEDURE — 3074F SYST BP LT 130 MM HG: CPT | Mod: CPTII,S$GLB,, | Performed by: PODIATRIST

## 2021-08-04 PROCEDURE — 99203 OFFICE O/P NEW LOW 30 MIN: CPT | Mod: S$GLB,,, | Performed by: PODIATRIST

## 2021-08-04 PROCEDURE — 3008F PR BODY MASS INDEX (BMI) DOCUMENTED: ICD-10-PCS | Mod: CPTII,S$GLB,, | Performed by: PODIATRIST

## 2021-08-04 PROCEDURE — 3008F BODY MASS INDEX DOCD: CPT | Mod: CPTII,S$GLB,, | Performed by: PODIATRIST

## 2021-08-04 PROCEDURE — 3061F NEG MICROALBUMINURIA REV: CPT | Mod: CPTII,S$GLB,, | Performed by: PODIATRIST

## 2021-08-04 PROCEDURE — 99203 PR OFFICE/OUTPT VISIT, NEW, LEVL III, 30-44 MIN: ICD-10-PCS | Mod: S$GLB,,, | Performed by: PODIATRIST

## 2021-08-04 PROCEDURE — 3044F HG A1C LEVEL LT 7.0%: CPT | Mod: CPTII,S$GLB,, | Performed by: PODIATRIST

## 2021-08-04 PROCEDURE — 3044F PR MOST RECENT HEMOGLOBIN A1C LEVEL <7.0%: ICD-10-PCS | Mod: CPTII,S$GLB,, | Performed by: PODIATRIST

## 2021-08-04 PROCEDURE — 3066F PR DOCUMENTATION OF TREATMENT FOR NEPHROPATHY: ICD-10-PCS | Mod: CPTII,S$GLB,, | Performed by: PODIATRIST

## 2021-08-04 PROCEDURE — 99999 PR PBB SHADOW E&M-EST. PATIENT-LVL III: ICD-10-PCS | Mod: PBBFAC,,, | Performed by: PODIATRIST

## 2021-08-09 ENCOUNTER — TELEPHONE (OUTPATIENT)
Dept: INTERNAL MEDICINE | Facility: CLINIC | Age: 36
End: 2021-08-09
Payer: COMMERCIAL

## 2021-08-09 ENCOUNTER — PATIENT MESSAGE (OUTPATIENT)
Dept: INTERNAL MEDICINE | Facility: CLINIC | Age: 36
End: 2021-08-09

## 2021-08-09 DIAGNOSIS — R79.89 ELEVATED LFTS: Primary | ICD-10-CM

## 2021-08-23 ENCOUNTER — PATIENT MESSAGE (OUTPATIENT)
Dept: ADMINISTRATIVE | Facility: OTHER | Age: 36
End: 2021-08-23

## 2021-08-23 ENCOUNTER — PATIENT MESSAGE (OUTPATIENT)
Dept: INTERNAL MEDICINE | Facility: CLINIC | Age: 36
End: 2021-08-23

## 2021-08-24 ENCOUNTER — TELEPHONE (OUTPATIENT)
Dept: INTERNAL MEDICINE | Facility: CLINIC | Age: 36
End: 2021-08-24
Payer: COMMERCIAL

## 2021-08-24 ENCOUNTER — HOSPITAL ENCOUNTER (OUTPATIENT)
Dept: CARDIOLOGY | Facility: HOSPITAL | Age: 36
Discharge: HOME | End: 2021-08-24
Attending: INTERNAL MEDICINE
Payer: COMMERCIAL

## 2021-08-24 ENCOUNTER — HOSPITAL ENCOUNTER (OUTPATIENT)
Dept: RADIOLOGY | Facility: HOSPITAL | Age: 36
Discharge: HOME | End: 2021-08-24
Attending: INTERNAL MEDICINE
Payer: COMMERCIAL

## 2021-08-24 VITALS
WEIGHT: 226 LBS | HEIGHT: 64 IN | BODY MASS INDEX: 38.58 KG/M2 | HEART RATE: 79 BPM | DIASTOLIC BLOOD PRESSURE: 60 MMHG | SYSTOLIC BLOOD PRESSURE: 120 MMHG

## 2021-08-24 DIAGNOSIS — R79.89 ELEVATED LFTS: ICD-10-CM

## 2021-08-24 LAB
ASCENDING AORTA: 2.26 CM
AV INDEX (PROSTH): 0.73
AV MEAN GRADIENT: 7 MMHG
AV PEAK GRADIENT: 11 MMHG
AV VALVE AREA: 2.23 CM2
AV VELOCITY RATIO: 0.81
BSA FOR ECHO PROCEDURE: 2.15 M2
CV ECHO LV RWT: 0.27 CM
DOP CALC AO PEAK VEL: 1.65 M/S
DOP CALC AO VTI: 34.37 CM
DOP CALC LVOT AREA: 3 CM2
DOP CALC LVOT DIAMETER: 1.97 CM
DOP CALC LVOT PEAK VEL: 1.34 M/S
DOP CALC LVOT STROKE VOLUME: 76.62 CM3
DOP CALC RVOT PEAK VEL: 0.91 M/S
DOP CALC RVOT VTI: 17.84 CM
DOP CALCLVOT PEAK VEL VTI: 25.15 CM
E WAVE DECELERATION TIME: 157.76 MSEC
E/A RATIO: 1.68
E/E' RATIO: 8.48 M/S
ECHO LV POSTERIOR WALL: 0.62 CM (ref 0.6–1.1)
EJECTION FRACTION: 60 %
FRACTIONAL SHORTENING: 30 % (ref 28–44)
INTERVENTRICULAR SEPTUM: 0.61 CM (ref 0.6–1.1)
IVRT: 85.63 MSEC
LA MAJOR: 4.04 CM
LA MINOR: 4.12 CM
LA WIDTH: 3.35 CM
LEFT ATRIUM SIZE: 2.84 CM
LEFT ATRIUM VOLUME INDEX MOD: 15.2 ML/M2
LEFT ATRIUM VOLUME INDEX: 16 ML/M2
LEFT ATRIUM VOLUME MOD: 31.25 CM3
LEFT ATRIUM VOLUME: 32.99 CM3
LEFT INTERNAL DIMENSION IN SYSTOLE: 3.26 CM (ref 2.1–4)
LEFT VENTRICLE DIASTOLIC VOLUME INDEX: 48.41 ML/M2
LEFT VENTRICLE DIASTOLIC VOLUME: 99.72 ML
LEFT VENTRICLE MASS INDEX: 42 G/M2
LEFT VENTRICLE SYSTOLIC VOLUME INDEX: 20.7 ML/M2
LEFT VENTRICLE SYSTOLIC VOLUME: 42.73 ML
LEFT VENTRICULAR INTERNAL DIMENSION IN DIASTOLE: 4.65 CM (ref 3.5–6)
LEFT VENTRICULAR MASS: 86.09 G
LV LATERAL E/E' RATIO: 7.42 M/S
LV SEPTAL E/E' RATIO: 9.89 M/S
MV A" WAVE DURATION": 11.42 MSEC
MV PEAK A VEL: 0.53 M/S
MV PEAK E VEL: 0.89 M/S
MV STENOSIS PRESSURE HALF TIME: 45.75 MS
MV VALVE AREA P 1/2 METHOD: 4.81 CM2
PISA TR MAX VEL: 1.76 M/S
PULM VEIN S/D RATIO: 0.68
PV MEAN GRADIENT: 2 MMHG
PV PEAK D VEL: 0.47 M/S
PV PEAK S VEL: 0.32 M/S
PV PEAK VELOCITY: 1.01 CM/S
RA MAJOR: 4.08 CM
RA PRESSURE: 3 MMHG
RA WIDTH: 2.6 CM
SINUS: 2.15 CM
STJ: 2.06 CM
TDI LATERAL: 0.12 M/S
TDI SEPTAL: 0.09 M/S
TDI: 0.11 M/S
TR MAX PG: 12 MMHG
TRICUSPID ANNULAR PLANE SYSTOLIC EXCURSION: 2.02 CM
TV REST PULMONARY ARTERY PRESSURE: 15 MMHG

## 2021-08-24 PROCEDURE — 93306 TTE W/DOPPLER COMPLETE: CPT

## 2021-08-24 PROCEDURE — 76700 US EXAM ABDOM COMPLETE: CPT | Mod: TC

## 2021-08-24 PROCEDURE — 76700 US EXAM ABDOM COMPLETE: CPT | Mod: 26,,, | Performed by: STUDENT IN AN ORGANIZED HEALTH CARE EDUCATION/TRAINING PROGRAM

## 2021-08-24 PROCEDURE — 93306 TTE W/DOPPLER COMPLETE: CPT | Mod: 26,,, | Performed by: INTERNAL MEDICINE

## 2021-08-24 PROCEDURE — 93306 ECHO (CUPID ONLY): ICD-10-PCS | Mod: 26,,, | Performed by: INTERNAL MEDICINE

## 2021-08-24 PROCEDURE — 76700 US ABDOMEN COMPLETE: ICD-10-PCS | Mod: 26,,, | Performed by: STUDENT IN AN ORGANIZED HEALTH CARE EDUCATION/TRAINING PROGRAM

## 2021-08-24 RX ORDER — AZITHROMYCIN 250 MG/1
TABLET, FILM COATED ORAL
Qty: 6 TABLET | Refills: 0 | Status: SHIPPED | OUTPATIENT
Start: 2021-08-24 | End: 2021-11-05

## 2021-09-03 ENCOUNTER — TELEPHONE (OUTPATIENT)
Dept: INTERNAL MEDICINE | Facility: CLINIC | Age: 36
End: 2021-09-03
Payer: COMMERCIAL

## 2021-09-03 ENCOUNTER — PATIENT MESSAGE (OUTPATIENT)
Dept: INTERNAL MEDICINE | Facility: CLINIC | Age: 36
End: 2021-09-03

## 2021-09-03 DIAGNOSIS — N62 BREAST HYPERTROPHY IN FEMALE: Primary | ICD-10-CM

## 2021-09-08 ENCOUNTER — TELEPHONE (OUTPATIENT)
Dept: PLASTIC SURGERY | Facility: CLINIC | Age: 36
End: 2021-09-08

## 2021-09-10 ENCOUNTER — PATIENT MESSAGE (OUTPATIENT)
Dept: INTERNAL MEDICINE | Facility: CLINIC | Age: 36
End: 2021-09-10

## 2021-09-13 ENCOUNTER — PATIENT MESSAGE (OUTPATIENT)
Dept: INTERNAL MEDICINE | Facility: CLINIC | Age: 36
End: 2021-09-13

## 2021-09-17 ENCOUNTER — TELEPHONE (OUTPATIENT)
Dept: INTERNAL MEDICINE | Facility: CLINIC | Age: 36
End: 2021-09-17
Payer: COMMERCIAL

## 2021-09-17 ENCOUNTER — LAB VISIT (OUTPATIENT)
Dept: LAB | Facility: HOSPITAL | Age: 36
End: 2021-09-17
Attending: INTERNAL MEDICINE
Payer: COMMERCIAL

## 2021-09-17 ENCOUNTER — PATIENT MESSAGE (OUTPATIENT)
Dept: INTERNAL MEDICINE | Facility: CLINIC | Age: 36
End: 2021-09-17

## 2021-09-17 DIAGNOSIS — R79.89 ELEVATED LFTS: ICD-10-CM

## 2021-09-17 DIAGNOSIS — R79.89 ELEVATED LFTS: Primary | ICD-10-CM

## 2021-09-17 LAB
ALBUMIN SERPL BCP-MCNC: 3.8 G/DL (ref 3.5–5.2)
ALP SERPL-CCNC: 83 U/L (ref 55–135)
ALT SERPL W/O P-5'-P-CCNC: 60 U/L (ref 10–44)
AST SERPL-CCNC: 29 U/L (ref 10–40)
BILIRUB DIRECT SERPL-MCNC: 0.1 MG/DL (ref 0.1–0.3)
BILIRUB SERPL-MCNC: 0.5 MG/DL (ref 0.1–1)
PROT SERPL-MCNC: 6.6 G/DL (ref 6–8.4)

## 2021-09-17 PROCEDURE — 80076 HEPATIC FUNCTION PANEL: CPT | Performed by: INTERNAL MEDICINE

## 2021-09-17 PROCEDURE — 36415 COLL VENOUS BLD VENIPUNCTURE: CPT | Performed by: INTERNAL MEDICINE

## 2021-09-26 ENCOUNTER — PATIENT MESSAGE (OUTPATIENT)
Dept: PODIATRY | Facility: CLINIC | Age: 36
End: 2021-09-26

## 2021-09-28 RX ORDER — CEPHALEXIN 500 MG/1
500 CAPSULE ORAL EVERY 8 HOURS
Qty: 30 CAPSULE | Refills: 0 | Status: SHIPPED | OUTPATIENT
Start: 2021-09-28 | End: 2021-10-15

## 2021-10-01 ENCOUNTER — PATIENT MESSAGE (OUTPATIENT)
Dept: PODIATRY | Facility: CLINIC | Age: 36
End: 2021-10-01

## 2021-10-05 ENCOUNTER — PATIENT OUTREACH (OUTPATIENT)
Dept: ADMINISTRATIVE | Facility: OTHER | Age: 36
End: 2021-10-05

## 2021-10-14 ENCOUNTER — PATIENT MESSAGE (OUTPATIENT)
Dept: OTOLARYNGOLOGY | Facility: CLINIC | Age: 36
End: 2021-10-14
Payer: COMMERCIAL

## 2021-10-15 ENCOUNTER — LAB VISIT (OUTPATIENT)
Dept: LAB | Facility: HOSPITAL | Age: 36
End: 2021-10-15
Payer: COMMERCIAL

## 2021-10-15 ENCOUNTER — OFFICE VISIT (OUTPATIENT)
Dept: HEPATOLOGY | Facility: CLINIC | Age: 36
End: 2021-10-15
Payer: COMMERCIAL

## 2021-10-15 ENCOUNTER — OFFICE VISIT (OUTPATIENT)
Dept: PODIATRY | Facility: CLINIC | Age: 36
End: 2021-10-15
Payer: COMMERCIAL

## 2021-10-15 ENCOUNTER — PATIENT MESSAGE (OUTPATIENT)
Dept: HEPATOLOGY | Facility: CLINIC | Age: 36
End: 2021-10-15

## 2021-10-15 ENCOUNTER — PROCEDURE VISIT (OUTPATIENT)
Dept: HEPATOLOGY | Facility: CLINIC | Age: 36
End: 2021-10-15
Payer: COMMERCIAL

## 2021-10-15 VITALS
SYSTOLIC BLOOD PRESSURE: 133 MMHG | WEIGHT: 218.94 LBS | TEMPERATURE: 98 F | HEART RATE: 88 BPM | OXYGEN SATURATION: 97 % | RESPIRATION RATE: 18 BRPM | BODY MASS INDEX: 37.38 KG/M2 | HEIGHT: 64 IN | DIASTOLIC BLOOD PRESSURE: 74 MMHG

## 2021-10-15 VITALS
BODY MASS INDEX: 38.79 KG/M2 | SYSTOLIC BLOOD PRESSURE: 125 MMHG | HEART RATE: 81 BPM | DIASTOLIC BLOOD PRESSURE: 78 MMHG | WEIGHT: 226 LBS

## 2021-10-15 DIAGNOSIS — E66.9 OBESITY (BMI 30-39.9): ICD-10-CM

## 2021-10-15 DIAGNOSIS — Z23 NEED FOR HEPATITIS A AND B VACCINATION: ICD-10-CM

## 2021-10-15 DIAGNOSIS — L60.0 INGROWN NAIL: Primary | ICD-10-CM

## 2021-10-15 DIAGNOSIS — R74.8 ELEVATED LIVER ENZYMES: ICD-10-CM

## 2021-10-15 DIAGNOSIS — I10 PRIMARY HYPERTENSION: ICD-10-CM

## 2021-10-15 DIAGNOSIS — R79.89 ELEVATED LFTS: ICD-10-CM

## 2021-10-15 DIAGNOSIS — K76.0 FATTY LIVER: ICD-10-CM

## 2021-10-15 DIAGNOSIS — K74.01 HEPATIC FIBROSIS, EARLY FIBROSIS: ICD-10-CM

## 2021-10-15 DIAGNOSIS — R74.8 ELEVATED LIVER ENZYMES: Primary | ICD-10-CM

## 2021-10-15 PROBLEM — Z28.39 RUBELLA NON-IMMUNE STATUS, ANTEPARTUM: Status: RESOLVED | Noted: 2020-09-02 | Resolved: 2021-10-15

## 2021-10-15 PROBLEM — O16.3 SEVERE HYPERTENSION AFFECTING PREGNANCY IN THIRD TRIMESTER: Status: RESOLVED | Noted: 2021-03-22 | Resolved: 2021-10-15

## 2021-10-15 PROBLEM — O14.13 SEVERE PRE-ECLAMPSIA IN THIRD TRIMESTER: Status: RESOLVED | Noted: 2021-03-22 | Resolved: 2021-10-15

## 2021-10-15 PROBLEM — Z98.891 S/P CESAREAN SECTION: Status: RESOLVED | Noted: 2021-03-24 | Resolved: 2021-10-15

## 2021-10-15 PROBLEM — O09.899 RUBELLA NON-IMMUNE STATUS, ANTEPARTUM: Status: RESOLVED | Noted: 2020-09-02 | Resolved: 2021-10-15

## 2021-10-15 LAB
A1AT SERPL-MCNC: 114 MG/DL (ref 100–190)
ALBUMIN SERPL BCP-MCNC: 3.9 G/DL (ref 3.5–5.2)
ALP SERPL-CCNC: 84 U/L (ref 55–135)
ALT SERPL W/O P-5'-P-CCNC: 82 U/L (ref 10–44)
ANION GAP SERPL CALC-SCNC: 14 MMOL/L (ref 8–16)
AST SERPL-CCNC: 30 U/L (ref 10–40)
BILIRUB SERPL-MCNC: 0.3 MG/DL (ref 0.1–1)
BUN SERPL-MCNC: 15 MG/DL (ref 6–20)
CALCIUM SERPL-MCNC: 9.8 MG/DL (ref 8.7–10.5)
CERULOPLASMIN SERPL-MCNC: 32 MG/DL (ref 15–45)
CHLORIDE SERPL-SCNC: 106 MMOL/L (ref 95–110)
CO2 SERPL-SCNC: 22 MMOL/L (ref 23–29)
CREAT SERPL-MCNC: 0.9 MG/DL (ref 0.5–1.4)
EST. GFR  (AFRICAN AMERICAN): >60 ML/MIN/1.73 M^2
EST. GFR  (NON AFRICAN AMERICAN): >60 ML/MIN/1.73 M^2
FERRITIN SERPL-MCNC: 34 NG/ML (ref 20–300)
GLUCOSE SERPL-MCNC: 83 MG/DL (ref 70–110)
HBV CORE AB SERPL QL IA: NEGATIVE
HEPATITIS A ANTIBODY, IGG: NEGATIVE
IGG SERPL-MCNC: 945 MG/DL (ref 650–1600)
INR PPP: 0.9 (ref 0.8–1.2)
IRON SERPL-MCNC: 35 UG/DL (ref 30–160)
POTASSIUM SERPL-SCNC: 3.4 MMOL/L (ref 3.5–5.1)
PROT SERPL-MCNC: 7.2 G/DL (ref 6–8.4)
PROTHROMBIN TIME: 9.8 SEC (ref 9–12.5)
SATURATED IRON: 8 % (ref 20–50)
SODIUM SERPL-SCNC: 142 MMOL/L (ref 136–145)
TOTAL IRON BINDING CAPACITY: 447 UG/DL (ref 250–450)
TRANSFERRIN SERPL-MCNC: 302 MG/DL (ref 200–375)

## 2021-10-15 PROCEDURE — 3061F NEG MICROALBUMINURIA REV: CPT | Mod: CPTII,S$GLB,, | Performed by: NURSE PRACTITIONER

## 2021-10-15 PROCEDURE — 3044F HG A1C LEVEL LT 7.0%: CPT | Mod: CPTII,S$GLB,, | Performed by: PODIATRIST

## 2021-10-15 PROCEDURE — 3066F PR DOCUMENTATION OF TREATMENT FOR NEPHROPATHY: ICD-10-PCS | Mod: CPTII,S$GLB,, | Performed by: NURSE PRACTITIONER

## 2021-10-15 PROCEDURE — 86706 HEP B SURFACE ANTIBODY: CPT | Performed by: NURSE PRACTITIONER

## 2021-10-15 PROCEDURE — 3044F PR MOST RECENT HEMOGLOBIN A1C LEVEL <7.0%: ICD-10-PCS | Mod: CPTII,S$GLB,, | Performed by: NURSE PRACTITIONER

## 2021-10-15 PROCEDURE — 11750 EXCISION NAIL&NAIL MATRIX: CPT | Mod: T5,S$GLB,, | Performed by: PODIATRIST

## 2021-10-15 PROCEDURE — 86038 ANTINUCLEAR ANTIBODIES: CPT | Performed by: NURSE PRACTITIONER

## 2021-10-15 PROCEDURE — 3044F PR MOST RECENT HEMOGLOBIN A1C LEVEL <7.0%: ICD-10-PCS | Mod: CPTII,S$GLB,, | Performed by: PODIATRIST

## 2021-10-15 PROCEDURE — 3061F PR NEG MICROALBUMINURIA RESULT DOCUMENTED/REVIEW: ICD-10-PCS | Mod: CPTII,S$GLB,, | Performed by: NURSE PRACTITIONER

## 2021-10-15 PROCEDURE — 86235 NUCLEAR ANTIGEN ANTIBODY: CPT | Mod: 91 | Performed by: NURSE PRACTITIONER

## 2021-10-15 PROCEDURE — 99999 PR PBB SHADOW E&M-EST. PATIENT-LVL III: CPT | Mod: PBBFAC,,, | Performed by: PODIATRIST

## 2021-10-15 PROCEDURE — 99999 PR PBB SHADOW E&M-EST. PATIENT-LVL III: ICD-10-PCS | Mod: PBBFAC,,, | Performed by: PODIATRIST

## 2021-10-15 PROCEDURE — 11750 PR REMOVAL OF NAIL BED: ICD-10-PCS | Mod: T5,S$GLB,, | Performed by: PODIATRIST

## 2021-10-15 PROCEDURE — 3075F PR MOST RECENT SYSTOLIC BLOOD PRESS GE 130-139MM HG: ICD-10-PCS | Mod: CPTII,S$GLB,, | Performed by: NURSE PRACTITIONER

## 2021-10-15 PROCEDURE — 99203 PR OFFICE/OUTPT VISIT, NEW, LEVL III, 30-44 MIN: ICD-10-PCS | Mod: S$GLB,,, | Performed by: NURSE PRACTITIONER

## 2021-10-15 PROCEDURE — 3044F HG A1C LEVEL LT 7.0%: CPT | Mod: CPTII,S$GLB,, | Performed by: NURSE PRACTITIONER

## 2021-10-15 PROCEDURE — 82103 ALPHA-1-ANTITRYPSIN TOTAL: CPT | Performed by: NURSE PRACTITIONER

## 2021-10-15 PROCEDURE — 3008F BODY MASS INDEX DOCD: CPT | Mod: CPTII,S$GLB,, | Performed by: NURSE PRACTITIONER

## 2021-10-15 PROCEDURE — 3078F DIAST BP <80 MM HG: CPT | Mod: CPTII,S$GLB,, | Performed by: PODIATRIST

## 2021-10-15 PROCEDURE — 3066F NEPHROPATHY DOC TX: CPT | Mod: CPTII,S$GLB,, | Performed by: NURSE PRACTITIONER

## 2021-10-15 PROCEDURE — 3061F NEG MICROALBUMINURIA REV: CPT | Mod: CPTII,S$GLB,, | Performed by: PODIATRIST

## 2021-10-15 PROCEDURE — 99999 PR PBB SHADOW E&M-EST. PATIENT-LVL IV: ICD-10-PCS | Mod: PBBFAC,,, | Performed by: NURSE PRACTITIONER

## 2021-10-15 PROCEDURE — 84466 ASSAY OF TRANSFERRIN: CPT | Performed by: NURSE PRACTITIONER

## 2021-10-15 PROCEDURE — 3061F PR NEG MICROALBUMINURIA RESULT DOCUMENTED/REVIEW: ICD-10-PCS | Mod: CPTII,S$GLB,, | Performed by: PODIATRIST

## 2021-10-15 PROCEDURE — 82390 ASSAY OF CERULOPLASMIN: CPT | Performed by: NURSE PRACTITIONER

## 2021-10-15 PROCEDURE — 3075F SYST BP GE 130 - 139MM HG: CPT | Mod: CPTII,S$GLB,, | Performed by: NURSE PRACTITIONER

## 2021-10-15 PROCEDURE — 80053 COMPREHEN METABOLIC PANEL: CPT | Performed by: NURSE PRACTITIONER

## 2021-10-15 PROCEDURE — 86235 NUCLEAR ANTIGEN ANTIBODY: CPT | Mod: 59 | Performed by: NURSE PRACTITIONER

## 2021-10-15 PROCEDURE — 99499 UNLISTED E&M SERVICE: CPT | Mod: S$GLB,,, | Performed by: PODIATRIST

## 2021-10-15 PROCEDURE — 99999 PR PBB SHADOW E&M-EST. PATIENT-LVL IV: CPT | Mod: PBBFAC,,, | Performed by: NURSE PRACTITIONER

## 2021-10-15 PROCEDURE — 11750 EXCISION NAIL&NAIL MATRIX: CPT | Mod: 51,TA,S$GLB, | Performed by: PODIATRIST

## 2021-10-15 PROCEDURE — 3074F SYST BP LT 130 MM HG: CPT | Mod: CPTII,S$GLB,, | Performed by: PODIATRIST

## 2021-10-15 PROCEDURE — 86790 VIRUS ANTIBODY NOS: CPT | Performed by: NURSE PRACTITIONER

## 2021-10-15 PROCEDURE — 3008F BODY MASS INDEX DOCD: CPT | Mod: CPTII,S$GLB,, | Performed by: PODIATRIST

## 2021-10-15 PROCEDURE — 99203 OFFICE O/P NEW LOW 30 MIN: CPT | Mod: S$GLB,,, | Performed by: NURSE PRACTITIONER

## 2021-10-15 PROCEDURE — 3078F PR MOST RECENT DIASTOLIC BLOOD PRESSURE < 80 MM HG: ICD-10-PCS | Mod: CPTII,S$GLB,, | Performed by: PODIATRIST

## 2021-10-15 PROCEDURE — 85610 PROTHROMBIN TIME: CPT | Performed by: NURSE PRACTITIONER

## 2021-10-15 PROCEDURE — 91200 FIBROSCAN (VIBRATION CONTROLLED TRANSIENT ELASTOGRAPHY): ICD-10-PCS | Mod: S$GLB,,, | Performed by: NURSE PRACTITIONER

## 2021-10-15 PROCEDURE — 3008F PR BODY MASS INDEX (BMI) DOCUMENTED: ICD-10-PCS | Mod: CPTII,S$GLB,, | Performed by: NURSE PRACTITIONER

## 2021-10-15 PROCEDURE — 80321 ALCOHOLS BIOMARKERS 1OR 2: CPT | Performed by: NURSE PRACTITIONER

## 2021-10-15 PROCEDURE — 82784 ASSAY IGA/IGD/IGG/IGM EACH: CPT | Performed by: NURSE PRACTITIONER

## 2021-10-15 PROCEDURE — 3074F PR MOST RECENT SYSTOLIC BLOOD PRESSURE < 130 MM HG: ICD-10-PCS | Mod: CPTII,S$GLB,, | Performed by: PODIATRIST

## 2021-10-15 PROCEDURE — 3066F PR DOCUMENTATION OF TREATMENT FOR NEPHROPATHY: ICD-10-PCS | Mod: CPTII,S$GLB,, | Performed by: PODIATRIST

## 2021-10-15 PROCEDURE — 82728 ASSAY OF FERRITIN: CPT | Performed by: NURSE PRACTITIONER

## 2021-10-15 PROCEDURE — 3008F PR BODY MASS INDEX (BMI) DOCUMENTED: ICD-10-PCS | Mod: CPTII,S$GLB,, | Performed by: PODIATRIST

## 2021-10-15 PROCEDURE — 86039 ANTINUCLEAR ANTIBODIES (ANA): CPT | Performed by: NURSE PRACTITIONER

## 2021-10-15 PROCEDURE — 99499 NO LOS: ICD-10-PCS | Mod: S$GLB,,, | Performed by: PODIATRIST

## 2021-10-15 PROCEDURE — 3078F PR MOST RECENT DIASTOLIC BLOOD PRESSURE < 80 MM HG: ICD-10-PCS | Mod: CPTII,S$GLB,, | Performed by: NURSE PRACTITIONER

## 2021-10-15 PROCEDURE — 86256 FLUORESCENT ANTIBODY TITER: CPT | Performed by: NURSE PRACTITIONER

## 2021-10-15 PROCEDURE — 3066F NEPHROPATHY DOC TX: CPT | Mod: CPTII,S$GLB,, | Performed by: PODIATRIST

## 2021-10-15 PROCEDURE — 3078F DIAST BP <80 MM HG: CPT | Mod: CPTII,S$GLB,, | Performed by: NURSE PRACTITIONER

## 2021-10-15 PROCEDURE — 86704 HEP B CORE ANTIBODY TOTAL: CPT | Performed by: NURSE PRACTITIONER

## 2021-10-15 PROCEDURE — 91200 LIVER ELASTOGRAPHY: CPT | Mod: S$GLB,,, | Performed by: NURSE PRACTITIONER

## 2021-10-18 LAB
ANA PATTERN 1: NORMAL
ANA PATTERN 2: NORMAL
ANA SER QL IF: POSITIVE
ANA TITER 2: NORMAL
ANA TITR SER IF: NORMAL {TITER}
HBV SURFACE AB SER QL IA: NEGATIVE
HBV SURFACE AB SERPL IA-ACNC: 8 MIU/ML
MITOCHONDRIA AB TITR SER IF: NORMAL {TITER}
SMOOTH MUSCLE AB TITR SER IF: NORMAL {TITER}

## 2021-10-19 LAB
ANTI SM ANTIBODY: 0.12 RATIO (ref 0–0.99)
ANTI SM/RNP ANTIBODY: 0.08 RATIO (ref 0–0.99)
ANTI-SM INTERPRETATION: NEGATIVE
ANTI-SM/RNP INTERPRETATION: NEGATIVE
ANTI-SSA ANTIBODY: 0.07 RATIO (ref 0–0.99)
ANTI-SSA INTERPRETATION: NEGATIVE
ANTI-SSB ANTIBODY: 0.06 RATIO (ref 0–0.99)
ANTI-SSB INTERPRETATION: NEGATIVE
DSDNA AB SER-ACNC: NORMAL [IU]/ML

## 2021-10-20 ENCOUNTER — PATIENT MESSAGE (OUTPATIENT)
Dept: HEPATOLOGY | Facility: CLINIC | Age: 36
End: 2021-10-20
Payer: COMMERCIAL

## 2021-10-20 ENCOUNTER — PATIENT MESSAGE (OUTPATIENT)
Dept: HEPATOLOGY | Facility: CLINIC | Age: 36
End: 2021-10-20

## 2021-10-20 ENCOUNTER — OFFICE VISIT (OUTPATIENT)
Dept: OTOLARYNGOLOGY | Facility: CLINIC | Age: 36
End: 2021-10-20
Payer: COMMERCIAL

## 2021-10-20 VITALS — DIASTOLIC BLOOD PRESSURE: 81 MMHG | SYSTOLIC BLOOD PRESSURE: 124 MMHG | HEART RATE: 70 BPM

## 2021-10-20 DIAGNOSIS — J32.0 RIGHT MAXILLARY SINUSITIS, CHRONIC: ICD-10-CM

## 2021-10-20 DIAGNOSIS — H93.8X1 EAR FULLNESS, RIGHT: ICD-10-CM

## 2021-10-20 DIAGNOSIS — H69.92 ETD (EUSTACHIAN TUBE DYSFUNCTION), LEFT: Primary | ICD-10-CM

## 2021-10-20 DIAGNOSIS — R09.81 NASAL CONGESTION: ICD-10-CM

## 2021-10-20 DIAGNOSIS — H65.02 NON-RECURRENT ACUTE SEROUS OTITIS MEDIA OF LEFT EAR: ICD-10-CM

## 2021-10-20 PROCEDURE — 1159F MED LIST DOCD IN RCRD: CPT | Mod: CPTII,S$GLB,, | Performed by: OTOLARYNGOLOGY

## 2021-10-20 PROCEDURE — 3079F DIAST BP 80-89 MM HG: CPT | Mod: CPTII,S$GLB,, | Performed by: OTOLARYNGOLOGY

## 2021-10-20 PROCEDURE — 3074F PR MOST RECENT SYSTOLIC BLOOD PRESSURE < 130 MM HG: ICD-10-PCS | Mod: CPTII,S$GLB,, | Performed by: OTOLARYNGOLOGY

## 2021-10-20 PROCEDURE — 3061F NEG MICROALBUMINURIA REV: CPT | Mod: CPTII,S$GLB,, | Performed by: OTOLARYNGOLOGY

## 2021-10-20 PROCEDURE — 3074F SYST BP LT 130 MM HG: CPT | Mod: CPTII,S$GLB,, | Performed by: OTOLARYNGOLOGY

## 2021-10-20 PROCEDURE — 99213 OFFICE O/P EST LOW 20 MIN: CPT | Mod: S$GLB,,, | Performed by: OTOLARYNGOLOGY

## 2021-10-20 PROCEDURE — 99999 PR PBB SHADOW E&M-EST. PATIENT-LVL III: ICD-10-PCS | Mod: PBBFAC,,, | Performed by: OTOLARYNGOLOGY

## 2021-10-20 PROCEDURE — 1160F PR REVIEW ALL MEDS BY PRESCRIBER/CLIN PHARMACIST DOCUMENTED: ICD-10-PCS | Mod: CPTII,S$GLB,, | Performed by: OTOLARYNGOLOGY

## 2021-10-20 PROCEDURE — 99213 PR OFFICE/OUTPT VISIT, EST, LEVL III, 20-29 MIN: ICD-10-PCS | Mod: S$GLB,,, | Performed by: OTOLARYNGOLOGY

## 2021-10-20 PROCEDURE — 3044F HG A1C LEVEL LT 7.0%: CPT | Mod: CPTII,S$GLB,, | Performed by: OTOLARYNGOLOGY

## 2021-10-20 PROCEDURE — 99999 PR PBB SHADOW E&M-EST. PATIENT-LVL III: CPT | Mod: PBBFAC,,, | Performed by: OTOLARYNGOLOGY

## 2021-10-20 PROCEDURE — 3066F PR DOCUMENTATION OF TREATMENT FOR NEPHROPATHY: ICD-10-PCS | Mod: CPTII,S$GLB,, | Performed by: OTOLARYNGOLOGY

## 2021-10-20 PROCEDURE — 3079F PR MOST RECENT DIASTOLIC BLOOD PRESSURE 80-89 MM HG: ICD-10-PCS | Mod: CPTII,S$GLB,, | Performed by: OTOLARYNGOLOGY

## 2021-10-20 PROCEDURE — 1159F PR MEDICATION LIST DOCUMENTED IN MEDICAL RECORD: ICD-10-PCS | Mod: CPTII,S$GLB,, | Performed by: OTOLARYNGOLOGY

## 2021-10-20 PROCEDURE — 3044F PR MOST RECENT HEMOGLOBIN A1C LEVEL <7.0%: ICD-10-PCS | Mod: CPTII,S$GLB,, | Performed by: OTOLARYNGOLOGY

## 2021-10-20 PROCEDURE — 3066F NEPHROPATHY DOC TX: CPT | Mod: CPTII,S$GLB,, | Performed by: OTOLARYNGOLOGY

## 2021-10-20 PROCEDURE — 1160F RVW MEDS BY RX/DR IN RCRD: CPT | Mod: CPTII,S$GLB,, | Performed by: OTOLARYNGOLOGY

## 2021-10-20 PROCEDURE — 3061F PR NEG MICROALBUMINURIA RESULT DOCUMENTED/REVIEW: ICD-10-PCS | Mod: CPTII,S$GLB,, | Performed by: OTOLARYNGOLOGY

## 2021-10-20 RX ORDER — FLUTICASONE PROPIONATE 50 MCG
2 SPRAY, SUSPENSION (ML) NASAL DAILY
Qty: 9.9 ML | Refills: 2 | Status: SHIPPED | OUTPATIENT
Start: 2021-10-20 | End: 2021-11-19

## 2021-10-21 ENCOUNTER — OFFICE VISIT (OUTPATIENT)
Dept: PODIATRY | Facility: CLINIC | Age: 36
End: 2021-10-21
Payer: COMMERCIAL

## 2021-10-21 VITALS
SYSTOLIC BLOOD PRESSURE: 112 MMHG | BODY MASS INDEX: 37.38 KG/M2 | DIASTOLIC BLOOD PRESSURE: 66 MMHG | HEART RATE: 66 BPM | WEIGHT: 218.94 LBS | HEIGHT: 64 IN

## 2021-10-21 DIAGNOSIS — Z98.890 POST-OPERATIVE STATE: Primary | ICD-10-CM

## 2021-10-21 PROCEDURE — 3074F SYST BP LT 130 MM HG: CPT | Mod: CPTII,S$GLB,, | Performed by: PODIATRIST

## 2021-10-21 PROCEDURE — 3078F PR MOST RECENT DIASTOLIC BLOOD PRESSURE < 80 MM HG: ICD-10-PCS | Mod: CPTII,S$GLB,, | Performed by: PODIATRIST

## 2021-10-21 PROCEDURE — 3044F PR MOST RECENT HEMOGLOBIN A1C LEVEL <7.0%: ICD-10-PCS | Mod: CPTII,S$GLB,, | Performed by: PODIATRIST

## 2021-10-21 PROCEDURE — 3066F PR DOCUMENTATION OF TREATMENT FOR NEPHROPATHY: ICD-10-PCS | Mod: CPTII,S$GLB,, | Performed by: PODIATRIST

## 2021-10-21 PROCEDURE — 3061F NEG MICROALBUMINURIA REV: CPT | Mod: CPTII,S$GLB,, | Performed by: PODIATRIST

## 2021-10-21 PROCEDURE — 99024 POSTOP FOLLOW-UP VISIT: CPT | Mod: S$GLB,,, | Performed by: PODIATRIST

## 2021-10-21 PROCEDURE — 3078F DIAST BP <80 MM HG: CPT | Mod: CPTII,S$GLB,, | Performed by: PODIATRIST

## 2021-10-21 PROCEDURE — 3074F PR MOST RECENT SYSTOLIC BLOOD PRESSURE < 130 MM HG: ICD-10-PCS | Mod: CPTII,S$GLB,, | Performed by: PODIATRIST

## 2021-10-21 PROCEDURE — 99024 PR POST-OP FOLLOW-UP VISIT: ICD-10-PCS | Mod: S$GLB,,, | Performed by: PODIATRIST

## 2021-10-21 PROCEDURE — 3061F PR NEG MICROALBUMINURIA RESULT DOCUMENTED/REVIEW: ICD-10-PCS | Mod: CPTII,S$GLB,, | Performed by: PODIATRIST

## 2021-10-21 PROCEDURE — 3044F HG A1C LEVEL LT 7.0%: CPT | Mod: CPTII,S$GLB,, | Performed by: PODIATRIST

## 2021-10-21 PROCEDURE — 99999 PR PBB SHADOW E&M-EST. PATIENT-LVL III: CPT | Mod: PBBFAC,,, | Performed by: PODIATRIST

## 2021-10-21 PROCEDURE — 3008F PR BODY MASS INDEX (BMI) DOCUMENTED: ICD-10-PCS | Mod: CPTII,S$GLB,, | Performed by: PODIATRIST

## 2021-10-21 PROCEDURE — 99999 PR PBB SHADOW E&M-EST. PATIENT-LVL III: ICD-10-PCS | Mod: PBBFAC,,, | Performed by: PODIATRIST

## 2021-10-21 PROCEDURE — 3008F BODY MASS INDEX DOCD: CPT | Mod: CPTII,S$GLB,, | Performed by: PODIATRIST

## 2021-10-21 PROCEDURE — 3066F NEPHROPATHY DOC TX: CPT | Mod: CPTII,S$GLB,, | Performed by: PODIATRIST

## 2021-10-22 ENCOUNTER — PATIENT MESSAGE (OUTPATIENT)
Dept: HEPATOLOGY | Facility: CLINIC | Age: 36
End: 2021-10-22

## 2021-10-22 ENCOUNTER — PATIENT MESSAGE (OUTPATIENT)
Dept: HEPATOLOGY | Facility: CLINIC | Age: 36
End: 2021-10-22
Payer: COMMERCIAL

## 2021-10-25 ENCOUNTER — TELEPHONE (OUTPATIENT)
Dept: HEPATOLOGY | Facility: CLINIC | Age: 36
End: 2021-10-25
Payer: COMMERCIAL

## 2021-10-27 ENCOUNTER — PATIENT MESSAGE (OUTPATIENT)
Dept: PHARMACY | Facility: CLINIC | Age: 36
End: 2021-10-27
Payer: COMMERCIAL

## 2021-10-29 ENCOUNTER — IMMUNIZATION (OUTPATIENT)
Dept: PHARMACY | Facility: CLINIC | Age: 36
End: 2021-10-29
Payer: COMMERCIAL

## 2021-11-05 ENCOUNTER — OFFICE VISIT (OUTPATIENT)
Dept: INTERNAL MEDICINE | Facility: CLINIC | Age: 36
End: 2021-11-05
Payer: COMMERCIAL

## 2021-11-05 VITALS
TEMPERATURE: 98 F | WEIGHT: 216.06 LBS | DIASTOLIC BLOOD PRESSURE: 68 MMHG | SYSTOLIC BLOOD PRESSURE: 116 MMHG | HEART RATE: 76 BPM | OXYGEN SATURATION: 99 % | HEIGHT: 64 IN | BODY MASS INDEX: 36.89 KG/M2

## 2021-11-05 DIAGNOSIS — I10 PRIMARY HYPERTENSION: Primary | ICD-10-CM

## 2021-11-05 DIAGNOSIS — K76.0 FATTY LIVER: ICD-10-CM

## 2021-11-05 PROCEDURE — 3066F NEPHROPATHY DOC TX: CPT | Mod: CPTII,S$GLB,, | Performed by: INTERNAL MEDICINE

## 2021-11-05 PROCEDURE — 99213 OFFICE O/P EST LOW 20 MIN: CPT | Mod: S$GLB,,, | Performed by: INTERNAL MEDICINE

## 2021-11-05 PROCEDURE — 1159F PR MEDICATION LIST DOCUMENTED IN MEDICAL RECORD: ICD-10-PCS | Mod: CPTII,S$GLB,, | Performed by: INTERNAL MEDICINE

## 2021-11-05 PROCEDURE — 1159F MED LIST DOCD IN RCRD: CPT | Mod: CPTII,S$GLB,, | Performed by: INTERNAL MEDICINE

## 2021-11-05 PROCEDURE — 3008F PR BODY MASS INDEX (BMI) DOCUMENTED: ICD-10-PCS | Mod: CPTII,S$GLB,, | Performed by: INTERNAL MEDICINE

## 2021-11-05 PROCEDURE — 3008F BODY MASS INDEX DOCD: CPT | Mod: CPTII,S$GLB,, | Performed by: INTERNAL MEDICINE

## 2021-11-05 PROCEDURE — 3074F PR MOST RECENT SYSTOLIC BLOOD PRESSURE < 130 MM HG: ICD-10-PCS | Mod: CPTII,S$GLB,, | Performed by: INTERNAL MEDICINE

## 2021-11-05 PROCEDURE — 99999 PR PBB SHADOW E&M-EST. PATIENT-LVL III: ICD-10-PCS | Mod: PBBFAC,,, | Performed by: INTERNAL MEDICINE

## 2021-11-05 PROCEDURE — 99999 PR PBB SHADOW E&M-EST. PATIENT-LVL III: CPT | Mod: PBBFAC,,, | Performed by: INTERNAL MEDICINE

## 2021-11-05 PROCEDURE — 3074F SYST BP LT 130 MM HG: CPT | Mod: CPTII,S$GLB,, | Performed by: INTERNAL MEDICINE

## 2021-11-05 PROCEDURE — 3044F PR MOST RECENT HEMOGLOBIN A1C LEVEL <7.0%: ICD-10-PCS | Mod: CPTII,S$GLB,, | Performed by: INTERNAL MEDICINE

## 2021-11-05 PROCEDURE — 3078F PR MOST RECENT DIASTOLIC BLOOD PRESSURE < 80 MM HG: ICD-10-PCS | Mod: CPTII,S$GLB,, | Performed by: INTERNAL MEDICINE

## 2021-11-05 PROCEDURE — 99213 PR OFFICE/OUTPT VISIT, EST, LEVL III, 20-29 MIN: ICD-10-PCS | Mod: S$GLB,,, | Performed by: INTERNAL MEDICINE

## 2021-11-05 PROCEDURE — 3061F NEG MICROALBUMINURIA REV: CPT | Mod: CPTII,S$GLB,, | Performed by: INTERNAL MEDICINE

## 2021-11-05 PROCEDURE — 3066F PR DOCUMENTATION OF TREATMENT FOR NEPHROPATHY: ICD-10-PCS | Mod: CPTII,S$GLB,, | Performed by: INTERNAL MEDICINE

## 2021-11-05 PROCEDURE — 3044F HG A1C LEVEL LT 7.0%: CPT | Mod: CPTII,S$GLB,, | Performed by: INTERNAL MEDICINE

## 2021-11-05 PROCEDURE — 3061F PR NEG MICROALBUMINURIA RESULT DOCUMENTED/REVIEW: ICD-10-PCS | Mod: CPTII,S$GLB,, | Performed by: INTERNAL MEDICINE

## 2021-11-05 PROCEDURE — 3078F DIAST BP <80 MM HG: CPT | Mod: CPTII,S$GLB,, | Performed by: INTERNAL MEDICINE

## 2021-11-05 RX ORDER — CEFDINIR 300 MG/1
300 CAPSULE ORAL 2 TIMES DAILY
Qty: 20 CAPSULE | Refills: 0 | Status: SHIPPED | OUTPATIENT
Start: 2021-11-05 | End: 2021-11-09

## 2021-11-08 ENCOUNTER — PATIENT MESSAGE (OUTPATIENT)
Dept: INTERNAL MEDICINE | Facility: CLINIC | Age: 36
End: 2021-11-08
Payer: COMMERCIAL

## 2021-11-09 ENCOUNTER — TELEPHONE (OUTPATIENT)
Dept: INTERNAL MEDICINE | Facility: CLINIC | Age: 36
End: 2021-11-09
Payer: COMMERCIAL

## 2021-11-09 LAB
PETH 16:0/18.1 (POPETH): <10 NG/ML
PETH 16:0/18.2 (PLPETH): <10 NG/ML

## 2021-11-09 RX ORDER — LEVOFLOXACIN 500 MG/1
500 TABLET, FILM COATED ORAL DAILY
Qty: 7 TABLET | Refills: 0 | Status: SHIPPED | OUTPATIENT
Start: 2021-11-09 | End: 2021-12-08

## 2021-11-11 ENCOUNTER — PATIENT MESSAGE (OUTPATIENT)
Dept: INTERNAL MEDICINE | Facility: CLINIC | Age: 36
End: 2021-11-11
Payer: COMMERCIAL

## 2021-11-21 ENCOUNTER — PATIENT MESSAGE (OUTPATIENT)
Dept: ADMINISTRATIVE | Facility: OTHER | Age: 36
End: 2021-11-21
Payer: COMMERCIAL

## 2021-11-29 ENCOUNTER — PATIENT MESSAGE (OUTPATIENT)
Dept: PHARMACY | Facility: CLINIC | Age: 36
End: 2021-11-29
Payer: COMMERCIAL

## 2021-12-01 ENCOUNTER — IMMUNIZATION (OUTPATIENT)
Dept: PHARMACY | Facility: CLINIC | Age: 36
End: 2021-12-01
Payer: COMMERCIAL

## 2021-12-08 ENCOUNTER — LAB VISIT (OUTPATIENT)
Dept: LAB | Facility: HOSPITAL | Age: 36
End: 2021-12-08
Attending: INTERNAL MEDICINE
Payer: COMMERCIAL

## 2021-12-08 ENCOUNTER — TELEPHONE (OUTPATIENT)
Dept: INTERNAL MEDICINE | Facility: CLINIC | Age: 36
End: 2021-12-08
Payer: COMMERCIAL

## 2021-12-08 ENCOUNTER — PATIENT MESSAGE (OUTPATIENT)
Dept: INTERNAL MEDICINE | Facility: CLINIC | Age: 36
End: 2021-12-08
Payer: COMMERCIAL

## 2021-12-08 DIAGNOSIS — N39.0 URINARY TRACT INFECTION WITHOUT HEMATURIA, SITE UNSPECIFIED: Primary | ICD-10-CM

## 2021-12-08 DIAGNOSIS — N39.0 URINARY TRACT INFECTION WITHOUT HEMATURIA, SITE UNSPECIFIED: ICD-10-CM

## 2021-12-08 LAB
BILIRUB UR QL STRIP: NEGATIVE
CLARITY UR REFRACT.AUTO: CLEAR
COLOR UR AUTO: YELLOW
GLUCOSE UR QL STRIP: NEGATIVE
HGB UR QL STRIP: NEGATIVE
KETONES UR QL STRIP: ABNORMAL
LEUKOCYTE ESTERASE UR QL STRIP: NEGATIVE
NITRITE UR QL STRIP: NEGATIVE
PH UR STRIP: 5 [PH] (ref 5–8)
PROT UR QL STRIP: NEGATIVE
SP GR UR STRIP: 1.03 (ref 1–1.03)
URN SPEC COLLECT METH UR: ABNORMAL

## 2021-12-08 PROCEDURE — 87186 SC STD MICRODIL/AGAR DIL: CPT | Performed by: INTERNAL MEDICINE

## 2021-12-08 PROCEDURE — 81003 URINALYSIS AUTO W/O SCOPE: CPT | Performed by: INTERNAL MEDICINE

## 2021-12-08 PROCEDURE — 87086 URINE CULTURE/COLONY COUNT: CPT | Performed by: INTERNAL MEDICINE

## 2021-12-08 PROCEDURE — 87077 CULTURE AEROBIC IDENTIFY: CPT | Performed by: INTERNAL MEDICINE

## 2021-12-08 PROCEDURE — 87088 URINE BACTERIA CULTURE: CPT | Performed by: INTERNAL MEDICINE

## 2021-12-08 RX ORDER — SULFAMETHOXAZOLE AND TRIMETHOPRIM 800; 160 MG/1; MG/1
1 TABLET ORAL 2 TIMES DAILY
Qty: 14 TABLET | Refills: 0 | Status: SHIPPED | OUTPATIENT
Start: 2021-12-08 | End: 2021-12-14

## 2021-12-14 ENCOUNTER — TELEPHONE (OUTPATIENT)
Dept: INTERNAL MEDICINE | Facility: CLINIC | Age: 36
End: 2021-12-14
Payer: COMMERCIAL

## 2021-12-14 LAB — BACTERIA UR CULT: ABNORMAL

## 2021-12-14 RX ORDER — AMOXICILLIN AND CLAVULANATE POTASSIUM 500; 125 MG/1; MG/1
1 TABLET, FILM COATED ORAL 2 TIMES DAILY
Qty: 14 TABLET | Refills: 0 | Status: SHIPPED | OUTPATIENT
Start: 2021-12-14 | End: 2022-04-18

## 2022-01-08 ENCOUNTER — PATIENT MESSAGE (OUTPATIENT)
Dept: INTERNAL MEDICINE | Facility: CLINIC | Age: 37
End: 2022-01-08
Payer: COMMERCIAL

## 2022-01-08 ENCOUNTER — LAB VISIT (OUTPATIENT)
Dept: PRIMARY CARE CLINIC | Facility: OTHER | Age: 37
End: 2022-01-08
Attending: INTERNAL MEDICINE

## 2022-01-08 DIAGNOSIS — R50.9 FEVER, UNSPECIFIED FEVER CAUSE: Primary | ICD-10-CM

## 2022-01-08 DIAGNOSIS — R50.9 FEVER, UNSPECIFIED FEVER CAUSE: ICD-10-CM

## 2022-01-08 LAB
CTP QC/QA: YES
SARS-COV-2 AG RESP QL IA.RAPID: POSITIVE

## 2022-01-08 PROCEDURE — 87811 SARS-COV-2 COVID19 W/OPTIC: CPT

## 2022-01-09 ENCOUNTER — PATIENT MESSAGE (OUTPATIENT)
Dept: ADMINISTRATIVE | Facility: OTHER | Age: 37
End: 2022-01-09
Payer: COMMERCIAL

## 2022-01-10 ENCOUNTER — PATIENT MESSAGE (OUTPATIENT)
Dept: PRIMARY CARE CLINIC | Facility: CLINIC | Age: 37
End: 2022-01-10
Payer: COMMERCIAL

## 2022-01-14 ENCOUNTER — LAB VISIT (OUTPATIENT)
Dept: LAB | Facility: HOSPITAL | Age: 37
End: 2022-01-14
Attending: INTERNAL MEDICINE
Payer: COMMERCIAL

## 2022-01-14 DIAGNOSIS — K76.0 FATTY LIVER: ICD-10-CM

## 2022-01-14 DIAGNOSIS — R74.8 ELEVATED LIVER ENZYMES: ICD-10-CM

## 2022-01-14 LAB
ALBUMIN SERPL BCP-MCNC: 3.9 G/DL (ref 3.5–5.2)
ALP SERPL-CCNC: 61 U/L (ref 55–135)
ALT SERPL W/O P-5'-P-CCNC: 46 U/L (ref 10–44)
AST SERPL-CCNC: 27 U/L (ref 10–40)
BILIRUB DIRECT SERPL-MCNC: 0.1 MG/DL (ref 0.1–0.3)
BILIRUB SERPL-MCNC: 0.4 MG/DL (ref 0.1–1)
PROT SERPL-MCNC: 7 G/DL (ref 6–8.4)

## 2022-01-14 PROCEDURE — 36415 COLL VENOUS BLD VENIPUNCTURE: CPT | Performed by: NURSE PRACTITIONER

## 2022-01-14 PROCEDURE — 80076 HEPATIC FUNCTION PANEL: CPT | Performed by: NURSE PRACTITIONER

## 2022-02-09 ENCOUNTER — TELEPHONE (OUTPATIENT)
Dept: OBSTETRICS AND GYNECOLOGY | Facility: CLINIC | Age: 37
End: 2022-02-09
Payer: COMMERCIAL

## 2022-02-10 ENCOUNTER — PATIENT MESSAGE (OUTPATIENT)
Dept: OBSTETRICS AND GYNECOLOGY | Facility: CLINIC | Age: 37
End: 2022-02-10
Payer: COMMERCIAL

## 2022-02-11 ENCOUNTER — TELEPHONE (OUTPATIENT)
Dept: OBSTETRICS AND GYNECOLOGY | Facility: CLINIC | Age: 37
End: 2022-02-11
Payer: COMMERCIAL

## 2022-02-11 DIAGNOSIS — Z34.90 PREGNANCY: Primary | ICD-10-CM

## 2022-02-16 ENCOUNTER — PATIENT MESSAGE (OUTPATIENT)
Dept: OBSTETRICS AND GYNECOLOGY | Facility: CLINIC | Age: 37
End: 2022-02-16
Payer: COMMERCIAL

## 2022-02-16 ENCOUNTER — LAB VISIT (OUTPATIENT)
Dept: LAB | Facility: HOSPITAL | Age: 37
End: 2022-02-16
Attending: OBSTETRICS & GYNECOLOGY
Payer: COMMERCIAL

## 2022-02-16 DIAGNOSIS — Z20.828 PARVOVIRUS EXPOSURE: Primary | ICD-10-CM

## 2022-02-16 DIAGNOSIS — Z20.828 PARVOVIRUS EXPOSURE: ICD-10-CM

## 2022-02-16 PROCEDURE — 86747 PARVOVIRUS ANTIBODY: CPT | Performed by: OBSTETRICS & GYNECOLOGY

## 2022-02-16 PROCEDURE — 36415 COLL VENOUS BLD VENIPUNCTURE: CPT | Performed by: OBSTETRICS & GYNECOLOGY

## 2022-02-18 ENCOUNTER — PATIENT MESSAGE (OUTPATIENT)
Dept: OBSTETRICS AND GYNECOLOGY | Facility: CLINIC | Age: 37
End: 2022-02-18
Payer: COMMERCIAL

## 2022-02-18 LAB
PARVOVIRUS B19 ABS IGG & IGM: ABNORMAL
PARVOVIRUS B19 IGG ANTIBODY: POSITIVE
PARVOVIRUS B19 IGM ANTIBODY: NEGATIVE

## 2022-02-28 ENCOUNTER — PATIENT MESSAGE (OUTPATIENT)
Dept: INTERNAL MEDICINE | Facility: CLINIC | Age: 37
End: 2022-02-28
Payer: COMMERCIAL

## 2022-03-02 ENCOUNTER — PATIENT MESSAGE (OUTPATIENT)
Dept: OBSTETRICS AND GYNECOLOGY | Facility: CLINIC | Age: 37
End: 2022-03-02
Payer: COMMERCIAL

## 2022-03-07 ENCOUNTER — LAB VISIT (OUTPATIENT)
Dept: LAB | Facility: OTHER | Age: 37
End: 2022-03-07
Attending: NURSE PRACTITIONER
Payer: COMMERCIAL

## 2022-03-07 ENCOUNTER — OFFICE VISIT (OUTPATIENT)
Dept: OBSTETRICS AND GYNECOLOGY | Facility: CLINIC | Age: 37
End: 2022-03-07
Payer: COMMERCIAL

## 2022-03-07 ENCOUNTER — PROCEDURE VISIT (OUTPATIENT)
Dept: OBSTETRICS AND GYNECOLOGY | Facility: CLINIC | Age: 37
End: 2022-03-07
Payer: COMMERCIAL

## 2022-03-07 VITALS
WEIGHT: 216.94 LBS | HEIGHT: 64 IN | SYSTOLIC BLOOD PRESSURE: 116 MMHG | BODY MASS INDEX: 37.04 KG/M2 | DIASTOLIC BLOOD PRESSURE: 68 MMHG

## 2022-03-07 DIAGNOSIS — Z34.90 PREGNANCY: ICD-10-CM

## 2022-03-07 DIAGNOSIS — Z11.51 SCREENING FOR HPV (HUMAN PAPILLOMAVIRUS): ICD-10-CM

## 2022-03-07 DIAGNOSIS — Z87.59 HISTORY OF SEVERE PRE-ECLAMPSIA: ICD-10-CM

## 2022-03-07 DIAGNOSIS — R74.01 TRANSAMINITIS: ICD-10-CM

## 2022-03-07 DIAGNOSIS — Z12.4 PAP SMEAR FOR CERVICAL CANCER SCREENING: ICD-10-CM

## 2022-03-07 DIAGNOSIS — Z3A.08 8 WEEKS GESTATION OF PREGNANCY: ICD-10-CM

## 2022-03-07 DIAGNOSIS — Z86.79 HISTORY OF ESSENTIAL HYPERTENSION: ICD-10-CM

## 2022-03-07 DIAGNOSIS — Z3A.08 8 WEEKS GESTATION OF PREGNANCY: Primary | ICD-10-CM

## 2022-03-07 LAB
ABO + RH BLD: NORMAL
ALBUMIN SERPL BCP-MCNC: 4.1 G/DL (ref 3.5–5.2)
ALP SERPL-CCNC: 59 U/L (ref 55–135)
ALT SERPL W/O P-5'-P-CCNC: 62 U/L (ref 10–44)
ANION GAP SERPL CALC-SCNC: 9 MMOL/L (ref 8–16)
AST SERPL-CCNC: 20 U/L (ref 10–40)
BASOPHILS # BLD AUTO: 0.03 K/UL (ref 0–0.2)
BASOPHILS NFR BLD: 0.4 % (ref 0–1.9)
BILIRUB SERPL-MCNC: 0.5 MG/DL (ref 0.1–1)
BLD GP AB SCN CELLS X3 SERPL QL: NORMAL
BUN SERPL-MCNC: 8 MG/DL (ref 6–20)
CALCIUM SERPL-MCNC: 9.5 MG/DL (ref 8.7–10.5)
CHLORIDE SERPL-SCNC: 105 MMOL/L (ref 95–110)
CO2 SERPL-SCNC: 22 MMOL/L (ref 23–29)
CREAT SERPL-MCNC: 0.7 MG/DL (ref 0.5–1.4)
DIFFERENTIAL METHOD: ABNORMAL
EOSINOPHIL # BLD AUTO: 0.1 K/UL (ref 0–0.5)
EOSINOPHIL NFR BLD: 1.3 % (ref 0–8)
ERYTHROCYTE [DISTWIDTH] IN BLOOD BY AUTOMATED COUNT: 14.9 % (ref 11.5–14.5)
EST. GFR  (AFRICAN AMERICAN): >60 ML/MIN/1.73 M^2
EST. GFR  (NON AFRICAN AMERICAN): >60 ML/MIN/1.73 M^2
GLUCOSE SERPL-MCNC: 97 MG/DL (ref 70–110)
HCT VFR BLD AUTO: 40.5 % (ref 37–48.5)
HGB BLD-MCNC: 13.2 G/DL (ref 12–16)
IMM GRANULOCYTES # BLD AUTO: 0.04 K/UL (ref 0–0.04)
IMM GRANULOCYTES NFR BLD AUTO: 0.5 % (ref 0–0.5)
LYMPHOCYTES # BLD AUTO: 1.7 K/UL (ref 1–4.8)
LYMPHOCYTES NFR BLD: 19.9 % (ref 18–48)
MCH RBC QN AUTO: 27.2 PG (ref 27–31)
MCHC RBC AUTO-ENTMCNC: 32.6 G/DL (ref 32–36)
MCV RBC AUTO: 83 FL (ref 82–98)
MONOCYTES # BLD AUTO: 0.3 K/UL (ref 0.3–1)
MONOCYTES NFR BLD: 3.6 % (ref 4–15)
NEUTROPHILS # BLD AUTO: 6.3 K/UL (ref 1.8–7.7)
NEUTROPHILS NFR BLD: 74.3 % (ref 38–73)
NRBC BLD-RTO: 0 /100 WBC
PLATELET # BLD AUTO: 234 K/UL (ref 150–450)
PMV BLD AUTO: 10.5 FL (ref 9.2–12.9)
POTASSIUM SERPL-SCNC: 4 MMOL/L (ref 3.5–5.1)
PROT SERPL-MCNC: 7.2 G/DL (ref 6–8.4)
RBC # BLD AUTO: 4.86 M/UL (ref 4–5.4)
SODIUM SERPL-SCNC: 136 MMOL/L (ref 136–145)
WBC # BLD AUTO: 8.41 K/UL (ref 3.9–12.7)

## 2022-03-07 PROCEDURE — 3074F PR MOST RECENT SYSTOLIC BLOOD PRESSURE < 130 MM HG: ICD-10-PCS | Mod: CPTII,S$GLB,, | Performed by: NURSE PRACTITIONER

## 2022-03-07 PROCEDURE — 80053 COMPREHEN METABOLIC PANEL: CPT | Performed by: NURSE PRACTITIONER

## 2022-03-07 PROCEDURE — 87624 HPV HI-RISK TYP POOLED RSLT: CPT | Performed by: NURSE PRACTITIONER

## 2022-03-07 PROCEDURE — 1159F PR MEDICATION LIST DOCUMENTED IN MEDICAL RECORD: ICD-10-PCS | Mod: CPTII,S$GLB,, | Performed by: NURSE PRACTITIONER

## 2022-03-07 PROCEDURE — 36415 COLL VENOUS BLD VENIPUNCTURE: CPT | Performed by: NURSE PRACTITIONER

## 2022-03-07 PROCEDURE — 99999 PR PBB SHADOW E&M-EST. PATIENT-LVL III: CPT | Mod: PBBFAC,,, | Performed by: NURSE PRACTITIONER

## 2022-03-07 PROCEDURE — 86762 RUBELLA ANTIBODY: CPT | Performed by: NURSE PRACTITIONER

## 2022-03-07 PROCEDURE — 1160F RVW MEDS BY RX/DR IN RCRD: CPT | Mod: CPTII,S$GLB,, | Performed by: NURSE PRACTITIONER

## 2022-03-07 PROCEDURE — 87491 CHLMYD TRACH DNA AMP PROBE: CPT | Performed by: NURSE PRACTITIONER

## 2022-03-07 PROCEDURE — 99214 OFFICE O/P EST MOD 30 MIN: CPT | Mod: S$GLB,,, | Performed by: NURSE PRACTITIONER

## 2022-03-07 PROCEDURE — 86850 RBC ANTIBODY SCREEN: CPT | Performed by: NURSE PRACTITIONER

## 2022-03-07 PROCEDURE — 86592 SYPHILIS TEST NON-TREP QUAL: CPT | Performed by: NURSE PRACTITIONER

## 2022-03-07 PROCEDURE — 1160F PR REVIEW ALL MEDS BY PRESCRIBER/CLIN PHARMACIST DOCUMENTED: ICD-10-PCS | Mod: CPTII,S$GLB,, | Performed by: NURSE PRACTITIONER

## 2022-03-07 PROCEDURE — 3008F PR BODY MASS INDEX (BMI) DOCUMENTED: ICD-10-PCS | Mod: CPTII,S$GLB,, | Performed by: NURSE PRACTITIONER

## 2022-03-07 PROCEDURE — 3074F SYST BP LT 130 MM HG: CPT | Mod: CPTII,S$GLB,, | Performed by: NURSE PRACTITIONER

## 2022-03-07 PROCEDURE — 3078F DIAST BP <80 MM HG: CPT | Mod: CPTII,S$GLB,, | Performed by: NURSE PRACTITIONER

## 2022-03-07 PROCEDURE — 3078F PR MOST RECENT DIASTOLIC BLOOD PRESSURE < 80 MM HG: ICD-10-PCS | Mod: CPTII,S$GLB,, | Performed by: NURSE PRACTITIONER

## 2022-03-07 PROCEDURE — 1159F MED LIST DOCD IN RCRD: CPT | Mod: CPTII,S$GLB,, | Performed by: NURSE PRACTITIONER

## 2022-03-07 PROCEDURE — 99214 PR OFFICE/OUTPT VISIT, EST, LEVL IV, 30-39 MIN: ICD-10-PCS | Mod: S$GLB,,, | Performed by: NURSE PRACTITIONER

## 2022-03-07 PROCEDURE — 87340 HEPATITIS B SURFACE AG IA: CPT | Performed by: NURSE PRACTITIONER

## 2022-03-07 PROCEDURE — 76801 OB US < 14 WKS SINGLE FETUS: CPT | Mod: 26,S$GLB,, | Performed by: OBSTETRICS & GYNECOLOGY

## 2022-03-07 PROCEDURE — 85025 COMPLETE CBC W/AUTO DIFF WBC: CPT | Performed by: NURSE PRACTITIONER

## 2022-03-07 PROCEDURE — 76801 US OB/GYN PROCEDURE (VIEWPOINT): ICD-10-PCS | Mod: 26,S$GLB,, | Performed by: OBSTETRICS & GYNECOLOGY

## 2022-03-07 PROCEDURE — 99999 PR PBB SHADOW E&M-EST. PATIENT-LVL III: ICD-10-PCS | Mod: PBBFAC,,, | Performed by: NURSE PRACTITIONER

## 2022-03-07 PROCEDURE — 87389 HIV-1 AG W/HIV-1&-2 AB AG IA: CPT | Performed by: NURSE PRACTITIONER

## 2022-03-07 PROCEDURE — 87086 URINE CULTURE/COLONY COUNT: CPT | Performed by: NURSE PRACTITIONER

## 2022-03-07 PROCEDURE — 87591 N.GONORRHOEAE DNA AMP PROB: CPT | Performed by: NURSE PRACTITIONER

## 2022-03-07 PROCEDURE — 88175 CYTOPATH C/V AUTO FLUID REDO: CPT | Performed by: NURSE PRACTITIONER

## 2022-03-07 PROCEDURE — 3008F BODY MASS INDEX DOCD: CPT | Mod: CPTII,S$GLB,, | Performed by: NURSE PRACTITIONER

## 2022-03-07 NOTE — PROGRESS NOTES
"  CC: Positive Pregnancy Test    HISTORY OF PRESENT ILLNESS:    Bushra Kahn is a 36 y.o. female, ,  Presents today for a routine exam complaining of amenorrhea and positive home urine pregnancy test.  Patient's last menstrual period was 2022.   She is not currently on any contraception. Reports breast tenderness. Denies vaginal bleeding and pelvic pain.    History of essential HTN, preE, and transaminitis with hepatology mgmt. No reported medical history for FOB. No reported personal/familial history of genetic or chromosomal issues for patient or FOB. No reported abdominal surgeries.         ROS:  GENERAL: No weight changes. No swelling. No fatigue. No fever.  CARDIOVASCULAR: No chest pain. No shortness of breath. No leg cramps.   NEUROLOGICAL: No headaches. No vision changes.  BREASTS: No pain. No lumps. No discharge.  ABDOMEN: No pain. No diarrhea. No constipation.  REPRODUCTIVE: No abnormal bleeding.   VULVA: No pain. No lesions. No itching.  VAGINA: No relaxation. No itching. No odor. No discharge. No lesions.  URINARY: No incontinence. No nocturia. No frequency. No dysuria.    MEDICATIONS AND ALLERGIES:  Reviewed        COMPREHENSIVE GYN HISTORY:  PAP History: Denies abnormal Paps.  Infection History: Denies STDs. Denies PID.  Benign History: Denies uterine fibroids. Denies ovarian cysts. Denies endometriosis. Denies other conditions.  Cancer History: Denies cervical cancer. Denies uterine cancer or hyperplasia. Denies ovarian cancer. Denies vulvar cancer or pre-cancer. Denies vaginal cancer or pre-cancer. Denies breast cancer. Denies colon cancer.  Sexual Activity History: Reports currently being sexually active  Menstrual History: None.  Contraception: None    /68   Ht 5' 4" (1.626 m)   Wt 98.4 kg (216 lb 14.9 oz)   LMP 2022   BMI 37.24 kg/m²     PE:  AFFECT: Calm, alert and oriented X 3. Interactive during exam  GENERAL: Appears well-nourished, well-developed, in no " acute distress.  HEAD: Normocephalic, atruamatic  SKIN: Normal for race, warm, & dry. No lesions or rashes.  ABDOMEN: Soft and nontender without masses or organomegally.  EXTREMITIES: No cyanosis, clubbing or edema. No calf tenderness.  LYMPH NODES: No axillary or inguinal adenopathy.    PROCEDURES:  UPT Positive  Genprobe urine  Pap      ASSESSMENT/PLAN:  Amenorrhea  Positive urine pregnancy test (TED: 10/16/2022, EGA: 8w1d based on LMP)    -  Routine prenatal care    Nausea and vomiting in pregnancy    -  Education regarding lifestyle and dietary modifications    -  Advised use of B6/Unisom. Pt will notify us if no relief/worsening symptoms, will consider Zofran if needed.    History of cHTN, PreE   - Instructed to discuss low doses ASA with hepatology- plan to initiate at 12 weeks GA    Transaminitis   - CMP today, will continue to see hepatology team during pregnancy   - Pending MFM referral      1st TRIMESTER COUNSELING:   Common complaints of pregnancy  HIV and other routine prenatal tests including  genetic screening  Risk factors identified by prenatal history  Oriented to practice - discussed anticipated course of prenatal care & indications for Ultrasound  Childbirth classes/Hospital facilities   Nutrition and weight gain counseling  Toxoplasmosis precautions (Cats/Raw Meat)  Sexual activity and exercise  Environmental/Work hazards  Travel  Tobacco (Ask, Advise, Assess, Assist, and Arrange), as well as alcohol and drug use  Use of any medications (Including supplements, Vitamins, Herbs, or OTC Drugs)  Domestic violence  Seat belt use      TERATOLOGY COUNSELING:   Discussed indications and options for aneuploidy screening - pamphlets given    -  Pt declines testing      Dating US today, initial labs today, anatomy scan ordered.  FOLLOW-UP in 4 weeks with Dr. Tyrese Crews NP    OB/GYN

## 2022-03-08 ENCOUNTER — PATIENT MESSAGE (OUTPATIENT)
Dept: OBSTETRICS AND GYNECOLOGY | Facility: CLINIC | Age: 37
End: 2022-03-08
Payer: COMMERCIAL

## 2022-03-08 ENCOUNTER — PATIENT MESSAGE (OUTPATIENT)
Dept: HEPATOLOGY | Facility: CLINIC | Age: 37
End: 2022-03-08
Payer: COMMERCIAL

## 2022-03-08 LAB
BACTERIA UR CULT: NORMAL
BACTERIA UR CULT: NORMAL
C TRACH DNA SPEC QL NAA+PROBE: NOT DETECTED
N GONORRHOEA DNA SPEC QL NAA+PROBE: NOT DETECTED
RUBV IGG SER-ACNC: 10.2 IU/ML
RUBV IGG SER-IMP: REACTIVE

## 2022-03-10 LAB
HBV SURFACE AG SERPL QL IA: NEGATIVE
HIV 1+2 AB+HIV1 P24 AG SERPL QL IA: NEGATIVE
RPR SER QL: NORMAL

## 2022-03-14 ENCOUNTER — PATIENT MESSAGE (OUTPATIENT)
Dept: OBSTETRICS AND GYNECOLOGY | Facility: CLINIC | Age: 37
End: 2022-03-14
Payer: COMMERCIAL

## 2022-03-15 ENCOUNTER — PATIENT MESSAGE (OUTPATIENT)
Dept: MATERNAL FETAL MEDICINE | Facility: CLINIC | Age: 37
End: 2022-03-15
Payer: COMMERCIAL

## 2022-03-15 ENCOUNTER — PATIENT MESSAGE (OUTPATIENT)
Dept: OBSTETRICS AND GYNECOLOGY | Facility: CLINIC | Age: 37
End: 2022-03-15
Payer: COMMERCIAL

## 2022-03-16 ENCOUNTER — OFFICE VISIT (OUTPATIENT)
Dept: OPTOMETRY | Facility: CLINIC | Age: 37
End: 2022-03-16
Payer: COMMERCIAL

## 2022-03-16 ENCOUNTER — OFFICE VISIT (OUTPATIENT)
Dept: OPTOMETRY | Facility: CLINIC | Age: 37
End: 2022-03-16

## 2022-03-16 DIAGNOSIS — Z97.3 WEARS CONTACT LENSES: ICD-10-CM

## 2022-03-16 DIAGNOSIS — H52.13 MYOPIA OF BOTH EYES: Primary | ICD-10-CM

## 2022-03-16 DIAGNOSIS — Z46.0 FITTING AND ADJUSTMENT OF SPECTACLES AND CONTACT LENSES: Primary | ICD-10-CM

## 2022-03-16 PROCEDURE — 92015 DETERMINE REFRACTIVE STATE: CPT | Mod: S$GLB,,, | Performed by: OPTOMETRIST

## 2022-03-16 PROCEDURE — 92012 PR EYE EXAM, EST PATIENT,INTERMED: ICD-10-PCS | Mod: S$GLB,,, | Performed by: OPTOMETRIST

## 2022-03-16 PROCEDURE — 99999 PR PBB SHADOW E&M-EST. PATIENT-LVL II: CPT | Mod: PBBFAC,,, | Performed by: OPTOMETRIST

## 2022-03-16 PROCEDURE — 92012 INTRM OPH EXAM EST PATIENT: CPT | Mod: S$GLB,,, | Performed by: OPTOMETRIST

## 2022-03-16 PROCEDURE — 92310 CONTACT LENS FITTING OU: CPT | Mod: CSM,,, | Performed by: OPTOMETRIST

## 2022-03-16 PROCEDURE — 99999 PR PBB SHADOW E&M-EST. PATIENT-LVL II: ICD-10-PCS | Mod: PBBFAC,,, | Performed by: OPTOMETRIST

## 2022-03-16 PROCEDURE — 92015 PR REFRACTION: ICD-10-PCS | Mod: S$GLB,,, | Performed by: OPTOMETRIST

## 2022-03-16 PROCEDURE — 92310 PR CONTACT LENS FITTING (NO CHANGE): ICD-10-PCS | Mod: CSM,,, | Performed by: OPTOMETRIST

## 2022-03-16 NOTE — PROGRESS NOTES
HPI     35 Y/o female is here for routine eye exam and CLFU  with no C/o about   ocular health   Pt is pregnant would like to decline dilation   Pt wears contact lens  Pt denies pain and discomfort   No f/f     Eye med: no med     Last edited by Librado Domínguez MA on 3/16/2022  8:06 AM. (History)        ROS     Negative for: Constitutional, Gastrointestinal, Neurological, Skin,   Genitourinary, Musculoskeletal, HENT, Endocrine, Cardiovascular, Eyes,   Respiratory, Psychiatric, Allergic/Imm, Heme/Lymph    Last edited by Mauro Caraballo, OD on 3/16/2022  8:18 AM. (History)        Assessment /Plan     For exam results, see Encounter Report.    Myopia of both eyes    Wears contact lenses      1. Good fit/VA w [facility]--pt happy  2. DFE deferred today due to pregnancy    PLAN:    1. Wrote new spex/CLRx  2. Continue Daily Wear schedule.  NO SLEEPING IN CONTACT LENSES. Clean and disinfect nightly.  exchange monthly.    3. rtc 1 yr

## 2022-03-17 DIAGNOSIS — K76.0 FATTY LIVER: Primary | ICD-10-CM

## 2022-03-17 DIAGNOSIS — K74.01 HEPATIC FIBROSIS, EARLY FIBROSIS: ICD-10-CM

## 2022-03-17 DIAGNOSIS — R74.8 ELEVATED LIVER ENZYMES: ICD-10-CM

## 2022-04-04 ENCOUNTER — PATIENT MESSAGE (OUTPATIENT)
Dept: OBSTETRICS AND GYNECOLOGY | Facility: CLINIC | Age: 37
End: 2022-04-04
Payer: COMMERCIAL

## 2022-04-18 ENCOUNTER — INITIAL PRENATAL (OUTPATIENT)
Dept: OBSTETRICS AND GYNECOLOGY | Facility: CLINIC | Age: 37
End: 2022-04-18
Payer: COMMERCIAL

## 2022-04-18 VITALS
SYSTOLIC BLOOD PRESSURE: 120 MMHG | WEIGHT: 228.19 LBS | BODY MASS INDEX: 39.17 KG/M2 | DIASTOLIC BLOOD PRESSURE: 70 MMHG

## 2022-04-18 DIAGNOSIS — Z87.59 HISTORY OF PRE-ECLAMPSIA: ICD-10-CM

## 2022-04-18 DIAGNOSIS — Z3A.14 14 WEEKS GESTATION OF PREGNANCY: Primary | ICD-10-CM

## 2022-04-18 PROCEDURE — 0502F SUBSEQUENT PRENATAL CARE: CPT | Mod: CPTII,S$GLB,, | Performed by: OBSTETRICS & GYNECOLOGY

## 2022-04-18 PROCEDURE — 99999 PR PBB SHADOW E&M-EST. PATIENT-LVL III: ICD-10-PCS | Mod: PBBFAC,,, | Performed by: OBSTETRICS & GYNECOLOGY

## 2022-04-18 PROCEDURE — 0502F PR SUBSEQUENT PRENATAL CARE: ICD-10-PCS | Mod: CPTII,S$GLB,, | Performed by: OBSTETRICS & GYNECOLOGY

## 2022-04-18 PROCEDURE — 99999 PR PBB SHADOW E&M-EST. PATIENT-LVL III: CPT | Mod: PBBFAC,,, | Performed by: OBSTETRICS & GYNECOLOGY

## 2022-04-18 NOTE — PROGRESS NOTES
HERE for routine OB visit at 14 1/7 wks, with NO complaints.  Denies vaginal bleeding, cramping/ ctx,  History of PRE E-  Starting on ASA 81 mg daily.  Will plan for R CS.  Declines genetic testing> MFM Scan for 19 weeks

## 2022-04-25 ENCOUNTER — PATIENT MESSAGE (OUTPATIENT)
Dept: OBSTETRICS AND GYNECOLOGY | Facility: CLINIC | Age: 37
End: 2022-04-25
Payer: COMMERCIAL

## 2022-04-25 ENCOUNTER — TELEPHONE (OUTPATIENT)
Dept: OBSTETRICS AND GYNECOLOGY | Facility: CLINIC | Age: 37
End: 2022-04-25
Payer: COMMERCIAL

## 2022-04-27 ENCOUNTER — PATIENT MESSAGE (OUTPATIENT)
Dept: OBSTETRICS AND GYNECOLOGY | Facility: CLINIC | Age: 37
End: 2022-04-27
Payer: COMMERCIAL

## 2022-04-29 ENCOUNTER — PATIENT MESSAGE (OUTPATIENT)
Dept: ADMINISTRATIVE | Facility: OTHER | Age: 37
End: 2022-04-29
Payer: COMMERCIAL

## 2022-05-04 ENCOUNTER — PATIENT MESSAGE (OUTPATIENT)
Dept: OBSTETRICS AND GYNECOLOGY | Facility: CLINIC | Age: 37
End: 2022-05-04
Payer: COMMERCIAL

## 2022-05-27 ENCOUNTER — PATIENT MESSAGE (OUTPATIENT)
Dept: MATERNAL FETAL MEDICINE | Facility: CLINIC | Age: 37
End: 2022-05-27
Payer: COMMERCIAL

## 2022-05-30 ENCOUNTER — PROCEDURE VISIT (OUTPATIENT)
Dept: MATERNAL FETAL MEDICINE | Facility: CLINIC | Age: 37
End: 2022-05-30
Payer: COMMERCIAL

## 2022-05-30 ENCOUNTER — PATIENT MESSAGE (OUTPATIENT)
Dept: OBSTETRICS AND GYNECOLOGY | Facility: CLINIC | Age: 37
End: 2022-05-30

## 2022-05-30 ENCOUNTER — ROUTINE PRENATAL (OUTPATIENT)
Dept: OBSTETRICS AND GYNECOLOGY | Facility: CLINIC | Age: 37
End: 2022-05-30
Payer: COMMERCIAL

## 2022-05-30 VITALS
WEIGHT: 234.56 LBS | DIASTOLIC BLOOD PRESSURE: 68 MMHG | SYSTOLIC BLOOD PRESSURE: 120 MMHG | BODY MASS INDEX: 40.26 KG/M2

## 2022-05-30 DIAGNOSIS — Z87.59 HISTORY OF PRE-ECLAMPSIA: ICD-10-CM

## 2022-05-30 DIAGNOSIS — Z3A.20 20 WEEKS GESTATION OF PREGNANCY: Primary | ICD-10-CM

## 2022-05-30 DIAGNOSIS — Z36.2 ENCOUNTER FOR FOLLOW-UP ULTRASOUND OF FETAL ANATOMY: ICD-10-CM

## 2022-05-30 DIAGNOSIS — Z3A.08 8 WEEKS GESTATION OF PREGNANCY: ICD-10-CM

## 2022-05-30 DIAGNOSIS — Z36.89 ENCOUNTER FOR ULTRASOUND TO ASSESS FETAL GROWTH: Primary | ICD-10-CM

## 2022-05-30 DIAGNOSIS — K76.0 FATTY LIVER: ICD-10-CM

## 2022-05-30 PROCEDURE — 0502F PR SUBSEQUENT PRENATAL CARE: ICD-10-PCS | Mod: CPTII,S$GLB,, | Performed by: OBSTETRICS & GYNECOLOGY

## 2022-05-30 PROCEDURE — 99999 PR PBB SHADOW E&M-EST. PATIENT-LVL III: CPT | Mod: PBBFAC,,, | Performed by: OBSTETRICS & GYNECOLOGY

## 2022-05-30 PROCEDURE — 76811 US MFM PROCEDURE (VIEWPOINT): ICD-10-PCS | Mod: S$GLB,,, | Performed by: OBSTETRICS & GYNECOLOGY

## 2022-05-30 PROCEDURE — 99999 PR PBB SHADOW E&M-EST. PATIENT-LVL III: ICD-10-PCS | Mod: PBBFAC,,, | Performed by: OBSTETRICS & GYNECOLOGY

## 2022-05-30 PROCEDURE — 76811 OB US DETAILED SNGL FETUS: CPT | Mod: S$GLB,,, | Performed by: OBSTETRICS & GYNECOLOGY

## 2022-05-30 PROCEDURE — 0502F SUBSEQUENT PRENATAL CARE: CPT | Mod: CPTII,S$GLB,, | Performed by: OBSTETRICS & GYNECOLOGY

## 2022-05-30 RX ORDER — ASPIRIN 81 MG/1
81 TABLET ORAL DAILY
Status: ON HOLD | COMMUNITY
End: 2022-10-11 | Stop reason: HOSPADM

## 2022-05-30 NOTE — PROGRESS NOTES
HERE for routine OB visit at 20 1/7 wks, with NO complaints.  Denies vaginal bleeding, cramping/ ctx, or LOF.  + FM.  Will plan LFTs next visit with DM screen CBC.  F/U in four weeks.

## 2022-06-01 ENCOUNTER — PATIENT MESSAGE (OUTPATIENT)
Dept: OBSTETRICS AND GYNECOLOGY | Facility: CLINIC | Age: 37
End: 2022-06-01
Payer: COMMERCIAL

## 2022-06-02 ENCOUNTER — PATIENT MESSAGE (OUTPATIENT)
Dept: OBSTETRICS AND GYNECOLOGY | Facility: CLINIC | Age: 37
End: 2022-06-02
Payer: COMMERCIAL

## 2022-06-02 NOTE — TELEPHONE ENCOUNTER
Spoke to patient to try and get future appointments scheduled. Was able to get one of the appointments scheduled and pt stated she will call back tomorrow after consulting with her  on times.

## 2022-06-06 ENCOUNTER — PATIENT MESSAGE (OUTPATIENT)
Dept: OBSTETRICS AND GYNECOLOGY | Facility: CLINIC | Age: 37
End: 2022-06-06
Payer: COMMERCIAL

## 2022-06-08 ENCOUNTER — PATIENT MESSAGE (OUTPATIENT)
Dept: OBSTETRICS AND GYNECOLOGY | Facility: CLINIC | Age: 37
End: 2022-06-08
Payer: COMMERCIAL

## 2022-06-08 NOTE — TELEPHONE ENCOUNTER
Patient's prenatal appointments have been scheduled. She will check to see if she can keep 6/22 and let us know.

## 2022-06-16 ENCOUNTER — PATIENT MESSAGE (OUTPATIENT)
Dept: OBSTETRICS AND GYNECOLOGY | Facility: CLINIC | Age: 37
End: 2022-06-16
Payer: COMMERCIAL

## 2022-06-22 ENCOUNTER — ROUTINE PRENATAL (OUTPATIENT)
Dept: OBSTETRICS AND GYNECOLOGY | Facility: CLINIC | Age: 37
End: 2022-06-22
Payer: COMMERCIAL

## 2022-06-22 VITALS
DIASTOLIC BLOOD PRESSURE: 70 MMHG | BODY MASS INDEX: 40.79 KG/M2 | WEIGHT: 237.63 LBS | SYSTOLIC BLOOD PRESSURE: 120 MMHG

## 2022-06-22 DIAGNOSIS — Z3A.23 23 WEEKS GESTATION OF PREGNANCY: Primary | ICD-10-CM

## 2022-06-22 DIAGNOSIS — O34.219 HISTORY OF CESAREAN SECTION COMPLICATING PREGNANCY: ICD-10-CM

## 2022-06-22 PROCEDURE — 0502F PR SUBSEQUENT PRENATAL CARE: ICD-10-PCS | Mod: CPTII,S$GLB,, | Performed by: OBSTETRICS & GYNECOLOGY

## 2022-06-22 PROCEDURE — 0502F SUBSEQUENT PRENATAL CARE: CPT | Mod: CPTII,S$GLB,, | Performed by: OBSTETRICS & GYNECOLOGY

## 2022-06-22 PROCEDURE — 99999 PR PBB SHADOW E&M-EST. PATIENT-LVL III: CPT | Mod: PBBFAC,,, | Performed by: OBSTETRICS & GYNECOLOGY

## 2022-06-22 PROCEDURE — 99999 PR PBB SHADOW E&M-EST. PATIENT-LVL III: ICD-10-PCS | Mod: PBBFAC,,, | Performed by: OBSTETRICS & GYNECOLOGY

## 2022-06-22 NOTE — PROGRESS NOTES
HERE for routine OB visit at 23 3/7 wks, with NO complaints.  Denies vaginal bleeding, cramping/ ctx, or LOF.  + FM.  [facility] BID.  OB screen/  CBC/ LFTs.   PTL precautions.  She wants to plan R CS 39 weeks

## 2022-06-23 ENCOUNTER — PATIENT MESSAGE (OUTPATIENT)
Dept: OBSTETRICS AND GYNECOLOGY | Facility: CLINIC | Age: 37
End: 2022-06-23
Payer: COMMERCIAL

## 2022-06-23 ENCOUNTER — TELEPHONE (OUTPATIENT)
Dept: OBSTETRICS AND GYNECOLOGY | Facility: CLINIC | Age: 37
End: 2022-06-23
Payer: COMMERCIAL

## 2022-06-23 DIAGNOSIS — R39.9 UTI SYMPTOMS: Primary | ICD-10-CM

## 2022-06-24 ENCOUNTER — PATIENT MESSAGE (OUTPATIENT)
Dept: OBSTETRICS AND GYNECOLOGY | Facility: CLINIC | Age: 37
End: 2022-06-24
Payer: COMMERCIAL

## 2022-06-24 ENCOUNTER — LAB VISIT (OUTPATIENT)
Dept: LAB | Facility: HOSPITAL | Age: 37
End: 2022-06-24
Attending: OBSTETRICS & GYNECOLOGY
Payer: COMMERCIAL

## 2022-06-24 ENCOUNTER — PATIENT MESSAGE (OUTPATIENT)
Dept: MATERNAL FETAL MEDICINE | Facility: CLINIC | Age: 37
End: 2022-06-24
Payer: COMMERCIAL

## 2022-06-24 DIAGNOSIS — R39.9 UTI SYMPTOMS: ICD-10-CM

## 2022-06-24 LAB
BACTERIA #/AREA URNS AUTO: NORMAL /HPF
BILIRUB UR QL STRIP: NEGATIVE
CLARITY UR REFRACT.AUTO: ABNORMAL
COLOR UR AUTO: ABNORMAL
GLUCOSE UR QL STRIP: NEGATIVE
HGB UR QL STRIP: NEGATIVE
HYALINE CASTS UR QL AUTO: 1 /LPF
KETONES UR QL STRIP: NEGATIVE
LEUKOCYTE ESTERASE UR QL STRIP: NEGATIVE
MICROSCOPIC COMMENT: NORMAL
NITRITE UR QL STRIP: NEGATIVE
PH UR STRIP: 5 [PH] (ref 5–8)
PROT UR QL STRIP: ABNORMAL
RBC #/AREA URNS AUTO: 2 /HPF (ref 0–4)
SP GR UR STRIP: >1.03 (ref 1–1.03)
URN SPEC COLLECT METH UR: ABNORMAL
WBC #/AREA URNS AUTO: 1 /HPF (ref 0–5)

## 2022-06-24 PROCEDURE — 81001 URINALYSIS AUTO W/SCOPE: CPT | Performed by: OBSTETRICS & GYNECOLOGY

## 2022-06-27 ENCOUNTER — LAB VISIT (OUTPATIENT)
Dept: LAB | Facility: OTHER | Age: 37
End: 2022-06-27
Attending: OBSTETRICS & GYNECOLOGY
Payer: COMMERCIAL

## 2022-06-27 ENCOUNTER — PROCEDURE VISIT (OUTPATIENT)
Dept: MATERNAL FETAL MEDICINE | Facility: CLINIC | Age: 37
End: 2022-06-27
Payer: COMMERCIAL

## 2022-06-27 DIAGNOSIS — Z36.89 ENCOUNTER FOR ULTRASOUND TO ASSESS FETAL GROWTH: Primary | ICD-10-CM

## 2022-06-27 DIAGNOSIS — K76.0 FATTY LIVER: ICD-10-CM

## 2022-06-27 DIAGNOSIS — Z87.59 HISTORY OF PRE-ECLAMPSIA: ICD-10-CM

## 2022-06-27 DIAGNOSIS — Z36.2 ENCOUNTER FOR FOLLOW-UP ULTRASOUND OF FETAL ANATOMY: ICD-10-CM

## 2022-06-27 DIAGNOSIS — Z3A.20 20 WEEKS GESTATION OF PREGNANCY: ICD-10-CM

## 2022-06-27 LAB
ALBUMIN SERPL BCP-MCNC: 2.8 G/DL (ref 3.5–5.2)
ALP SERPL-CCNC: 62 U/L (ref 55–135)
ALT SERPL W/O P-5'-P-CCNC: 28 U/L (ref 10–44)
ANION GAP SERPL CALC-SCNC: 8 MMOL/L (ref 8–16)
AST SERPL-CCNC: 18 U/L (ref 10–40)
BASOPHILS # BLD AUTO: 0.02 K/UL (ref 0–0.2)
BASOPHILS NFR BLD: 0.2 % (ref 0–1.9)
BILIRUB SERPL-MCNC: 0.3 MG/DL (ref 0.1–1)
BUN SERPL-MCNC: 8 MG/DL (ref 6–20)
CALCIUM SERPL-MCNC: 8.6 MG/DL (ref 8.7–10.5)
CHLORIDE SERPL-SCNC: 107 MMOL/L (ref 95–110)
CO2 SERPL-SCNC: 22 MMOL/L (ref 23–29)
CREAT SERPL-MCNC: 0.6 MG/DL (ref 0.5–1.4)
DIFFERENTIAL METHOD: ABNORMAL
EOSINOPHIL # BLD AUTO: 0.1 K/UL (ref 0–0.5)
EOSINOPHIL NFR BLD: 1.4 % (ref 0–8)
ERYTHROCYTE [DISTWIDTH] IN BLOOD BY AUTOMATED COUNT: 14 % (ref 11.5–14.5)
EST. GFR  (AFRICAN AMERICAN): >60 ML/MIN/1.73 M^2
EST. GFR  (NON AFRICAN AMERICAN): >60 ML/MIN/1.73 M^2
GLUCOSE SERPL-MCNC: 100 MG/DL (ref 70–110)
GLUCOSE SERPL-MCNC: 96 MG/DL (ref 70–140)
HCT VFR BLD AUTO: 35.6 % (ref 37–48.5)
HGB BLD-MCNC: 11.7 G/DL (ref 12–16)
IMM GRANULOCYTES # BLD AUTO: 0.05 K/UL (ref 0–0.04)
IMM GRANULOCYTES NFR BLD AUTO: 0.5 % (ref 0–0.5)
LYMPHOCYTES # BLD AUTO: 1.4 K/UL (ref 1–4.8)
LYMPHOCYTES NFR BLD: 14.3 % (ref 18–48)
MCH RBC QN AUTO: 28.8 PG (ref 27–31)
MCHC RBC AUTO-ENTMCNC: 32.9 G/DL (ref 32–36)
MCV RBC AUTO: 88 FL (ref 82–98)
MONOCYTES # BLD AUTO: 0.3 K/UL (ref 0.3–1)
MONOCYTES NFR BLD: 3 % (ref 4–15)
NEUTROPHILS # BLD AUTO: 7.9 K/UL (ref 1.8–7.7)
NEUTROPHILS NFR BLD: 80.6 % (ref 38–73)
NRBC BLD-RTO: 0 /100 WBC
PLATELET # BLD AUTO: 223 K/UL (ref 150–450)
PMV BLD AUTO: 10.3 FL (ref 9.2–12.9)
POTASSIUM SERPL-SCNC: 3.5 MMOL/L (ref 3.5–5.1)
PROT SERPL-MCNC: 6.2 G/DL (ref 6–8.4)
RBC # BLD AUTO: 4.06 M/UL (ref 4–5.4)
SODIUM SERPL-SCNC: 137 MMOL/L (ref 136–145)
WBC # BLD AUTO: 9.86 K/UL (ref 3.9–12.7)

## 2022-06-27 PROCEDURE — 76816 US MFM PROCEDURE (VIEWPOINT): ICD-10-PCS | Mod: S$GLB,,, | Performed by: OBSTETRICS & GYNECOLOGY

## 2022-06-27 PROCEDURE — 80053 COMPREHEN METABOLIC PANEL: CPT | Performed by: OBSTETRICS & GYNECOLOGY

## 2022-06-27 PROCEDURE — 36415 COLL VENOUS BLD VENIPUNCTURE: CPT | Performed by: OBSTETRICS & GYNECOLOGY

## 2022-06-27 PROCEDURE — 82950 GLUCOSE TEST: CPT | Performed by: OBSTETRICS & GYNECOLOGY

## 2022-06-27 PROCEDURE — 85025 COMPLETE CBC W/AUTO DIFF WBC: CPT | Performed by: OBSTETRICS & GYNECOLOGY

## 2022-06-27 PROCEDURE — 76816 OB US FOLLOW-UP PER FETUS: CPT | Mod: S$GLB,,, | Performed by: OBSTETRICS & GYNECOLOGY

## 2022-07-03 ENCOUNTER — PATIENT MESSAGE (OUTPATIENT)
Dept: OBSTETRICS AND GYNECOLOGY | Facility: CLINIC | Age: 37
End: 2022-07-03
Payer: COMMERCIAL

## 2022-07-13 ENCOUNTER — PATIENT MESSAGE (OUTPATIENT)
Dept: OBSTETRICS AND GYNECOLOGY | Facility: CLINIC | Age: 37
End: 2022-07-13
Payer: COMMERCIAL

## 2022-07-26 ENCOUNTER — CLINICAL SUPPORT (OUTPATIENT)
Dept: OBSTETRICS AND GYNECOLOGY | Facility: CLINIC | Age: 37
End: 2022-07-26
Payer: COMMERCIAL

## 2022-07-26 ENCOUNTER — ROUTINE PRENATAL (OUTPATIENT)
Dept: OBSTETRICS AND GYNECOLOGY | Facility: CLINIC | Age: 37
End: 2022-07-26
Payer: COMMERCIAL

## 2022-07-26 ENCOUNTER — LAB VISIT (OUTPATIENT)
Dept: LAB | Facility: HOSPITAL | Age: 37
End: 2022-07-26
Attending: OBSTETRICS & GYNECOLOGY
Payer: COMMERCIAL

## 2022-07-26 ENCOUNTER — PATIENT MESSAGE (OUTPATIENT)
Dept: OBSTETRICS AND GYNECOLOGY | Facility: CLINIC | Age: 37
End: 2022-07-26

## 2022-07-26 VITALS
WEIGHT: 243.63 LBS | SYSTOLIC BLOOD PRESSURE: 138 MMHG | DIASTOLIC BLOOD PRESSURE: 86 MMHG | BODY MASS INDEX: 41.82 KG/M2

## 2022-07-26 DIAGNOSIS — R03.0 ELEVATED BLOOD PRESSURE READING: ICD-10-CM

## 2022-07-26 DIAGNOSIS — Z3A.28 28 WEEKS GESTATION OF PREGNANCY: ICD-10-CM

## 2022-07-26 DIAGNOSIS — Z3A.28 28 WEEKS GESTATION OF PREGNANCY: Primary | ICD-10-CM

## 2022-07-26 LAB
ALBUMIN SERPL BCP-MCNC: 2.9 G/DL (ref 3.5–5.2)
ALP SERPL-CCNC: 62 U/L (ref 55–135)
ALT SERPL W/O P-5'-P-CCNC: 24 U/L (ref 10–44)
ANION GAP SERPL CALC-SCNC: 9 MMOL/L (ref 8–16)
AST SERPL-CCNC: 19 U/L (ref 10–40)
BASOPHILS # BLD AUTO: 0.02 K/UL (ref 0–0.2)
BASOPHILS NFR BLD: 0.2 % (ref 0–1.9)
BILIRUB SERPL-MCNC: 0.3 MG/DL (ref 0.1–1)
BUN SERPL-MCNC: 8 MG/DL (ref 6–20)
CALCIUM SERPL-MCNC: 8.7 MG/DL (ref 8.7–10.5)
CHLORIDE SERPL-SCNC: 106 MMOL/L (ref 95–110)
CO2 SERPL-SCNC: 20 MMOL/L (ref 23–29)
CREAT SERPL-MCNC: 0.6 MG/DL (ref 0.5–1.4)
DIFFERENTIAL METHOD: ABNORMAL
EOSINOPHIL # BLD AUTO: 0.1 K/UL (ref 0–0.5)
EOSINOPHIL NFR BLD: 1 % (ref 0–8)
ERYTHROCYTE [DISTWIDTH] IN BLOOD BY AUTOMATED COUNT: 14.6 % (ref 11.5–14.5)
EST. GFR  (AFRICAN AMERICAN): >60 ML/MIN/1.73 M^2
EST. GFR  (NON AFRICAN AMERICAN): >60 ML/MIN/1.73 M^2
GLUCOSE SERPL-MCNC: 82 MG/DL (ref 70–110)
HCT VFR BLD AUTO: 34 % (ref 37–48.5)
HGB BLD-MCNC: 11 G/DL (ref 12–16)
IMM GRANULOCYTES # BLD AUTO: 0.08 K/UL (ref 0–0.04)
IMM GRANULOCYTES NFR BLD AUTO: 0.6 % (ref 0–0.5)
LDH SERPL L TO P-CCNC: 210 U/L (ref 110–260)
LYMPHOCYTES # BLD AUTO: 1.9 K/UL (ref 1–4.8)
LYMPHOCYTES NFR BLD: 15.4 % (ref 18–48)
MCH RBC QN AUTO: 28.3 PG (ref 27–31)
MCHC RBC AUTO-ENTMCNC: 32.4 G/DL (ref 32–36)
MCV RBC AUTO: 87 FL (ref 82–98)
MONOCYTES # BLD AUTO: 0.7 K/UL (ref 0.3–1)
MONOCYTES NFR BLD: 5.2 % (ref 4–15)
NEUTROPHILS # BLD AUTO: 9.6 K/UL (ref 1.8–7.7)
NEUTROPHILS NFR BLD: 77.6 % (ref 38–73)
NRBC BLD-RTO: 0 /100 WBC
PLATELET # BLD AUTO: 216 K/UL (ref 150–450)
PMV BLD AUTO: 10.6 FL (ref 9.2–12.9)
POTASSIUM SERPL-SCNC: 3.5 MMOL/L (ref 3.5–5.1)
PROT SERPL-MCNC: 6.2 G/DL (ref 6–8.4)
RBC # BLD AUTO: 3.89 M/UL (ref 4–5.4)
SODIUM SERPL-SCNC: 135 MMOL/L (ref 136–145)
URATE SERPL-MCNC: 3.9 MG/DL (ref 2.4–5.7)
WBC # BLD AUTO: 12.43 K/UL (ref 3.9–12.7)

## 2022-07-26 PROCEDURE — 99999 PR PBB SHADOW E&M-EST. PATIENT-LVL III: CPT | Mod: PBBFAC,,, | Performed by: OBSTETRICS & GYNECOLOGY

## 2022-07-26 PROCEDURE — 90471 IMMUNIZATION ADMIN: CPT | Mod: S$GLB,,, | Performed by: OBSTETRICS & GYNECOLOGY

## 2022-07-26 PROCEDURE — 90471 TDAP VACCINE GREATER THAN OR EQUAL TO 7YO IM: ICD-10-PCS | Mod: S$GLB,,, | Performed by: OBSTETRICS & GYNECOLOGY

## 2022-07-26 PROCEDURE — 0502F PR SUBSEQUENT PRENATAL CARE: ICD-10-PCS | Mod: CPTII,S$GLB,, | Performed by: OBSTETRICS & GYNECOLOGY

## 2022-07-26 PROCEDURE — 36415 COLL VENOUS BLD VENIPUNCTURE: CPT | Mod: PN | Performed by: OBSTETRICS & GYNECOLOGY

## 2022-07-26 PROCEDURE — 90715 TDAP VACCINE 7 YRS/> IM: CPT | Mod: S$GLB,,, | Performed by: OBSTETRICS & GYNECOLOGY

## 2022-07-26 PROCEDURE — 0502F SUBSEQUENT PRENATAL CARE: CPT | Mod: CPTII,S$GLB,, | Performed by: OBSTETRICS & GYNECOLOGY

## 2022-07-26 PROCEDURE — 80053 COMPREHEN METABOLIC PANEL: CPT | Performed by: OBSTETRICS & GYNECOLOGY

## 2022-07-26 PROCEDURE — 99999 PR PBB SHADOW E&M-EST. PATIENT-LVL III: ICD-10-PCS | Mod: PBBFAC,,, | Performed by: OBSTETRICS & GYNECOLOGY

## 2022-07-26 PROCEDURE — 99999 PR PBB SHADOW E&M-EST. PATIENT-LVL II: CPT | Mod: PBBFAC,,,

## 2022-07-26 PROCEDURE — 90715 TDAP VACCINE GREATER THAN OR EQUAL TO 7YO IM: ICD-10-PCS | Mod: S$GLB,,, | Performed by: OBSTETRICS & GYNECOLOGY

## 2022-07-26 PROCEDURE — 99999 PR PBB SHADOW E&M-EST. PATIENT-LVL II: ICD-10-PCS | Mod: PBBFAC,,,

## 2022-07-26 PROCEDURE — 84550 ASSAY OF BLOOD/URIC ACID: CPT | Performed by: OBSTETRICS & GYNECOLOGY

## 2022-07-26 PROCEDURE — 83615 LACTATE (LD) (LDH) ENZYME: CPT | Performed by: OBSTETRICS & GYNECOLOGY

## 2022-07-26 PROCEDURE — 85025 COMPLETE CBC W/AUTO DIFF WBC: CPT | Performed by: OBSTETRICS & GYNECOLOGY

## 2022-07-26 NOTE — PROGRESS NOTES
HERE for routine OB visit at 28  2/7 wks, with NO complaints.  Denies vaginal bleeding, cramping/ ctx, or LOF.  + FM.  [facility] BID.  Her BP has increased. Some elevated BP at work but normal today.  PRE E labs today. Check FLTs for fatty liver. PRE E precautions.  DIscussed possible earlier delivery if BP or PRE E become an issue.  F/U in two weeks

## 2022-07-26 NOTE — PROGRESS NOTES
After obtaining consent, and per orders of Dr. CEDILLO, injection of TDAP Lot [id number] 14 APR 2024 given in the RD by DANA DOMINGO. Patient tolerated well and band aid applied. Patient instructed to remain in clinic for 15 minutes afterwards, and to report any adverse reaction to me immediately.

## 2022-08-01 ENCOUNTER — PATIENT MESSAGE (OUTPATIENT)
Dept: OPTOMETRY | Facility: CLINIC | Age: 37
End: 2022-08-01
Payer: COMMERCIAL

## 2022-08-05 ENCOUNTER — PATIENT MESSAGE (OUTPATIENT)
Dept: OBSTETRICS AND GYNECOLOGY | Facility: CLINIC | Age: 37
End: 2022-08-05
Payer: COMMERCIAL

## 2022-08-08 ENCOUNTER — TELEPHONE (OUTPATIENT)
Dept: OBSTETRICS AND GYNECOLOGY | Facility: CLINIC | Age: 37
End: 2022-08-08
Payer: COMMERCIAL

## 2022-08-08 NOTE — TELEPHONE ENCOUNTER
Called patient. No answer. Left voice message for patient to call the office.     System-Generated Message  Denita Xiong MD; EVELYN Kahn submitted an abnormal reading of 149/84 at 8/7/2022  6:19 AM.

## 2022-08-09 ENCOUNTER — ROUTINE PRENATAL (OUTPATIENT)
Dept: OBSTETRICS AND GYNECOLOGY | Facility: CLINIC | Age: 37
End: 2022-08-09
Payer: COMMERCIAL

## 2022-08-09 VITALS
WEIGHT: 241.63 LBS | SYSTOLIC BLOOD PRESSURE: 126 MMHG | BODY MASS INDEX: 41.47 KG/M2 | DIASTOLIC BLOOD PRESSURE: 72 MMHG

## 2022-08-09 DIAGNOSIS — O26.899 HEADACHE IN PREGNANCY, ANTEPARTUM: Primary | ICD-10-CM

## 2022-08-09 DIAGNOSIS — R51.9 HEADACHE IN PREGNANCY, ANTEPARTUM: Primary | ICD-10-CM

## 2022-08-09 PROCEDURE — 0502F SUBSEQUENT PRENATAL CARE: CPT | Mod: CPTII,S$GLB,, | Performed by: OBSTETRICS & GYNECOLOGY

## 2022-08-09 PROCEDURE — 0502F PR SUBSEQUENT PRENATAL CARE: ICD-10-PCS | Mod: CPTII,S$GLB,, | Performed by: OBSTETRICS & GYNECOLOGY

## 2022-08-09 PROCEDURE — 99999 PR PBB SHADOW E&M-EST. PATIENT-LVL III: ICD-10-PCS | Mod: PBBFAC,,, | Performed by: OBSTETRICS & GYNECOLOGY

## 2022-08-09 PROCEDURE — 99999 PR PBB SHADOW E&M-EST. PATIENT-LVL III: CPT | Mod: PBBFAC,,, | Performed by: OBSTETRICS & GYNECOLOGY

## 2022-08-09 PROCEDURE — 82570 ASSAY OF URINE CREATININE: CPT | Performed by: OBSTETRICS & GYNECOLOGY

## 2022-08-09 NOTE — PROGRESS NOTES
HERE for routine OB visit at 30 2/7 wks, with NO complaints.  Denies vaginal bleeding, cramping/ ctx, or LOF.  + FM.  [facility] BID. She has some pain off and on in the lower pelvis but no pain today. She did a great deal of activity .  Some elevated BP.  BP fine today.   She had HA but having congestion .  Recommend for patient to take claritin or zyrtec.  Tylenol, caffeine to help the HA. PRE E labs were negative  Will monitor closely. History of PRE E   F/U as scheduled

## 2022-08-10 LAB
CREAT UR-MCNC: 209 MG/DL (ref 15–325)
PROT UR-MCNC: 17 MG/DL (ref 0–15)
PROT/CREAT UR: 0.08 MG/G{CREAT} (ref 0–0.2)

## 2022-08-19 ENCOUNTER — PATIENT MESSAGE (OUTPATIENT)
Dept: MATERNAL FETAL MEDICINE | Facility: CLINIC | Age: 37
End: 2022-08-19
Payer: COMMERCIAL

## 2022-08-22 ENCOUNTER — PROCEDURE VISIT (OUTPATIENT)
Dept: MATERNAL FETAL MEDICINE | Facility: CLINIC | Age: 37
End: 2022-08-22
Payer: COMMERCIAL

## 2022-08-22 DIAGNOSIS — Z36.89 ENCOUNTER FOR ULTRASOUND TO ASSESS FETAL GROWTH: ICD-10-CM

## 2022-08-22 PROCEDURE — 76816 OB US FOLLOW-UP PER FETUS: CPT | Mod: S$GLB,,, | Performed by: OBSTETRICS & GYNECOLOGY

## 2022-08-22 PROCEDURE — 76816 US MFM PROCEDURE (VIEWPOINT): ICD-10-PCS | Mod: S$GLB,,, | Performed by: OBSTETRICS & GYNECOLOGY

## 2022-08-23 ENCOUNTER — ROUTINE PRENATAL (OUTPATIENT)
Dept: OBSTETRICS AND GYNECOLOGY | Facility: CLINIC | Age: 37
End: 2022-08-23
Payer: COMMERCIAL

## 2022-08-23 VITALS
BODY MASS INDEX: 42.16 KG/M2 | DIASTOLIC BLOOD PRESSURE: 70 MMHG | WEIGHT: 245.56 LBS | SYSTOLIC BLOOD PRESSURE: 114 MMHG

## 2022-08-23 DIAGNOSIS — Z3A.32 32 WEEKS GESTATION OF PREGNANCY: Primary | ICD-10-CM

## 2022-08-23 PROCEDURE — 0502F PR SUBSEQUENT PRENATAL CARE: ICD-10-PCS | Mod: CPTII,S$GLB,, | Performed by: OBSTETRICS & GYNECOLOGY

## 2022-08-23 PROCEDURE — 99999 PR PBB SHADOW E&M-EST. PATIENT-LVL III: ICD-10-PCS | Mod: PBBFAC,,, | Performed by: OBSTETRICS & GYNECOLOGY

## 2022-08-23 PROCEDURE — 0502F SUBSEQUENT PRENATAL CARE: CPT | Mod: CPTII,S$GLB,, | Performed by: OBSTETRICS & GYNECOLOGY

## 2022-08-23 PROCEDURE — 99999 PR PBB SHADOW E&M-EST. PATIENT-LVL III: CPT | Mod: PBBFAC,,, | Performed by: OBSTETRICS & GYNECOLOGY

## 2022-08-29 ENCOUNTER — PATIENT MESSAGE (OUTPATIENT)
Dept: OBSTETRICS AND GYNECOLOGY | Facility: CLINIC | Age: 37
End: 2022-08-29
Payer: COMMERCIAL

## 2022-09-01 ENCOUNTER — PATIENT MESSAGE (OUTPATIENT)
Dept: OBSTETRICS AND GYNECOLOGY | Facility: CLINIC | Age: 37
End: 2022-09-01
Payer: COMMERCIAL

## 2022-09-08 ENCOUNTER — ROUTINE PRENATAL (OUTPATIENT)
Dept: OBSTETRICS AND GYNECOLOGY | Facility: CLINIC | Age: 37
End: 2022-09-08
Payer: COMMERCIAL

## 2022-09-08 VITALS
DIASTOLIC BLOOD PRESSURE: 80 MMHG | BODY MASS INDEX: 42.72 KG/M2 | WEIGHT: 248.88 LBS | SYSTOLIC BLOOD PRESSURE: 118 MMHG

## 2022-09-08 DIAGNOSIS — Z3A.34 34 WEEKS GESTATION OF PREGNANCY: Primary | ICD-10-CM

## 2022-09-08 PROCEDURE — 99999 PR PBB SHADOW E&M-EST. PATIENT-LVL III: CPT | Mod: PBBFAC,,, | Performed by: OBSTETRICS & GYNECOLOGY

## 2022-09-08 PROCEDURE — 0502F SUBSEQUENT PRENATAL CARE: CPT | Mod: CPTII,S$GLB,, | Performed by: OBSTETRICS & GYNECOLOGY

## 2022-09-08 PROCEDURE — 99999 PR PBB SHADOW E&M-EST. PATIENT-LVL III: ICD-10-PCS | Mod: PBBFAC,,, | Performed by: OBSTETRICS & GYNECOLOGY

## 2022-09-08 PROCEDURE — 0502F PR SUBSEQUENT PRENATAL CARE: ICD-10-PCS | Mod: CPTII,S$GLB,, | Performed by: OBSTETRICS & GYNECOLOGY

## 2022-09-08 NOTE — PROGRESS NOTES
HERE for routine OB visit at 34 4/7 wks, with NO complaints.  NO HA blurry vision or epigastric pain  Denies vaginal bleeding, cramping/ ctx, or LOF.  + FM.  [facility] BID.  PTL precautions.   F/U in one week

## 2022-09-13 ENCOUNTER — ROUTINE PRENATAL (OUTPATIENT)
Dept: OBSTETRICS AND GYNECOLOGY | Facility: CLINIC | Age: 37
End: 2022-09-13
Payer: COMMERCIAL

## 2022-09-13 VITALS
DIASTOLIC BLOOD PRESSURE: 69 MMHG | BODY MASS INDEX: 43.14 KG/M2 | WEIGHT: 251.31 LBS | SYSTOLIC BLOOD PRESSURE: 134 MMHG

## 2022-09-13 DIAGNOSIS — Z3A.35 35 WEEKS GESTATION OF PREGNANCY: Primary | ICD-10-CM

## 2022-09-13 PROCEDURE — 99999 PR PBB SHADOW E&M-EST. PATIENT-LVL III: CPT | Mod: PBBFAC,,, | Performed by: OBSTETRICS & GYNECOLOGY

## 2022-09-13 PROCEDURE — 99999 PR PBB SHADOW E&M-EST. PATIENT-LVL III: ICD-10-PCS | Mod: PBBFAC,,, | Performed by: OBSTETRICS & GYNECOLOGY

## 2022-09-13 PROCEDURE — 87081 CULTURE SCREEN ONLY: CPT | Performed by: STUDENT IN AN ORGANIZED HEALTH CARE EDUCATION/TRAINING PROGRAM

## 2022-09-13 PROCEDURE — 0502F SUBSEQUENT PRENATAL CARE: CPT | Mod: CPTII,S$GLB,, | Performed by: OBSTETRICS & GYNECOLOGY

## 2022-09-13 PROCEDURE — 0502F PR SUBSEQUENT PRENATAL CARE: ICD-10-PCS | Mod: CPTII,S$GLB,, | Performed by: OBSTETRICS & GYNECOLOGY

## 2022-09-13 NOTE — PROGRESS NOTES
37 y.o.  at 35w2d here for routine OB visit. Has been feeling queasy since . Had some vomiting but is keeping food/liquids down. She cannot take the unisom/B6 due to fatigue. Feels like she can manage without meds. She checked her BP at home today and was severe range. BP normal here. No headache, vision changes, chest pain, SOB, RUQ pain. Having Alexandria Baires contractions. No  vaginal bleeding, leakage of fluid. Reports normal fetal movement. PreE and labor precautions given. GBS and thirds today. Scheduled for RCS on 10/10. RTC in 1 week.

## 2022-09-14 ENCOUNTER — PATIENT MESSAGE (OUTPATIENT)
Dept: OBSTETRICS AND GYNECOLOGY | Facility: CLINIC | Age: 37
End: 2022-09-14
Payer: COMMERCIAL

## 2022-09-16 LAB — BACTERIA SPEC AEROBE CULT: NORMAL

## 2022-09-19 ENCOUNTER — PATIENT MESSAGE (OUTPATIENT)
Dept: OBSTETRICS AND GYNECOLOGY | Facility: CLINIC | Age: 37
End: 2022-09-19
Payer: COMMERCIAL

## 2022-09-21 ENCOUNTER — LAB VISIT (OUTPATIENT)
Dept: LAB | Facility: HOSPITAL | Age: 37
End: 2022-09-21
Attending: STUDENT IN AN ORGANIZED HEALTH CARE EDUCATION/TRAINING PROGRAM
Payer: COMMERCIAL

## 2022-09-21 DIAGNOSIS — Z3A.35 35 WEEKS GESTATION OF PREGNANCY: ICD-10-CM

## 2022-09-21 LAB
BASOPHILS # BLD AUTO: 0.03 K/UL (ref 0–0.2)
BASOPHILS NFR BLD: 0.3 % (ref 0–1.9)
DIFFERENTIAL METHOD: ABNORMAL
EOSINOPHIL # BLD AUTO: 0.2 K/UL (ref 0–0.5)
EOSINOPHIL NFR BLD: 1.4 % (ref 0–8)
ERYTHROCYTE [DISTWIDTH] IN BLOOD BY AUTOMATED COUNT: 14.8 % (ref 11.5–14.5)
HCT VFR BLD AUTO: 34.8 % (ref 37–48.5)
HGB BLD-MCNC: 10.9 G/DL (ref 12–16)
HIV 1+2 AB+HIV1 P24 AG SERPL QL IA: NORMAL
IMM GRANULOCYTES # BLD AUTO: 0.13 K/UL (ref 0–0.04)
IMM GRANULOCYTES NFR BLD AUTO: 1.1 % (ref 0–0.5)
LYMPHOCYTES # BLD AUTO: 1.7 K/UL (ref 1–4.8)
LYMPHOCYTES NFR BLD: 14.5 % (ref 18–48)
MCH RBC QN AUTO: 27 PG (ref 27–31)
MCHC RBC AUTO-ENTMCNC: 31.3 G/DL (ref 32–36)
MCV RBC AUTO: 86 FL (ref 82–98)
MONOCYTES # BLD AUTO: 0.6 K/UL (ref 0.3–1)
MONOCYTES NFR BLD: 4.8 % (ref 4–15)
NEUTROPHILS # BLD AUTO: 9.2 K/UL (ref 1.8–7.7)
NEUTROPHILS NFR BLD: 77.9 % (ref 38–73)
NRBC BLD-RTO: 0 /100 WBC
PLATELET # BLD AUTO: 238 K/UL (ref 150–450)
PMV BLD AUTO: 10.4 FL (ref 9.2–12.9)
RBC # BLD AUTO: 4.03 M/UL (ref 4–5.4)
RPR SER QL: NORMAL
WBC # BLD AUTO: 11.76 K/UL (ref 3.9–12.7)

## 2022-09-21 PROCEDURE — 86592 SYPHILIS TEST NON-TREP QUAL: CPT | Performed by: STUDENT IN AN ORGANIZED HEALTH CARE EDUCATION/TRAINING PROGRAM

## 2022-09-21 PROCEDURE — 36415 COLL VENOUS BLD VENIPUNCTURE: CPT | Performed by: STUDENT IN AN ORGANIZED HEALTH CARE EDUCATION/TRAINING PROGRAM

## 2022-09-21 PROCEDURE — 87389 HIV-1 AG W/HIV-1&-2 AB AG IA: CPT | Performed by: STUDENT IN AN ORGANIZED HEALTH CARE EDUCATION/TRAINING PROGRAM

## 2022-09-21 PROCEDURE — 85025 COMPLETE CBC W/AUTO DIFF WBC: CPT | Performed by: STUDENT IN AN ORGANIZED HEALTH CARE EDUCATION/TRAINING PROGRAM

## 2022-09-25 NOTE — PROGRESS NOTES
HERE for routine OB visit at 32 2/7 wks, with NO complaints.  Denies vaginal bleeding, cramping/ ctx, or LOF.  + FM.  [facility] BID.  F/U in two weeks

## 2022-09-26 ENCOUNTER — PATIENT MESSAGE (OUTPATIENT)
Dept: ADMINISTRATIVE | Facility: OTHER | Age: 37
End: 2022-09-26
Payer: COMMERCIAL

## 2022-09-27 ENCOUNTER — ROUTINE PRENATAL (OUTPATIENT)
Dept: OBSTETRICS AND GYNECOLOGY | Facility: CLINIC | Age: 37
End: 2022-09-27
Payer: COMMERCIAL

## 2022-09-27 VITALS
WEIGHT: 254.63 LBS | DIASTOLIC BLOOD PRESSURE: 84 MMHG | SYSTOLIC BLOOD PRESSURE: 132 MMHG | BODY MASS INDEX: 43.71 KG/M2

## 2022-09-27 DIAGNOSIS — Z3A.37 37 WEEKS GESTATION OF PREGNANCY: Primary | ICD-10-CM

## 2022-09-27 PROCEDURE — 0502F PR SUBSEQUENT PRENATAL CARE: ICD-10-PCS | Mod: CPTII,S$GLB,, | Performed by: OBSTETRICS & GYNECOLOGY

## 2022-09-27 PROCEDURE — 99999 PR PBB SHADOW E&M-EST. PATIENT-LVL III: CPT | Mod: PBBFAC,,, | Performed by: OBSTETRICS & GYNECOLOGY

## 2022-09-27 PROCEDURE — 99999 PR PBB SHADOW E&M-EST. PATIENT-LVL III: ICD-10-PCS | Mod: PBBFAC,,, | Performed by: OBSTETRICS & GYNECOLOGY

## 2022-09-27 PROCEDURE — 0502F SUBSEQUENT PRENATAL CARE: CPT | Mod: CPTII,S$GLB,, | Performed by: OBSTETRICS & GYNECOLOGY

## 2022-10-03 ENCOUNTER — PATIENT MESSAGE (OUTPATIENT)
Dept: OBSTETRICS AND GYNECOLOGY | Facility: CLINIC | Age: 37
End: 2022-10-03
Payer: COMMERCIAL

## 2022-10-04 ENCOUNTER — ROUTINE PRENATAL (OUTPATIENT)
Dept: OBSTETRICS AND GYNECOLOGY | Facility: CLINIC | Age: 37
End: 2022-10-04
Payer: COMMERCIAL

## 2022-10-04 VITALS — SYSTOLIC BLOOD PRESSURE: 128 MMHG | BODY MASS INDEX: 43.9 KG/M2 | DIASTOLIC BLOOD PRESSURE: 76 MMHG | WEIGHT: 255.75 LBS

## 2022-10-04 DIAGNOSIS — Z3A.38 38 WEEKS GESTATION OF PREGNANCY: Primary | ICD-10-CM

## 2022-10-04 PROCEDURE — 99999 PR PBB SHADOW E&M-EST. PATIENT-LVL III: ICD-10-PCS | Mod: PBBFAC,,, | Performed by: OBSTETRICS & GYNECOLOGY

## 2022-10-04 PROCEDURE — 0502F PR SUBSEQUENT PRENATAL CARE: ICD-10-PCS | Mod: CPTII,S$GLB,, | Performed by: OBSTETRICS & GYNECOLOGY

## 2022-10-04 PROCEDURE — 99999 PR PBB SHADOW E&M-EST. PATIENT-LVL III: CPT | Mod: PBBFAC,,, | Performed by: OBSTETRICS & GYNECOLOGY

## 2022-10-04 PROCEDURE — 0502F SUBSEQUENT PRENATAL CARE: CPT | Mod: CPTII,S$GLB,, | Performed by: OBSTETRICS & GYNECOLOGY

## 2022-10-04 NOTE — PROGRESS NOTES
HISTORY AND PHYSICAL                                                OBSTETRICS          Subjective:       Bushra Kahn is a 37 y.o.  female with IUP at 38w2d weeks gestation who presents for PNC    Patient denies contractions, denies vaginal bleeding, denies LOF.   Fetal Movement: normal.    This IUP is complicated by .  history of CS, History of PRE E, on  ASA 81 mg  history of elevated LFTs with NL LFTs now  , fatty liver,  R CS-  10/10 at  7 am      Review of Systems   ROS:   GENERAL: No fever, chills, fatigability or weight loss.  SKIN: No rashes, itching or changes in color or texture of skin.  HEAD: No headaches or recent head trauma.  EYES: Visual acuity fine. No photophobia, ocular pain or diplopia.  EARS: Denies ear pain, discharge or vertigo.  NOSE: No loss of smell, no epistaxis or postnasal drip.  MOUTH & THROAT: No hoarseness or change in voice. No excessive gum bleeding.  NODES: Denies swollen glands.  CHEST: Denies ROBERTSON, cyanosis, wheezing, cough and sputum production.  CARDIOVASCULAR: Denies chest pain, PND, orthopnea or reduced exercise tolerance.  ABDOMEN: Appetite fine. No weight loss. Denies diarrhea, abdominal pain, hematemesis or blood in stool.  URINARY: No flank pain, dysuria or hematuria.  PERIPHERAL VASCULAR: No claudication or cyanosis.  MUSCULOSKELETAL: No joint stiffness or swelling. Denies back pain.  NEUROLOGIC: No history of seizures, paralysis, alteration of gait or coordination.          PMHx:   Past Medical History:   Diagnosis Date    Acid reflux     Allergy     Pre-eclampsia, severe, postpartum condition 3/29/2021    Severe hypertension affecting pregnancy in third trimester 3/22/2021    Severe pre-eclampsia in third trimester 3/22/2021       PSHx:   Past Surgical History:   Procedure Laterality Date    ADENOIDECTOMY  age 16    biopsy of breast  Right 2014     SECTION N/A 3/24/2021    Procedure:  SECTION;  Surgeon: Denita Xiong,  MD;  Location: [facility] L&D;  Service: OB/GYN;  Laterality: N/A;    CHOLECYSTECTOMY      Laparoscopic    ESOPHAGOGASTRODUODENOSCOPY N/A 2018    Procedure: ESOPHAGOGASTRODUODENOSCOPY (EGD);  Surgeon: Aurelio James MD;  Location: [address]);  Service: Endoscopy;  Laterality: N/A;       All:   Review of patient's allergies indicates:   Allergen Reactions    Benzyl salicylate Rash     acne    Cefdinir Other (See Comments)     Nausea/vomiting    Nifedipine Other (See Comments)     headache    Sudafed [pseudoephedrine hcl] Other (See Comments)     Emotional lability       Meds: (Not in a hospital admission)      SH:   Social History     Socioeconomic History    Marital status:    Tobacco Use    Smoking status: Former     Packs/day: 0.10     Years: 13.00     Pack years: 1.30     Types: Cigarettes     Quit date: 8/15/2018     Years since quittin.1    Smokeless tobacco: Never   Substance and Sexual Activity    Alcohol use: Not Currently     Comment: couple of servings per year, no daily or heavy alcohol use     Drug use: No    Sexual activity: Yes     Partners: Male     Birth control/protection: None     Comment:  2016, together since : Huang     Social Determinants of Health     Financial Resource Strain: Unknown    Difficulty of Paying Living Expenses: Patient refused   Food Insecurity: No Food Insecurity    Worried About Running Out of Food in the Last Year: Never true    Ran Out of Food in the Last Year: Never true   Transportation Needs: No Transportation Needs    Lack of Transportation (Medical): No    Lack of Transportation (Non-Medical): No   Physical Activity: Insufficiently Active    Days of Exercise per Week: 3 days    Minutes of Exercise per Session: 10 min   Stress: No Stress Concern Present    Feeling of Stress : Only a little   Social Connections: Unknown    Frequency of Communication with Friends and Family: More than three times a week     Frequency of Social Gatherings with Friends and Family: Once a week    Active Member of Clubs or Organizations: Yes    Attends Club or Organization Meetings: 1 to 4 times per year    Marital Status:    Housing Stability: Low Risk     Unable to Pay for Housing in the Last Year: No    Number of Places Lived in the Last Year: 1    Unstable Housing in the Last Year: No       FH:   Family History   Problem Relation Age of Onset    Lupus Mother     Kidney disease Mother     Rheum arthritis Mother     Hypertension Father     Sleep apnea Father     Breast cancer Paternal Aunt 50    Cataracts Paternal Grandmother     Colon cancer Paternal Grandmother     Cataracts Paternal Grandfather     Cancer Paternal Grandfather         Lung cancer- smoker    Amblyopia Neg Hx     Blindness Neg Hx     Glaucoma Neg Hx     Macular degeneration Neg Hx     Retinal detachment Neg Hx     Strabismus Neg Hx     Stroke Neg Hx     Ovarian cancer Neg Hx     Diabetes Neg Hx     Melanoma Neg Hx     Psoriasis Neg Hx     Asthma Neg Hx     Allergies Neg Hx     Allergic rhinitis Neg Hx     Angioedema Neg Hx     Eczema Neg Hx     Immunodeficiency Neg Hx     Rhinitis Neg Hx     Urticaria Neg Hx     Atopy Neg Hx        OBHx:   OB History    Para Term  AB Living   2 1 1 0 0 1   SAB IAB Ectopic Multiple Live Births   0 0 0 0 1      # Outcome Date GA Lbr Jonathan/2nd Weight Sex Delivery Anes PTL Lv   2 Current            1 Term 21 37w4d  3.28 kg (7 lb 3.7 oz) M CS-LTranv EPI N MIC      Name: MARYANN VILLALOBOS      Apgar1: 8  Apgar5: 9       Objective:       /76   Wt 116 kg (255 lb 11.7 oz)   LMP 2022   BMI 43.90 kg/m²     Vitals:    10/04/22 1549   BP: 128/76   Weight: 116 kg (255 lb 11.7 oz)       General:   alert, appears stated age and cooperative, no apparent distress   HENT:  normocephalic, atraumatic   Eyes:  extraocular movements and conjunctivae normal   Neck:  supple, range  of motion normal, no thyromegaly   Lungs:   no respiratory distress   Heart:   regular rate   Abdomen:  soft, non-tender, non-distended but gravid, no rebound or guarding    Extremities negative edema, negative erythema   FHT: `140sR                 TOCO: occ   Presentations: cephalic by ultrasound   Cervix:     Dilation: Closed    Effacement: 50%    Station:  -3    Consistency: medium    Position: middle   Sterile Speculum Exam:     EFW by Leopold's:  7 lbs 12 oz    Recent Growth Scan:     Lab Review  Blood Type A POS  GBBS: negative  Rubella: Immune  RPR: NR  HIV: negative  HepB: negative       Assessment:       38w2d weeks gestation with  History of PRE E scheduled for repeat CS    There are no hospital problems to display for this patient.         Plan:      Risks, benefits, alternatives and possible complications have been discussed in detail with the patient.   - Consents signed and to chart  - Admit to Labor and Delivery unit  -    - Epidural per Anesthesia  - Draw CBC, T&S  - Notify Staff    Plan for R CS      Post-Partum Hemorrhage risk - medium

## 2022-10-04 NOTE — H&P (VIEW-ONLY)
HISTORY AND PHYSICAL                                                OBSTETRICS          Subjective:       Bushra Kahn is a 37 y.o.  female with IUP at 38w2d weeks gestation who presents for PNC    Patient denies contractions, denies vaginal bleeding, denies LOF.   Fetal Movement: normal.    This IUP is complicated by .  history of CS, History of PRE E, on  ASA 81 mg  history of elevated LFTs with NL LFTs now  , fatty liver,  R CS-  10/10 at  7 am      Review of Systems   ROS:   GENERAL: No fever, chills, fatigability or weight loss.  SKIN: No rashes, itching or changes in color or texture of skin.  HEAD: No headaches or recent head trauma.  EYES: Visual acuity fine. No photophobia, ocular pain or diplopia.  EARS: Denies ear pain, discharge or vertigo.  NOSE: No loss of smell, no epistaxis or postnasal drip.  MOUTH & THROAT: No hoarseness or change in voice. No excessive gum bleeding.  NODES: Denies swollen glands.  CHEST: Denies ROBERTSON, cyanosis, wheezing, cough and sputum production.  CARDIOVASCULAR: Denies chest pain, PND, orthopnea or reduced exercise tolerance.  ABDOMEN: Appetite fine. No weight loss. Denies diarrhea, abdominal pain, hematemesis or blood in stool.  URINARY: No flank pain, dysuria or hematuria.  PERIPHERAL VASCULAR: No claudication or cyanosis.  MUSCULOSKELETAL: No joint stiffness or swelling. Denies back pain.  NEUROLOGIC: No history of seizures, paralysis, alteration of gait or coordination.          PMHx:   Past Medical History:   Diagnosis Date    Acid reflux     Allergy     Pre-eclampsia, severe, postpartum condition 3/29/2021    Severe hypertension affecting pregnancy in third trimester 3/22/2021    Severe pre-eclampsia in third trimester 3/22/2021       PSHx:   Past Surgical History:   Procedure Laterality Date    ADENOIDECTOMY  age 16    biopsy of breast  Right 2014     SECTION N/A 3/24/2021    Procedure:  SECTION;  Surgeon: Denita Xiong,  MD;  Location: [facility] L&D;  Service: OB/GYN;  Laterality: N/A;    CHOLECYSTECTOMY      Laparoscopic    ESOPHAGOGASTRODUODENOSCOPY N/A 2018    Procedure: ESOPHAGOGASTRODUODENOSCOPY (EGD);  Surgeon: Aurelio James MD;  Location: [address]);  Service: Endoscopy;  Laterality: N/A;       All:   Review of patient's allergies indicates:   Allergen Reactions    Benzyl salicylate Rash     acne    Cefdinir Other (See Comments)     Nausea/vomiting    Nifedipine Other (See Comments)     headache    Sudafed [pseudoephedrine hcl] Other (See Comments)     Emotional lability       Meds: (Not in a hospital admission)      SH:   Social History     Socioeconomic History    Marital status:    Tobacco Use    Smoking status: Former     Packs/day: 0.10     Years: 13.00     Pack years: 1.30     Types: Cigarettes     Quit date: 8/15/2018     Years since quittin.1    Smokeless tobacco: Never   Substance and Sexual Activity    Alcohol use: Not Currently     Comment: couple of servings per year, no daily or heavy alcohol use     Drug use: No    Sexual activity: Yes     Partners: Male     Birth control/protection: None     Comment:  2016, together since : Huang     Social Determinants of Health     Financial Resource Strain: Unknown    Difficulty of Paying Living Expenses: Patient refused   Food Insecurity: No Food Insecurity    Worried About Running Out of Food in the Last Year: Never true    Ran Out of Food in the Last Year: Never true   Transportation Needs: No Transportation Needs    Lack of Transportation (Medical): No    Lack of Transportation (Non-Medical): No   Physical Activity: Insufficiently Active    Days of Exercise per Week: 3 days    Minutes of Exercise per Session: 10 min   Stress: No Stress Concern Present    Feeling of Stress : Only a little   Social Connections: Unknown    Frequency of Communication with Friends and Family: More than three times a week     Frequency of Social Gatherings with Friends and Family: Once a week    Active Member of Clubs or Organizations: Yes    Attends Club or Organization Meetings: 1 to 4 times per year    Marital Status:    Housing Stability: Low Risk     Unable to Pay for Housing in the Last Year: No    Number of Places Lived in the Last Year: 1    Unstable Housing in the Last Year: No       FH:   Family History   Problem Relation Age of Onset    Lupus Mother     Kidney disease Mother     Rheum arthritis Mother     Hypertension Father     Sleep apnea Father     Breast cancer Paternal Aunt 50    Cataracts Paternal Grandmother     Colon cancer Paternal Grandmother     Cataracts Paternal Grandfather     Cancer Paternal Grandfather         Lung cancer- smoker    Amblyopia Neg Hx     Blindness Neg Hx     Glaucoma Neg Hx     Macular degeneration Neg Hx     Retinal detachment Neg Hx     Strabismus Neg Hx     Stroke Neg Hx     Ovarian cancer Neg Hx     Diabetes Neg Hx     Melanoma Neg Hx     Psoriasis Neg Hx     Asthma Neg Hx     Allergies Neg Hx     Allergic rhinitis Neg Hx     Angioedema Neg Hx     Eczema Neg Hx     Immunodeficiency Neg Hx     Rhinitis Neg Hx     Urticaria Neg Hx     Atopy Neg Hx        OBHx:   OB History    Para Term  AB Living   2 1 1 0 0 1   SAB IAB Ectopic Multiple Live Births   0 0 0 0 1      # Outcome Date GA Lbr Jonathan/2nd Weight Sex Delivery Anes PTL Lv   2 Current            1 Term 21 37w4d  3.28 kg (7 lb 3.7 oz) M CS-LTranv EPI N MIC      Name: MARYANN VILLALOBOS      Apgar1: 8  Apgar5: 9       Objective:       /76   Wt 116 kg (255 lb 11.7 oz)   LMP 2022   BMI 43.90 kg/m²     Vitals:    10/04/22 1549   BP: 128/76   Weight: 116 kg (255 lb 11.7 oz)       General:   alert, appears stated age and cooperative, no apparent distress   HENT:  normocephalic, atraumatic   Eyes:  extraocular movements and conjunctivae normal   Neck:  supple, range  of motion normal, no thyromegaly   Lungs:   no respiratory distress   Heart:   regular rate   Abdomen:  soft, non-tender, non-distended but gravid, no rebound or guarding    Extremities negative edema, negative erythema   FHT: `140sR                 TOCO: occ   Presentations: cephalic by ultrasound   Cervix:     Dilation: Closed    Effacement: 50%    Station:  -3    Consistency: medium    Position: middle   Sterile Speculum Exam:     EFW by Leopold's:  7 lbs 12 oz    Recent Growth Scan:     Lab Review  Blood Type A POS  GBBS: negative  Rubella: Immune  RPR: NR  HIV: negative  HepB: negative       Assessment:       38w2d weeks gestation with  History of PRE E scheduled for repeat CS    There are no hospital problems to display for this patient.         Plan:      Risks, benefits, alternatives and possible complications have been discussed in detail with the patient.   - Consents signed and to chart  - Admit to Labor and Delivery unit  -    - Epidural per Anesthesia  - Draw CBC, T&S  - Notify Staff    Plan for R CS      Post-Partum Hemorrhage risk - medium

## 2022-10-06 ENCOUNTER — PATIENT MESSAGE (OUTPATIENT)
Dept: OBSTETRICS AND GYNECOLOGY | Facility: OTHER | Age: 37
End: 2022-10-06
Payer: COMMERCIAL

## 2022-10-10 ENCOUNTER — HOSPITAL ENCOUNTER (INPATIENT)
Facility: OTHER | Age: 37
LOS: 2 days | Discharge: HOME | End: 2022-10-12
Attending: OBSTETRICS & GYNECOLOGY | Admitting: STUDENT IN AN ORGANIZED HEALTH CARE EDUCATION/TRAINING PROGRAM
Payer: COMMERCIAL

## 2022-10-10 ENCOUNTER — ANESTHESIA EVENT (OUTPATIENT)
Dept: OBSTETRICS AND GYNECOLOGY | Facility: OTHER | Age: 37
End: 2022-10-10
Payer: COMMERCIAL

## 2022-10-10 ENCOUNTER — ANESTHESIA (OUTPATIENT)
Dept: OBSTETRICS AND GYNECOLOGY | Facility: OTHER | Age: 37
End: 2022-10-10
Payer: COMMERCIAL

## 2022-10-10 DIAGNOSIS — Z98.891 HISTORY OF C-SECTION: Primary | ICD-10-CM

## 2022-10-10 LAB
ABO + RH BLD: NORMAL
BASOPHILS # BLD AUTO: 0.03 K/UL (ref 0–0.2)
BASOPHILS NFR BLD: 0.3 % (ref 0–1.9)
BLD GP AB SCN CELLS X3 SERPL QL: NORMAL
DIFFERENTIAL METHOD: ABNORMAL
EOSINOPHIL # BLD AUTO: 0.2 K/UL (ref 0–0.5)
EOSINOPHIL NFR BLD: 1.4 % (ref 0–8)
ERYTHROCYTE [DISTWIDTH] IN BLOOD BY AUTOMATED COUNT: 15.1 % (ref 11.5–14.5)
HCT VFR BLD AUTO: 34 % (ref 37–48.5)
HGB BLD-MCNC: 11.1 G/DL (ref 12–16)
IMM GRANULOCYTES # BLD AUTO: 0.09 K/UL (ref 0–0.04)
IMM GRANULOCYTES NFR BLD AUTO: 0.8 % (ref 0–0.5)
LYMPHOCYTES # BLD AUTO: 1.6 K/UL (ref 1–4.8)
LYMPHOCYTES NFR BLD: 14.1 % (ref 18–48)
MCH RBC QN AUTO: 26.9 PG (ref 27–31)
MCHC RBC AUTO-ENTMCNC: 32.6 G/DL (ref 32–36)
MCV RBC AUTO: 83 FL (ref 82–98)
MONOCYTES # BLD AUTO: 0.6 K/UL (ref 0.3–1)
MONOCYTES NFR BLD: 5 % (ref 4–15)
NEUTROPHILS # BLD AUTO: 8.8 K/UL (ref 1.8–7.7)
NEUTROPHILS NFR BLD: 78.4 % (ref 38–73)
NRBC BLD-RTO: 0 /100 WBC
PLATELET # BLD AUTO: 216 K/UL (ref 150–450)
PMV BLD AUTO: 10 FL (ref 9.2–12.9)
RBC # BLD AUTO: 4.12 M/UL (ref 4–5.4)
WBC # BLD AUTO: 11.26 K/UL (ref 3.9–12.7)

## 2022-10-10 PROCEDURE — 63600175 PHARM REV CODE 636 W HCPCS: Performed by: STUDENT IN AN ORGANIZED HEALTH CARE EDUCATION/TRAINING PROGRAM

## 2022-10-10 PROCEDURE — 25000003 PHARM REV CODE 250: Performed by: STUDENT IN AN ORGANIZED HEALTH CARE EDUCATION/TRAINING PROGRAM

## 2022-10-10 PROCEDURE — 59510 PR FULL ROUT OBSTE CARE,CESAREAN DELIV: ICD-10-PCS | Mod: GB,,, | Performed by: OBSTETRICS & GYNECOLOGY

## 2022-10-10 PROCEDURE — 36004725 HC OB OR TIME LEV III - EA ADD 15 MIN: Performed by: OBSTETRICS & GYNECOLOGY

## 2022-10-10 PROCEDURE — 25000003 PHARM REV CODE 250

## 2022-10-10 PROCEDURE — 59510 PRA FULL ROUT OBSTE CARE,CESAREAN DELIV: ICD-10-PCS | Mod: QY,,, | Performed by: ANESTHESIOLOGY

## 2022-10-10 PROCEDURE — 63600175 PHARM REV CODE 636 W HCPCS

## 2022-10-10 PROCEDURE — 86901 BLOOD TYPING SEROLOGIC RH(D): CPT | Performed by: OBSTETRICS & GYNECOLOGY

## 2022-10-10 PROCEDURE — 37000009 HC ANESTHESIA EA ADD 15 MINS: Performed by: OBSTETRICS & GYNECOLOGY

## 2022-10-10 PROCEDURE — 85025 COMPLETE CBC W/AUTO DIFF WBC: CPT | Performed by: OBSTETRICS & GYNECOLOGY

## 2022-10-10 PROCEDURE — C1751 CATH, INF, PER/CENT/MIDLINE: HCPCS | Performed by: ANESTHESIOLOGY

## 2022-10-10 PROCEDURE — 71000033 HC RECOVERY, INTIAL HOUR: Performed by: OBSTETRICS & GYNECOLOGY

## 2022-10-10 PROCEDURE — 36004724 HC OB OR TIME LEV III - 1ST 15 MIN: Performed by: OBSTETRICS & GYNECOLOGY

## 2022-10-10 PROCEDURE — 71000039 HC RECOVERY, EACH ADD'L HOUR: Performed by: OBSTETRICS & GYNECOLOGY

## 2022-10-10 PROCEDURE — 59510 CESAREAN DELIVERY: CPT | Mod: QY,,, | Performed by: ANESTHESIOLOGY

## 2022-10-10 PROCEDURE — 37000008 HC ANESTHESIA 1ST 15 MINUTES: Performed by: OBSTETRICS & GYNECOLOGY

## 2022-10-10 PROCEDURE — 51702 INSERT TEMP BLADDER CATH: CPT

## 2022-10-10 PROCEDURE — 11000001 HC ACUTE MED/SURG PRIVATE ROOM

## 2022-10-10 PROCEDURE — 59510 CESAREAN DELIVERY: CPT | Mod: GB,,, | Performed by: OBSTETRICS & GYNECOLOGY

## 2022-10-10 RX ORDER — MISOPROSTOL 200 UG/1
800 TABLET ORAL ONCE AS NEEDED
Status: DISCONTINUED | OUTPATIENT
Start: 2022-10-10 | End: 2022-10-12 | Stop reason: HOSPADM

## 2022-10-10 RX ORDER — FENTANYL CITRATE 50 UG/ML
INJECTION, SOLUTION INTRAMUSCULAR; INTRAVENOUS
Status: DISCONTINUED | OUTPATIENT
Start: 2022-10-10 | End: 2022-10-10

## 2022-10-10 RX ORDER — DEXMEDETOMIDINE HYDROCHLORIDE 100 UG/ML
INJECTION, SOLUTION INTRAVENOUS
Status: DISCONTINUED | OUTPATIENT
Start: 2022-10-10 | End: 2022-10-10

## 2022-10-10 RX ORDER — OXYTOCIN/RINGER'S LACTATE 30/500 ML
95 PLASTIC BAG, INJECTION (ML) INTRAVENOUS CONTINUOUS
Status: DISCONTINUED | OUTPATIENT
Start: 2022-10-10 | End: 2022-10-12 | Stop reason: HOSPADM

## 2022-10-10 RX ORDER — OXYCODONE HYDROCHLORIDE 5 MG/1
10 TABLET ORAL EVERY 4 HOURS PRN
Status: DISCONTINUED | OUTPATIENT
Start: 2022-10-10 | End: 2022-10-12 | Stop reason: HOSPADM

## 2022-10-10 RX ORDER — ONDANSETRON HYDROCHLORIDE 2 MG/ML
INJECTION, SOLUTION INTRAMUSCULAR; INTRAVENOUS
Status: DISCONTINUED | OUTPATIENT
Start: 2022-10-10 | End: 2022-10-10

## 2022-10-10 RX ORDER — ONDANSETRON 2 MG/ML
4 INJECTION INTRAMUSCULAR; INTRAVENOUS EVERY 6 HOURS PRN
Status: DISCONTINUED | OUTPATIENT
Start: 2022-10-10 | End: 2022-10-12 | Stop reason: HOSPADM

## 2022-10-10 RX ORDER — MORPHINE SULFATE 0.5 MG/ML
INJECTION, SOLUTION EPIDURAL; INTRATHECAL; INTRAVENOUS
Status: DISCONTINUED | OUTPATIENT
Start: 2022-10-10 | End: 2022-10-10

## 2022-10-10 RX ORDER — OXYCODONE HYDROCHLORIDE 5 MG/1
5 TABLET ORAL EVERY 4 HOURS PRN
Status: DISCONTINUED | OUTPATIENT
Start: 2022-10-10 | End: 2022-10-12 | Stop reason: HOSPADM

## 2022-10-10 RX ORDER — PHENYLEPHRINE HYDROCHLORIDE 10 MG/ML
INJECTION INTRAVENOUS CONTINUOUS PRN
Status: DISCONTINUED | OUTPATIENT
Start: 2022-10-10 | End: 2022-10-10

## 2022-10-10 RX ORDER — DIPHENHYDRAMINE HYDROCHLORIDE 50 MG/ML
12.5 INJECTION INTRAMUSCULAR; INTRAVENOUS
Status: DISCONTINUED | OUTPATIENT
Start: 2022-10-10 | End: 2022-10-12 | Stop reason: HOSPADM

## 2022-10-10 RX ORDER — SODIUM CHLORIDE, SODIUM LACTATE, POTASSIUM CHLORIDE, CALCIUM CHLORIDE 600; 310; 30; 20 MG/100ML; MG/100ML; MG/100ML; MG/100ML
INJECTION, SOLUTION INTRAVENOUS CONTINUOUS
Status: DISCONTINUED | OUTPATIENT
Start: 2022-10-10 | End: 2022-10-12 | Stop reason: HOSPADM

## 2022-10-10 RX ORDER — PROMETHAZINE HYDROCHLORIDE 25 MG/ML
INJECTION, SOLUTION INTRAMUSCULAR; INTRAVENOUS
Status: DISCONTINUED | OUTPATIENT
Start: 2022-10-10 | End: 2022-10-10

## 2022-10-10 RX ORDER — HYDROCORTISONE 25 MG/G
CREAM TOPICAL 3 TIMES DAILY PRN
Status: DISCONTINUED | OUTPATIENT
Start: 2022-10-10 | End: 2022-10-12 | Stop reason: HOSPADM

## 2022-10-10 RX ORDER — ACETAMINOPHEN 500 MG
1000 TABLET ORAL
Status: COMPLETED | OUTPATIENT
Start: 2022-10-10 | End: 2022-10-10

## 2022-10-10 RX ORDER — FAMOTIDINE 10 MG/ML
20 INJECTION INTRAVENOUS
Status: COMPLETED | OUTPATIENT
Start: 2022-10-10 | End: 2022-10-10

## 2022-10-10 RX ORDER — KETOROLAC TROMETHAMINE 30 MG/ML
30 INJECTION, SOLUTION INTRAMUSCULAR; INTRAVENOUS
Status: COMPLETED | OUTPATIENT
Start: 2022-10-10 | End: 2022-10-11

## 2022-10-10 RX ORDER — IBUPROFEN 400 MG/1
800 TABLET ORAL
Status: DISCONTINUED | OUTPATIENT
Start: 2022-10-11 | End: 2022-10-12 | Stop reason: HOSPADM

## 2022-10-10 RX ORDER — SIMETHICONE 80 MG
1 TABLET,CHEWABLE ORAL EVERY 6 HOURS PRN
Status: DISCONTINUED | OUTPATIENT
Start: 2022-10-10 | End: 2022-10-12 | Stop reason: HOSPADM

## 2022-10-10 RX ORDER — CEFAZOLIN SODIUM 1 G/3ML
INJECTION, POWDER, FOR SOLUTION INTRAMUSCULAR; INTRAVENOUS
Status: DISCONTINUED | OUTPATIENT
Start: 2022-10-10 | End: 2022-10-10

## 2022-10-10 RX ORDER — NALBUPHINE HYDROCHLORIDE 20 MG/ML
5 INJECTION, SOLUTION INTRAMUSCULAR; INTRAVENOUS; SUBCUTANEOUS ONCE AS NEEDED
Status: DISCONTINUED | OUTPATIENT
Start: 2022-10-10 | End: 2022-10-12 | Stop reason: HOSPADM

## 2022-10-10 RX ORDER — PHENYLEPHRINE HCL IN 0.9% NACL 1 MG/10 ML
SYRINGE (ML) INTRAVENOUS
Status: DISCONTINUED | OUTPATIENT
Start: 2022-10-10 | End: 2022-10-10

## 2022-10-10 RX ORDER — METHYLERGONOVINE MALEATE 0.2 MG/ML
200 INJECTION INTRAVENOUS ONCE AS NEEDED
Status: DISCONTINUED | OUTPATIENT
Start: 2022-10-10 | End: 2022-10-12 | Stop reason: HOSPADM

## 2022-10-10 RX ORDER — PROCHLORPERAZINE EDISYLATE 5 MG/ML
5 INJECTION INTRAMUSCULAR; INTRAVENOUS EVERY 6 HOURS PRN
Status: DISCONTINUED | OUTPATIENT
Start: 2022-10-10 | End: 2022-10-12 | Stop reason: HOSPADM

## 2022-10-10 RX ORDER — BUPIVACAINE HYDROCHLORIDE 7.5 MG/ML
INJECTION, SOLUTION INTRASPINAL
Status: DISCONTINUED | OUTPATIENT
Start: 2022-10-10 | End: 2022-10-10

## 2022-10-10 RX ORDER — DIPHENHYDRAMINE HCL 25 MG
25 CAPSULE ORAL EVERY 6 HOURS PRN
Status: DISCONTINUED | OUTPATIENT
Start: 2022-10-10 | End: 2022-10-12 | Stop reason: HOSPADM

## 2022-10-10 RX ORDER — ACETAMINOPHEN 325 MG/1
650 TABLET ORAL
Status: DISCONTINUED | OUTPATIENT
Start: 2022-10-10 | End: 2022-10-12 | Stop reason: HOSPADM

## 2022-10-10 RX ORDER — AMOXICILLIN 250 MG
1 CAPSULE ORAL NIGHTLY PRN
Status: DISCONTINUED | OUTPATIENT
Start: 2022-10-10 | End: 2022-10-12 | Stop reason: HOSPADM

## 2022-10-10 RX ORDER — CEFAZOLIN SODIUM 2 G/50ML
2 SOLUTION INTRAVENOUS
Status: DISCONTINUED | OUTPATIENT
Start: 2022-10-10 | End: 2022-10-10

## 2022-10-10 RX ORDER — DEXAMETHASONE SODIUM PHOSPHATE 4 MG/ML
INJECTION, SOLUTION INTRA-ARTICULAR; INTRALESIONAL; INTRAMUSCULAR; INTRAVENOUS; SOFT TISSUE
Status: DISCONTINUED | OUTPATIENT
Start: 2022-10-10 | End: 2022-10-10

## 2022-10-10 RX ORDER — TRANEXAMIC ACID 100 MG/ML
1000 INJECTION, SOLUTION INTRAVENOUS ONCE AS NEEDED
Status: DISCONTINUED | OUTPATIENT
Start: 2022-10-10 | End: 2022-10-12 | Stop reason: HOSPADM

## 2022-10-10 RX ORDER — PRENATAL WITH FERROUS FUM AND FOLIC ACID 3080; 920; 120; 400; 22; 1.84; 3; 20; 10; 1; 12; 200; 27; 25; 2 [IU]/1; [IU]/1; MG/1; [IU]/1; MG/1; MG/1; MG/1; MG/1; MG/1; MG/1; UG/1; MG/1; MG/1; MG/1; MG/1
1 TABLET ORAL DAILY
Status: DISCONTINUED | OUTPATIENT
Start: 2022-10-10 | End: 2022-10-12 | Stop reason: HOSPADM

## 2022-10-10 RX ORDER — DOCUSATE SODIUM 100 MG/1
200 CAPSULE, LIQUID FILLED ORAL 2 TIMES DAILY
Status: DISCONTINUED | OUTPATIENT
Start: 2022-10-10 | End: 2022-10-12 | Stop reason: HOSPADM

## 2022-10-10 RX ORDER — CARBOPROST TROMETHAMINE 250 UG/ML
250 INJECTION, SOLUTION INTRAMUSCULAR
Status: DISCONTINUED | OUTPATIENT
Start: 2022-10-10 | End: 2022-10-12 | Stop reason: HOSPADM

## 2022-10-10 RX ORDER — ONDANSETRON 8 MG/1
8 TABLET, ORALLY DISINTEGRATING ORAL EVERY 8 HOURS PRN
Status: DISCONTINUED | OUTPATIENT
Start: 2022-10-10 | End: 2022-10-12 | Stop reason: HOSPADM

## 2022-10-10 RX ORDER — SODIUM CITRATE AND CITRIC ACID MONOHYDRATE 334; 500 MG/5ML; MG/5ML
30 SOLUTION ORAL
Status: COMPLETED | OUTPATIENT
Start: 2022-10-10 | End: 2022-10-10

## 2022-10-10 RX ORDER — OXYTOCIN 10 [USP'U]/ML
INJECTION, SOLUTION INTRAMUSCULAR; INTRAVENOUS
Status: DISCONTINUED | OUTPATIENT
Start: 2022-10-10 | End: 2022-10-10

## 2022-10-10 RX ADMIN — ACETAMINOPHEN 650 MG: 325 TABLET, FILM COATED ORAL at 10:10

## 2022-10-10 RX ADMIN — ACETAMINOPHEN 1000 MG: 500 TABLET, FILM COATED ORAL at 06:10

## 2022-10-10 RX ADMIN — OXYTOCIN 6 UNITS: 10 INJECTION, SOLUTION INTRAMUSCULAR; INTRAVENOUS at 07:10

## 2022-10-10 RX ADMIN — DEXMEDETOMIDINE HYDROCHLORIDE 8 MCG: 100 INJECTION, SOLUTION INTRAVENOUS at 07:10

## 2022-10-10 RX ADMIN — MORPHINE SULFATE 0.1 MG: 0.5 INJECTION, SOLUTION EPIDURAL; INTRATHECAL; INTRAVENOUS at 07:10

## 2022-10-10 RX ADMIN — SODIUM CHLORIDE, SODIUM LACTATE, POTASSIUM CHLORIDE, AND CALCIUM CHLORIDE: 600; 310; 30; 20 INJECTION, SOLUTION INTRAVENOUS at 07:10

## 2022-10-10 RX ADMIN — KETOROLAC TROMETHAMINE 30 MG: 30 INJECTION, SOLUTION INTRAMUSCULAR; INTRAVENOUS at 09:10

## 2022-10-10 RX ADMIN — DEXMEDETOMIDINE HYDROCHLORIDE 12 MCG: 100 INJECTION, SOLUTION INTRAVENOUS at 07:10

## 2022-10-10 RX ADMIN — ACETAMINOPHEN 650 MG: 325 TABLET, FILM COATED ORAL at 04:10

## 2022-10-10 RX ADMIN — DOCUSATE SODIUM 200 MG: 100 CAPSULE, LIQUID FILLED ORAL at 08:10

## 2022-10-10 RX ADMIN — ONDANSETRON 4 MG: 2 INJECTION INTRAMUSCULAR; INTRAVENOUS at 07:10

## 2022-10-10 RX ADMIN — OXYCODONE 10 MG: 5 TABLET ORAL at 04:10

## 2022-10-10 RX ADMIN — PHENYLEPHRINE HYDROCHLORIDE 0.5 MCG/KG/MIN: 10 INJECTION, SOLUTION INTRAVENOUS at 07:10

## 2022-10-10 RX ADMIN — CEFAZOLIN 2 G: 1 INJECTION, POWDER, FOR SOLUTION INTRAMUSCULAR; INTRAVENOUS at 07:10

## 2022-10-10 RX ADMIN — Medication 100 MCG: at 07:10

## 2022-10-10 RX ADMIN — DEXAMETHASONE SODIUM PHOSPHATE 4 MG: 4 INJECTION INTRA-ARTICULAR; INTRALESIONAL; INTRAMUSCULAR; INTRAVENOUS; SOFT TISSUE at 07:10

## 2022-10-10 RX ADMIN — SODIUM CHLORIDE, SODIUM LACTATE, POTASSIUM CHLORIDE, AND CALCIUM CHLORIDE: 600; 310; 30; 20 INJECTION, SOLUTION INTRAVENOUS at 08:10

## 2022-10-10 RX ADMIN — BUPIVACAINE HYDROCHLORIDE IN DEXTROSE 1.6 ML: 7.5 INJECTION, SOLUTION SUBARACHNOID at 07:10

## 2022-10-10 RX ADMIN — SODIUM CITRATE AND CITRIC ACID MONOHYDRATE 30 ML: 500; 334 SOLUTION ORAL at 06:10

## 2022-10-10 RX ADMIN — Medication 95 MILLI-UNITS/MIN: at 09:10

## 2022-10-10 RX ADMIN — FAMOTIDINE 20 MG: 10 INJECTION, SOLUTION INTRAVENOUS at 06:10

## 2022-10-10 RX ADMIN — KETOROLAC TROMETHAMINE 30 MG: 30 INJECTION, SOLUTION INTRAMUSCULAR; INTRAVENOUS at 10:10

## 2022-10-10 RX ADMIN — KETOROLAC TROMETHAMINE 30 MG: 30 INJECTION, SOLUTION INTRAMUSCULAR; INTRAVENOUS at 04:10

## 2022-10-10 RX ADMIN — OXYCODONE 10 MG: 5 TABLET ORAL at 10:10

## 2022-10-10 RX ADMIN — FENTANYL CITRATE 10 MCG: 50 INJECTION, SOLUTION INTRAMUSCULAR; INTRAVENOUS at 07:10

## 2022-10-10 RX ADMIN — PROMETHAZINE HYDROCHLORIDE 6.25 MG: 25 INJECTION INTRAMUSCULAR; INTRAVENOUS at 07:10

## 2022-10-10 RX ADMIN — Medication 100 ML: at 07:10

## 2022-10-10 NOTE — L&D DELIVERY NOTE
[facility] - Labor & Delivery   Section   Operative Note    SUMMARY   Date of Procedure: 10/10/2022     Procedure: Procedure(s) (LRB):   SECTION (N/A)    Surgeon(s) and Role:     * Denita Xiong MD - Primary    Assisting Surgeon: Michelle Prieto MD PGY1    Pre-Operative Diagnosis: Repeat  section    Post-Operative Diagnosis: s/p repeat LTCS    Anesthesia: Spinal/Epidural           Description of the Findings of the Procedure:   1. Single viable female infant, APGARs 8/9, weight 3630g   2. Normal appearing uterus, fallopian tubes, and ovaries.  3. Normal placenta.    Complications: No    Blood Loss: 985mL     Procedure Details   The patient was seen in the Holding Room. The risks, benefits, complications, treatment options, and expected outcomes were discussed with the patient.  The patient concurred with the proposed plan, giving informed consent.  The site of surgery properly noted/marked. The patient was taken to Operating Room, identified as Bushra Kahn and the procedure verified as  Delivery. A Time Out was held and the above information confirmed.    After induction of anesthesia, the patient was prepped and draped in the usual sterile manner while placed in a dorsal supine position with a left lateral tilt.  A cerda catheter was also placed per nursing. Preoperative antibiotics were administered and an allis test was performed yielding adequate anesthesia.  A Pfannenstiel incision was made and carried down through the subcutaneous tissue to the fascia. Fascial incision was made and extended transversely. The fascia was grasped with Kocher clamps and  from the underlying rectus tissue superiorly and inferiorly. The peritoneum was identified, found to be free of adherent bowel and entered with hemostats and Metzenbaum scissors. Peritoneal incision was extended longitudinally. The vesico-uterine peritoneum was identified and bladder blade was inserted. The  vesico-uterine peritoneum was incised and bluntly freed from the lower uterine segment. The bladder blade was reinserted to keep the bladder out of the operative field. A low transverse uterine incision was made with knife and extended with finger fraction. The amniotic sac was ruptured with a hemostat and the infant was noted to be in cephalic position. The head was brought to the incision and elevated out of the pelvis. The patient delivered a single viable female infant. Infant weighed 3630 grams with Apgar scores of 8/9 at one and five minutes respectively. After the umbilical cord was clamped and cut, cord blood was obtained for evaluation. The placenta was removed intact and appeared normal, and was discarded. The uterus was exteriorized. The uterine outline, tubes and ovaries appeared normal. The uterine incision was closed with running locked sutures of 1 Chromic. Hemostasis was observed. The posterior cul de sac was irrigated for removal of blood clots. The uterus was returned to the abdominal cavity. Incision was reinspected and good hemostasis was noted. The abdominal cavity was irrigated to remove clots. The muscle was reapproximated with 1 Chromic. The fascia was then reapproximated with running sutures of 1 PDS. The fascia was then irrigated. The skin was reapproximated with 4-0 Monocryl.   Instrument, sponge, and needle counts were correct prior to the abdominal closure and at the conclusion of the case.     Pt tolerated procedure well and was in good condition after the procedure.         Specimens:   Specimen (24h ago, onward)      None            Condition: Stable    Disposition: PACU - hemodynamically stable.    Attestation: Stable         Delivery Information for Roverto Kahn    Birth information:  YOB: 2020   Time of birth: 7:31 AM   Sex: female   Head Delivery Date/Time: 10/10/2022  7:31 AM   Delivery type: , Low Transverse   Gestational Age: 39w1d    Delivery  Providers    Delivering clinician: Denita Xiong MD   Provider Role    Michelle Prieto MD Resident    Bere Jones, RN Delivery Nurse    Ariela Sparrow, DARION Nurse    Clarice Washburn Surgical Tech              Measurements    Weight: 3630 g  Weight (lbs): 8 lb  Length:          Apgars    Living status: Living  Apgars:  1 min.:  5 min.:  10 min.:  15 min.:  20 min.:    Skin color:  0  1       Heart rate:  2  2       Reflex irritability:  2  2       Muscle tone:  2  2       Respiratory effort:  2  2       Total:  8  9       Apgars assigned by: TATUM SPARROW RN         Operative Delivery    Forceps attempted?: No  Vacuum extractor attempted?: No         Shoulder Dystocia    Shoulder dystocia present?: No           Presentation    Presentation: Vertex  Position: Middle Occiput Anterior           Interventions/Resuscitation    Method: Bulb Suctioning, Tactile Stimulation, CPAP, Deep Suctioning       Cord    Vessels: 3 vessels  Complications: None  Delayed Cord Clamping?: Yes  Cord Clamped Date/Time: 10/10/2022  7:32 AM  Cord Blood Disposition: Sent with Baby  Gases Sent?: No  Stem Cell Collection (by MD): No       Placenta    Placenta delivery date/time: 10/10/2022 0733  Placenta removal: Manual removal  Placenta appearance: Intact  Placenta disposition: discarded           Labor Events:       labor:       Labor Onset Date/Time:         Dilation Complete Date/Time:         Start Pushing Date/Time:         Start Pushing Date/Time:       Rupture Date/Time:            Rupture type:            Fluid Amount:         Fluid Color:         Fluid Odor:         Membrane Status: INT (Intact)                 steroids:       Antibiotics given for GBS:       Induction:       Indications for induction:        Augmentation:       Indications for augmentation:       Labor complications:       Additional complications:          Cervical ripening:                     Delivery:      Episiotomy:       Indication for Episiotomy:        Perineal Lacerations:   Repaired:      Periurethral Laceration:   Repaired:     Labial Laceration:   Repaired:     Sulcus Laceration:   Repaired:     Vaginal Laceration:   Repaired:     Cervical Laceration:   Repaired:     Repair suture:       Repair # of packets:       Last Value - EBL - Nursing (mL):       Sum - EBL - Nursing (mL): 0     Last Value - EBL - Anesthesia (mL):        Calculated QBL (mL): 430        Vaginal Sweep Performed:       [facility] Correct:         Other providers:       Anesthesia    Method: Spinal, Epidural          Details (if applicable):  Trial of Labor No    Categorization: Repeat    Priority: Routine   Indications for : Prior Uterine Surgery   Incision Type: low transverse     Additional  information:  Forceps:    Vacuum:    Breech:    Observed anomalies    Other (Comments):           Michelle Prieto MD  [facility]   OBGYN PGY1

## 2022-10-10 NOTE — TRANSFER OF CARE
"Anesthesia Transfer of Care Note    Patient: Bushra Kahn    Procedure(s) Performed: Procedure(s) (LRB):   SECTION (N/A)    Patient location: Labor and Delivery    Anesthesia Type: CSE    Transport from OR: Transported from OR on room air with adequate spontaneous ventilation    Post pain: adequate analgesia    Post assessment: no apparent anesthetic complications    Post vital signs: stable    Level of consciousness: awake and alert    Nausea/Vomiting: no nausea/vomiting    Complications: none    Transfer of care protocol was followed      Last vitals:   Visit Vitals  /73   Pulse 95   Temp 36.4 °C (97.6 °F) (Temporal)   Resp 18   Ht 5' 4" (1.626 m)   Wt 116 kg (255 lb 11.7 oz)   LMP 2022   SpO2 96%   Breastfeeding Unknown   BMI 43.90 kg/m²     "

## 2022-10-10 NOTE — PLAN OF CARE
VSS. Pain controlled with oral pain medication. Formula feeding and pt pumping with plans to bottle feed EBM. Fundus firm and midline with moderate lochia rubra. LTV dressing in place, clean/dry/intact. LTV dressing removed. LTV incision clean & dry with sutures intact. [place] discontinued, DTV at 0000. Passing gas. No concerns at this time.     Problem:  Fall Injury Risk  Goal: Absence of Fall, Infant Drop and Related Injury  Outcome: Ongoing, Progressing     Problem: Adult Inpatient Plan of Care  Goal: Plan of Care Review  Outcome: Ongoing, Progressing  Goal: Patient-Specific Goal (Individualized)  Outcome: Ongoing, Progressing  Goal: Absence of Hospital-Acquired Illness or Injury  Outcome: Ongoing, Progressing  Goal: Optimal Comfort and Wellbeing  Outcome: Ongoing, Progressing  Goal: Readiness for Transition of Care  Outcome: Ongoing, Progressing     Problem: Bariatric Environmental Safety  Goal: Safety Maintained with Care  Outcome: Ongoing, Progressing     Problem: Breastfeeding  Goal: Effective Breastfeeding  Outcome: Ongoing, Progressing     Problem: Adjustment to Role Transition (Postpartum  Delivery)  Goal: Successful Maternal Role Transition  Outcome: Ongoing, Progressing     Problem: Bleeding (Postpartum  Delivery)  Goal: Hemostasis  Outcome: Ongoing, Progressing     Problem: Infection (Postpartum  Delivery)  Goal: Absence of Infection Signs and Symptoms  Outcome: Ongoing, Progressing     Problem: Pain (Postpartum  Delivery)  Goal: Acceptable Pain Control  Outcome: Ongoing, Progressing     Problem: Postoperative Nausea and Vomiting (Postpartum  Delivery)  Goal: Nausea and Vomiting Relief  Outcome: Ongoing, Progressing     Problem: Postoperative Urinary Retention (Postpartum  Delivery)  Goal: Effective Urinary Elimination  Outcome: Ongoing, Progressing     Problem: Infection  Goal: Absence of Infection Signs and Symptoms  Outcome: Ongoing,  Progressing

## 2022-10-10 NOTE — INTERVAL H&P NOTE
The patient has been examined and the H&P has been reviewed:    I concur with the findings and no changes have occurred since H&P was written.    Surgery risks, benefits and alternative options discussed and understood by patient/family.    Vertex presentation on bedside scan.  Baseline 120, mod btbv, + accels, - decels Cat 1   Irregular contractions     To OR for rLTCS    Marlen Christian M.D.  OB/GYN PGY-4      Active Hospital Problems    Diagnosis  POA    *History of  [Z98.891]  Not Applicable      Resolved Hospital Problems   No resolved problems to display.

## 2022-10-10 NOTE — ANESTHESIA PROCEDURE NOTES
CSE    Patient location during procedure: OR  Start time: 10/10/2022 7:03 AM  Timeout: 10/10/2022 7:02 AM  End time: 10/10/2022 7:08 AM      Staffing  Authorizing Provider: Migdalia Sims MD  Performing Provider: Joseph Conklin MD    Preanesthetic Checklist  Completed: patient identified, IV checked, site marked, risks and benefits discussed, surgical consent, monitors and equipment checked, pre-op evaluation and timeout performed  CSE  Patient position: sitting  Prep: ChloraPrep  Patient monitoring: heart rate, frequent blood pressure checks and continuous pulse ox  Approach: midline  Spinal Needle  Needle type: pencil-tip   Needle gauge: 25 G  Needle length: 5 in  Epidural Needle  Injection technique: ABUNDIO air  Needle type: Tuohy   Needle gauge: 17 G  Needle length: 3.5 in  Needle insertion depth: 8 cm  Location: L3-4  Needle localization: anatomical landmarks   Catheter  Catheter type: springwound  Catheter size: 19 G  Catheter at skin depth: 12 cm  Assessment  Sensory level: T4   Dermatomal levels determined by pinch or prick  Intrathecal Medications:   administered: primary anesthetic mcg of

## 2022-10-10 NOTE — LACTATION NOTE
Lactation visited with pt. Pt plans to pump and bottlefeed EBM and ABM. Pt offered a Medela Symphony pump to initiate milk supply, pt declined. Pt aware of the importance of early and frequent pumping to establish a milk supply. LC number on white board to call when she was ready to start pumping.

## 2022-10-10 NOTE — ANESTHESIA PREPROCEDURE EVALUATION
[facility]  Anesthesia Pre-Operative Evaluation         Patient Name: Bushra Kahn  YOB: 1985  MRN: [id number]    10/10/2022      Bushra Kahn is a 37 y.o. female  @ 39w1d who presents for scheduled c section. Patient denies bleeding disorders, spine surgery, and asthma    OB History    Para Term  AB Living   2 1 1 0 0 1   SAB IAB Ectopic Multiple Live Births   0 0 0 0 1      # Outcome Date GA Lbr Jonathan/2nd Weight Sex Delivery Anes PTL Lv   2 Current            1 Term 21 37w4d  3.28 kg (7 lb 3.7 oz) M CS-LTranv EPI N MIC       Review of patient's allergies indicates:   Allergen Reactions    Benzyl salicylate Rash     acne    Cefdinir Other (See Comments)     Nausea/vomiting    Nifedipine Other (See Comments)     headache    Sudafed [pseudoephedrine hcl] Other (See Comments)     Emotional lability       Wt Readings from Last 1 Encounters:   10/10/22 0515 116 kg (255 lb 11.7 oz)       BP Readings from Last 3 Encounters:   10/04/22 128/76   22 132/84   22 134/69       Patient Active Problem List   Diagnosis    Pain of both shoulder joints    Elevated liver enzymes    Fatty liver    Obesity (BMI 30-39.9)    Primary hypertension    Hepatic fibrosis, early fibrosis    Wears contact lenses       Past Surgical History:   Procedure Laterality Date    ADENOIDECTOMY  age 16    biopsy of breast  Right 2014     SECTION N/A 3/24/2021    Procedure:  SECTION;  Surgeon: Denita Xiong MD;  Location: [facility] L&D;  Service: OB/GYN;  Laterality: N/A;    CHOLECYSTECTOMY      Laparoscopic    ESOPHAGOGASTRODUODENOSCOPY N/A 2018    Procedure: ESOPHAGOGASTRODUODENOSCOPY (EGD);  Surgeon: Aurelio James MD;  Location: [address];  Service: Endoscopy;  Laterality: N/A;       Social History     Socioeconomic History    Marital status:     Tobacco Use    Smoking status: Former     Packs/day: 0.10     Years: 13.00     Pack years: 1.30     Types: Cigarettes     Quit date: 8/15/2018     Years since quittin.1    Smokeless tobacco: Never   Substance and Sexual Activity    Alcohol use: Not Currently     Comment: couple of servings per year, no daily or heavy alcohol use     Drug use: No    Sexual activity: Yes     Partners: Male     Birth control/protection: None     Comment:  2016, together since : Huang     Social Determinants of Health     Financial Resource Strain: Unknown    Difficulty of Paying Living Expenses: Patient refused   Food Insecurity: No Food Insecurity    Worried About Running Out of Food in the Last Year: Never true    Ran Out of Food in the Last Year: Never true   Transportation Needs: No Transportation Needs    Lack of Transportation (Medical): No    Lack of Transportation (Non-Medical): No   Physical Activity: Insufficiently Active    Days of Exercise per Week: 3 days    Minutes of Exercise per Session: 10 min   Stress: No Stress Concern Present    Feeling of Stress : Only a little   Social Connections: Unknown    Frequency of Communication with Friends and Family: More than three times a week    Frequency of Social Gatherings with Friends and Family: Once a week    Active Member of Clubs or Organizations: Yes    Attends Club or Organization Meetings: 1 to 4 times per year    Marital Status:    Housing Stability: Low Risk     Unable to Pay for Housing in the Last Year: No    Number of Places Lived in the Last Year: 1    Unstable Housing in the Last Year: No         Chemistry        Component Value Date/Time     (L) 2022 1611    K 3.5 2022 1611     2022 1611    CO2 20 (L) 2022 1611    BUN 8 2022 1611    CREATININE 0.6 2022 1611    GLU 82 2022 1611        Component Value Date/Time    CALCIUM 8.7 2022 1611    ALKPHOS  62 07/26/2022 1611    AST 19 07/26/2022 1611    ALT 24 07/26/2022 1611    BILITOT 0.3 07/26/2022 1611    ESTGFRAFRICA >60.0 07/26/2022 1611    EGFRNONAA >60.0 07/26/2022 1611            Lab Results   Component Value Date    WBC 11.76 09/21/2022    HGB 10.9 (L) 09/21/2022    HCT 34.8 (L) 09/21/2022    MCV 86 09/21/2022     09/21/2022       No results for input(s): PT, INR, PROTIME, APTT in the last 72 hours.            Pre-op Assessment    I have reviewed the Patient Summary Reports.     I have reviewed the Nursing Notes.    I have reviewed the Medications.     Review of Systems  Anesthesia Hx:  No problems with previous Anesthesia  History of prior surgery of interest to airway management or planning: Denies Family Hx of Anesthesia complications.   Denies Personal Hx of Anesthesia complications.   Hematology/Oncology:     Oncology Normal     Cardiovascular:    Denies Angina. ROBERTSON ECG has been reviewed. Severe Pre-E   Pulmonary:   Denies Asthma.  Denies Recent URI.    Renal/:  Renal/ Normal     Hepatic/GI:   GERD Denies Liver Disease.    Musculoskeletal:  Denies Spine Disorders    Neurological:   Denies CVA. Denies Seizures.    Endocrine:  Endocrine Normal Denies Diabetes.        Physical Exam  General: Well nourished, Alert, Oriented and Cooperative    Airway:  Mallampati: II           Anesthesia Plan  Type of Anesthesia, risks & benefits discussed:    Anesthesia Type: Epidural  Intra-op Monitoring Plan: Standard ASA Monitors  Post Op Pain Control Plan: multimodal analgesia and epidural analgesia  Informed Consent: Informed consent signed with the Patient and all parties understand the risks and agree with anesthesia plan.  All questions answered.   ASA Score: 2  Day of Surgery Review of History & Physical: H&P Update referred to the surgeon/provider.    Ready For Surgery From Anesthesia Perspective.     .

## 2022-10-10 NOTE — BRIEF OP NOTE
[facility] - Labor & Delivery  Brief Operative Note    SUMMARY     Surgery Date: 10/10/2022     Surgeon(s) and Role:     * Denita Xiong MD - Primary    Assisting Surgeon: Michelle Prieto MD PGY1    Pre-op Diagnosis:  32 weeks gestation of pregnancy [Z3A.32]    Post-op Diagnosis:  Post-Op Diagnosis Codes:     * 32 weeks gestation of pregnancy [Z3A.32]    Procedure(s) (LRB):   SECTION (N/A)    Anesthesia: Spinal/Epidural    Operative Findings:   1. Single viable female infant, APGARs 8/9, weight 3630g   2. Normal appearing uterus, fallopian tubes, and ovaries.  3. Normal placenta.    Estimated Blood Loss: 985mL    Estimated Blood Loss has been documented.         Specimens:   Specimen (24h ago, onward)      None            [id number]    Patient transferred to recovery room in stable condition      Michelle Prieto MD  [facility]   OBGYN PGY1

## 2022-10-10 NOTE — DISCHARGE INSTRUCTIONS
Discharge Instructions    The AAP recommends exclusive breastfeeding for about the first 6 months of age and as solid foods are introduced, continued breastfeeding  for at least 1 year or longer.    Feed the baby at the earliest sign of hunger or comfort (see signs on the back cover of the Mother's Breastfeeding Guide)  Hands to mouth, sucking motions  Rooting or searching for something to suck on  Dont wait for crying - it is a sign of distress    The feedings may be 8-12 times per 24hrs and will not follow a schedule  Avoid pacifiers and bottles for the first 4 weeks  Alternate the breast you start the feeding with, or start with the breast that feels the fullest  Switch breasts when the baby takes himself off the breast or falls asleep  Keep offering breasts until the baby looks full, no longer gives hunger signs, and stays asleep when placed on his back in the crib  If the baby is sleepy and wont wake for a feeding, put the baby skin-to-skin dressed in a diaper against the mothers bare chest  Sleep with your baby in your room near you  The baby should be positioned and latched on to the breast correctly  Chest-to-chest, chin in the breast  Babys lips are flipped outward  Babys mouth is stretched open wide like a shout  Babys sucking should feel like tugging to the mother  The baby should be drinking at the breast:  You should hear swallowing or gulping throughout the feeding  You should see milk on the babys lips when he comes off the breast  Your breasts should be softer when the baby is finished feeding  The baby should look relaxed at the end of feedings  After the 4th day and your milk is in:  The babys poop should turn bright yellow and be loose, watery, and seedy  The baby should have at least 3-4 poops the size of the palm of your hand per day  The baby should have at least 5-6 wet diapers per day  The urine should be light yellow in color  You should drink when you are thirsty and eat a  healthy diet when you are    hungry.     Take naps to get the rest you need.   Take medications and/or drink alcohol only with permission of your obstetrician    or the babys pediatrician.  You can also call the Infant Risk Center,   ([phone]), Monday-Friday, 8am-5pm Central time, to get the most   up-to-date evidence-based information on the use of medications during   pregnancy and breastfeeding.      Complete the First Alert form in the Mother's Breastfeeding Guide 3-5 days after the baby's birth.  Please call the Breastfeeding Warmline ([phone]) or the baby's pediatrician if you have any concerns.    The baby should be examined by a pediatrician at 3-5 days of age and again at 2 weeks of age.  Once your milk production increases, the baby should be gaining at least ½ - 1oz each day and should be back to birthweight no later than 10-14 days of age.  If this is not the case, please call the Breastfeeding Warmline ([phone]) for assistance and support.    Community Resources    [facility] Breastfeeding Warmline: [phone]   Local [facility] clinics: provide incentives and breastpumps to eligible mothers 7-049-377-BABY  La Leche League International (LLLI):  mother-to-mother support group website        www.lll.SecureAlert  [facility] La Leche League mother-to-mother support groups:        www.lllDimensions [employer] Infrastructure Solutions."[employer]        La Leche League [facility]   Dr. Vaughn Hayes website for latch videos and general information:        www.breastfeedinginc.ca  Infant Risk Center is a call center that provides information about the safety of taking medications while breastfeeding.  Call 2-971-386-4009, M-F, 8am-5pm, [place].  International Lactation Consultant Association provides resources for assistance:        www.ilca.org  [place] Breastfeeding Coalition provides informationand resources for parents  and the community    www.LaBreastfeedingSupport.org  Vesna Cage is a mom-to-mom support group:                              www.nolanesting.com//breastfeedng-support/  Part[employer] for Healthy Babies:  0-266-233-BABY(4918)  [employer]: a breastfeeding support group for women of color, 938.294.2624

## 2022-10-11 LAB
ALBUMIN SERPL BCP-MCNC: 2.2 G/DL (ref 3.5–5.2)
ALP SERPL-CCNC: 103 U/L (ref 55–135)
ALT SERPL W/O P-5'-P-CCNC: 31 U/L (ref 10–44)
ANION GAP SERPL CALC-SCNC: 4 MMOL/L (ref 8–16)
AST SERPL-CCNC: 33 U/L (ref 10–40)
BACTERIA #/AREA URNS HPF: ABNORMAL /HPF
BASOPHILS # BLD AUTO: 0.04 K/UL (ref 0–0.2)
BASOPHILS NFR BLD: 0.4 % (ref 0–1.9)
BILIRUB SERPL-MCNC: 0.3 MG/DL (ref 0.1–1)
BILIRUB UR QL STRIP: NEGATIVE
BUN SERPL-MCNC: 8 MG/DL (ref 6–20)
CALCIUM SERPL-MCNC: 8.2 MG/DL (ref 8.7–10.5)
CHLORIDE SERPL-SCNC: 108 MMOL/L (ref 95–110)
CLARITY UR: CLEAR
CO2 SERPL-SCNC: 24 MMOL/L (ref 23–29)
COLOR UR: YELLOW
CREAT SERPL-MCNC: 0.6 MG/DL (ref 0.5–1.4)
DIFFERENTIAL METHOD: ABNORMAL
EOSINOPHIL # BLD AUTO: 0.1 K/UL (ref 0–0.5)
EOSINOPHIL NFR BLD: 1 % (ref 0–8)
ERYTHROCYTE [DISTWIDTH] IN BLOOD BY AUTOMATED COUNT: 15.1 % (ref 11.5–14.5)
EST. GFR  (NO RACE VARIABLE): >60 ML/MIN/1.73 M^2
GLUCOSE SERPL-MCNC: 93 MG/DL (ref 70–110)
GLUCOSE UR QL STRIP: NEGATIVE
HCT VFR BLD AUTO: 29.3 % (ref 37–48.5)
HGB BLD-MCNC: 9.4 G/DL (ref 12–16)
HGB UR QL STRIP: ABNORMAL
HYALINE CASTS #/AREA URNS LPF: 0 /LPF
IMM GRANULOCYTES # BLD AUTO: 0.08 K/UL (ref 0–0.04)
IMM GRANULOCYTES NFR BLD AUTO: 0.7 % (ref 0–0.5)
KETONES UR QL STRIP: NEGATIVE
LEUKOCYTE ESTERASE UR QL STRIP: ABNORMAL
LYMPHOCYTES # BLD AUTO: 1.4 K/UL (ref 1–4.8)
LYMPHOCYTES NFR BLD: 12.6 % (ref 18–48)
MCH RBC QN AUTO: 26.8 PG (ref 27–31)
MCHC RBC AUTO-ENTMCNC: 32.1 G/DL (ref 32–36)
MCV RBC AUTO: 84 FL (ref 82–98)
MICROSCOPIC COMMENT: ABNORMAL
MONOCYTES # BLD AUTO: 0.6 K/UL (ref 0.3–1)
MONOCYTES NFR BLD: 5.8 % (ref 4–15)
NEUTROPHILS # BLD AUTO: 8.8 K/UL (ref 1.8–7.7)
NEUTROPHILS NFR BLD: 79.5 % (ref 38–73)
NITRITE UR QL STRIP: POSITIVE
NRBC BLD-RTO: 0 /100 WBC
PH UR STRIP: 7 [PH] (ref 5–8)
PLATELET # BLD AUTO: 203 K/UL (ref 150–450)
PMV BLD AUTO: 9.9 FL (ref 9.2–12.9)
POTASSIUM SERPL-SCNC: 3.9 MMOL/L (ref 3.5–5.1)
PROT SERPL-MCNC: 5.2 G/DL (ref 6–8.4)
PROT UR QL STRIP: ABNORMAL
RBC # BLD AUTO: 3.51 M/UL (ref 4–5.4)
RBC #/AREA URNS HPF: 100 /HPF (ref 0–4)
SODIUM SERPL-SCNC: 136 MMOL/L (ref 136–145)
SP GR UR STRIP: 1.02 (ref 1–1.03)
URN SPEC COLLECT METH UR: ABNORMAL
UROBILINOGEN UR STRIP-ACNC: 1 EU/DL
WBC # BLD AUTO: 11.07 K/UL (ref 3.9–12.7)
WBC #/AREA URNS HPF: 100 /HPF (ref 0–5)
WBC CLUMPS URNS QL MICRO: ABNORMAL

## 2022-10-11 PROCEDURE — 11000001 HC ACUTE MED/SURG PRIVATE ROOM

## 2022-10-11 PROCEDURE — 25000003 PHARM REV CODE 250

## 2022-10-11 PROCEDURE — 87086 URINE CULTURE/COLONY COUNT: CPT

## 2022-10-11 PROCEDURE — 36415 COLL VENOUS BLD VENIPUNCTURE: CPT

## 2022-10-11 PROCEDURE — 85025 COMPLETE CBC W/AUTO DIFF WBC: CPT | Performed by: OBSTETRICS & GYNECOLOGY

## 2022-10-11 PROCEDURE — 63600175 PHARM REV CODE 636 W HCPCS

## 2022-10-11 PROCEDURE — 80053 COMPREHEN METABOLIC PANEL: CPT

## 2022-10-11 PROCEDURE — 81000 URINALYSIS NONAUTO W/SCOPE: CPT

## 2022-10-11 RX ORDER — ACETAMINOPHEN 325 MG/1
650 TABLET ORAL EVERY 6 HOURS PRN
Qty: 60 TABLET | Refills: 3 | Status: SHIPPED | OUTPATIENT
Start: 2022-10-11 | End: 2022-10-22

## 2022-10-11 RX ORDER — OXYCODONE HYDROCHLORIDE 5 MG/1
5 TABLET ORAL EVERY 6 HOURS PRN
Qty: 10 TABLET | Refills: 0 | Status: SHIPPED | OUTPATIENT
Start: 2022-10-11 | End: 2023-01-09 | Stop reason: ALTCHOICE

## 2022-10-11 RX ORDER — AMOXICILLIN 250 MG
1 CAPSULE ORAL NIGHTLY PRN
Qty: 30 TABLET | Refills: 0 | Status: SHIPPED | OUTPATIENT
Start: 2022-10-11 | End: 2023-01-09 | Stop reason: ALTCHOICE

## 2022-10-11 RX ORDER — PROMETHAZINE HYDROCHLORIDE 12.5 MG/1
12.5 TABLET ORAL EVERY 6 HOURS PRN
Status: DISCONTINUED | OUTPATIENT
Start: 2022-10-11 | End: 2022-10-12 | Stop reason: HOSPADM

## 2022-10-11 RX ORDER — IBUPROFEN 600 MG/1
600 TABLET ORAL EVERY 6 HOURS PRN
Qty: 60 TABLET | Refills: 3 | Status: SHIPPED | OUTPATIENT
Start: 2022-10-11 | End: 2023-01-09 | Stop reason: ALTCHOICE

## 2022-10-11 RX ORDER — CEPHALEXIN 500 MG/1
500 CAPSULE ORAL EVERY 12 HOURS
Status: DISCONTINUED | OUTPATIENT
Start: 2022-10-11 | End: 2022-10-12 | Stop reason: HOSPADM

## 2022-10-11 RX ORDER — CEPHALEXIN 500 MG/1
500 CAPSULE ORAL EVERY 12 HOURS
Status: DISCONTINUED | OUTPATIENT
Start: 2022-10-11 | End: 2022-10-11

## 2022-10-11 RX ADMIN — IBUPROFEN 800 MG: 400 TABLET, FILM COATED ORAL at 09:10

## 2022-10-11 RX ADMIN — ACETAMINOPHEN 650 MG: 325 TABLET, FILM COATED ORAL at 05:10

## 2022-10-11 RX ADMIN — KETOROLAC TROMETHAMINE 30 MG: 30 INJECTION, SOLUTION INTRAMUSCULAR; INTRAVENOUS at 03:10

## 2022-10-11 RX ADMIN — ACETAMINOPHEN 650 MG: 325 TABLET, FILM COATED ORAL at 03:10

## 2022-10-11 RX ADMIN — IBUPROFEN 800 MG: 400 TABLET, FILM COATED ORAL at 05:10

## 2022-10-11 RX ADMIN — DOCUSATE SODIUM 200 MG: 100 CAPSULE, LIQUID FILLED ORAL at 09:10

## 2022-10-11 RX ADMIN — CEPHALEXIN 500 MG: 500 CAPSULE ORAL at 11:10

## 2022-10-11 RX ADMIN — OXYCODONE 5 MG: 5 TABLET ORAL at 04:10

## 2022-10-11 RX ADMIN — DOCUSATE SODIUM 200 MG: 100 CAPSULE, LIQUID FILLED ORAL at 08:10

## 2022-10-11 RX ADMIN — ACETAMINOPHEN 650 MG: 325 TABLET, FILM COATED ORAL at 11:10

## 2022-10-11 RX ADMIN — OXYCODONE 10 MG: 5 TABLET ORAL at 08:10

## 2022-10-11 RX ADMIN — ONDANSETRON 8 MG: 8 TABLET, ORALLY DISINTEGRATING ORAL at 01:10

## 2022-10-11 RX ADMIN — OXYCODONE 10 MG: 5 TABLET ORAL at 12:10

## 2022-10-11 RX ADMIN — PROMETHAZINE HYDROCHLORIDE 12.5 MG: 12.5 TABLET ORAL at 06:10

## 2022-10-11 RX ADMIN — OXYCODONE 10 MG: 5 TABLET ORAL at 03:10

## 2022-10-11 RX ADMIN — OXYCODONE 10 MG: 5 TABLET ORAL at 10:10

## 2022-10-11 NOTE — LACTATION NOTE
Pt is encouraged to pump her breast with the Symphony pump 8 times in 24hrs. Pt has pumped 2 times since yesterday.Reviewed setting up the pump and cleaning the breast pump parts. Pt verbalized understanding.    10/11/22 1206   Maternal Assessment   Breast Shape Bilateral:;pendulous;round   Breast Density Bilateral:;soft   Areola Bilateral:;elastic   Maternal Infant Feeding   Maternal Emotional State assist needed   Equipment Type   Breast Pump Type double electric, hospital grade   Breast Pump Flange Type hard   Breast Pump Flange Size 28 mm   Breast Pumping   Breast Pumping Interventions post-feed pumping encouraged   Breast Pumping double electric breast pump utilized   Community Referrals   Community Referrals outpatient lactation program;support group

## 2022-10-11 NOTE — ANESTHESIA POSTPROCEDURE EVALUATION
Anesthesia Post Evaluation    Patient: Bushra Kahn    Procedure(s) Performed: Procedure(s) (LRB):   SECTION (N/A)    Final Anesthesia Type: CSE      Patient location during evaluation: labor & delivery  Patient participation: Yes- Able to Participate  Level of consciousness: awake and alert, awake and oriented  Post-procedure vital signs: reviewed and stable  Pain management: adequate  Airway patency: patent  MANPREET mitigation strategies: Multimodal analgesia and Use of major conduction anesthesia (spinal/epidural) or peripheral nerve block  PONV status at discharge: No PONV  Anesthetic complications: no      Cardiovascular status: blood pressure returned to baseline and hemodynamically stable  Respiratory status: unassisted, spontaneous ventilation and room air  Hydration status: euvolemic  Follow-up not needed.          Vitals Value Taken Time   /68 10/11/22 0833   Temp 36.7 °C (98 °F) 10/11/22 0833   Pulse 77 10/11/22 0833   Resp 18 10/11/22 0833   SpO2 98 % 10/11/22 0833         Event Time   Out of Recovery 11:18:00         Pain/Caity Score: Pain Rating Prior to Med Admin: 8 (10/11/2022  9:02 AM)  Pain Rating Post Med Admin: 3 (10/11/2022  1:05 AM)

## 2022-10-11 NOTE — PROGRESS NOTES
POSTPARTUM PROGRESS NOTE    Subjective:     PPD/POD#: 1   Procedure: Repeat LTCS   EGA: 39w1d   N/V: No   F/C: No   Abd Pain: Mild, well-controlled with oral pain medication   Lochia: Mild   Voiding: Yes   Ambulating: Yes   Bowel fnc: Yes   Breastfeeding: Yes   Contraception: Per primary OB   Circumcision: N/A, female     Objective:      Temp:  [97.6 °F (36.4 °C)-98.4 °F (36.9 °C)] 98 °F (36.7 °C)  Pulse:  [70-81] 77  Resp:  [16-19] 18  SpO2:  [97 %-98 %] 98 %  BP: (115-142)/(60-84) 139/68    Lung: Normal respiratory effort   Abdomen: Soft, appropriately tender   Uterus: Firm, no fundal tenderness   Incision: Bandage in place without shadowing   : Deferred   Extremities: Bilateral trace edema     Lab Review    Recent Labs   Lab 10/11/22  0616      K 3.9      CO2 24   BUN 8   CREATININE 0.6   GLU 93   PROT 5.2*   BILITOT 0.3   ALKPHOS 103   ALT 31   AST 33       Recent Labs   Lab 10/10/22  0515 10/11/22  0616   WBC 11.26 11.07   HGB 11.1* 9.4*   HCT 34.0* 29.3*   MCV 83 84    203         I/O    Intake/Output Summary (Last 24 hours) at 10/11/2022 1243  Last data filed at 10/10/2022 2300  Gross per 24 hour   Intake 452.11 ml   Output 700 ml   Net -247.89 ml        Assessment and Plan:   Postpartum care:  - Patient doing well.  - Continue routine management and advances.    gHTN, h/o preE  - BP as above  - asymptomatic  - preE labs as above  - Mag: not indicated  - Hypertensive agent not indicated at this time    UTI  - UA with nitrites, leuks, protein, blood  - culture pending  - started on keflex 500mg BID for 7d    NAFLD  - normal LFTs as above      Kanwal Madison MD   OB/GYN PGY1  [facility]

## 2022-10-11 NOTE — LACTATION NOTE
Uni2hony pump, tubing, collections containers and labels brought to bedside.  Discussed proper pump setting of initiation phase.  Instructed on proper usage of pump and to adjust suction according to maximum comfort level.  Verified appropriate flange fit.  Educated on the frequency and duration of pumping in order to promote and maintain a full milk supply.  Hands on pumping technique reviewed.  Encouraged hand expression after pumping.  Instructed on cleaning of breast pump parts.  Written instructions also given.  Pt verbalized understanding and appropriate recall for proper milk handling, collection, labeling, storage and transportation.

## 2022-10-12 VITALS
HEART RATE: 67 BPM | TEMPERATURE: 98 F | OXYGEN SATURATION: 99 % | HEIGHT: 64 IN | BODY MASS INDEX: 43.66 KG/M2 | RESPIRATION RATE: 18 BRPM | WEIGHT: 255.75 LBS | DIASTOLIC BLOOD PRESSURE: 67 MMHG | SYSTOLIC BLOOD PRESSURE: 130 MMHG

## 2022-10-12 LAB — BACTERIA UR CULT: NORMAL

## 2022-10-12 PROCEDURE — 25000003 PHARM REV CODE 250

## 2022-10-12 RX ORDER — CEPHALEXIN 500 MG/1
500 CAPSULE ORAL EVERY 12 HOURS
Qty: 13 CAPSULE | Refills: 0 | Status: SHIPPED | OUTPATIENT
Start: 2022-10-12 | End: 2023-01-09 | Stop reason: ALTCHOICE

## 2022-10-12 RX ORDER — PROMETHAZINE HYDROCHLORIDE 12.5 MG/1
12.5 TABLET ORAL EVERY 6 HOURS PRN
Qty: 30 TABLET | Refills: 0 | Status: SHIPPED | OUTPATIENT
Start: 2022-10-12 | End: 2022-10-22

## 2022-10-12 RX ADMIN — DOCUSATE SODIUM 200 MG: 100 CAPSULE, LIQUID FILLED ORAL at 08:10

## 2022-10-12 RX ADMIN — ACETAMINOPHEN 650 MG: 325 TABLET, FILM COATED ORAL at 05:10

## 2022-10-12 RX ADMIN — CEPHALEXIN 500 MG: 500 CAPSULE ORAL at 09:10

## 2022-10-12 RX ADMIN — OXYCODONE 10 MG: 5 TABLET ORAL at 05:10

## 2022-10-12 RX ADMIN — IBUPROFEN 800 MG: 400 TABLET, FILM COATED ORAL at 03:10

## 2022-10-12 RX ADMIN — CEPHALEXIN 500 MG: 500 CAPSULE ORAL at 12:10

## 2022-10-12 RX ADMIN — IBUPROFEN 800 MG: 400 TABLET, FILM COATED ORAL at 12:10

## 2022-10-12 RX ADMIN — ACETAMINOPHEN 650 MG: 325 TABLET, FILM COATED ORAL at 12:10

## 2022-10-12 RX ADMIN — OXYCODONE 10 MG: 5 TABLET ORAL at 09:10

## 2022-10-12 RX ADMIN — PROMETHAZINE HYDROCHLORIDE 12.5 MG: 12.5 TABLET ORAL at 12:10

## 2022-10-12 NOTE — PROGRESS NOTES
POSTPARTUM PROGRESS NOTE    Subjective:     PPD/POD#: 2   Procedure: Repeat LTCS   EGA: 39w1d   N/V: No   F/C: No   Abd Pain: Mild, well-controlled with oral pain medication   Lochia: Mild   Voiding: Yes   Ambulating: Yes   Bowel fnc: Yes   Breastfeeding: Yes   Contraception: Per primary OB   Circumcision: N/A, female     Objective:      Temp:  [97.8 °F (36.6 °C)-98.1 °F (36.7 °C)] 97.8 °F (36.6 °C)  Pulse:  [77-88] 80  Resp:  [16-18] 17  SpO2:  [98 %-100 %] 100 %  BP: (134-140)/(68-76) 137/76    Lung: Normal respiratory effort   Abdomen: Soft, appropriately tender   Uterus: Firm, no fundal tenderness   Incision: Bandage in place without shadowing   : Deferred   Extremities: Bilateral trace edema     Lab Review    Recent Labs   Lab 10/11/22  0616      K 3.9      CO2 24   BUN 8   CREATININE 0.6   GLU 93   PROT 5.2*   BILITOT 0.3   ALKPHOS 103   ALT 31   AST 33         Recent Labs   Lab 10/10/22  0515 10/11/22  0616   WBC 11.26 11.07   HGB 11.1* 9.4*   HCT 34.0* 29.3*   MCV 83 84    203           I/O  No intake or output data in the 24 hours ending 10/12/22 0726       Assessment and Plan:   Postpartum care:  - Patient doing well.  - Continue routine management and advances.    gHTN, h/o preE  - BP as above  - asymptomatic  - preE labs as above  - Mag: not indicated  - Hypertensive agent not indicated at this time    UTI  - UA with nitrites, leuks, protein, blood  - culture pending  - started on keflex 500mg BID for 7d    NAFLD  - normal LFTs as above    Acute blood loss anemia  - H/H as above  - QBL: 985cc  - asymptomatic  - iron/colace        Kanwal Madison MD   OB/GYN PGY1  [facility]

## 2022-10-12 NOTE — PLAN OF CARE
Problem:  Fall Injury Risk  Goal: Absence of Fall, Infant Drop and Related Injury  Outcome: Met     Problem: Adult Inpatient Plan of Care  Goal: Plan of Care Review  Outcome: Met  Goal: Patient-Specific Goal (Individualized)  Outcome: Met  Goal: Absence of Hospital-Acquired Illness or Injury  Outcome: Met  Goal: Optimal Comfort and Wellbeing  Outcome: Met  Goal: Readiness for Transition of Care  Outcome: Met     Problem: Bariatric Environmental Safety  Goal: Safety Maintained with Care  Outcome: Met     Problem: Breastfeeding  Goal: Effective Breastfeeding  Outcome: Met     Problem: Adjustment to Role Transition (Postpartum  Delivery)  Goal: Successful Maternal Role Transition  Outcome: Met     Problem: Bleeding (Postpartum  Delivery)  Goal: Hemostasis  Outcome: Met     Problem: Infection (Postpartum  Delivery)  Goal: Absence of Infection Signs and Symptoms  Outcome: Met     Problem: Pain (Postpartum  Delivery)  Goal: Acceptable Pain Control  Outcome: Met     Problem: Postoperative Nausea and Vomiting (Postpartum  Delivery)  Goal: Nausea and Vomiting Relief  Outcome: Met     Problem: Postoperative Urinary Retention (Postpartum  Delivery)  Goal: Effective Urinary Elimination  Outcome: Met     Problem: Infection  Goal: Absence of Infection Signs and Symptoms  Outcome: Met     Patient doing well. VS stable. Pain controlled well with oral medication. Patient to follow up in 6 weeks for PP check.    Patient to be DC to home with infant in arms via wheelchair by transport. No distress noted at this time.

## 2022-10-12 NOTE — DISCHARGE SUMMARY
Delivery Discharge Summary  Obstetrics      Primary OB Clinician: Denita Xiong MD      Admission date: 10/10/2022  Discharge date: 10/12/2022    Disposition: To home, self care    Discharge Diagnosis List:      Patient Active Problem List   Diagnosis    Pain of both shoulder joints    Elevated liver enzymes    Fatty liver    Obesity (BMI 30-39.9)    Primary hypertension    Hepatic fibrosis, early fibrosis    Wears contact lenses    History of        Procedure: rLTCS    Hospital Course:  Bushra Kahn is a 37 y.o. now , POD #2 who was admitted on 10/10/2022 at 39w1d for scheduled rLTCS. Patient was subsequently admitted to labor and delivery unit with signed consents.     C/S was uncomplicated - see delivery note for full details.    Patient did end up meeting criteria for diagnosis of gHTN during her stay, but pressures were not persistent or high enough to qualify for initiation of an anti-hypertensive medication.     Her postpartum course was complicated by development of an UTI for which she was started on keflex. On discharge day, patient's pain is controlled with oral pain medications. Pt is tolerating ambulation without SOB or CP, and regular diet without N/V. Reports lochia is mild. Denies any HA, vision changes, F/C, LE swelling. Denies any breast pain/soreness.    Pt in stable condition and ready for discharge. She has been instructed to start and/or continue medications and follow up with her obstetrics provider as listed below.    Pertinent studies:  CBC  Recent Labs   Lab 10/10/22  0515 10/11/22  0616   WBC 11.26 11.07   HGB 11.1* 9.4*   HCT 34.0* 29.3*   MCV 83 84    203          Immunization History   Administered Date(s) Administered    COVID-19, MRNA, LN-S, PF (Pfizer) (Purple Cap) 2021, 2021    DTP 1987, 1987, 1988, 04/15/1989, 1992    Hepatitis A / Hepatitis B 10/27/2021, 2021    Hepatitis B, Pediatric/Adolescent  08/10/2001, 2004, 2005    Influenza 2018, 2019, 10/05/2020    Influenza - High Dose - PF (65 years and older) 10/13/2017    Influenza - Quadrivalent - PF *Preferred* (6 months and older) 2016, 10/05/2018, 2019, 10/05/2020, 10/05/2020, 10/12/2021, 10/12/2021    MMR 1988, 10/29/1992, 2000, 2008    OPV 1987, 1987, 1988, 04/15/1989, 1992    Td (ADULT) 08/10/2001    Tdap 2021, 2022        Delivery:    Episiotomy:     Lacerations:     Repair suture:     Repair # of packets:     Blood loss (ml):       Birth information:  YOB: 2020   Time of birth: 7:31 AM   Sex: female   Delivery type: , Low Transverse   Gestational Age: 39w1d    Delivery Clinician:      Other providers:       Additional  information:  Forceps:    Vacuum:    Breech:    Observed anomalies      Living?:           APGARS  One minute Five minutes Ten minutes   Skin color:         Heart rate:         Grimace:         Muscle tone:         Breathing:         Totals: 8  9        Placenta: Delivered:       appearance  Patient Instructions:   Current Discharge Medication List        START taking these medications    Details   acetaminophen (TYLENOL) 325 MG tablet Take 2 tablets (650 mg total) by mouth every 6 (six) hours as needed for Pain.  Qty: 60 tablet, Refills: 3      cephALEXin (KEFLEX) 500 MG capsule Take 1 capsule (500 mg total) by mouth every 12 (twelve) hours.  Qty: 13 capsule, Refills: 0      ibuprofen (ADVIL,MOTRIN) 600 MG tablet Take 1 tablet (600 mg total) by mouth every 6 (six) hours as needed for Other.  Qty: 60 tablet, Refills: 3      oxyCODONE (ROXICODONE) 5 MG immediate release tablet Take 1 tablet (5 mg total) by mouth every 6 (six) hours as needed for Pain.  Qty: 10 tablet, Refills: 0    Comments: Quantity prescribed more than 7 day supply? No      promethazine (PHENERGAN) 12.5 MG Tab Take 1 tablet (12.5 mg total) by mouth every 6  (six) hours as needed.  Qty: 30 tablet, Refills: 0      senna-docusate 8.6-50 mg (PERICOLACE) 8.6-50 mg per tablet Take 1 tablet by mouth nightly as needed for Constipation.  Qty: 30 tablet, Refills: 0           CONTINUE these medications which have NOT CHANGED    Details   prenatal vit/iron fum/folic ac (PRENATAL 1+1 ORAL) Take by mouth.           STOP taking these medications       aspirin (ECOTRIN) 81 MG EC tablet Comments:   Reason for Stopping:         omeprazole (PRILOSEC) 20 MG capsule Comments:   Reason for Stopping:               Discharge Procedure Orders   Diet Adult Regular     Lifting restrictions   Order Comments: No lifting more than infant for 6 weeks.     No driving until:   Order Comments: No driving until no longer taking pain medication and feels comfortable stepping on the brake.     Pelvic Rest   Order Comments: Nothing in vagina including intercourse for 6 weeks.     Notify your health care provider if you experience any of the following:  temperature >100.4     Notify your health care provider if you experience any of the following:  persistent nausea and vomiting or diarrhea     Notify your health care provider if you experience any of the following:  severe uncontrolled pain     Notify your health care provider if you experience any of the following:  redness, tenderness, or signs of infection (pain, swelling, redness, odor or green/yellow discharge around incision site)     Notify your health care provider if you experience any of the following:  severe persistent headache     Notify your health care provider if you experience any of the following:  persistent dizziness, light-headedness, or visual disturbances     Notify your health care provider if you experience any of the following:  increased confusion or weakness     Notify your health care provider if you experience any of the following:   Order Comments: Heavy vaginal bleeding, saturating more than 2 pads in 1 hour.     Activity as  tolerated        Follow-up Information       Denita Xiong MD Follow up in 6 week(s).    Specialties: Obstetrics, Obstetrics and Gynecology  Why: Postpartum visit  Contact information:  [address] [place], [place] [id number] [phone]               Denita Xiong MD Follow up in 1 week(s).    Specialties: Obstetrics, Obstetrics and Gynecology  Why: BP check  Contact information:  [address] [place], [place] [id number] [phone]                              Marlen Christian M.D.  OB/GYN PGY-4

## 2022-10-12 NOTE — PLAN OF CARE
VSS. Uterus firm w/o massage. Bleeding continues to improve. Incision dressing clean, dry, and intact. Pt ambulating independently. Voiding spontaneously, passing flatus. Pain managed adequately w/ medication. Plan of care reviewed with pt and spouse. No new concerns expressed at this time. Will continue to monitor and intervene as necessary.

## 2022-10-12 NOTE — LACTATION NOTE
"Visited patient in room to provide discharge instructions.  Patient states she no longer plans to pump and bottle feed EBM, it's "too much c an 18 month old at home".  Support of decision provided.  Encouraged to call the [facility] if she changes her mind as her milk comes in or if she decides to provide a partial milk supply.  Requested staff nurse to provide Non-nursing engorgement education.  "

## 2022-10-12 NOTE — PROGRESS NOTES
HTN education given with handout, pt states understanding and will send BP through [facility] in 3-5 days.

## 2022-10-13 ENCOUNTER — PATIENT MESSAGE (OUTPATIENT)
Dept: OBSTETRICS AND GYNECOLOGY | Facility: OTHER | Age: 37
End: 2022-10-13
Payer: COMMERCIAL

## 2022-10-19 ENCOUNTER — PATIENT MESSAGE (OUTPATIENT)
Dept: OBSTETRICS AND GYNECOLOGY | Facility: CLINIC | Age: 37
End: 2022-10-19
Payer: COMMERCIAL

## 2022-10-19 ENCOUNTER — POSTPARTUM VISIT (OUTPATIENT)
Dept: OBSTETRICS AND GYNECOLOGY | Facility: CLINIC | Age: 37
End: 2022-10-19
Payer: COMMERCIAL

## 2022-10-19 VITALS
WEIGHT: 240.31 LBS | HEIGHT: 64 IN | SYSTOLIC BLOOD PRESSURE: 124 MMHG | DIASTOLIC BLOOD PRESSURE: 68 MMHG | BODY MASS INDEX: 41.03 KG/M2

## 2022-10-19 PROCEDURE — 99999 PR PBB SHADOW E&M-EST. PATIENT-LVL III: CPT | Mod: PBBFAC,,,

## 2022-10-19 PROCEDURE — 0503F PR POSTPARTUM CARE VISIT: ICD-10-PCS | Mod: CPTII,S$GLB,, | Performed by: OBSTETRICS & GYNECOLOGY

## 2022-10-19 PROCEDURE — 99999 PR PBB SHADOW E&M-EST. PATIENT-LVL III: ICD-10-PCS | Mod: PBBFAC,,,

## 2022-10-19 PROCEDURE — 0503F POSTPARTUM CARE VISIT: CPT | Mod: CPTII,S$GLB,, | Performed by: OBSTETRICS & GYNECOLOGY

## 2022-10-19 NOTE — PROGRESS NOTES
History & Physical  Gynecology      SUBJECTIVE:     Chief Complaint:   Postpartum Care (B/P Check)       History of Present Illness  Bushra Kahn is a 37 y.o. female   presents for post partum BP recheck. Reports giving birth via LTCS on 10/10. Had a girl. Had GHTN while in hospital, did not need treatment. HX of Pre.E with last pregnancy. Denies headache, blurred vision, dizziness, swelling, RUQ abdominal pain, CP, SOB. Reports that she has had occasional headaches at home. Not today. Formula feeding. States her mood is good.     BP Readings from Last 2 Encounters:   10/19/22 124/68   10/12/22 130/67          Review of patient's allergies indicates:   Allergen Reactions    Benzyl salicylate Rash     acne    Cefdinir Other (See Comments)     Nausea/vomiting    Nifedipine Other (See Comments)     headache    Sudafed [pseudoephedrine hcl] Other (See Comments)     Emotional lability       Past Medical History:   Diagnosis Date    Acid reflux     Allergy     Pre-eclampsia, severe, postpartum condition 3/29/2021    Severe hypertension affecting pregnancy in third trimester 3/22/2021    Severe pre-eclampsia in third trimester 3/22/2021     Past Surgical History:   Procedure Laterality Date    ADENOIDECTOMY  age 16    biopsy of breast  Right 2014     SECTION N/A 3/24/2021    Procedure:  SECTION;  Surgeon: Denita Xiong MD;  Location: [facility];  Service: OB/GYN;  Laterality: N/A;     SECTION N/A 10/10/2022    Procedure:  SECTION;  Surgeon: Denita Xiong MD;  Location: [facility];  Service: OB/GYN;  Laterality: N/A;    CHOLECYSTECTOMY      Laparoscopic    ESOPHAGOGASTRODUODENOSCOPY N/A 2018    Procedure: ESOPHAGOGASTRODUODENOSCOPY (EGD);  Surgeon: Aurelio James MD;  Location: [address];  Service: Endoscopy;  Laterality: N/A;     OB History          2    Para   2    Term   2       0    AB   0    Living   2         SAB   0    IAB   0     Ectopic   0    Multiple   0    Live Births   2               Family History   Problem Relation Age of Onset    Lupus Mother     Kidney disease Mother     Rheum arthritis Mother     Hypertension Father     Sleep apnea Father     Breast cancer Paternal Aunt 50    Cataracts Paternal Grandmother     Colon cancer Paternal Grandmother     Cataracts Paternal Grandfather     Cancer Paternal Grandfather         Lung cancer- smoker    Amblyopia Neg Hx     Blindness Neg Hx     Glaucoma Neg Hx     Macular degeneration Neg Hx     Retinal detachment Neg Hx     Strabismus Neg Hx     Stroke Neg Hx     Ovarian cancer Neg Hx     Diabetes Neg Hx     Melanoma Neg Hx     Psoriasis Neg Hx     Asthma Neg Hx     Allergies Neg Hx     Allergic rhinitis Neg Hx     Angioedema Neg Hx     Eczema Neg Hx     Immunodeficiency Neg Hx     Rhinitis Neg Hx     Urticaria Neg Hx     Atopy Neg Hx      Social History     Tobacco Use    Smoking status: Former     Packs/day: 0.10     Years: 13.00     Pack years: 1.30     Types: Cigarettes     Quit date: 8/15/2018     Years since quittin.1    Smokeless tobacco: Never   Substance Use Topics    Alcohol use: Not Currently     Comment: couple of servings per year, no daily or heavy alcohol use     Drug use: No       Current Outpatient Medications   Medication Sig    acetaminophen (TYLENOL) 325 MG tablet Take 2 tablets (650 mg total) by mouth every 6 (six) hours as needed for Pain.    ibuprofen (ADVIL,MOTRIN) 600 MG tablet Take 1 tablet (600 mg total) by mouth every 6 (six) hours as needed for Other.    cephALEXin (KEFLEX) 500 MG capsule Take 1 capsule (500 mg total) by mouth every 12 (twelve) hours. (Patient not taking: Reported on 10/19/2022)    oxyCODONE (ROXICODONE) 5 MG immediate release tablet Take 1 tablet (5 mg total) by mouth every 6 (six) hours as needed for Pain. (Patient not taking: Reported on 10/19/2022)    prenatal vit/iron fum/folic ac (PRENATAL 1+1 ORAL) Take by mouth.    promethazine  (PHENERGAN) 12.5 MG Tab Take 1 tablet (12.5 mg total) by mouth every 6 (six) hours as needed. (Patient not taking: Reported on 10/19/2022)    senna-docusate 8.6-50 mg (PERICOLACE) 8.6-50 mg per tablet Take 1 tablet by mouth nightly as needed for Constipation. (Patient not taking: Reported on 10/19/2022)     No current facility-administered medications for this visit.         Review of Systems:  Review of Systems   Constitutional:  Negative for chills, diaphoresis and fever.   Respiratory:  Negative for cough, shortness of breath and wheezing.    Gastrointestinal:  Negative for constipation, diarrhea, nausea and vomiting.   Genitourinary:  Negative for dysuria, flank pain, pelvic pain and vaginal pain.   Neurological:  Negative for syncope and numbness.   Psychiatric/Behavioral:  Negative for depression. The patient is not nervous/anxious.       OBJECTIVE:     Physical Exam:  Physical Exam  Constitutional:       Appearance: Normal appearance.   Cardiovascular:      Rate and Rhythm: Normal rate and regular rhythm.   Pulmonary:      Effort: Pulmonary effort is normal.      Breath sounds: Normal breath sounds.   Abdominal:      General: A surgical scar is present.      Comments: Incision C/D/I   Skin:     General: Skin is warm and dry.   Neurological:      Mental Status: She is alert and oriented to person, place, and time.   Psychiatric:         Mood and Affect: Mood normal.         Behavior: Behavior normal.       ASSESSMENT:       ICD-10-CM ICD-9-CM    1. Encounter for care and examination of lactating mother  Z39.1 V24.1              Plan:      - Pt does take BP at home  - Discussed that if she develops a headache that wont go away with medication, RUQ pain, blurry vision, she needs to report to ED   - Should also report to ED if severe BPs>160s/110 or if mild range pressures >140s/90s consistently   - F/U for PP visit in ~4 weeks     Counseling time: 15 minutes    PATTIE Avalos

## 2022-10-20 ENCOUNTER — OFFICE VISIT (OUTPATIENT)
Dept: OBSTETRICS AND GYNECOLOGY | Facility: CLINIC | Age: 37
End: 2022-10-20
Payer: COMMERCIAL

## 2022-10-20 ENCOUNTER — PATIENT MESSAGE (OUTPATIENT)
Dept: OBSTETRICS AND GYNECOLOGY | Facility: CLINIC | Age: 37
End: 2022-10-20
Payer: COMMERCIAL

## 2022-10-20 ENCOUNTER — HOSPITAL ENCOUNTER (OUTPATIENT)
Facility: OTHER | Age: 37
Discharge: HOME | End: 2022-10-22
Attending: OBSTETRICS & GYNECOLOGY | Admitting: OBSTETRICS & GYNECOLOGY
Payer: COMMERCIAL

## 2022-10-20 VITALS
DIASTOLIC BLOOD PRESSURE: 88 MMHG | WEIGHT: 240.31 LBS | HEIGHT: 64 IN | BODY MASS INDEX: 41.03 KG/M2 | SYSTOLIC BLOOD PRESSURE: 138 MMHG

## 2022-10-20 DIAGNOSIS — O11.9 CHRONIC HYPERTENSION WITH SUPERIMPOSED PRE-ECLAMPSIA: Primary | ICD-10-CM

## 2022-10-20 DIAGNOSIS — R07.9 CHEST PAIN: ICD-10-CM

## 2022-10-20 DIAGNOSIS — Z01.30 BP CHECK: Primary | ICD-10-CM

## 2022-10-20 LAB
ALBUMIN SERPL BCP-MCNC: 3.2 G/DL (ref 3.5–5.2)
ALP SERPL-CCNC: 82 U/L (ref 55–135)
ALT SERPL W/O P-5'-P-CCNC: 32 U/L (ref 10–44)
ANION GAP SERPL CALC-SCNC: 10 MMOL/L (ref 8–16)
ANISOCYTOSIS BLD QL SMEAR: SLIGHT
AST SERPL-CCNC: 21 U/L (ref 10–40)
BASOPHILS # BLD AUTO: 0.03 K/UL (ref 0–0.2)
BASOPHILS NFR BLD: 0.4 % (ref 0–1.9)
BILIRUB SERPL-MCNC: 0.4 MG/DL (ref 0.1–1)
BUN SERPL-MCNC: 16 MG/DL (ref 6–20)
CALCIUM SERPL-MCNC: 8.8 MG/DL (ref 8.7–10.5)
CHLORIDE SERPL-SCNC: 110 MMOL/L (ref 95–110)
CO2 SERPL-SCNC: 21 MMOL/L (ref 23–29)
CREAT SERPL-MCNC: 0.8 MG/DL (ref 0.5–1.4)
DIFFERENTIAL METHOD: ABNORMAL
EOSINOPHIL # BLD AUTO: 0.2 K/UL (ref 0–0.5)
EOSINOPHIL NFR BLD: 2.5 % (ref 0–8)
ERYTHROCYTE [DISTWIDTH] IN BLOOD BY AUTOMATED COUNT: 14.6 % (ref 11.5–14.5)
EST. GFR  (NO RACE VARIABLE): >60 ML/MIN/1.73 M^2
GLUCOSE SERPL-MCNC: 87 MG/DL (ref 70–110)
HCT VFR BLD AUTO: 32 % (ref 37–48.5)
HGB BLD-MCNC: 10.2 G/DL (ref 12–16)
IMM GRANULOCYTES # BLD AUTO: 0.04 K/UL (ref 0–0.04)
IMM GRANULOCYTES NFR BLD AUTO: 0.5 % (ref 0–0.5)
LDH SERPL L TO P-CCNC: 274 U/L (ref 110–260)
LYMPHOCYTES # BLD AUTO: 2.2 K/UL (ref 1–4.8)
LYMPHOCYTES NFR BLD: 25.6 % (ref 18–48)
MCH RBC QN AUTO: 26.1 PG (ref 27–31)
MCHC RBC AUTO-ENTMCNC: 31.9 G/DL (ref 32–36)
MCV RBC AUTO: 82 FL (ref 82–98)
MONOCYTES # BLD AUTO: 0.4 K/UL (ref 0.3–1)
MONOCYTES NFR BLD: 4.3 % (ref 4–15)
NEUTROPHILS # BLD AUTO: 5.7 K/UL (ref 1.8–7.7)
NEUTROPHILS NFR BLD: 66.7 % (ref 38–73)
NRBC BLD-RTO: 0 /100 WBC
PLATELET # BLD AUTO: 243 K/UL (ref 150–450)
PLATELET BLD QL SMEAR: ABNORMAL
PMV BLD AUTO: 10.6 FL (ref 9.2–12.9)
POTASSIUM SERPL-SCNC: 4 MMOL/L (ref 3.5–5.1)
PROT SERPL-MCNC: 6.3 G/DL (ref 6–8.4)
RBC # BLD AUTO: 3.91 M/UL (ref 4–5.4)
SODIUM SERPL-SCNC: 141 MMOL/L (ref 136–145)
WBC # BLD AUTO: 8.51 K/UL (ref 3.9–12.7)

## 2022-10-20 PROCEDURE — 0503F POSTPARTUM CARE VISIT: CPT | Mod: CPTII,S$GLB,, | Performed by: REGISTERED NURSE

## 2022-10-20 PROCEDURE — G0378 HOSPITAL OBSERVATION PER HR: HCPCS

## 2022-10-20 PROCEDURE — 3079F DIAST BP 80-89 MM HG: CPT | Mod: CPTII,S$GLB,, | Performed by: REGISTERED NURSE

## 2022-10-20 PROCEDURE — 25000003 PHARM REV CODE 250

## 2022-10-20 PROCEDURE — 1160F PR REVIEW ALL MEDS BY PRESCRIBER/CLIN PHARMACIST DOCUMENTED: ICD-10-PCS | Mod: CPTII,S$GLB,, | Performed by: REGISTERED NURSE

## 2022-10-20 PROCEDURE — 96374 THER/PROPH/DIAG INJ IV PUSH: CPT

## 2022-10-20 PROCEDURE — 63600175 PHARM REV CODE 636 W HCPCS

## 2022-10-20 PROCEDURE — 0503F PR POSTPARTUM CARE VISIT: ICD-10-PCS | Mod: CPTII,S$GLB,, | Performed by: REGISTERED NURSE

## 2022-10-20 PROCEDURE — 3075F PR MOST RECENT SYSTOLIC BLOOD PRESS GE 130-139MM HG: ICD-10-PCS | Mod: CPTII,S$GLB,, | Performed by: REGISTERED NURSE

## 2022-10-20 PROCEDURE — 1160F RVW MEDS BY RX/DR IN RCRD: CPT | Mod: CPTII,S$GLB,, | Performed by: REGISTERED NURSE

## 2022-10-20 PROCEDURE — 3079F PR MOST RECENT DIASTOLIC BLOOD PRESSURE 80-89 MM HG: ICD-10-PCS | Mod: CPTII,S$GLB,, | Performed by: REGISTERED NURSE

## 2022-10-20 PROCEDURE — 80053 COMPREHEN METABOLIC PANEL: CPT | Performed by: OBSTETRICS & GYNECOLOGY

## 2022-10-20 PROCEDURE — 99285 EMERGENCY DEPT VISIT HI MDM: CPT | Mod: 25

## 2022-10-20 PROCEDURE — 83615 LACTATE (LD) (LDH) ENZYME: CPT | Performed by: OBSTETRICS & GYNECOLOGY

## 2022-10-20 PROCEDURE — 1159F MED LIST DOCD IN RCRD: CPT | Mod: CPTII,S$GLB,, | Performed by: REGISTERED NURSE

## 2022-10-20 PROCEDURE — 99285 EMERGENCY DEPT VISIT HI MDM: CPT | Mod: 24,,, | Performed by: OBSTETRICS & GYNECOLOGY

## 2022-10-20 PROCEDURE — 3075F SYST BP GE 130 - 139MM HG: CPT | Mod: CPTII,S$GLB,, | Performed by: REGISTERED NURSE

## 2022-10-20 PROCEDURE — 1159F PR MEDICATION LIST DOCUMENTED IN MEDICAL RECORD: ICD-10-PCS | Mod: CPTII,S$GLB,, | Performed by: REGISTERED NURSE

## 2022-10-20 PROCEDURE — 85025 COMPLETE CBC W/AUTO DIFF WBC: CPT

## 2022-10-20 PROCEDURE — 99999 PR PBB SHADOW E&M-EST. PATIENT-LVL III: ICD-10-PCS | Mod: PBBFAC,,, | Performed by: REGISTERED NURSE

## 2022-10-20 PROCEDURE — 99285 PR EMERGENCY DEPT VISIT,LEVEL V: ICD-10-PCS | Mod: 24,,, | Performed by: OBSTETRICS & GYNECOLOGY

## 2022-10-20 PROCEDURE — 99999 PR PBB SHADOW E&M-EST. PATIENT-LVL III: CPT | Mod: PBBFAC,,, | Performed by: REGISTERED NURSE

## 2022-10-20 PROCEDURE — 96376 TX/PRO/DX INJ SAME DRUG ADON: CPT

## 2022-10-20 RX ORDER — ACETAMINOPHEN 500 MG
1000 TABLET ORAL ONCE
Status: COMPLETED | OUTPATIENT
Start: 2022-10-20 | End: 2022-10-20

## 2022-10-20 RX ORDER — IBUPROFEN 600 MG/1
600 TABLET ORAL EVERY 6 HOURS PRN
Status: DISCONTINUED | OUTPATIENT
Start: 2022-10-20 | End: 2022-10-22 | Stop reason: HOSPADM

## 2022-10-20 RX ORDER — PROCHLORPERAZINE EDISYLATE 5 MG/ML
5 INJECTION INTRAMUSCULAR; INTRAVENOUS EVERY 6 HOURS PRN
Status: DISCONTINUED | OUTPATIENT
Start: 2022-10-20 | End: 2022-10-22 | Stop reason: HOSPADM

## 2022-10-20 RX ORDER — OXYCODONE HYDROCHLORIDE 5 MG/1
10 TABLET ORAL EVERY 4 HOURS PRN
Status: DISCONTINUED | OUTPATIENT
Start: 2022-10-20 | End: 2022-10-22 | Stop reason: HOSPADM

## 2022-10-20 RX ORDER — ONDANSETRON 8 MG/1
8 TABLET, ORALLY DISINTEGRATING ORAL EVERY 8 HOURS PRN
Status: DISCONTINUED | OUTPATIENT
Start: 2022-10-20 | End: 2022-10-21

## 2022-10-20 RX ORDER — HYDRALAZINE HYDROCHLORIDE 20 MG/ML
5 INJECTION INTRAMUSCULAR; INTRAVENOUS ONCE
Status: COMPLETED | OUTPATIENT
Start: 2022-10-20 | End: 2022-10-20

## 2022-10-20 RX ORDER — AMLODIPINE BESYLATE 5 MG/1
5 TABLET ORAL DAILY
Status: DISCONTINUED | OUTPATIENT
Start: 2022-10-20 | End: 2022-10-22 | Stop reason: HOSPADM

## 2022-10-20 RX ORDER — OXYCODONE HYDROCHLORIDE 5 MG/1
5 TABLET ORAL EVERY 4 HOURS PRN
Status: DISCONTINUED | OUTPATIENT
Start: 2022-10-20 | End: 2022-10-22 | Stop reason: HOSPADM

## 2022-10-20 RX ORDER — ACETAMINOPHEN 325 MG/1
650 TABLET ORAL EVERY 4 HOURS PRN
Status: DISCONTINUED | OUTPATIENT
Start: 2022-10-20 | End: 2022-10-22 | Stop reason: HOSPADM

## 2022-10-20 RX ORDER — SODIUM CHLORIDE 0.9 % (FLUSH) 0.9 %
10 SYRINGE (ML) INJECTION
Status: DISCONTINUED | OUTPATIENT
Start: 2022-10-20 | End: 2022-10-22 | Stop reason: HOSPADM

## 2022-10-20 RX ORDER — HYDRALAZINE HYDROCHLORIDE 20 MG/ML
10 INJECTION INTRAMUSCULAR; INTRAVENOUS ONCE
Status: COMPLETED | OUTPATIENT
Start: 2022-10-20 | End: 2022-10-20

## 2022-10-20 RX ADMIN — IBUPROFEN 600 MG: 600 TABLET ORAL at 11:10

## 2022-10-20 RX ADMIN — HYDRALAZINE HYDROCHLORIDE 5 MG: 20 INJECTION, SOLUTION INTRAMUSCULAR; INTRAVENOUS at 07:10

## 2022-10-20 RX ADMIN — AMLODIPINE BESYLATE 5 MG: 5 TABLET ORAL at 10:10

## 2022-10-20 RX ADMIN — HYDRALAZINE HYDROCHLORIDE 10 MG: 20 INJECTION, SOLUTION INTRAMUSCULAR; INTRAVENOUS at 07:10

## 2022-10-20 RX ADMIN — ACETAMINOPHEN 1000 MG: 500 TABLET, FILM COATED ORAL at 07:10

## 2022-10-20 NOTE — ED TRIAGE NOTES
Pt presents to [facility] c/o elevated BPs.  She is 10 days postpartum, s/p C/S.  Pt has a history of pre-e in her last pregnancy for which she received a mag infusion.  Pt was not discharged from the hospital with BP meds.  Pt said she had a headache a few days ago but it has resolved with a combo of ibuprofen, tylenol and caffeine.  Pt also reports pain in her chest.  Can not discern whether it is muscular vs engorgement.  No dyspnea or SOB.  Dr. Christensen notified of pt's arrival.

## 2022-10-20 NOTE — TELEPHONE ENCOUNTER
Spoke with pt. C/o bp in 150s/90s    Drank caffeine and headaches went away. Declines vision changes. Pt advised per Bushra Beatty to go back to walk in   . Appt made

## 2022-10-20 NOTE — ED PROVIDER NOTES
Encounter Date: 10/20/2022       History     Chief Complaint   Patient presents with    Hypertension     HPI  Bushra Kahn is a 37 y.o. [id number] POD#9 from  presents complaining of blood pressures and headache.     Patient has a h/o of Pre-E in a previous pregnancy. Reports elevated pressures at home. Also reports a headache that was relieved with tylenol earlier. She reports that headache slowly returning in the [facility]. Pt also reports some mild chest pain. Can not discern whether it is muscular vs engorgement She denies vision changes, RUQ pain or SOB.     This IUP WAS complicated by h/o Pre-E, h/o of elevated LFTs, now with NL LFTs, fatty liver.  Patient denies contractions, denies vaginal bleeding, denies LOF.   Fetal Movement: normal.     Review of patient's allergies indicates:   Allergen Reactions    Benzyl salicylate Rash     acne    Cefdinir Other (See Comments)     Nausea/vomiting    Nifedipine Other (See Comments)     headache    Sudafed [pseudoephedrine hcl] Other (See Comments)     Emotional lability     Past Medical History:   Diagnosis Date    Acid reflux     Allergy     Pre-eclampsia, severe, postpartum condition 3/29/2021    Severe hypertension affecting pregnancy in third trimester 3/22/2021    Severe pre-eclampsia in third trimester 3/22/2021     Past Surgical History:   Procedure Laterality Date    ADENOIDECTOMY  age 16    biopsy of breast  Right 2014     SECTION N/A 3/24/2021    Procedure:  SECTION;  Surgeon: Denita Xiong MD;  Location: [facility];  Service: OB/GYN;  Laterality: N/A;     SECTION N/A 10/10/2022    Procedure:  SECTION;  Surgeon: Denita Xiong MD;  Location: [facility];  Service: OB/GYN;  Laterality: N/A;    CHOLECYSTECTOMY      Laparoscopic    ESOPHAGOGASTRODUODENOSCOPY N/A 2018    Procedure: ESOPHAGOGASTRODUODENOSCOPY (EGD);  Surgeon: Aurelio James MD;  Location: [address];  Service: Endoscopy;  Laterality:  N/A;     Family History   Problem Relation Age of Onset    Lupus Mother     Kidney disease Mother     Rheum arthritis Mother     Hypertension Father     Sleep apnea Father     Breast cancer Paternal Aunt 50    Cataracts Paternal Grandmother     Colon cancer Paternal Grandmother     Cataracts Paternal Grandfather     Cancer Paternal Grandfather         Lung cancer- smoker    Amblyopia Neg Hx     Blindness Neg Hx     Glaucoma Neg Hx     Macular degeneration Neg Hx     Retinal detachment Neg Hx     Strabismus Neg Hx     Stroke Neg Hx     Ovarian cancer Neg Hx     Diabetes Neg Hx     Melanoma Neg Hx     Psoriasis Neg Hx     Asthma Neg Hx     Allergies Neg Hx     Allergic rhinitis Neg Hx     Angioedema Neg Hx     Eczema Neg Hx     Immunodeficiency Neg Hx     Rhinitis Neg Hx     Urticaria Neg Hx     Atopy Neg Hx      Social History     Tobacco Use    Smoking status: Former     Packs/day: 0.10     Years: 13.00     Pack years: 1.30     Types: Cigarettes     Quit date: 8/15/2018     Years since quittin.1    Smokeless tobacco: Never   Substance Use Topics    Alcohol use: Not Currently     Comment: couple of servings per year, no daily or heavy alcohol use     Drug use: No     Review of Systems   Constitutional:  Negative for fever.   HENT:  Negative for rhinorrhea and sore throat.    Eyes:  Negative for visual disturbance.   Respiratory:  Positive for chest tightness. Negative for shortness of breath.    Cardiovascular:  Positive for chest pain.   Gastrointestinal:  Negative for abdominal pain and diarrhea.   Genitourinary:  Negative for difficulty urinating, dysuria and pelvic pain.   Musculoskeletal:  Negative for back pain.   Skin:  Negative for color change.   Neurological:  Positive for headaches. Negative for light-headedness.   Psychiatric/Behavioral:  Negative for confusion.    All other systems reviewed and are negative.    Physical Exam     Initial Vitals   BP Pulse Resp Temp SpO2   10/20/22 1838 10/20/22 1838  10/20/22 2302 10/20/22 2302 10/20/22 1838   (!) 171/81 (!) 56 18 98 °F (36.7 °C) 98 %      MAP       --                Physical Exam    Constitutional: She appears well-developed and well-nourished. No distress.   HENT:   Head: Normocephalic and atraumatic.   Eyes: EOM are normal.   Cardiovascular:  Normal rate, regular rhythm and normal heart sounds.     Exam reveals no gallop and no friction rub.       No murmur heard.  Pulmonary/Chest: Breath sounds normal. No respiratory distress. She has no wheezes. She has no rhonchi. She has no rales.   Abdominal: Abdomen is soft. There is no abdominal tenderness.   Pfannenstiel incision clean dry and intact    Musculoskeletal:         General: Normal range of motion.     Neurological: She is alert and oriented to person, place, and time.   Skin: Skin is warm, dry and intact. No rash noted.   Psychiatric: She has a normal mood and affect. Her speech is normal and behavior is normal.       ED Course   Procedures  Labs Reviewed   CBC W/ AUTO DIFFERENTIAL - Abnormal; Notable for the following components:       Result Value    RBC 3.91 (*)     Hemoglobin 10.2 (*)     Hematocrit 32.0 (*)     MCH 26.1 (*)     MCHC 31.9 (*)     RDW 14.6 (*)     Platelet Estimate Clumped (*)     All other components within normal limits   COMPREHENSIVE METABOLIC PANEL - Abnormal; Notable for the following components:    CO2 21 (*)     Albumin 3.2 (*)     All other components within normal limits   LACTATE DEHYDROGENASE - Abnormal; Notable for the following components:     (*)     All other components within normal limits   POCT URINALYSIS, DIPSTICK OR TABLET REAGENT, AUTOMATED, WITH MICROSCOP          Imaging Results    None          Medications   sodium chloride 0.9% flush 10 mL (has no administration in time range)   ondansetron disintegrating tablet 8 mg (has no administration in time range)   prochlorperazine injection Soln 5 mg (has no administration in time range)   acetaminophen tablet  650 mg (has no administration in time range)   oxyCODONE immediate release tablet 5 mg (has no administration in time range)   oxyCODONE immediate release tablet 10 mg (has no administration in time range)   amLODIPine tablet 5 mg (5 mg Oral Given 10/20/22 2205)   ibuprofen tablet 600 mg (600 mg Oral Given 10/20/22 2316)   acetaminophen tablet 1,000 mg (1,000 mg Oral Given 10/20/22 190)   hydrALAZINE injection 5 mg (5 mg Intravenous Given 10/20/22 1916)   hydrALAZINE injection 10 mg (10 mg Intravenous Given 10/20/22 1947)     Medical Decision Making:   ED Management:  37 y.o.  POD #9 from rLCTS now presenting for postpartum Pre-E  - Three severe sustained pressures in [facility]. Pushed hydral 5 and then 10 with return to mild range pressures.   - HR slightly bradycardic to the 40s. Other VSS  - EKG wnl   - headache resolved with tylenol   - CMP, CBC wnl   - Admitting for BP control   - Staff notified          Attending Attestation:   Physician Attestation Statement for Resident:  As the supervising MD   Physician Attestation Statement: I have personally seen and examined this patient.   I agree with the above history.  -:   As the supervising MD I agree with the above PE.     As the supervising MD I agree with the above treatment, course, plan, and disposition.   I was personally present during the critical portions of the procedure(s) performed by the resident and was immediately available in the ED to provide services and assistance as needed during the entire procedure.                             Clinical Impression:   Final diagnoses:  [O14.95] Pre-eclampsia, postpartum      ED Disposition Condition    Observation                 Barb Brasher MD  10/22/22 5919

## 2022-10-20 NOTE — PROGRESS NOTES
Postpartum BP Check  Pt presents for PP BP check. Pt delivered via  on 10/10/22 @ 39w1d. Pregnancy was complicated by history of pre-e with prior pregnancy, history of elevated LFTs, fatty liver and AMA. She was seen in walk-in clinic yesterday for BP check. BP today at home was 158/106, 156/84, 147/90. Pt reports HA relieved with Tylenol. Reports one episode of blurry vision/floaters earlier today, that resolved. She reports chest-pain/pressure that is more to her left side. She states it could be due to engorgement but feels it is more deep chest in location. She denies any breast redness or tenderness. She is formula feeding. She has history of pre-eclampsia with her last pregnancy (2021). BP today was 137/90 in clinic. No other complaints or concerns noted.     OB History    Para Term  AB Living   2 2 2 0 0 2   SAB IAB Ectopic Multiple Live Births   0 0 0 0 2      # Outcome Date GA Lbr Jonathan/2nd Weight Sex Delivery Anes PTL Lv   2 Term 10/10/22 39w1d  3.63 kg (8 lb) F CS-LTranv Spinal, EPI  MIC   1 Term 21 37w4d  3.28 kg (7 lb 3.7 oz) M CS-LTranv EPI N MIC        Baby's course has been uncomplicated. Baby is feeding by bottle.     ROS:  GENERAL: No fever, chills, fatigability.  VULVAR: No pain, no lesions and no itching.  VAGINAL: No relaxation, no itching, no discharge, no abnormal bleeding and no lesions.  ABDOMEN: No abdominal pain. Denies nausea. Denies vomiting. No diarrhea. No constipation.  BREAST: Denies pain. No lumps. No discharge.  URINARY: No incontinence, no nocturia, no frequency and no dysuria.  CARDIOVASCULAR: + chest pain. No shortness of breath. No leg cramps.  NEUROLOGICAL: + headache, relieved with Tylenol.       General appearance - alert, well appearing, and in no distress, oriented to person, place, and time, overweight, and well hydrated  Mental status - alert, oriented to person, place, and time, normal mood, behavior, speech, dress, motor activity, and  thought processes, affect appropriate to mood  Skin - coloration normal for race, good turgor, warm to touch, no rashes  Pelvic - deferred  Extremities - +1 edema to feet bilaterally; no redness or tenderness in the calves or thighs    PLAN:  Pt sent to [facility] due to chest to r/o PP Pre-eclampsia.        ZAIN Davidson

## 2022-10-21 ENCOUNTER — ANESTHESIA (OUTPATIENT)
Dept: OBSTETRICS AND GYNECOLOGY | Facility: OTHER | Age: 37
End: 2022-10-21

## 2022-10-21 ENCOUNTER — ANESTHESIA EVENT (OUTPATIENT)
Dept: OBSTETRICS AND GYNECOLOGY | Facility: OTHER | Age: 37
End: 2022-10-21

## 2022-10-21 PROBLEM — O11.9 CHRONIC HYPERTENSION WITH SUPERIMPOSED PRE-ECLAMPSIA: Status: ACTIVE | Noted: 2022-10-21

## 2022-10-21 LAB
ALBUMIN SERPL BCP-MCNC: 3.3 G/DL (ref 3.5–5.2)
ALP SERPL-CCNC: 89 U/L (ref 55–135)
ALT SERPL W/O P-5'-P-CCNC: 30 U/L (ref 10–44)
ANION GAP SERPL CALC-SCNC: 8 MMOL/L (ref 8–16)
AST SERPL-CCNC: 22 U/L (ref 10–40)
BASOPHILS # BLD AUTO: 0.02 K/UL (ref 0–0.2)
BASOPHILS NFR BLD: 0.3 % (ref 0–1.9)
BILIRUB SERPL-MCNC: 0.5 MG/DL (ref 0.1–1)
BUN SERPL-MCNC: 13 MG/DL (ref 6–20)
CALCIUM SERPL-MCNC: 9 MG/DL (ref 8.7–10.5)
CHLORIDE SERPL-SCNC: 109 MMOL/L (ref 95–110)
CO2 SERPL-SCNC: 24 MMOL/L (ref 23–29)
CREAT SERPL-MCNC: 0.7 MG/DL (ref 0.5–1.4)
DIFFERENTIAL METHOD: ABNORMAL
EOSINOPHIL # BLD AUTO: 0.2 K/UL (ref 0–0.5)
EOSINOPHIL NFR BLD: 3 % (ref 0–8)
ERYTHROCYTE [DISTWIDTH] IN BLOOD BY AUTOMATED COUNT: 14.4 % (ref 11.5–14.5)
EST. GFR  (NO RACE VARIABLE): >60 ML/MIN/1.73 M^2
GLUCOSE SERPL-MCNC: 80 MG/DL (ref 70–110)
HCT VFR BLD AUTO: 36 % (ref 37–48.5)
HGB BLD-MCNC: 11.4 G/DL (ref 12–16)
IMM GRANULOCYTES # BLD AUTO: 0.04 K/UL (ref 0–0.04)
IMM GRANULOCYTES NFR BLD AUTO: 0.6 % (ref 0–0.5)
LYMPHOCYTES # BLD AUTO: 1.5 K/UL (ref 1–4.8)
LYMPHOCYTES NFR BLD: 21.1 % (ref 18–48)
MCH RBC QN AUTO: 26.3 PG (ref 27–31)
MCHC RBC AUTO-ENTMCNC: 31.7 G/DL (ref 32–36)
MCV RBC AUTO: 83 FL (ref 82–98)
MONOCYTES # BLD AUTO: 0.3 K/UL (ref 0.3–1)
MONOCYTES NFR BLD: 4.7 % (ref 4–15)
NEUTROPHILS # BLD AUTO: 5.1 K/UL (ref 1.8–7.7)
NEUTROPHILS NFR BLD: 70.3 % (ref 38–73)
NRBC BLD-RTO: 0 /100 WBC
PLATELET # BLD AUTO: 307 K/UL (ref 150–450)
PMV BLD AUTO: 9.2 FL (ref 9.2–12.9)
POTASSIUM SERPL-SCNC: 4 MMOL/L (ref 3.5–5.1)
PROT SERPL-MCNC: 6.8 G/DL (ref 6–8.4)
RBC # BLD AUTO: 4.34 M/UL (ref 4–5.4)
SODIUM SERPL-SCNC: 141 MMOL/L (ref 136–145)
WBC # BLD AUTO: 7.25 K/UL (ref 3.9–12.7)

## 2022-10-21 PROCEDURE — 25000003 PHARM REV CODE 250

## 2022-10-21 PROCEDURE — 85025 COMPLETE CBC W/AUTO DIFF WBC: CPT | Performed by: STUDENT IN AN ORGANIZED HEALTH CARE EDUCATION/TRAINING PROGRAM

## 2022-10-21 PROCEDURE — 96365 THER/PROPH/DIAG IV INF INIT: CPT

## 2022-10-21 PROCEDURE — 96366 THER/PROPH/DIAG IV INF ADDON: CPT

## 2022-10-21 PROCEDURE — G0378 HOSPITAL OBSERVATION PER HR: HCPCS

## 2022-10-21 PROCEDURE — 63600175 PHARM REV CODE 636 W HCPCS: Performed by: STUDENT IN AN ORGANIZED HEALTH CARE EDUCATION/TRAINING PROGRAM

## 2022-10-21 PROCEDURE — 36415 COLL VENOUS BLD VENIPUNCTURE: CPT | Performed by: STUDENT IN AN ORGANIZED HEALTH CARE EDUCATION/TRAINING PROGRAM

## 2022-10-21 PROCEDURE — 99226 PR SUBSEQUENT OBSERVATION CARE,LEVEL III: CPT | Mod: 24,,, | Performed by: OBSTETRICS & GYNECOLOGY

## 2022-10-21 PROCEDURE — 80053 COMPREHEN METABOLIC PANEL: CPT | Performed by: STUDENT IN AN ORGANIZED HEALTH CARE EDUCATION/TRAINING PROGRAM

## 2022-10-21 PROCEDURE — 99226 PR SUBSEQUENT OBSERVATION CARE,LEVEL III: ICD-10-PCS | Mod: 24,,, | Performed by: OBSTETRICS & GYNECOLOGY

## 2022-10-21 RX ORDER — SODIUM CHLORIDE, SODIUM LACTATE, POTASSIUM CHLORIDE, CALCIUM CHLORIDE 600; 310; 30; 20 MG/100ML; MG/100ML; MG/100ML; MG/100ML
INJECTION, SOLUTION INTRAVENOUS CONTINUOUS
Status: DISCONTINUED | OUTPATIENT
Start: 2022-10-21 | End: 2022-10-22 | Stop reason: HOSPADM

## 2022-10-21 RX ORDER — MAGNESIUM SULFATE HEPTAHYDRATE 40 MG/ML
2 INJECTION, SOLUTION INTRAVENOUS CONTINUOUS
Status: DISCONTINUED | OUTPATIENT
Start: 2022-10-21 | End: 2022-10-22 | Stop reason: HOSPADM

## 2022-10-21 RX ORDER — MAGNESIUM SULFATE HEPTAHYDRATE 40 MG/ML
4 INJECTION, SOLUTION INTRAVENOUS ONCE
Status: COMPLETED | OUTPATIENT
Start: 2022-10-21 | End: 2022-10-21

## 2022-10-21 RX ORDER — MAGNESIUM SULFATE HEPTAHYDRATE 40 MG/ML
INJECTION, SOLUTION INTRAVENOUS
Status: CANCELLED | OUTPATIENT
Start: 2022-10-21

## 2022-10-21 RX ORDER — CALCIUM GLUCONATE 98 MG/ML
1 INJECTION, SOLUTION INTRAVENOUS
Status: DISCONTINUED | OUTPATIENT
Start: 2022-10-21 | End: 2022-10-22 | Stop reason: HOSPADM

## 2022-10-21 RX ADMIN — MAGNESIUM SULFATE HEPTAHYDRATE 4 G: 40 INJECTION, SOLUTION INTRAVENOUS at 10:10

## 2022-10-21 RX ADMIN — ACETAMINOPHEN 650 MG: 325 TABLET, FILM COATED ORAL at 06:10

## 2022-10-21 RX ADMIN — MAGNESIUM SULFATE IN WATER 2 G/HR: 40 INJECTION, SOLUTION INTRAVENOUS at 10:10

## 2022-10-21 RX ADMIN — IBUPROFEN 600 MG: 600 TABLET ORAL at 05:10

## 2022-10-21 RX ADMIN — ACETAMINOPHEN 650 MG: 325 TABLET, FILM COATED ORAL at 02:10

## 2022-10-21 RX ADMIN — AMLODIPINE BESYLATE 5 MG: 5 TABLET ORAL at 09:10

## 2022-10-21 RX ADMIN — IBUPROFEN 600 MG: 600 TABLET ORAL at 10:10

## 2022-10-21 RX ADMIN — SODIUM CHLORIDE, SODIUM LACTATE, POTASSIUM CHLORIDE, AND CALCIUM CHLORIDE: .6; .31; .03; .02 INJECTION, SOLUTION INTRAVENOUS at 10:10

## 2022-10-21 NOTE — MEDICAL/APP STUDENT
[facility] ([facility])  Progress Note    Patient Name: Bushra Kahn  MRN: [id number]  Patient Class: OP- Observation   Admission Date: 10/20/2022  Length of Stay: 0 days  Attending Physician: Denita Xiong MD  Primary Care Provider: Elsa Ferrari MD    Subjective:     Chief Complaint/Reason for Admission: Postpartum Preeclampsia     History of Present Illness:   Bushra Kahn is a 37 y.o. [id number] POD#11 s/p  section who presented to the [facility]  on   10/20 complaining of high blood pressures and headache.      Patient has a h/o of Pre-E in a previous pregnancy. Reports elevated pressures at home to 160s systolic. Also reports a headache that was relieved with tylenol earlier. She reports that headache slowly returning in the [facility]. Pt also reports some mild chest pain. Can not discern whether it is muscular vs engorgement She denies vision changes, RUQ pain or SOB.      This IUP WAS complicated by h/o Pre-E, h/o of elevated LFTs, now with normal LFTs, fatty liver.      10/20/2022: [facility] with HA and SRBP requiring Hydral /10. HA alleviated w/ 1g tylenol. Started on norvasc 5. Not started on mag.  10/21/2022: Overnight, pt reports that HA waxed and waned. Reports mild HA this morn. Other PreE ROS neg. Bps mild range.     Objective:   Patient Vitals for the past 24 hrs:   BP Temp Temp src Pulse Resp SpO2   10/21/22 0313 135/67 98.2 °F (36.8 °C) -- 69 18 99 %   10/20/22 2302 (!) 144/72 98 °F (36.7 °C) Oral 60 18 98 %   10/20/22 2118 (!) 155/76 -- -- 62 -- 99 %   10/20/22 2103 (!) 152/85 -- -- 72 -- 99 %   10/20/22 2045 (!) 173/85 -- -- 60 -- 100 %   10/20/22 2030 (!) 155/73 -- -- 67 -- 99 %   10/20/22 2015 (!) 140/67 -- -- 65 -- 99 %   10/20/22 2000 (!) 148/69 -- -- 67 -- 99 %   10/20/22 1930 (!) 172/80 -- -- (!) 48 -- 100 %   10/20/22 1900 (!) 183 -- -- (!) 50 -- 100 %   10/20/22 1838 (!) 171/81 -- -- (!) 56 -- 98 %     Physical Exam  Vitals reviewed.   Constitutional:        General: She is not in acute distress.     Appearance: Normal appearance.   HENT:      Head: Normocephalic and atraumatic.   Eyes:      Extraocular Movements: Extraocular movements intact.   Cardiovascular:      Rate and Rhythm: Normal rate.   Abdominal:      Palpations: Abdomen is soft.      Comments: Pfannenstiel incision clean, dry, intact, and healing nicely   Musculoskeletal:         General: Normal range of motion.   Neurological:      General: No focal deficit present.      Mental Status: She is alert and oriented to person, place, and time.   Psychiatric:         Mood and Affect: Mood normal.     Lab Review    Recent Labs   Lab 10/20/22  1952      K 4.0      CO2 21*   BUN 16   CREATININE 0.8   GLU 87   PROT 6.3   BILITOT 0.4   ALKPHOS 82   ALT 32   AST 21       Recent Labs   Lab 10/20/22  1900   WBC 8.51   HGB 10.2*   HCT 32.0*   MCV 82          I/O  No intake or output data in the 24 hours ending 10/21/22 0657     Assessment/Plan:      Preeclampsia, postpartum   - POD #11 rLTCS, admitted for BP control and HA.  - No diagnosis of Preeclampsia in this pregancy but has a hx of PreE in G1.  - In [facility] pt had SRBP requiring IV hydral 5/10. Started on norvasc 5. Pt reports allergy to procardia in the past (made her feel SOB).  - Mild range BP overnight.  - BP: (135-183)/(67-88) 135/67  - HA on admission that was alleviated with 1g tylenol and caffeine.   - Given normal labs and HA relived w/ tylenol, magnesium was not initiated on admit.   - Will initiate magnesium 4g loading/2g continuous for 24 hours today.  - Will give tylenol again this morning for recurrent HA. Can consider reglan/benadryl as well.   - EKG wnl on admit, chest pain resolved in [facility].  - Pre E labs: see above.   - Norvasc 5 mg daily  - Will need f/u w/ primary OBGYN on discharge.    Dispo: Will continue to monitor signs and symptoms of pp preE, 24 hours of mag, and will treat symptoms as needed.      Signed,    KOREY Sun  [facility], MS4  10/21/2022  7:06 AM    I have personally seen and evaluated the patient. I have reviewed the medical students note as documented above and agree with the documentation. I have personally performed an independent exam and history and agree the document above accurately reflects the patient's clinical course as written.   Temp:  [98 °F (36.7 °C)-98.4 °F (36.9 °C)] 98.4 °F (36.9 °C)  Pulse:  [48-85] 74  Resp:  [18] 18  SpO2:  [98 %-100 %] 99 %  BP: (130-183)/(63-88) 144/81    37 year old  s/p  section on POD 11 admitted for post partum preeclampsia with severe features.   She reports a mild headache this morning.   Recommended starting magnesium for seizure prophylaxis and discussed the risks and benefits.  Continue with Norvasc for BP control. BP control much improved  Monitor for signs of Magnesium toxicity.   Heart RRR  Lungs CTA B  Abdomen soft and non tender. Incision c/d/I.   Cr and LFT's WNL. Plt 307.   Questions answered.     Jesus Alberto Freed  10/21/2022

## 2022-10-21 NOTE — ANESTHESIA PREPROCEDURE EVALUATION
[facility]  Anesthesia Pre-Operative Evaluation         Patient Name: Bushra Kahn  YOB: 1985  MRN: [id number]    10/21/2022      Bushra Kahn is a 37 y.o. female  @ 39w1d who is POD 9 from C/S presents with elevated BP and HA concerning for Pre-E. This IUP WAS complicated by h/o Pre-E, h/o of elevated LFTs, now with NL LFTs, fatty liver    OB History    Para Term  AB Living   2 2 2 0 0 2   SAB IAB Ectopic Multiple Live Births   0 0 0 0 2      # Outcome Date GA Lbr Jonathan/2nd Weight Sex Delivery Anes PTL Lv   2 Term 10/10/22 39w1d  3.63 kg (8 lb) F CS-LTranv Spinal, EPI  MIC   1 Term 21 37w4d  3.28 kg (7 lb 3.7 oz) M CS-LTranv EPI N MIC       Review of patient's allergies indicates:   Allergen Reactions    Benzyl salicylate Rash     acne    Cefdinir Other (See Comments)     Nausea/vomiting    Nifedipine Other (See Comments)     headache    Sudafed [pseudoephedrine hcl] Other (See Comments)     Emotional lability       Wt Readings from Last 1 Encounters:   10/20/22 1729 109 kg (240 lb 4.8 oz)       BP Readings from Last 3 Encounters:   10/21/22 135/67   10/20/22 138/88   10/19/22 124/68       Patient Active Problem List   Diagnosis    Pain of both shoulder joints    Elevated liver enzymes    Fatty liver    Obesity (BMI 30-39.9)    Primary hypertension    Hepatic fibrosis, early fibrosis    Wears contact lenses    History of     Pre-eclampsia, postpartum       Past Surgical History:   Procedure Laterality Date    ADENOIDECTOMY  age 16    biopsy of breast  Right 2014     SECTION N/A 3/24/2021    Procedure:  SECTION;  Surgeon: Denita Xiong MD;  [facility];  Service: OB/GYN;  Laterality: N/A;     SECTION N/A 10/10/2022    Procedure:  SECTION;  Surgeon: Denita Xiong,  MD;  Location: [facility] L&D;  Service: OB/GYN;  Laterality: N/A;    CHOLECYSTECTOMY      Laparoscopic    ESOPHAGOGASTRODUODENOSCOPY N/A 2018    Procedure: ESOPHAGOGASTRODUODENOSCOPY (EGD);  Surgeon: Aurelio James MD;  Location: [facility] ([address] [place] [address]);  Service: Endoscopy;  Laterality: N/A;       Social History     Socioeconomic History    Marital status:    Tobacco Use    Smoking status: Former     Packs/day: 0.10     Years: 13.00     Pack years: 1.30     Types: Cigarettes     Quit date: 8/15/2018     Years since quittin.1    Smokeless tobacco: Never   Substance and Sexual Activity    Alcohol use: Not Currently     Comment: couple of servings per year, no daily or heavy alcohol use     Drug use: No    Sexual activity: Yes     Partners: Male     Birth control/protection: None     Comment:  2016, together since : Huang     Social Determinants of Health     Financial Resource Strain: Unknown    Difficulty of Paying Living Expenses: Patient refused   Food Insecurity: No Food Insecurity    Worried About Running Out of Food in the Last Year: Never true    Ran Out of Food in the Last Year: Never true   Transportation Needs: No Transportation Needs    Lack of Transportation (Medical): No    Lack of Transportation (Non-Medical): No   Physical Activity: Insufficiently Active    Days of Exercise per Week: 3 days    Minutes of Exercise per Session: 10 min   Stress: No Stress Concern Present    Feeling of Stress : Only a little   Social Connections: Unknown    Frequency of Communication with Friends and Family: More than three times a week    Frequency of Social Gatherings with Friends and Family: Once a week    Active Member of Clubs or Organizations: Yes    Attends Club or Organization Meetings: 1 to 4 times per year    Marital Status:    Housing Stability: Low Risk     Unable to Pay for Housing in the Last Year: No    Number of Places Lived in the Last Year:  1    Unstable Housing in the Last Year: No         Chemistry        Component Value Date/Time     10/20/2022 1952    K 4.0 10/20/2022 1952     10/20/2022 1952    CO2 21 (L) 10/20/2022 1952    BUN 16 10/20/2022 1952    CREATININE 0.8 10/20/2022 1952    GLU 87 10/20/2022 1952        Component Value Date/Time    CALCIUM 8.8 10/20/2022 1952    ALKPHOS 82 10/20/2022 1952    AST 21 10/20/2022 1952    ALT 32 10/20/2022 1952    BILITOT 0.4 10/20/2022 1952    ESTGFRAFRICA >60.0 07/26/2022 1611    EGFRNONAA >60.0 07/26/2022 1611            Lab Results   Component Value Date    WBC 8.51 10/20/2022    HGB 10.2 (L) 10/20/2022    HCT 32.0 (L) 10/20/2022    MCV 82 10/20/2022     10/20/2022       No results for input(s): PT, INR, PROTIME, APTT in the last 72 hours.            Pre-op Assessment    I have reviewed the Patient Summary Reports.     I have reviewed the Nursing Notes.    I have reviewed the Medications.     Review of Systems  Anesthesia Hx:  No problems with previous Anesthesia  History of prior surgery of interest to airway management or planning: Denies Family Hx of Anesthesia complications.   Denies Personal Hx of Anesthesia complications.   Hematology/Oncology:     Oncology Normal     Cardiovascular:    Denies Angina. ROBERTSON ECG has been reviewed. Severe Pre-E   Pulmonary:   Denies Asthma.  Denies Recent URI.    Renal/:  Renal/ Normal     Hepatic/GI:   GERD Denies Liver Disease.    Musculoskeletal:  Denies Spine Disorders    Neurological:   Denies CVA. Denies Seizures.    Endocrine:  Endocrine Normal Denies Diabetes.        Physical Exam  General: Well nourished, Alert, Oriented and Cooperative    Airway:  Mallampati: II           Anesthesia Plan  Type of Anesthesia, risks & benefits discussed:    Anesthesia Type: Gen ETT, Epidural  Intra-op Monitoring Plan: Standard ASA Monitors  Post Op Pain Control Plan: multimodal analgesia  Induction:  IV  Airway Plan: Video, Post-Induction  Informed  Consent: Informed consent signed with the Patient and all parties understand the risks and agree with anesthesia plan.  All questions answered.   ASA Score: 3  Day of Surgery Review of History & Physical: H&P Update referred to the surgeon/provider.    Ready For Surgery From Anesthesia Perspective.     .

## 2022-10-21 NOTE — H&P
[facility] - [facility] ([place])  Obstetrics & Gynecology  History & Physical    Patient Name: Bushra Kahn  MRN: [id number]  Admission Date: 10/20/2022  Primary Care Provider: Elsa Ferrari MD    Subjective:     Chief Complaint/Reason for Admission: post partum pre-E    History of Present Illness:   Bushra Kahn is a 37 y.o. [id number] POD#9 from  presents complaining of high blood pressures and headache.      Patient has a h/o of Pre-E in a previous pregnancy. Reports elevated pressures at home to 160s systolic. Also reports a headache that was relieved with tylenol earlier. She reports that headache slowly returning in the [facility]. Pt also reports some mild chest pain. Can not discern whether it is muscular vs engorgement She denies vision changes, RUQ pain or SOB.      This IUP WAS complicated by h/o Pre-E, h/o of elevated LFTs, now with NL LFTs, fatty liver.  Patient denies contractions, denies vaginal bleeding, denies LOF.   Fetal Movement: normal.        No current facility-administered medications on file prior to encounter.     Current Outpatient Medications on File Prior to Encounter   Medication Sig    acetaminophen (TYLENOL) 325 MG tablet Take 2 tablets (650 mg total) by mouth every 6 (six) hours as needed for Pain.    cephALEXin (KEFLEX) 500 MG capsule Take 1 capsule (500 mg total) by mouth every 12 (twelve) hours.    ibuprofen (ADVIL,MOTRIN) 600 MG tablet Take 1 tablet (600 mg total) by mouth every 6 (six) hours as needed for Other.    oxyCODONE (ROXICODONE) 5 MG immediate release tablet Take 1 tablet (5 mg total) by mouth every 6 (six) hours as needed for Pain.    prenatal vit/iron fum/folic ac (PRENATAL 1+1 ORAL) Take by mouth.    promethazine (PHENERGAN) 12.5 MG Tab Take 1 tablet (12.5 mg total) by mouth every 6 (six) hours as needed.    senna-docusate 8.6-50 mg (PERICOLACE) 8.6-50 mg per tablet Take 1 tablet by mouth nightly as needed for Constipation.       Review of patient's  allergies indicates:   Allergen Reactions    Benzyl salicylate Rash     acne    Cefdinir Other (See Comments)     Nausea/vomiting    Nifedipine Other (See Comments)     headache    Sudafed [pseudoephedrine hcl] Other (See Comments)     Emotional lability       Past Medical History:   Diagnosis Date    Acid reflux     Allergy     Pre-eclampsia, severe, postpartum condition 3/29/2021    Severe hypertension affecting pregnancy in third trimester 3/22/2021    Severe pre-eclampsia in third trimester 3/22/2021     OB History    Para Term  AB Living   2 2 2 0 0 2   SAB IAB Ectopic Multiple Live Births   0 0 0 0 2      # Outcome Date GA Lbr Jonathan/2nd Weight Sex Delivery Anes PTL Lv   2 Term 10/10/22 39w1d  3.63 kg (8 lb) F CS-LTranv Spinal, EPI  MIC   1 Term 21 37w4d  3.28 kg (7 lb 3.7 oz) M CS-LTranv EPI N MIC     Past Surgical History:   Procedure Laterality Date    ADENOIDECTOMY  age 16    biopsy of breast  Right 2014     SECTION N/A 3/24/2021    Procedure:  SECTION;  Surgeon: Denita Xiong MD;  Location: [facility]&D;  Service: OB/GYN;  Laterality: N/A;     SECTION N/A 10/10/2022    Procedure:  SECTION;  Surgeon: Denita Xiong MD;  Location: [facility];  Service: OB/GYN;  Laterality: N/A;    CHOLECYSTECTOMY      Laparoscopic    ESOPHAGOGASTRODUODENOSCOPY N/A 2018    Procedure: ESOPHAGOGASTRODUODENOSCOPY (EGD);  Surgeon: Aurelio James MD;  Location: [address];  Service: Endoscopy;  Laterality: N/A;     Family History       Problem Relation (Age of Onset)    Breast cancer Paternal Aunt (50)    Cancer Paternal Grandfather    Cataracts Paternal Grandmother, Paternal Grandfather    Colon cancer Paternal Grandmother    Hypertension Father    Kidney disease Mother    Lupus Mother    Rheum arthritis Mother    Sleep apnea Father          Tobacco Use    Smoking status: Former     Packs/day: 0.10     Years: 13.00     Pack years: 1.30     Types: Cigarettes      Quit date: 8/15/2018     Years since quittin.1    Smokeless tobacco: Never   Substance and Sexual Activity    Alcohol use: Not Currently     Comment: couple of servings per year, no daily or heavy alcohol use     Drug use: No    Sexual activity: Yes     Partners: Male     Birth control/protection: None     Comment:  2016, together since : Huang     Review of Systems   Constitutional:  Negative for chills, fatigue and fever.   HENT:  Negative for nasal congestion.    Eyes:  Negative for visual disturbance.   Respiratory:  Negative for cough, shortness of breath and wheezing.    Cardiovascular:  Positive for chest pain. Negative for leg swelling.   Gastrointestinal:  Negative for abdominal pain, constipation, nausea and vomiting.   Genitourinary:  Negative for dysuria, pelvic pain, vaginal bleeding and vaginal pain.   Musculoskeletal:  Negative for back pain.   Integumentary:  Negative for rash.   Neurological:  Positive for headaches. Negative for syncope.   Hematological:  Negative for adenopathy.   Psychiatric/Behavioral:  Negative for depression.    Objective:     Vital Signs (Most Recent):  Temp: 98 °F (36.7 °C) (10/20/22 2302)  Pulse: 60 (10/20/22 2302)  Resp: 18 (10/20/22 2302)  BP: (!) 144/72 (10/20/22 2302)  SpO2: 98 % (10/20/22 2302)   Vital Signs (24h Range):  Temp:  [98 °F (36.7 °C)] 98 °F (36.7 °C)  Pulse:  [48-72] 60  Resp:  [18] 18  SpO2:  [98 %-100 %] 98 %  BP: (138-183)/(67-88) 144/72        There is no height or weight on file to calculate BMI.  Patient's last menstrual period was 2022.    Physical Exam:   Constitutional: She is oriented to person, place, and time. She appears well-developed and well-nourished.    HENT:   Head: Normocephalic.      Cardiovascular:  Normal rate, regular rhythm and normal heart sounds.      Exam reveals no gallop.        Pulmonary/Chest: Effort normal and breath sounds normal. No respiratory distress. She has no wheezes. She has no  rales. She exhibits tenderness.        Abdominal: There is no abdominal tenderness. There is no guarding.   Pfannenstiel incision clean, dry, intact, and healing nicely              Musculoskeletal: Normal range of motion.       Neurological: She is alert and oriented to person, place, and time.    Skin: Skin is warm and dry.    Psychiatric: She has a normal mood and affect.     Laboratory:  Lab Results   Component Value Date    WBC 8.51 10/20/2022    HGB 10.2 (L) 10/20/2022    HCT 32.0 (L) 10/20/2022    MCV 82 10/20/2022     10/20/2022       CMP  Sodium   Date Value Ref Range Status   10/20/2022 141 136 - 145 mmol/L Final     Potassium   Date Value Ref Range Status   10/20/2022 4.0 3.5 - 5.1 mmol/L Final     Chloride   Date Value Ref Range Status   10/20/2022 110 95 - 110 mmol/L Final     CO2   Date Value Ref Range Status   10/20/2022 21 (L) 23 - 29 mmol/L Final     Glucose   Date Value Ref Range Status   10/20/2022 87 70 - 110 mg/dL Final     BUN   Date Value Ref Range Status   10/20/2022 16 6 - 20 mg/dL Final     Creatinine   Date Value Ref Range Status   10/20/2022 0.8 0.5 - 1.4 mg/dL Final     Calcium   Date Value Ref Range Status   10/20/2022 8.8 8.7 - 10.5 mg/dL Final     Total Protein   Date Value Ref Range Status   10/20/2022 6.3 6.0 - 8.4 g/dL Final     Albumin   Date Value Ref Range Status   10/20/2022 3.2 (L) 3.5 - 5.2 g/dL Final     Total Bilirubin   Date Value Ref Range Status   10/20/2022 0.4 0.1 - 1.0 mg/dL Final     Comment:     For infants and newborns, interpretation of results should be based  on gestational age, weight and in agreement with clinical  observations.    Premature Infant recommended reference ranges:  Up to 24 hours.............<8.0 mg/dL  Up to 48 hours............<12.0 mg/dL  3-5 days..................<15.0 mg/dL  6-29 days.................<15.0 mg/dL       Alkaline Phosphatase   Date Value Ref Range Status   10/20/2022 82 55 - 135 U/L Final     AST   Date Value Ref Range  Status   10/20/2022 21 10 - 40 U/L Final     ALT   Date Value Ref Range Status   10/20/2022 32 10 - 44 U/L Final     Anion Gap   Date Value Ref Range Status   10/20/2022 10 8 - 16 mmol/L Final     eGFR if    Date Value Ref Range Status   2022 >60.0 >60 mL/min/1.73 m^2 Final     eGFR if non    Date Value Ref Range Status   2022 >60.0 >60 mL/min/1.73 m^2 Final     Comment:     Calculation used to obtain the estimated glomerular filtration  rate (eGFR) is the CKD-EPI equation.          Diagnostic Results:  EKG- sinus rhythm    Assessment/Plan:   Assessment: Bushra Kahn is 37 y.o.  POD#9 from [facility] now presenting for postpartum pre-eclampsia  Active Diagnoses:    Diagnosis Date Noted POA    PRINCIPAL PROBLEM:  Pre-eclampsia, postpartum [O14.95] 10/20/2022 Yes      Problems Resolved During this Admission:     Plan:  -BP: (138-183)/(67-88) 144/72  -HR:  [48-72] 60  - Patient allergic to procardia  - Required two pushes (Hydral 5, hydral 10) in [facility] with return to mild range pressures   - Mild headache resolved with tylenol   - EKG wnl, CP resolved without intervention   -  Pre-E Labs: //243 Cr 0.8 ALT/AST    - Admitting for blood pressure control   - Starting norvasc 5 given allergy to procardia and mild bradycardia   - Given patient POD #10, with normal labs and no symptoms, magnesium not indiciated at his time    Avril Leiva MD  Obstetrics & Gynecology  [facility] - [facility] ([place])

## 2022-10-21 NOTE — PROGRESS NOTES
VSS. HA now resolved with oral pain medication. No questions or concerns at this time. Will continue to monitor.

## 2022-10-22 VITALS
HEART RATE: 71 BPM | DIASTOLIC BLOOD PRESSURE: 93 MMHG | SYSTOLIC BLOOD PRESSURE: 140 MMHG | OXYGEN SATURATION: 98 % | TEMPERATURE: 98 F | RESPIRATION RATE: 18 BRPM

## 2022-10-22 PROCEDURE — 99217 PR OBSERVATION CARE DISCHARGE: ICD-10-PCS | Mod: 24,,, | Performed by: OBSTETRICS & GYNECOLOGY

## 2022-10-22 PROCEDURE — G0378 HOSPITAL OBSERVATION PER HR: HCPCS

## 2022-10-22 PROCEDURE — 99217 PR OBSERVATION CARE DISCHARGE: CPT | Mod: 24,,, | Performed by: OBSTETRICS & GYNECOLOGY

## 2022-10-22 PROCEDURE — 63600175 PHARM REV CODE 636 W HCPCS: Performed by: STUDENT IN AN ORGANIZED HEALTH CARE EDUCATION/TRAINING PROGRAM

## 2022-10-22 PROCEDURE — 99225 PR SUBSEQUENT OBSERVATION CARE,LEVEL II: ICD-10-PCS | Mod: ,,, | Performed by: OBSTETRICS & GYNECOLOGY

## 2022-10-22 PROCEDURE — 99225 PR SUBSEQUENT OBSERVATION CARE,LEVEL II: CPT | Mod: ,,, | Performed by: OBSTETRICS & GYNECOLOGY

## 2022-10-22 PROCEDURE — 25000003 PHARM REV CODE 250

## 2022-10-22 PROCEDURE — 96366 THER/PROPH/DIAG IV INF ADDON: CPT

## 2022-10-22 RX ORDER — AMLODIPINE BESYLATE 5 MG/1
5 TABLET ORAL DAILY
Qty: 30 TABLET | Refills: 0 | Status: SHIPPED | OUTPATIENT
Start: 2022-10-23 | End: 2023-01-09 | Stop reason: ALTCHOICE

## 2022-10-22 RX ADMIN — IBUPROFEN 600 MG: 600 TABLET ORAL at 11:10

## 2022-10-22 RX ADMIN — AMLODIPINE BESYLATE 5 MG: 5 TABLET ORAL at 09:10

## 2022-10-22 RX ADMIN — ACETAMINOPHEN 650 MG: 325 TABLET, FILM COATED ORAL at 07:10

## 2022-10-22 RX ADMIN — IBUPROFEN 600 MG: 600 TABLET ORAL at 04:10

## 2022-10-22 RX ADMIN — MAGNESIUM SULFATE IN WATER 2 G/HR: 40 INJECTION, SOLUTION INTRAVENOUS at 04:10

## 2022-10-22 RX ADMIN — ACETAMINOPHEN 650 MG: 325 TABLET, FILM COATED ORAL at 12:10

## 2022-10-22 NOTE — PLAN OF CARE
No acute events overnight. VSS. Pt denies SOB, chest pain, RUQ pain, and blurry vision. Pt reported HA intermittently throughout night that resolved with tylenol and ibuprofen. Plan of care reviewed with pt. All questions and concerns addressed. Bed in low position with wheels locked and call light within reach.     Problem: Adult Inpatient Plan of Care  Goal: Plan of Care Review  Outcome: Ongoing, Progressing  Goal: Patient-Specific Goal (Individualized)  Outcome: Ongoing, Progressing  Goal: Absence of Hospital-Acquired Illness or Injury  Outcome: Ongoing, Progressing  Goal: Optimal Comfort and Wellbeing  Outcome: Ongoing, Progressing  Goal: Readiness for Transition of Care  Outcome: Ongoing, Progressing

## 2022-10-22 NOTE — DISCHARGE INSTRUCTIONS
Call your OB provider or [phone] after hours if you have any questions      Provider Procedure Text (D): After obtaining clear surgical margins the defect was repaired by another provider.

## 2022-10-22 NOTE — DISCHARGE SUMMARY
Discharge Summary  Gynecology      Admit Date: 10/20/2022    Discharge Date and Time: 10/22/2022     Attending Physician: No att. providers found    Principal Diagnoses: Chronic hypertension with superimposed pre-eclampsia    Active Hospital Problems    Diagnosis  POA    *Chronic hypertension with superimposed pre-eclampsia, post partum [O11.9]  Yes     On chart review patient with essential HTN outside of pregnancy at initial PNC visit         Resolved Hospital Problems   No resolved problems to display.       Procedures: * No surgery found *    Discharged Condition: good    Hospital Course:   Bushra Kahn is a 37 y.o. y.o.  female who presented on 10/20/2022 postpartum CHTN with LUÍS with SF.    10/20/2022: [facility] with HA and SRBP requiring Hydral 5/10. HA alleviated w/ 1g tylenol. Started on norvasc 5. Not started on mag.  10/21/2022: Overnight, pt reports that HA waxed and waned. Reports mild HA this morn. Other PreE ROS neg. Bps mild range.   10/22/2022 Mild HA present this AM, continues to wax and wane. Denies other sxs of Pre-E. Mag to be discontinued at 0930 this morning. Mag checks WNL. Bps mild range, no IV pushes required overnight. Will continue to monitor HA symptoms and BP s/p Mag. Continue on norvasc 5, if BP continue to be well controlled, can consider d/c this afternoon.  Temp:  [96.3 °F (35.7 °C)-98.2 °F (36.8 °C)] 97.7 °F (36.5 °C)  Pulse:  [57-93] 71  Resp:  [16-18] 18  SpO2:  [97 %-100 %] 98 %  BP: (122-154)/(58-93) 140/93  BP WNL today. Stable for discharge home    Consults: None    Significant Diagnostic Studies:  Recent Labs   Lab 10/20/22  1900 10/21/22  0923   WBC 8.51 7.25   HGB 10.2* 11.4*   HCT 32.0* 36.0*   MCV 82 83    307        Treatments:   1. S/p magnesium  2. BP control with norvasc 5    Disposition: Home or Self Care    Patient Instructions:   Discharge Medication List as of 10/22/2022  2:48 PM        START taking these medications    Details   amLODIPine  (NORVASC) 5 MG tablet Take 1 tablet (5 mg total) by mouth once daily., Starting Sun 10/23/2022, Until Mon 10/23/2023, Normal           CONTINUE these medications which have NOT CHANGED    Details   cephALEXin (KEFLEX) 500 MG capsule Take 1 capsule (500 mg total) by mouth every 12 (twelve) hours., Starting Wed 10/12/2022, Normal      ibuprofen (ADVIL,MOTRIN) 600 MG tablet Take 1 tablet (600 mg total) by mouth every 6 (six) hours as needed for Other., Starting Tue 10/11/2022, Normal      oxyCODONE (ROXICODONE) 5 MG immediate release tablet Take 1 tablet (5 mg total) by mouth every 6 (six) hours as needed for Pain., Starting Tue 10/11/2022, Normal      prenatal vit/iron fum/folic ac (PRENATAL 1+1 ORAL) Take by mouth., Historical Med      senna-docusate 8.6-50 mg (PERICOLACE) 8.6-50 mg per tablet Take 1 tablet by mouth nightly as needed for Constipation., Starting Tue 10/11/2022, Normal      acetaminophen (TYLENOL) 325 MG tablet Take 2 tablets (650 mg total) by mouth every 6 (six) hours as needed for Pain., Starting Tue 10/11/2022, Normal      promethazine (PHENERGAN) 12.5 MG Tab Take 1 tablet (12.5 mg total) by mouth every 6 (six) hours as needed., Starting Wed 10/12/2022, Normal             Discharge Procedure Orders   Diet Adult Regular     Diet Adult Regular     Sponge bath only until clinic visit     Keep surgical extremity elevated     Ice to affected area     Lifting restrictions     Other restrictions (specify):   Order Comments: Notify MD if bleeding 1 pad/hour for 2 consecutive hours.     No driving until:   Order Comments: Do not drive while taking narcotics.     Pelvic Rest   Order Comments: Pelvic rest until cleared by MD. Nothing in vagina till cleared by MD including tampons, douching, intercourse     No dressing needed     Notify your health care provider if you experience any of the following:  temperature >100.4     Notify your health care provider if you experience any of the following:  persistent  nausea and vomiting or diarrhea     Notify your health care provider if you experience any of the following:  severe uncontrolled pain     Notify your health care provider if you experience any of the following:  redness, tenderness, or signs of infection (pain, swelling, redness, odor or green/yellow discharge around incision site)     Notify your health care provider if you experience any of the following:  difficulty breathing or increased cough     Notify your health care provider if you experience any of the following:  severe persistent headache     Notify your health care provider if you experience any of the following:  worsening rash     Notify your health care provider if you experience any of the following:  persistent dizziness, light-headedness, or visual disturbances     Notify your health care provider if you experience any of the following:  increased confusion or weakness     Notify your health care provider if you experience any of the following:   Order Comments: Notify MD if bleeding 1 pad/hour for 2 consecutive hours.     Activity as tolerated     Shower on day dressing removed (No bath)     Weight bearing restrictions (specify):        Follow-up Information       Denita Xiong MD Follow up in 1 week(s).    Specialties: Obstetrics, Obstetrics and Gynecology  Why: BP check  Contact information:  [address] [address]  [phone]                           Anupama Hernandez MD  OBGYN PGY-2

## 2022-10-22 NOTE — PROGRESS NOTES
[facility] - Post[facility] ([facility])  Progress Note     Patient Name: Bushra Kahn  MRN: [id number]  Patient Class: OP- Observation          Admission Date: 10/20/2022  Length of Stay: 0 days  Attending Physician: Denita Xiong MD  Primary Care Provider: Elsa Ferrari MD     Subjective:      Chief Complaint/Reason for Admission: Postpartum Preeclampsia     History of Present Illness:   Bushra Kahn is a 37 y.o. [id number] POD#11 s/p  section who presented to the [facility]  on   10/20 complaining of high blood pressures and headache.      Patient has a h/o of Pre-E in a previous pregnancy. Reports elevated pressures at home to 160s systolic. Also reports a headache that was relieved with tylenol earlier. She reports that headache slowly returning in the [facility]. Pt also reports some mild chest pain. Can not discern whether it is muscular vs engorgement She denies vision changes, RUQ pain or SOB.      This IUP WAS complicated by h/o Pre-E, h/o of elevated LFTs, now with normal LFTs, fatty liver.       10/20/2022: [facility] with HA and SRBP requiring Hydral 5/10. HA alleviated w/ 1g tylenol. Started on norvasc 5. Not started on mag.  10/21/2022: Overnight, pt reports that HA waxed and waned. Reports mild HA this morn. Other PreE ROS neg. Bps mild range.   10/22/2022 Mild HA present this AM, continues to wax and wane. Denies other sxs of Pre-E. Mag to be discontinued at 0930 this morning. Mag checks WNL. Bps mild range, no IV pushes required overnight. Will continue to monitor HA symptoms and BP s/p Mag. Continue on norvasc 5, if BP continue to be well controlled, can consider d/c this afternoon.     Temp:  [96.3 °F (35.7 °C)-98.4 °F (36.9 °C)] 97.7 °F (36.5 °C)  Pulse:  [57-93] 71  Resp:  [16-18] 18  SpO2:  [97 %-100 %] 98 %  BP: (122-154)/(58-93) 140/93       Objective:   Patient Vitals for the past 24 hrs:    BP Temp Temp src Pulse Resp SpO2   10/21/22 0313 135/67 98.2 °F (36.8 °C) -- 69 18 99 %    10/20/22 2302 (!) 144/72 98 °F (36.7 °C) Oral 60 18 98 %   10/20/22 2118 (!) 155/76 -- -- 62 -- 99 %   10/20/22 2103 (!) 152/85 -- -- 72 -- 99 %   10/20/22 2045 (!) 173/85 -- -- 60 -- 100 %   10/20/22 2030 (!) 155/73 -- -- 67 -- 99 %   10/20/22 2015 (!) 140/67 -- -- 65 -- 99 %   10/20/22 2000 (!) 148/69 -- -- 67 -- 99 %   10/20/22 1930 (!) 172/80 -- -- (!) 48 -- 100 %   10/20/22 1900 (!) 183/78 -- -- (!) 50 -- 100 %   10/20/22 1838 (!) 171/81 -- -- (!) 56 -- 98 %      Physical Exam  Vitals reviewed.   Constitutional:       General: She is not in acute distress.     Appearance: Normal appearance.   HENT:      Head: Normocephalic and atraumatic.   Eyes:      Extraocular Movements: Extraocular movements intact.   Cardiovascular:      Rate and Rhythm: Normal rate.   Abdominal:      Palpations: Abdomen is soft.      Comments: Pfannenstiel incision clean, dry, intact, and healing nicely   Musculoskeletal:         General: Normal range of motion.   Neurological:      General: No focal deficit present.      Mental Status: She is alert and oriented to person, place, and time.   Psychiatric:         Mood and Affect: Mood normal.      Lab Review         Recent Labs   Lab 10/20/22  1952      K 4.0      CO2 21*   BUN 16   CREATININE 0.8   GLU 87   PROT 6.3   BILITOT 0.4   ALKPHOS 82   ALT 32   AST 21             Recent Labs   Lab 10/20/22  1900   WBC 8.51   HGB 10.2*   HCT 32.0*   MCV 82            I/O  No intake or output data in the 24 hours ending 10/21/22 0657     Assessment/Plan:      Preeclampsia, postpartum   - POD #12 rLTCS, admitted for BP control and HA.  - No diagnosis of Preeclampsia in this pregancy but has a hx of PreE in G1.  - In [facility] pt had SRBP requiring IV hydral 5/10. Started on norvasc 5. Pt reports allergy to procardia in the past (made her feel SOB).  - Mild range BP overnight.  - BP: (122-154)/(58-93) 140/93  - HA on admission that was alleviated with 1g tylenol and caffeine.   -  Given normal labs and HA relived w/ tylenol, magnesium was not initiated on admit.   - Will initiate magnesium 4g loading/2g continuous for 24 hours today.  --d/c mag at 0930 this AM  - Will give tylenol again this morning for recurrent HA. Can consider reglan/benadryl as well.   - EKG wnl on admit, chest pain resolved in [facility].  - Pre E labs: see above.   - Norvasc 5 mg daily, continue, BP well controlled  - Will need f/u w/ primary OBGYN on discharge.     Dispo: Will continue to monitor signs and symptoms of pp preE, s/p 24 hours of mag, and will treat symptoms as needed.    Anupama Hernandez MD  OBGYN PGY-2

## 2022-10-23 ENCOUNTER — HOSPITAL ENCOUNTER (EMERGENCY)
Facility: OTHER | Age: 37
Discharge: HOME | End: 2022-10-24
Attending: OBSTETRICS & GYNECOLOGY
Payer: COMMERCIAL

## 2022-10-23 DIAGNOSIS — R10.11 RUQ PAIN: Primary | ICD-10-CM

## 2022-10-23 LAB
ALBUMIN SERPL BCP-MCNC: 3.4 G/DL (ref 3.5–5.2)
ALP SERPL-CCNC: 88 U/L (ref 55–135)
ALT SERPL W/O P-5'-P-CCNC: 27 U/L (ref 10–44)
AMYLASE SERPL-CCNC: 54 U/L (ref 20–110)
ANION GAP SERPL CALC-SCNC: 9 MMOL/L (ref 8–16)
AST SERPL-CCNC: 20 U/L (ref 10–40)
BASOPHILS # BLD AUTO: 0.04 K/UL (ref 0–0.2)
BASOPHILS NFR BLD: 0.4 % (ref 0–1.9)
BILIRUB SERPL-MCNC: 0.3 MG/DL (ref 0.1–1)
BUN SERPL-MCNC: 14 MG/DL (ref 6–20)
CALCIUM SERPL-MCNC: 9.2 MG/DL (ref 8.7–10.5)
CHLORIDE SERPL-SCNC: 111 MMOL/L (ref 95–110)
CO2 SERPL-SCNC: 23 MMOL/L (ref 23–29)
CREAT SERPL-MCNC: 1 MG/DL (ref 0.5–1.4)
DIFFERENTIAL METHOD: ABNORMAL
EOSINOPHIL # BLD AUTO: 0.3 K/UL (ref 0–0.5)
EOSINOPHIL NFR BLD: 3.1 % (ref 0–8)
ERYTHROCYTE [DISTWIDTH] IN BLOOD BY AUTOMATED COUNT: 14.6 % (ref 11.5–14.5)
EST. GFR  (NO RACE VARIABLE): >60 ML/MIN/1.73 M^2
GLUCOSE SERPL-MCNC: 89 MG/DL (ref 70–110)
HCT VFR BLD AUTO: 35.3 % (ref 37–48.5)
HGB BLD-MCNC: 11.2 G/DL (ref 12–16)
IMM GRANULOCYTES # BLD AUTO: 0.03 K/UL (ref 0–0.04)
IMM GRANULOCYTES NFR BLD AUTO: 0.3 % (ref 0–0.5)
LIPASE SERPL-CCNC: 32 U/L (ref 4–60)
LYMPHOCYTES # BLD AUTO: 2.1 K/UL (ref 1–4.8)
LYMPHOCYTES NFR BLD: 21.6 % (ref 18–48)
MCH RBC QN AUTO: 26 PG (ref 27–31)
MCHC RBC AUTO-ENTMCNC: 31.7 G/DL (ref 32–36)
MCV RBC AUTO: 82 FL (ref 82–98)
MONOCYTES # BLD AUTO: 0.5 K/UL (ref 0.3–1)
MONOCYTES NFR BLD: 4.9 % (ref 4–15)
NEUTROPHILS # BLD AUTO: 6.7 K/UL (ref 1.8–7.7)
NEUTROPHILS NFR BLD: 69.7 % (ref 38–73)
NRBC BLD-RTO: 0 /100 WBC
PLATELET # BLD AUTO: 346 K/UL (ref 150–450)
PMV BLD AUTO: 9.2 FL (ref 9.2–12.9)
POTASSIUM SERPL-SCNC: 4.8 MMOL/L (ref 3.5–5.1)
PROT SERPL-MCNC: 6.9 G/DL (ref 6–8.4)
RBC # BLD AUTO: 4.3 M/UL (ref 4–5.4)
SODIUM SERPL-SCNC: 143 MMOL/L (ref 136–145)
WBC # BLD AUTO: 9.57 K/UL (ref 3.9–12.7)

## 2022-10-23 PROCEDURE — 80053 COMPREHEN METABOLIC PANEL: CPT

## 2022-10-23 PROCEDURE — 83690 ASSAY OF LIPASE: CPT

## 2022-10-23 PROCEDURE — 99284 EMERGENCY DEPT VISIT MOD MDM: CPT | Mod: 25

## 2022-10-23 PROCEDURE — 81000 URINALYSIS NONAUTO W/SCOPE: CPT | Performed by: STUDENT IN AN ORGANIZED HEALTH CARE EDUCATION/TRAINING PROGRAM

## 2022-10-23 PROCEDURE — 25000003 PHARM REV CODE 250: Performed by: OBSTETRICS & GYNECOLOGY

## 2022-10-23 PROCEDURE — 25000003 PHARM REV CODE 250

## 2022-10-23 PROCEDURE — 85025 COMPLETE CBC W/AUTO DIFF WBC: CPT

## 2022-10-23 PROCEDURE — 82150 ASSAY OF AMYLASE: CPT

## 2022-10-23 RX ORDER — IBUPROFEN 600 MG/1
600 TABLET ORAL ONCE
Status: COMPLETED | OUTPATIENT
Start: 2022-10-23 | End: 2022-10-23

## 2022-10-23 RX ORDER — PROMETHAZINE HYDROCHLORIDE 12.5 MG/1
12.5 TABLET ORAL ONCE
Status: COMPLETED | OUTPATIENT
Start: 2022-10-23 | End: 2022-10-23

## 2022-10-23 RX ORDER — SIMETHICONE 80 MG
1 TABLET,CHEWABLE ORAL ONCE
Status: COMPLETED | OUTPATIENT
Start: 2022-10-23 | End: 2022-10-23

## 2022-10-23 RX ADMIN — PROMETHAZINE HYDROCHLORIDE 12.5 MG: 12.5 TABLET ORAL at 11:10

## 2022-10-23 RX ADMIN — SIMETHICONE 80 MG: 80 TABLET, CHEWABLE ORAL at 10:10

## 2022-10-23 RX ADMIN — IBUPROFEN 600 MG: 600 TABLET ORAL at 10:10

## 2022-10-24 VITALS
HEART RATE: 71 BPM | OXYGEN SATURATION: 97 % | SYSTOLIC BLOOD PRESSURE: 135 MMHG | RESPIRATION RATE: 18 BRPM | TEMPERATURE: 98 F | DIASTOLIC BLOOD PRESSURE: 61 MMHG

## 2022-10-24 LAB
BACTERIA #/AREA URNS HPF: NORMAL /HPF
BILIRUB UR QL STRIP: NEGATIVE
CLARITY UR: CLEAR
COLOR UR: YELLOW
GLUCOSE UR QL STRIP: NEGATIVE
HGB UR QL STRIP: ABNORMAL
KETONES UR QL STRIP: NEGATIVE
LEUKOCYTE ESTERASE UR QL STRIP: ABNORMAL
MICROSCOPIC COMMENT: NORMAL
NITRITE UR QL STRIP: NEGATIVE
PH UR STRIP: 6 [PH] (ref 5–8)
PROT UR QL STRIP: ABNORMAL
RBC #/AREA URNS HPF: 3 /HPF (ref 0–4)
SP GR UR STRIP: >=1.03 (ref 1–1.03)
URN SPEC COLLECT METH UR: ABNORMAL
UROBILINOGEN UR STRIP-ACNC: NEGATIVE EU/DL
WBC #/AREA URNS HPF: 5 /HPF (ref 0–5)

## 2022-10-24 PROCEDURE — 99284 PR EMERGENCY DEPT VISIT,LEVEL IV: ICD-10-PCS | Mod: 24,,, | Performed by: OBSTETRICS & GYNECOLOGY

## 2022-10-24 PROCEDURE — 99284 EMERGENCY DEPT VISIT MOD MDM: CPT | Mod: 24,,, | Performed by: OBSTETRICS & GYNECOLOGY

## 2022-10-24 RX ORDER — SIMETHICONE 80 MG
80 TABLET,CHEWABLE ORAL 3 TIMES DAILY PRN
Qty: 15 TABLET | Refills: 0 | Status: SHIPPED | OUTPATIENT
Start: 2022-10-24 | End: 2023-01-09 | Stop reason: ALTCHOICE

## 2022-10-24 NOTE — ED PROVIDER NOTES
Encounter Date: 10/23/2022       History     Chief Complaint   Patient presents with    Hypertension     Bushra Khan is a 37 y.o.  who is POD#13 from a rLTCS who presents complaining of RUQ pain. Patient reports new onset RUQ pain since 4 pm today. She took some prilosec without relief. She denies any epigastric pain, nausea or vomiting. She denies any headaches, vision changes, chest pain or SOB.     Her postpartum course has been complicated by postpartum pre-eclampsia for which she was recently admitted and required magnesium for seizure prophylaxis. She was started on Norvasc 5 mg with adequate BP control on day of discharge.     Review of patient's allergies indicates:   Allergen Reactions    Benzyl salicylate Rash     acne    Cefdinir Other (See Comments)     Nausea/vomiting    Nifedipine Other (See Comments)     headache    Sudafed [pseudoephedrine hcl] Other (See Comments)     Emotional lability     Past Medical History:   Diagnosis Date    Acid reflux     Allergy     Pre-eclampsia, severe, postpartum condition 3/29/2021    Severe hypertension affecting pregnancy in third trimester 3/22/2021    Severe pre-eclampsia in third trimester 3/22/2021     Past Surgical History:   Procedure Laterality Date    ADENOIDECTOMY  age 16    biopsy of breast  Right 2014     SECTION N/A 3/24/2021    Procedure:  SECTION;  Surgeon: Denita Xiong MD;  Location: [facility];  Service: OB/GYN;  Laterality: N/A;     SECTION N/A 10/10/2022    Procedure:  SECTION;  Surgeon: Denita Xiong MD;  Location: [facility];  Service: OB/GYN;  Laterality: N/A;    CHOLECYSTECTOMY      Laparoscopic    ESOPHAGOGASTRODUODENOSCOPY N/A 2018    Procedure: ESOPHAGOGASTRODUODENOSCOPY (EGD);  Surgeon: Aurelio James MD;  Location: [address]);  Service: Endoscopy;  Laterality: N/A;     Family History   Problem Relation Age of Onset    Lupus Mother     Kidney disease Mother      Rheum arthritis Mother     Hypertension Father     Sleep apnea Father     Breast cancer Paternal Aunt 50    Cataracts Paternal Grandmother     Colon cancer Paternal Grandmother     Cataracts Paternal Grandfather     Cancer Paternal Grandfather         Lung cancer- smoker    Amblyopia Neg Hx     Blindness Neg Hx     Glaucoma Neg Hx     Macular degeneration Neg Hx     Retinal detachment Neg Hx     Strabismus Neg Hx     Stroke Neg Hx     Ovarian cancer Neg Hx     Diabetes Neg Hx     Melanoma Neg Hx     Psoriasis Neg Hx     Asthma Neg Hx     Allergies Neg Hx     Allergic rhinitis Neg Hx     Angioedema Neg Hx     Eczema Neg Hx     Immunodeficiency Neg Hx     Rhinitis Neg Hx     Urticaria Neg Hx     Atopy Neg Hx      Social History     Tobacco Use    Smoking status: Former     Packs/day: 0.10     Years: 13.00     Pack years: 1.30     Types: Cigarettes     Quit date: 8/15/2018     Years since quittin.1    Smokeless tobacco: Never   Substance Use Topics    Alcohol use: Not Currently     Comment: couple of servings per year, no daily or heavy alcohol use     Drug use: No     Review of Systems   Constitutional:  Negative for chills and fever.   HENT:  Negative for congestion.    Eyes:  Negative for visual disturbance.   Respiratory:  Negative for cough and shortness of breath.    Cardiovascular:  Negative for chest pain.   Gastrointestinal:  Positive for abdominal pain (RUQ pain). Negative for constipation, diarrhea, nausea and vomiting.   Genitourinary:  Negative for dysuria.   Musculoskeletal:  Negative for back pain.   Skin:  Negative for rash.   Neurological:  Negative for headaches.   Psychiatric/Behavioral:  Negative for dysphoric mood.      Physical Exam     Initial Vitals   BP Pulse Resp Temp SpO2   10/23/22 2222 10/23/22 2217 10/23/22 2222 10/23/22 2222 10/23/22 2217   138/72 80 18 97.7 °F (36.5 °C) 98 %      MAP       --                Physical Exam    Vitals reviewed.  Constitutional: She appears well-developed  and well-nourished.   HENT:   Head: Normocephalic and atraumatic.   Pulmonary/Chest: No respiratory distress.   Abdominal: Abdomen is soft. She exhibits no distension. There is abdominal tenderness (moderate RUQ tenderness on palpation). There is no rebound and no guarding.   Musculoskeletal:         General: Normal range of motion.     Neurological: She is alert and oriented to person, place, and time.   Skin: Skin is warm and dry.   Psychiatric: She has a normal mood and affect.       ED Course   Procedures  Labs Reviewed   CBC W/ AUTO DIFFERENTIAL - Abnormal; Notable for the following components:       Result Value    Hemoglobin 11.2 (*)     Hematocrit 35.3 (*)     MCH 26.0 (*)     MCHC 31.7 (*)     RDW 14.6 (*)     All other components within normal limits   COMPREHENSIVE METABOLIC PANEL - Abnormal; Notable for the following components:    Chloride 111 (*)     Albumin 3.4 (*)     All other components within normal limits   URINALYSIS, REFLEX TO URINE CULTURE - Abnormal; Notable for the following components:    Specific Gravity, UA >=1.030 (*)     Protein, UA Trace (*)     Occult Blood UA 2+ (*)     Leukocytes, UA 1+ (*)     All other components within normal limits    Narrative:     Specimen Source->Urine   AMYLASE   LIPASE   URINALYSIS MICROSCOPIC    Narrative:     Specimen Source->Urine          Imaging Results              US Abdomen Limited (Final result)  Result time 10/24/22 00:30:50      Final result by Graham Yeh MD (10/24/22 00:30:50)                   Impression:      1. Mild right hydronephrosis.  2. Cholecystectomy.  3. Mild splenomegaly.      Electronically signed by: Graham Yeh MD  Date:    10/24/2022  Time:    00:30               Narrative:    EXAMINATION:  US ABDOMEN LIMITED    CLINICAL HISTORY:  RUq pain;    TECHNIQUE:  Limited ultrasound of the right upper quadrant of the abdomen (including pancreas, liver, gallbladder, common bile duct, and spleen) was  performed.    COMPARISON:  None.    FINDINGS:  The liver is upper limits of normal in size measuring 18cm.  Hepatic parenchyma is homogeneous without evidence for masses.  No intra- or extrahepatic biliary ductal dilatation. The common bile duct measures 0.2 cm.  The gallbladder has been removed.  The visualized portion of the pancreas appears normal.  Visualized portions of the IVC appear normal. The spleen is mildly enlarged measuring 13.6 cm. No ascites.  Mild right hydronephrosis is seen.                                       Medications   simethicone chewable tablet 80 mg (80 mg Oral Given 10/23/22 2239)   ibuprofen tablet 600 mg (600 mg Oral Given 10/23/22 2238)   promethazine tablet 12.5 mg (12.5 mg Oral Given 10/23/22 2342)     Medical Decision Making:   ED Management:  37 y.o. [id number] is POD#13 from a rLTCS who presents complaining of RUQ pain.  Occasional mild range, all other VSS  Moderate RUQ tenderness on palpation   Pre-E labs: AST/ALT 20/27, Cr 1.0, Plt 346  Amylase, lipase WNL  Patient reported nausea while in [facility] that was relieved with phenergan  RUQ US: mild splenomegaly, mild right hydronephrosis, otherwise WNL s/p cholecystectomy   RUQ pain improved with simethicone and ibuprofen   Rx sent for simethicone  Message sent to patient's primary OBGYN to have follow up US performed in 4-6 weeks to assess for continued splenomegaly  Patient deemed stable for discharge. Strict return precautions given            ED Course as of 10/24/22 0450   Sun Oct 23, 2022   2333 Pt c/o nausea since abdominal exam. Will give po phenergan [AV]      ED Course User Index  [AV] Ya Martinez MD                Clinical Impression:   Final diagnoses:  [R10.11] RUQ pain (Primary)      ED Disposition Condition    Discharge Stable          ED Prescriptions       Medication Sig Dispense Start Date End Date Auth. Provider    simethicone (MYLICON) 80 MG chewable tablet Take 1 tablet (80 mg total) by mouth 3 (three)  times daily as needed for Flatulence. 15 tablet 10/24/2022 -- Ghazal Christensen MD          Follow-up Information    None          Ghazal Christensen MD  Resident  10/24/22 7204

## 2022-10-25 ENCOUNTER — OFFICE VISIT (OUTPATIENT)
Dept: DERMATOLOGY | Facility: CLINIC | Age: 37
End: 2022-10-25
Payer: COMMERCIAL

## 2022-10-25 VITALS — BODY MASS INDEX: 41.2 KG/M2 | WEIGHT: 240 LBS

## 2022-10-25 DIAGNOSIS — D22.9 NEVUS: Primary | ICD-10-CM

## 2022-10-25 PROCEDURE — 1111F PR DISCHARGE MEDS RECONCILED W/ CURRENT OUTPATIENT MED LIST: ICD-10-PCS | Mod: CPTII,S$GLB,, | Performed by: DERMATOLOGY

## 2022-10-25 PROCEDURE — 99999 PR PBB SHADOW E&M-EST. PATIENT-LVL III: ICD-10-PCS | Mod: PBBFAC,,, | Performed by: DERMATOLOGY

## 2022-10-25 PROCEDURE — 1160F RVW MEDS BY RX/DR IN RCRD: CPT | Mod: CPTII,S$GLB,, | Performed by: DERMATOLOGY

## 2022-10-25 PROCEDURE — 99203 OFFICE O/P NEW LOW 30 MIN: CPT | Mod: S$GLB,,, | Performed by: DERMATOLOGY

## 2022-10-25 PROCEDURE — 99203 PR OFFICE/OUTPT VISIT, NEW, LEVL III, 30-44 MIN: ICD-10-PCS | Mod: S$GLB,,, | Performed by: DERMATOLOGY

## 2022-10-25 PROCEDURE — 99999 PR PBB SHADOW E&M-EST. PATIENT-LVL III: CPT | Mod: PBBFAC,,, | Performed by: DERMATOLOGY

## 2022-10-25 PROCEDURE — 1159F MED LIST DOCD IN RCRD: CPT | Mod: CPTII,S$GLB,, | Performed by: DERMATOLOGY

## 2022-10-25 PROCEDURE — 1160F PR REVIEW ALL MEDS BY PRESCRIBER/CLIN PHARMACIST DOCUMENTED: ICD-10-PCS | Mod: CPTII,S$GLB,, | Performed by: DERMATOLOGY

## 2022-10-25 PROCEDURE — 1111F DSCHRG MED/CURRENT MED MERGE: CPT | Mod: CPTII,S$GLB,, | Performed by: DERMATOLOGY

## 2022-10-25 PROCEDURE — 1159F PR MEDICATION LIST DOCUMENTED IN MEDICAL RECORD: ICD-10-PCS | Mod: CPTII,S$GLB,, | Performed by: DERMATOLOGY

## 2022-10-25 NOTE — PROGRESS NOTES
Subjective:       Patient ID:  Bushra Kahn is a 37 y.o. female who presents for   Chief Complaint   Patient presents with    Spot     scalp     Noticed a spot which was irritated on scalp recently, has photo of erythematous papule mid scalp.     Lesion - Initial  Affected locations: scalp  Signs / symptoms: asymptomatic  Aggravated by: nothing  Relieving factors/Treatments tried: nothing    Review of Systems   Constitutional:  Negative for fever, chills, weight loss, weight gain, fatigue, night sweats and malaise.   Skin:  Negative for daily sunscreen use, activity-related sunscreen use and wears hat.   Hematologic/Lymphatic: Does not bruise/bleed easily.      Objective:    Physical Exam   Constitutional: She appears well-developed and well-nourished.   Neurological: She is alert and oriented to person, place, and time.   Psychiatric: She has a normal mood and affect.   Skin:   Areas Examined (abnormalities noted in diagram):   Scalp / Hair Palpated and Inspected              Diagram Legend     Erythematous scaling macule/papule c/w actinic keratosis       Vascular papule c/w angioma      Pigmented verrucoid papule/plaque c/w seborrheic keratosis      Yellow umbilicated papule c/w sebaceous hyperplasia      Irregularly shaped tan macule c/w lentigo     1-2 mm smooth white papules consistent with Milia      Movable subcutaneous cyst with punctum c/w epidermal inclusion cyst      Subcutaneous movable cyst c/w pilar cyst      Firm pink to brown papule c/w dermatofibroma      Pedunculated fleshy papule(s) c/w skin tag(s)      Evenly pigmented macule c/w junctional nevus     Mildly variegated pigmented, slightly irregular-bordered macule c/w mildly atypical nevus      Flesh colored to evenly pigmented papule c/w intradermal nevus       Pink pearly papule/plaque c/w basal cell carcinoma      Erythematous hyperkeratotic cursted plaque c/w SCC      Surgical scar with no sign of skin cancer recurrence      Open  and closed comedones      Inflammatory papules and pustules      Verrucoid papule consistent consistent with wart     Erythematous eczematous patches and plaques     Dystrophic onycholytic nail with subungual debris c/w onychomycosis     Umbilicated papule    Erythematous-base heme-crusted tan verrucoid plaque consistent with inflamed seborrheic keratosis     Erythematous Silvery Scaling Plaque c/w Psoriasis     See annotation      Assessment / Plan:        Nevus, previously irritated  Refer for removal            Follow up if symptoms worsen or fail to improve, for removal.

## 2022-11-07 ENCOUNTER — PATIENT MESSAGE (OUTPATIENT)
Dept: OBSTETRICS AND GYNECOLOGY | Facility: CLINIC | Age: 37
End: 2022-11-07
Payer: COMMERCIAL

## 2022-11-22 ENCOUNTER — POSTPARTUM VISIT (OUTPATIENT)
Dept: OBSTETRICS AND GYNECOLOGY | Facility: CLINIC | Age: 37
End: 2022-11-22
Payer: COMMERCIAL

## 2022-11-22 VITALS
WEIGHT: 229.25 LBS | DIASTOLIC BLOOD PRESSURE: 72 MMHG | BODY MASS INDEX: 39.14 KG/M2 | SYSTOLIC BLOOD PRESSURE: 120 MMHG | HEIGHT: 64 IN

## 2022-11-22 DIAGNOSIS — L91.8 SKIN TAG: ICD-10-CM

## 2022-11-22 DIAGNOSIS — R31.9 HEMATURIA, UNSPECIFIED TYPE: Primary | ICD-10-CM

## 2022-11-22 PROCEDURE — 0503F POSTPARTUM CARE VISIT: CPT | Mod: CPTII,S$GLB,, | Performed by: OBSTETRICS & GYNECOLOGY

## 2022-11-22 PROCEDURE — 0503F PR POSTPARTUM CARE VISIT: ICD-10-PCS | Mod: CPTII,S$GLB,, | Performed by: OBSTETRICS & GYNECOLOGY

## 2022-11-22 PROCEDURE — 88305 TISSUE EXAM BY PATHOLOGIST: CPT | Performed by: PATHOLOGY

## 2022-11-22 PROCEDURE — 99999 PR PBB SHADOW E&M-EST. PATIENT-LVL III: CPT | Mod: PBBFAC,,, | Performed by: OBSTETRICS & GYNECOLOGY

## 2022-11-22 PROCEDURE — 88305 TISSUE EXAM BY PATHOLOGIST: ICD-10-PCS | Mod: 26,,, | Performed by: PATHOLOGY

## 2022-11-22 PROCEDURE — 88305 TISSUE EXAM BY PATHOLOGIST: CPT | Mod: 26,,, | Performed by: PATHOLOGY

## 2022-11-22 PROCEDURE — 99999 PR PBB SHADOW E&M-EST. PATIENT-LVL III: ICD-10-PCS | Mod: PBBFAC,,, | Performed by: OBSTETRICS & GYNECOLOGY

## 2022-11-22 PROCEDURE — 87086 URINE CULTURE/COLONY COUNT: CPT | Performed by: OBSTETRICS & GYNECOLOGY

## 2022-11-22 NOTE — PROGRESS NOTES
"CC: Post-partum follow-up    Bushra Kahn is a 37 y.o. female  who presents for post-partum visit.  She is S/P a R CS 10/10/2022.  She and the baby are doing well.  No pain.  No fever.   No bowel / bladder complaints.    Delivery Date: 10/12/22  Delivery MD: MACHELLE Xiong MD  Gender: female  Breast Feeding: NO  Depression: NO  Contraception: natural family planning (NFP)    Pregnancy was complicated by:  GHTN   Fatty liver    /72   Ht 5' 4" (1.626 m)   Wt 104 kg (229 lb 4.5 oz)   LMP  (LMP Unknown)   Breastfeeding No   BMI 39.36 kg/m²     ROS:  GENERAL: No fever, chills, fatigability.  VULVAR: No pain, no lesions and no itching.  VAGINAL: No relaxation, no itching, no discharge, no abnormal bleeding and no lesions.  ABDOMEN: No abdominal pain. Denies nausea. Denies vomiting. No diarrhea. No constipation  BREAST: Denies pain. No lumps.  URINARY: No incontinence, no nocturia, no frequency and no dysuria.  CARDIOVASCULAR: No chest pain. No shortness of breath. No leg cramps.  NEUROLOGICAL: No headaches. No vision changes.    PHYSICAL EXAM:  Exam chaperoned by nurse  ABDOMEN:  Soft, non-tender, non-distended  VULVA:  Normal, no lesions  CERVIX:  Without lesions, polyps or tenderness.  UTERUS:  Normal size, shape, consistency, no mass or tenderness.  ADNEXA:  Normal in size without mass or tenderness  Buttocks-  right buttocks- removed skin tag with scissor   IMP:  Doing well S/P R C section   Instructions / precautions reviewed  Contraceptive counseling  GHTN-  can stop HTN meds for now  H/P Fatty liver- NL LFTs    Rx NONE  Follow up with PCP to manage for possible HTN outside of pregnancy    PLAN:  May resume normal activities  Return: PRN or annually        "

## 2022-11-24 LAB — BACTERIA UR CULT: NO GROWTH

## 2022-11-30 ENCOUNTER — PATIENT MESSAGE (OUTPATIENT)
Dept: ADMINISTRATIVE | Facility: OTHER | Age: 37
End: 2022-11-30
Payer: COMMERCIAL

## 2022-12-02 ENCOUNTER — TELEPHONE (OUTPATIENT)
Dept: OBSTETRICS AND GYNECOLOGY | Facility: CLINIC | Age: 37
End: 2022-12-02
Payer: COMMERCIAL

## 2022-12-02 DIAGNOSIS — K76.0 FATTY LIVER: Primary | ICD-10-CM

## 2022-12-02 NOTE — TELEPHONE ENCOUNTER
This patient was seen in the [facility] today for RUQ pain. An ultrasound revealed mild splenomegaly. Dr. Martinez recommended repeating the ultrasound as an outpatient in 4-6 weeks and I was hoping to pass this message on to Dr. Xiong.

## 2022-12-04 ENCOUNTER — PATIENT MESSAGE (OUTPATIENT)
Dept: INTERNAL MEDICINE | Facility: CLINIC | Age: 37
End: 2022-12-04
Payer: COMMERCIAL

## 2022-12-05 ENCOUNTER — TELEPHONE (OUTPATIENT)
Dept: INTERNAL MEDICINE | Facility: CLINIC | Age: 37
End: 2022-12-05
Payer: COMMERCIAL

## 2022-12-05 ENCOUNTER — PATIENT MESSAGE (OUTPATIENT)
Dept: OBSTETRICS AND GYNECOLOGY | Facility: CLINIC | Age: 37
End: 2022-12-05
Payer: COMMERCIAL

## 2022-12-05 DIAGNOSIS — S99.912A INJURY OF LEFT ANKLE, INITIAL ENCOUNTER: Primary | ICD-10-CM

## 2022-12-06 LAB
FINAL PATHOLOGIC DIAGNOSIS: NORMAL
GROSS: NORMAL
Lab: NORMAL

## 2022-12-08 ENCOUNTER — IMMUNIZATION (OUTPATIENT)
Dept: PHARMACY | Facility: CLINIC | Age: 37
End: 2022-12-08
Payer: COMMERCIAL

## 2022-12-10 ENCOUNTER — HOSPITAL ENCOUNTER (OUTPATIENT)
Dept: RADIOLOGY | Facility: HOSPITAL | Age: 37
Discharge: HOME | End: 2022-12-10
Attending: INTERNAL MEDICINE
Payer: COMMERCIAL

## 2022-12-10 ENCOUNTER — HOSPITAL ENCOUNTER (OUTPATIENT)
Dept: RADIOLOGY | Facility: HOSPITAL | Age: 37
Discharge: HOME | End: 2022-12-10
Attending: OBSTETRICS & GYNECOLOGY
Payer: COMMERCIAL

## 2022-12-10 DIAGNOSIS — K76.0 FATTY LIVER: ICD-10-CM

## 2022-12-10 DIAGNOSIS — S99.912A INJURY OF LEFT ANKLE, INITIAL ENCOUNTER: ICD-10-CM

## 2022-12-10 PROCEDURE — 76705 ECHO EXAM OF ABDOMEN: CPT | Mod: TC

## 2022-12-10 PROCEDURE — 76705 ECHO EXAM OF ABDOMEN: CPT | Mod: 26,,, | Performed by: RADIOLOGY

## 2022-12-10 PROCEDURE — 73610 X-RAY EXAM OF ANKLE: CPT | Mod: 26,LT,, | Performed by: RADIOLOGY

## 2022-12-10 PROCEDURE — 76705 US ABDOMEN LIMITED: ICD-10-PCS | Mod: 26,,, | Performed by: RADIOLOGY

## 2022-12-10 PROCEDURE — 73610 X-RAY EXAM OF ANKLE: CPT | Mod: TC,LT

## 2022-12-10 PROCEDURE — 73610 XR ANKLE COMPLETE 3 VIEW LEFT: ICD-10-PCS | Mod: 26,LT,, | Performed by: RADIOLOGY

## 2022-12-12 ENCOUNTER — PATIENT MESSAGE (OUTPATIENT)
Dept: INTERNAL MEDICINE | Facility: CLINIC | Age: 37
End: 2022-12-12
Payer: COMMERCIAL

## 2022-12-13 ENCOUNTER — TELEPHONE (OUTPATIENT)
Dept: DERMATOLOGY | Facility: CLINIC | Age: 37
End: 2022-12-13
Payer: COMMERCIAL

## 2022-12-13 NOTE — TELEPHONE ENCOUNTER
Attempted to schedule pt an appt. Pt stated the mole fell off.      RD          ----- Message from Gem Schafer MD sent at 10/25/2022 10:38 AM CDT -----  Regarding: nevus  Irritated nevus scalp would like removed.

## 2022-12-20 ENCOUNTER — PATIENT MESSAGE (OUTPATIENT)
Dept: OBSTETRICS AND GYNECOLOGY | Facility: CLINIC | Age: 37
End: 2022-12-20
Payer: COMMERCIAL

## 2023-01-02 ENCOUNTER — PATIENT MESSAGE (OUTPATIENT)
Dept: OBSTETRICS AND GYNECOLOGY | Facility: CLINIC | Age: 38
End: 2023-01-02
Payer: COMMERCIAL

## 2023-01-17 ENCOUNTER — HOSPITAL ENCOUNTER (OUTPATIENT)
Dept: RADIOLOGY | Facility: HOSPITAL | Age: 38
Discharge: HOME | End: 2023-01-17
Attending: NURSE PRACTITIONER
Payer: COMMERCIAL

## 2023-01-17 DIAGNOSIS — R74.8 ELEVATED LIVER ENZYMES: ICD-10-CM

## 2023-01-17 DIAGNOSIS — K76.0 FATTY LIVER: ICD-10-CM

## 2023-01-17 DIAGNOSIS — K74.01 HEPATIC FIBROSIS, EARLY FIBROSIS: ICD-10-CM

## 2023-01-17 PROCEDURE — 76391 MR ELASTOGRAPHY: CPT | Mod: 26,,, | Performed by: STUDENT IN AN ORGANIZED HEALTH CARE EDUCATION/TRAINING PROGRAM

## 2023-01-17 PROCEDURE — 76391 MR ELASTOGRAPHY: CPT | Mod: TC

## 2023-01-17 PROCEDURE — 76391 MR ELASTOGRAPHY: ICD-10-PCS | Mod: 26,,, | Performed by: STUDENT IN AN ORGANIZED HEALTH CARE EDUCATION/TRAINING PROGRAM

## 2023-01-24 ENCOUNTER — PATIENT MESSAGE (OUTPATIENT)
Dept: HEPATOLOGY | Facility: CLINIC | Age: 38
End: 2023-01-24

## 2023-01-24 ENCOUNTER — OFFICE VISIT (OUTPATIENT)
Dept: HEPATOLOGY | Facility: CLINIC | Age: 38
End: 2023-01-24
Payer: COMMERCIAL

## 2023-01-24 ENCOUNTER — PATIENT OUTREACH (OUTPATIENT)
Dept: ADMINISTRATIVE | Facility: HOSPITAL | Age: 38
End: 2023-01-24
Payer: COMMERCIAL

## 2023-01-24 VITALS
RESPIRATION RATE: 18 BRPM | HEART RATE: 79 BPM | HEIGHT: 64 IN | WEIGHT: 228.63 LBS | TEMPERATURE: 97 F | SYSTOLIC BLOOD PRESSURE: 149 MMHG | BODY MASS INDEX: 39.03 KG/M2 | OXYGEN SATURATION: 96 % | DIASTOLIC BLOOD PRESSURE: 91 MMHG

## 2023-01-24 DIAGNOSIS — K76.0 FATTY LIVER: Primary | ICD-10-CM

## 2023-01-24 DIAGNOSIS — R74.8 ELEVATED LIVER ENZYMES: ICD-10-CM

## 2023-01-24 DIAGNOSIS — R76.8 POSITIVE ANA (ANTINUCLEAR ANTIBODY): ICD-10-CM

## 2023-01-24 DIAGNOSIS — E66.9 OBESITY (BMI 30-39.9): ICD-10-CM

## 2023-01-24 PROCEDURE — 1160F RVW MEDS BY RX/DR IN RCRD: CPT | Mod: CPTII,S$GLB,, | Performed by: NURSE PRACTITIONER

## 2023-01-24 PROCEDURE — 99214 PR OFFICE/OUTPT VISIT, EST, LEVL IV, 30-39 MIN: ICD-10-PCS | Mod: S$GLB,,, | Performed by: NURSE PRACTITIONER

## 2023-01-24 PROCEDURE — 1159F MED LIST DOCD IN RCRD: CPT | Mod: CPTII,S$GLB,, | Performed by: NURSE PRACTITIONER

## 2023-01-24 PROCEDURE — 1159F PR MEDICATION LIST DOCUMENTED IN MEDICAL RECORD: ICD-10-PCS | Mod: CPTII,S$GLB,, | Performed by: NURSE PRACTITIONER

## 2023-01-24 PROCEDURE — 3080F PR MOST RECENT DIASTOLIC BLOOD PRESSURE >= 90 MM HG: ICD-10-PCS | Mod: CPTII,S$GLB,, | Performed by: NURSE PRACTITIONER

## 2023-01-24 PROCEDURE — 3080F DIAST BP >= 90 MM HG: CPT | Mod: CPTII,S$GLB,, | Performed by: NURSE PRACTITIONER

## 2023-01-24 PROCEDURE — 3008F BODY MASS INDEX DOCD: CPT | Mod: CPTII,S$GLB,, | Performed by: NURSE PRACTITIONER

## 2023-01-24 PROCEDURE — 99214 OFFICE O/P EST MOD 30 MIN: CPT | Mod: S$GLB,,, | Performed by: NURSE PRACTITIONER

## 2023-01-24 PROCEDURE — 3077F SYST BP >= 140 MM HG: CPT | Mod: CPTII,S$GLB,, | Performed by: NURSE PRACTITIONER

## 2023-01-24 PROCEDURE — 99999 PR PBB SHADOW E&M-EST. PATIENT-LVL V: CPT | Mod: PBBFAC,,, | Performed by: NURSE PRACTITIONER

## 2023-01-24 PROCEDURE — 3008F PR BODY MASS INDEX (BMI) DOCUMENTED: ICD-10-PCS | Mod: CPTII,S$GLB,, | Performed by: NURSE PRACTITIONER

## 2023-01-24 PROCEDURE — 3077F PR MOST RECENT SYSTOLIC BLOOD PRESSURE >= 140 MM HG: ICD-10-PCS | Mod: CPTII,S$GLB,, | Performed by: NURSE PRACTITIONER

## 2023-01-24 PROCEDURE — 1160F PR REVIEW ALL MEDS BY PRESCRIBER/CLIN PHARMACIST DOCUMENTED: ICD-10-PCS | Mod: CPTII,S$GLB,, | Performed by: NURSE PRACTITIONER

## 2023-01-24 PROCEDURE — 99999 PR PBB SHADOW E&M-EST. PATIENT-LVL V: ICD-10-PCS | Mod: PBBFAC,,, | Performed by: NURSE PRACTITIONER

## 2023-01-24 NOTE — PROGRESS NOTES
HERE for routine OB visit at 37 5/7 wks, with NO complaints.  Denies vaginal bleeding, cramping/ ctx, or LOF.  + FM.  [facility] BID.   F/U in one week

## 2023-01-24 NOTE — PATIENT INSTRUCTIONS
1. Fibroscan to look for fat or scar tissue in the liver showed mild fatty liver, no scar tissue damage   2.  Follow up in 6 months with MRI and labs a few days before    There is no FDA approved therapy for fatty liver disease. Therefore, these things are important:  Limit alcohol consumption  2 Weight loss goal of 25-40 lbs, referral for [facility] if interested. Also, if interested in a dietician visit to create a weight loss plan, contact the dietician team at [facility] at [email or web address] to schedule a visit to you can call [facility] in [place]: [phone] and the  will transfer the call to one of the dieticians to schedule an appointment. Or you can also call [phone] to schedule. They do offer video visits   3. Low carb/sugar, high fiber and protein diet.Try to limit your carb intake to LESS than 30-45 grams of carbs with a meal or LESS than 5-10 grams with any snack (total of any snack foods eaten during that time). Use Zomazz Pal or Lose It nel to add up your carbs through the day. Do NOT drink any beverages with calories or carbs (this can lead to high blood sugar and weight gain). Also, some of our patients have been very successful with weight loss using the pre made/planned meal planning services that limit calories and portion size ( The main thing to look for is low calorie, high protein, low carb)  4. Exercise, 5 days per week, 30 minutes per day, as tolerated  5. Recommend good cholesterol, blood pressure, blood sugar levels     In some people, fatty liver can progress to steatohepatitis (inflamatory fatty liver) and possibly to cirrhosis, increasing the risk for liver cancer, liver failure, and death. Therefore, the lifestyle changes are very important to decrease this risk.     Website with information about fatty liver and inflammation related to fatty liver ([place]) = [email or web address].com  AND [email or web address]

## 2023-01-24 NOTE — PROGRESS NOTES
Health Maintenance Due   Topic Date Due    Pneumococcal Vaccines (Age 0-64) (1 - PCV) Never done    COVID-19 Vaccine (3 - Booster for Pfizer series) 07/17/2021     Chart review done.  updated. Immunizations reviewed & updated. Care Everywhere updated.

## 2023-01-25 ENCOUNTER — PATIENT MESSAGE (OUTPATIENT)
Dept: HEPATOLOGY | Facility: CLINIC | Age: 38
End: 2023-01-25
Payer: COMMERCIAL

## 2023-01-25 PROBLEM — R76.8 POSITIVE ANA (ANTINUCLEAR ANTIBODY): Status: ACTIVE | Noted: 2023-01-25

## 2023-01-25 PROBLEM — Z98.891 HISTORY OF C-SECTION: Status: RESOLVED | Noted: 2022-10-10 | Resolved: 2023-01-25

## 2023-01-25 PROBLEM — O11.9 CHRONIC HYPERTENSION WITH SUPERIMPOSED PRE-ECLAMPSIA: Status: RESOLVED | Noted: 2022-10-21 | Resolved: 2023-01-25

## 2023-01-25 PROBLEM — K74.01 HEPATIC FIBROSIS, EARLY FIBROSIS: Status: RESOLVED | Noted: 2021-10-15 | Resolved: 2023-01-25

## 2023-02-15 ENCOUNTER — TELEPHONE (OUTPATIENT)
Dept: INTERNAL MEDICINE | Facility: CLINIC | Age: 38
End: 2023-02-15
Payer: COMMERCIAL

## 2023-02-15 ENCOUNTER — PATIENT OUTREACH (OUTPATIENT)
Dept: ADMINISTRATIVE | Facility: HOSPITAL | Age: 38
End: 2023-02-15
Payer: COMMERCIAL

## 2023-02-15 VITALS — DIASTOLIC BLOOD PRESSURE: 89 MMHG | SYSTOLIC BLOOD PRESSURE: 129 MMHG

## 2023-02-24 ENCOUNTER — PATIENT MESSAGE (OUTPATIENT)
Dept: INTERNAL MEDICINE | Facility: CLINIC | Age: 38
End: 2023-02-24
Payer: COMMERCIAL

## 2023-02-28 ENCOUNTER — PATIENT MESSAGE (OUTPATIENT)
Dept: INTERNAL MEDICINE | Facility: CLINIC | Age: 38
End: 2023-02-28
Payer: COMMERCIAL

## 2023-03-02 ENCOUNTER — OFFICE VISIT (OUTPATIENT)
Dept: INTERNAL MEDICINE | Facility: CLINIC | Age: 38
End: 2023-03-02
Payer: COMMERCIAL

## 2023-03-02 VITALS
SYSTOLIC BLOOD PRESSURE: 124 MMHG | HEIGHT: 64 IN | OXYGEN SATURATION: 99 % | BODY MASS INDEX: 38.39 KG/M2 | WEIGHT: 224.88 LBS | DIASTOLIC BLOOD PRESSURE: 82 MMHG | HEART RATE: 94 BPM

## 2023-03-02 DIAGNOSIS — Z00.00 WELLNESS EXAMINATION: ICD-10-CM

## 2023-03-02 DIAGNOSIS — R10.9 ABDOMINAL PAIN, UNSPECIFIED ABDOMINAL LOCATION: Primary | ICD-10-CM

## 2023-03-02 DIAGNOSIS — M25.50 ARTHRALGIA, UNSPECIFIED JOINT: ICD-10-CM

## 2023-03-02 PROCEDURE — 99999 PR PBB SHADOW E&M-EST. PATIENT-LVL IV: CPT | Mod: PBBFAC,,, | Performed by: INTERNAL MEDICINE

## 2023-03-02 PROCEDURE — 1159F MED LIST DOCD IN RCRD: CPT | Mod: CPTII,S$GLB,, | Performed by: INTERNAL MEDICINE

## 2023-03-02 PROCEDURE — 99395 PREV VISIT EST AGE 18-39: CPT | Mod: S$GLB,,, | Performed by: INTERNAL MEDICINE

## 2023-03-02 PROCEDURE — 99395 PR PREVENTIVE VISIT,EST,18-39: ICD-10-PCS | Mod: S$GLB,,, | Performed by: INTERNAL MEDICINE

## 2023-03-02 PROCEDURE — 3008F BODY MASS INDEX DOCD: CPT | Mod: CPTII,S$GLB,, | Performed by: INTERNAL MEDICINE

## 2023-03-02 PROCEDURE — 3074F SYST BP LT 130 MM HG: CPT | Mod: CPTII,S$GLB,, | Performed by: INTERNAL MEDICINE

## 2023-03-02 PROCEDURE — 3079F PR MOST RECENT DIASTOLIC BLOOD PRESSURE 80-89 MM HG: ICD-10-PCS | Mod: CPTII,S$GLB,, | Performed by: INTERNAL MEDICINE

## 2023-03-02 PROCEDURE — 3044F PR MOST RECENT HEMOGLOBIN A1C LEVEL <7.0%: ICD-10-PCS | Mod: CPTII,S$GLB,, | Performed by: INTERNAL MEDICINE

## 2023-03-02 PROCEDURE — 1159F PR MEDICATION LIST DOCUMENTED IN MEDICAL RECORD: ICD-10-PCS | Mod: CPTII,S$GLB,, | Performed by: INTERNAL MEDICINE

## 2023-03-02 PROCEDURE — 99999 PR PBB SHADOW E&M-EST. PATIENT-LVL IV: ICD-10-PCS | Mod: PBBFAC,,, | Performed by: INTERNAL MEDICINE

## 2023-03-02 PROCEDURE — 3008F PR BODY MASS INDEX (BMI) DOCUMENTED: ICD-10-PCS | Mod: CPTII,S$GLB,, | Performed by: INTERNAL MEDICINE

## 2023-03-02 PROCEDURE — 3044F HG A1C LEVEL LT 7.0%: CPT | Mod: CPTII,S$GLB,, | Performed by: INTERNAL MEDICINE

## 2023-03-02 PROCEDURE — 3079F DIAST BP 80-89 MM HG: CPT | Mod: CPTII,S$GLB,, | Performed by: INTERNAL MEDICINE

## 2023-03-02 PROCEDURE — 3074F PR MOST RECENT SYSTOLIC BLOOD PRESSURE < 130 MM HG: ICD-10-PCS | Mod: CPTII,S$GLB,, | Performed by: INTERNAL MEDICINE

## 2023-03-02 NOTE — PROGRESS NOTES
Subjective:       Patient ID: Bushra Kahn is a 37 y.o. female.    Chief Complaint: Hand Pain (right)    Hand Pain   Pertinent negatives include no chest pain (arm pain or jaw pain). Pt recently had another child - post partum complicated by preeclampsia.  She has been having R hand and wrist pain with some mild swelling in joints.  No CP or SOB. No HA.  Review of Systems   Respiratory:  Negative for shortness of breath (PND or orthopnea).    Cardiovascular:  Negative for chest pain (arm pain or jaw pain).   Gastrointestinal:  Negative for abdominal pain, diarrhea, nausea and vomiting.   Genitourinary:  Negative for dysuria.   Neurological:  Negative for seizures, syncope and headaches.     Objective:      Physical Exam  Constitutional:       General: She is not in acute distress.     Appearance: She is well-developed.   HENT:      Head: Normocephalic.   Eyes:      Pupils: Pupils are equal, round, and reactive to light.   Neck:      Thyroid: No thyromegaly.      Vascular: No JVD.   Cardiovascular:      Rate and Rhythm: Normal rate and regular rhythm.      Heart sounds: Normal heart sounds. No murmur heard.    No friction rub. No gallop.   Pulmonary:      Effort: Pulmonary effort is normal.      Breath sounds: Normal breath sounds. No wheezing or rales.   Abdominal:      General: Bowel sounds are normal. There is no distension.      Palpations: Abdomen is soft. There is no mass.      Tenderness: There is no abdominal tenderness. There is no guarding or rebound.   Musculoskeletal:      Cervical back: Neck supple.   Lymphadenopathy:      Cervical: No cervical adenopathy.   Skin:     General: Skin is warm and dry.   Neurological:      Mental Status: She is alert and oriented to person, place, and time.      Deep Tendon Reflexes: Reflexes are normal and symmetric.   Psychiatric:         Behavior: Behavior normal.         Thought Content: Thought content normal.         Judgment: Judgment normal.        Assessment:       1. Abdominal pain, unspecified abdominal location    2. Wellness examination    3. Arthralgia, unspecified joint          Plan:   Abdominal pain, unspecified abdominal location  -     CT Renal Stone Study ABD Pelvis WO; Future; Expected date: 03/02/2023  RE- eval possible hydronephrosis seen on ultrasound  Wellness examination  -     CBC Auto Differential; Future; Expected date: 03/02/2023  -     Comprehensive Metabolic Panel; Future; Expected date: 03/02/2023  -     Lipid Panel; Future; Expected date: 03/02/2023  -     TSH; Future; Expected date: 03/02/2023  -     Hemoglobin A1C; Future; Expected date: 03/02/2023  -     Vitamin D; Future; Expected date: 03/02/2023  -     Sedimentation rate; Future; Expected date: 03/02/2023  -     C-Reactive Protein; Future; Expected date: 03/02/2023  -     BK Screen w/Reflex; Future; Expected date: 03/02/2023  -     Rheumatoid Factor; Future; Expected date: 03/02/2023  -     Cyclic Citrullinated Peptide Antibody, IgG; Future; Expected date: 03/02/2023    Arthralgia, unspecified joint  -     X-Ray Hand 3 View Bilateral; Future; Expected date: 03/02/2023  -     X-Ray Wrist Complete Bilateral; Future; Expected date: 03/02/2023  -     Ambulatory referral/consult to Rheumatology; Future; Expected date: 03/09/2023  Mother has RA and SLE - she has known positive HLA B27

## 2023-03-03 ENCOUNTER — HOSPITAL ENCOUNTER (OUTPATIENT)
Dept: RADIOLOGY | Facility: HOSPITAL | Age: 38
Discharge: HOME | End: 2023-03-03
Attending: INTERNAL MEDICINE
Payer: COMMERCIAL

## 2023-03-03 DIAGNOSIS — M25.50 ARTHRALGIA, UNSPECIFIED JOINT: ICD-10-CM

## 2023-03-03 PROCEDURE — 73130 X-RAY EXAM OF HAND: CPT | Mod: TC,50

## 2023-03-03 PROCEDURE — 73110 X-RAY EXAM OF WRIST: CPT | Mod: TC,50

## 2023-03-03 PROCEDURE — 73130 X-RAY EXAM OF HAND: CPT | Mod: 26,50,, | Performed by: RADIOLOGY

## 2023-03-03 PROCEDURE — 73110 XR WRIST COMPLETE 3 VIEWS BILATERAL: ICD-10-PCS | Mod: 26,50,, | Performed by: RADIOLOGY

## 2023-03-03 PROCEDURE — 73110 X-RAY EXAM OF WRIST: CPT | Mod: 26,50,, | Performed by: RADIOLOGY

## 2023-03-03 PROCEDURE — 73130 XR HAND COMPLETE 3 VIEWS BILATERAL: ICD-10-PCS | Mod: 26,50,, | Performed by: RADIOLOGY

## 2023-03-09 ENCOUNTER — HOSPITAL ENCOUNTER (OUTPATIENT)
Dept: RADIOLOGY | Facility: HOSPITAL | Age: 38
Discharge: HOME | End: 2023-03-09
Attending: INTERNAL MEDICINE
Payer: COMMERCIAL

## 2023-03-09 DIAGNOSIS — R10.9 ABDOMINAL PAIN, UNSPECIFIED ABDOMINAL LOCATION: ICD-10-CM

## 2023-03-09 PROCEDURE — 74176 CT ABD & PELVIS W/O CONTRAST: CPT | Mod: TC

## 2023-03-09 PROCEDURE — 74176 CT ABD & PELVIS W/O CONTRAST: CPT | Mod: 26,,, | Performed by: STUDENT IN AN ORGANIZED HEALTH CARE EDUCATION/TRAINING PROGRAM

## 2023-03-09 PROCEDURE — 74176 CT RENAL STONE STUDY ABD PELVIS WO: ICD-10-PCS | Mod: 26,,, | Performed by: STUDENT IN AN ORGANIZED HEALTH CARE EDUCATION/TRAINING PROGRAM

## 2023-03-15 ENCOUNTER — OFFICE VISIT (OUTPATIENT)
Dept: RHEUMATOLOGY | Facility: CLINIC | Age: 38
End: 2023-03-15
Payer: COMMERCIAL

## 2023-03-15 ENCOUNTER — LAB VISIT (OUTPATIENT)
Dept: LAB | Facility: HOSPITAL | Age: 38
End: 2023-03-15
Attending: INTERNAL MEDICINE
Payer: COMMERCIAL

## 2023-03-15 VITALS
DIASTOLIC BLOOD PRESSURE: 88 MMHG | HEIGHT: 64 IN | WEIGHT: 229.25 LBS | BODY MASS INDEX: 39.14 KG/M2 | HEART RATE: 85 BPM | SYSTOLIC BLOOD PRESSURE: 142 MMHG

## 2023-03-15 DIAGNOSIS — M13.0 POLYARTICULAR ARTHRITIS: Primary | ICD-10-CM

## 2023-03-15 DIAGNOSIS — R76.8 ANA POSITIVE: ICD-10-CM

## 2023-03-15 DIAGNOSIS — M25.50 ARTHRALGIA, UNSPECIFIED JOINT: ICD-10-CM

## 2023-03-15 LAB
C3 SERPL-MCNC: 146 MG/DL (ref 50–180)
C4 SERPL-MCNC: 38 MG/DL (ref 11–44)

## 2023-03-15 PROCEDURE — 3044F HG A1C LEVEL LT 7.0%: CPT | Mod: CPTII,S$GLB,, | Performed by: INTERNAL MEDICINE

## 2023-03-15 PROCEDURE — 3008F BODY MASS INDEX DOCD: CPT | Mod: CPTII,S$GLB,, | Performed by: INTERNAL MEDICINE

## 2023-03-15 PROCEDURE — 3079F DIAST BP 80-89 MM HG: CPT | Mod: CPTII,S$GLB,, | Performed by: INTERNAL MEDICINE

## 2023-03-15 PROCEDURE — 3008F PR BODY MASS INDEX (BMI) DOCUMENTED: ICD-10-PCS | Mod: CPTII,S$GLB,, | Performed by: INTERNAL MEDICINE

## 2023-03-15 PROCEDURE — 1160F PR REVIEW ALL MEDS BY PRESCRIBER/CLIN PHARMACIST DOCUMENTED: ICD-10-PCS | Mod: CPTII,S$GLB,, | Performed by: INTERNAL MEDICINE

## 2023-03-15 PROCEDURE — 36415 COLL VENOUS BLD VENIPUNCTURE: CPT | Performed by: INTERNAL MEDICINE

## 2023-03-15 PROCEDURE — 99205 PR OFFICE/OUTPT VISIT, NEW, LEVL V, 60-74 MIN: ICD-10-PCS | Mod: S$GLB,,, | Performed by: INTERNAL MEDICINE

## 2023-03-15 PROCEDURE — 86160 COMPLEMENT ANTIGEN: CPT | Mod: 59 | Performed by: INTERNAL MEDICINE

## 2023-03-15 PROCEDURE — 86160 COMPLEMENT ANTIGEN: CPT | Performed by: INTERNAL MEDICINE

## 2023-03-15 PROCEDURE — 3044F PR MOST RECENT HEMOGLOBIN A1C LEVEL <7.0%: ICD-10-PCS | Mod: CPTII,S$GLB,, | Performed by: INTERNAL MEDICINE

## 2023-03-15 PROCEDURE — 1160F RVW MEDS BY RX/DR IN RCRD: CPT | Mod: CPTII,S$GLB,, | Performed by: INTERNAL MEDICINE

## 2023-03-15 PROCEDURE — 3077F PR MOST RECENT SYSTOLIC BLOOD PRESSURE >= 140 MM HG: ICD-10-PCS | Mod: CPTII,S$GLB,, | Performed by: INTERNAL MEDICINE

## 2023-03-15 PROCEDURE — 1159F PR MEDICATION LIST DOCUMENTED IN MEDICAL RECORD: ICD-10-PCS | Mod: CPTII,S$GLB,, | Performed by: INTERNAL MEDICINE

## 2023-03-15 PROCEDURE — 99999 PR PBB SHADOW E&M-EST. PATIENT-LVL III: ICD-10-PCS | Mod: PBBFAC,,, | Performed by: INTERNAL MEDICINE

## 2023-03-15 PROCEDURE — 99999 PR PBB SHADOW E&M-EST. PATIENT-LVL III: CPT | Mod: PBBFAC,,, | Performed by: INTERNAL MEDICINE

## 2023-03-15 PROCEDURE — 3079F PR MOST RECENT DIASTOLIC BLOOD PRESSURE 80-89 MM HG: ICD-10-PCS | Mod: CPTII,S$GLB,, | Performed by: INTERNAL MEDICINE

## 2023-03-15 PROCEDURE — 99205 OFFICE O/P NEW HI 60 MIN: CPT | Mod: S$GLB,,, | Performed by: INTERNAL MEDICINE

## 2023-03-15 PROCEDURE — 1159F MED LIST DOCD IN RCRD: CPT | Mod: CPTII,S$GLB,, | Performed by: INTERNAL MEDICINE

## 2023-03-15 PROCEDURE — 3077F SYST BP >= 140 MM HG: CPT | Mod: CPTII,S$GLB,, | Performed by: INTERNAL MEDICINE

## 2023-03-15 NOTE — PROGRESS NOTES
Chief Complaint   Patient presents with    Pain       Patient was referred by     History of presenting illness    37 year old female comes in with    Hand pain - right one hurts the most- entire hand hurts and swells  Limiting-cant screw on bottles,faucets  The hand joint pain- comes as a flare    Ankles hurt- left ankle hurts the most  Random pain episodes as flares  Not much swelling    These symptoms started in 2014- when she had a UTI  At that time she had pain in both knees,both ankles, wrists,elbows,hands,hips,feet and SI joints      Since then pain has been on and off  But not limiting    Now she cannot do certain things and that's why she is here    She has good and bad days    Morning stiffness- few minutes    No relieving factors  Pressure on the front and back of the forearm sends a shooting pain down the hand and relieves the pain and also makes it hurt more  Activity aggravates it-like picking up a baby can be hurtful    Last UTI- 10/2022  Covid dec 2022    Past history : pre eclaMPSIA    Family history : mom has lupus,kidney disease,RA,HTN,sleep apnea    Social history : former smoker,quit in 2018        Review of Systems   Constitutional:  Negative for fever and unexpected weight change.   HENT:  Negative for mouth sores and trouble swallowing.    Eyes:  Negative for redness.   Respiratory:  Negative for cough and shortness of breath.    Cardiovascular:  Negative for chest pain.   Gastrointestinal:  Negative for constipation and diarrhea.   Genitourinary:  Negative for dysuria and genital sores.   Skin:  Negative for rash.   Neurological:  Negative for headaches.   Hematological:  Does not bruise/bleed easily.       Since first baby- red bumps on the left arm -doesn't itch,doesn't hurt,  Right arm- bumps as well  Stress causes redness on the neck  No skin rashes,malar rash,photosensitivity   No telangiectasias   No calcinosis   No psoriasis   No patchy alopecia   No oral and nasal ulcers    No dry eyes and dry mouth   No pleurisy or any cardiopulmonary complaints   No dysphagia,diplopia and dysphonia and muscle weakness   No n/v/c  Chronic diarrhea due to gall bladder removal   Mild acid reflux+   No raynaud's+   No digital ulcers   No cytopenias   No renal issues   No blood clots   No fever,chills,night sweats,weight loss and loss of appetite   No pregnancy losses/pre term deliveries  Pre eclampsia during the pregnancy and after delivery and once after delivery  Blood pressure elevation causes headaches,amlodipine helps  No recurrent conjunctivitis or uveitis or scleritis or episcleritis   No chronic or bloody diarrhea with no u colitis or crohn's /inflammatory bowel disease   No vaginal or urethral  d/c/STDs/no ulcers   No unexplained lymphadenopathy,parotitis   No seizures,strokes,psychosis  No sclerodactyly  No puffy hands  No perioral tightness           Physical Exam   Constitutional: She is oriented to person, place, and time. No distress.   HENT:   Head: Normocephalic.   Mouth/Throat: Oropharynx is clear and moist.   Eyes: Pupils are equal, round, and reactive to light. Conjunctivae are normal. Right eye exhibits no discharge. Left eye exhibits no discharge. No scleral icterus.   Neck: No thyromegaly present.   Cardiovascular: Normal rate, regular rhythm and normal heart sounds.   Pulmonary/Chest: Effort normal and breath sounds normal. No stridor.   Abdominal: Soft. Bowel sounds are normal.   Musculoskeletal:         General: Normal range of motion.      Cervical back: Normal range of motion.   Lymphadenopathy:     She has no cervical adenopathy.   Neurological: She is alert and oriented to person, place, and time.   Skin: Skin is warm. No rash noted. She is not diaphoretic.   Psychiatric: Affect and judgment normal.     Laboratory abnormalities    BK positive,6 times,profile negative  1: 320 homogenous,speckled    White count nml  Mild anemia 11.7/38.5  Plts nml  Lymphocyte nml    ALT  68/62    AST was high between march 2021 to July 2021-that's all-she had preeclapmsia in march 2021    ALT WAS HIGH between march 2021 to 3/2022  During the second pregnancy,ALT got better and then after the delivery LFTs are up again    GFR nml  Seeing hepatology- [facility] W/u pending  Mitochondrial ab neg  Smooth muscle ab neg    TSH nml      Vit d low,20    ESR,CRP nml    RF,CCP neg    Hand and wrist xrays nml  Left ankle xray nml      Right hip xray nml  MRI SI  joints  No inflammatory changes or erosions of the sacroiliac joints are detected.  Arthritis survey nml    Assessment       37 year old female comes in with    Hand pain - right one hurts the most- entire hand hurts and swells  Limiting-cant screw on bottles,faucets  The hand joint pain- comes as a flare    Ankles hurt- left ankle hurts the most  Random pain episodes as flares  Not much swelling    These symptoms started in 2014- when she had a UTI  At that time she had pain in both knees,both ankles, wrists,elbows,hands,hips,feet and SI joints    Since then pain has been on and off  But not limiting    Now she cannot do certain things and that's why she is here    She has good and bad days    Morning stiffness- few minutes    No relieving factors  Pressure on the front and back of the forearm sends a shooting pain down the hand and relieves the pain and also makes it hurt more  Activity aggravates it-like picking up a baby can be hurtful    Last UTI- 10/2022    Past history : pre eclaMPSIA    Since first baby- red bumps on the left arm -doesn't itch,doesn't hurt,  Right arm- bumps as well  Stress causes redness on the neck    Chronic diarrhea due to gall bladder removal   Mild acid reflux+     Pre eclampsia during the pregnancy and after delivery and once after delivery  Blood pressure elevation causes headaches,amlodipine helps    Laboratory abnormalities    BK positive,6 times,profile negative  1: 320 homogenous,speckled    White count nml  Mild anemia  11.7/38.5  Plts nml  Lymphocyte nml    ALT 68/62    AST was high between march 2021 to July 2021-that's all-she had preeclapmsia in march 2021    ALT was high between march 2021 to 3/2022  During the second pregnancy,ALT got better and then after the delivery LFTs are up again    GFR nml  Seeing hepatology- [facility] W/u pending  Mitochondrial ab neg  Smooth muscle ab neg    TSH nml    Vit d low,20    ESR,CRP nml    RF,CCP neg    Hand and wrist xrays nml  Left ankle xray nml    Right hip xray nml  MRI SI  joints  No inflammatory changes or erosions of the sacroiliac joints are detected.  Arthritis survey nml    Exam :  Red bumps on the b/l arms- looks like keratosis pilaris  Redness on the neck        1. Polyarticular arthritis    2. Arthralgia, unspecified joint    3. BK positive        Reviewed labs/xrays  Reviewed medications    Ordered labs /xrays    I have independently reviewed all labs and imaging       New problem     Plan    1) Positive BK   Joint pains   B/l arm rash  Negative profile  Nml blood counts  No symptoms of systemic lupus  2 pre eclampsias    Complete the lupus w/u    Rash on the b/l arms looks like keratosis pilaris  But will seek dermatology referral    Also has deranged LFTs  AST was deranged only for few months following first delivery when she had the pre eclampsia  ALT on the other hand went up at the same time,stayed elevated till the second pregnancy, got better throughout the pregnancy and now up again  [facility] w/u pending  Negative smooth muscle and mitochondrial ab        2) Polyarticular joint pains    Pain started after a UTI in 2014  She has had multiple UTIs up until 2022  Covid dec 2022  Joint pains were more diffuse and severe in 2014  Since then she has had good and bad days  Now right hand and left ankle  seem to get the most flares  Right hand especially can get swollen  On exam today only CMC is tender on the right hand but when I squeezed the right forearm she had a shooting pain  down the forearm to the hand and the pain was first a hurt and then it made her feel better  Left ankle is not tender to touch,has good flexion,extension and inversion/eversion but no swelling. But after we completed the exam and she stood on the ground,the left ankle started to hurt  Left hip hurts at the greater trochanteric bursal site    Both SI joints hurt   Right knee is tender when I apply pressure on the patella and force the extension    Left foot 2 MTPs tender    RA panel,inf markers are all negative  Multiple xrays negative  MRI SI joints negative    Need to r/o inflammatory arthritis    NM JOINT scan     Rtc in 6 to 8 weeks       More than 60 minutes spent taking care of the patient    Bushra was seen today for pain.    Diagnoses and all orders for this visit:    Polyarticular arthritis  -     NM Joint Scan Whole Body; Future    Arthralgia, unspecified joint  -     Ambulatory referral/consult to Rheumatology    BK positive  -     C3 Complement; Future  -     C4 Complement; Future  -     Urinalysis; Future  -     Protein/Creatinine Ratio, Urine; Future  -     Direct antiglobulin test; Future  -     Cancel: Cardiolipin antibody; Future  -     Cancel: Beta-2 Glycoprotein Abs (IgA, IgG, IgM); Future  -     Cancel: DRVVT; Future

## 2023-03-15 NOTE — PROGRESS NOTES
Rapid3 Question Responses and Scores 3/15/2023   MDHAQ Score 0.2   Psychologic Score 3.3   Pain Score 5   When you awakened in the morning OVER THE LAST WEEK, did you feel stiff? Yes   If Yes, please indicate the number of hours until you are as limber as you will be for the day 1   Fatigue Score 5   Global Health Score 2   RAPID3 Score 2.56     Answers submitted by the patient for this visit:  Rheumatology Questionnaire (Submitted on 3/15/2023)  fever: No  eye redness: No  mouth sores: No  headaches: No  shortness of breath: No  chest pain: No  trouble swallowing: No  diarrhea: No  constipation: No  unexpected weight change: No  genital sore: No  dysuria: No  During the last 3 days, have you had a skin rash?: No  Bruises or bleeds easily: No  cough: No

## 2023-03-17 ENCOUNTER — PATIENT MESSAGE (OUTPATIENT)
Dept: RESEARCH | Facility: HOSPITAL | Age: 38
End: 2023-03-17
Payer: COMMERCIAL

## 2023-03-17 DIAGNOSIS — R76.8 ANA POSITIVE: Primary | ICD-10-CM

## 2023-03-21 ENCOUNTER — LAB VISIT (OUTPATIENT)
Dept: LAB | Facility: HOSPITAL | Age: 38
End: 2023-03-21
Payer: COMMERCIAL

## 2023-03-21 DIAGNOSIS — R76.8 ANA POSITIVE: ICD-10-CM

## 2023-03-21 LAB — DAT IGG-SP REAG RBC-IMP: NORMAL

## 2023-03-21 PROCEDURE — 86147 CARDIOLIPIN ANTIBODY EA IG: CPT | Mod: 59 | Performed by: INTERNAL MEDICINE

## 2023-03-21 PROCEDURE — 36415 COLL VENOUS BLD VENIPUNCTURE: CPT | Performed by: INTERNAL MEDICINE

## 2023-03-21 PROCEDURE — 86880 COOMBS TEST DIRECT: CPT | Performed by: INTERNAL MEDICINE

## 2023-03-21 PROCEDURE — 85613 RUSSELL VIPER VENOM DILUTED: CPT | Performed by: INTERNAL MEDICINE

## 2023-03-21 PROCEDURE — 86146 BETA-2 GLYCOPROTEIN ANTIBODY: CPT | Mod: 59 | Performed by: INTERNAL MEDICINE

## 2023-03-21 PROCEDURE — 85730 THROMBOPLASTIN TIME PARTIAL: CPT | Performed by: INTERNAL MEDICINE

## 2023-03-23 LAB
B2 GLYCOPROT1 IGA SER QL: 1.4 U/ML
B2 GLYCOPROT1 IGG SER QL: 1 U/ML
B2 GLYCOPROT1 IGM SER QL: <2.4 U/ML
CARDIOLIPIN IGG SER IA-ACNC: <9.4 GPL (ref 0–14.99)
CARDIOLIPIN IGM SER IA-ACNC: <9.4 MPL (ref 0–12.49)

## 2023-03-25 LAB
APTT IMM NP PPP: NORMAL SEC (ref 32–48)
APTT P HEP NEUT PPP: NORMAL SEC (ref 32–48)
CONFIRM APTT STACLOT: NORMAL
DRVVT SCREEN TO CONFIRM RATIO: NORMAL RATIO
LA 3 SCREEN W REFLEX-IMP: NORMAL
LA NT DPL PPP QL: NORMAL
MIXING DRVVT: NORMAL SEC (ref 33–44)
PROTHROMBIN TIME: 12.3 SEC (ref 12–15.5)
REPTILASE TIME: NORMAL SEC
SCREEN APTT: 43 SEC (ref 32–48)
SCREEN DRVVT: 35 SEC (ref 33–44)
THROMBIN TIME: NORMAL SEC (ref 14.7–19.5)

## 2023-03-29 ENCOUNTER — HOSPITAL ENCOUNTER (OUTPATIENT)
Dept: RADIOLOGY | Facility: HOSPITAL | Age: 38
Discharge: HOME | End: 2023-03-29
Attending: INTERNAL MEDICINE
Payer: COMMERCIAL

## 2023-03-29 DIAGNOSIS — M13.0 POLYARTICULAR ARTHRITIS: ICD-10-CM

## 2023-03-29 PROCEDURE — A9512 TC99M PERTECHNETATE: HCPCS

## 2023-03-29 PROCEDURE — 78306 NM JOINT SCAN WHOLE BODY: ICD-10-PCS | Mod: 26,,, | Performed by: RADIOLOGY

## 2023-03-29 PROCEDURE — 78306 BONE IMAGING WHOLE BODY: CPT | Mod: 26,,, | Performed by: RADIOLOGY

## 2023-04-05 ENCOUNTER — TELEPHONE (OUTPATIENT)
Dept: INTERNAL MEDICINE | Facility: CLINIC | Age: 38
End: 2023-04-05
Payer: COMMERCIAL

## 2023-04-08 ENCOUNTER — PATIENT MESSAGE (OUTPATIENT)
Dept: RHEUMATOLOGY | Facility: CLINIC | Age: 38
End: 2023-04-08
Payer: COMMERCIAL

## 2023-04-09 ENCOUNTER — PATIENT MESSAGE (OUTPATIENT)
Dept: RHEUMATOLOGY | Facility: CLINIC | Age: 38
End: 2023-04-09
Payer: COMMERCIAL

## 2023-04-11 ENCOUNTER — PATIENT MESSAGE (OUTPATIENT)
Dept: INTERNAL MEDICINE | Facility: CLINIC | Age: 38
End: 2023-04-11
Payer: COMMERCIAL

## 2023-04-12 ENCOUNTER — PATIENT MESSAGE (OUTPATIENT)
Dept: INTERNAL MEDICINE | Facility: CLINIC | Age: 38
End: 2023-04-12
Payer: COMMERCIAL

## 2023-04-14 ENCOUNTER — TELEPHONE (OUTPATIENT)
Dept: ORTHOPEDICS | Facility: CLINIC | Age: 38
End: 2023-04-14
Payer: COMMERCIAL

## 2023-04-14 NOTE — TELEPHONE ENCOUNTER
Spoke to patient and directed her to call Dr. Mcqueen [facility] office at [phone].        ----- Message from Peggy Josh sent at 4/14/2023  9:23 AM CDT -----  Regarding: Call Back  Name of Who is Calling:  Patient          What is the request in detail:  Patient stated she was told Dr. Mcqueen do plastic surgery and would like to speak with him about surgery for her breast            Can the clinic reply by [facility]: Yes            What [facility] to [facility] Back [facility]:[phone]

## 2023-04-24 ENCOUNTER — LAB VISIT (OUTPATIENT)
Dept: LAB | Facility: HOSPITAL | Age: 38
End: 2023-04-24
Payer: COMMERCIAL

## 2023-04-24 DIAGNOSIS — K76.0 FATTY LIVER: ICD-10-CM

## 2023-04-24 DIAGNOSIS — R74.8 ELEVATED LIVER ENZYMES: ICD-10-CM

## 2023-04-24 LAB
ALBUMIN SERPL BCP-MCNC: 4.1 G/DL (ref 3.5–5.2)
ALP SERPL-CCNC: 61 U/L (ref 55–135)
ALT SERPL W/O P-5'-P-CCNC: 39 U/L (ref 10–44)
AST SERPL-CCNC: 29 U/L (ref 10–40)
BILIRUB DIRECT SERPL-MCNC: 0.2 MG/DL (ref 0.1–0.3)
BILIRUB SERPL-MCNC: 0.5 MG/DL (ref 0.1–1)
HAV IGG SER QL IA: REACTIVE
HBV CORE AB SERPL QL IA: NORMAL
HBV SURFACE AB SER-ACNC: >1000 MIU/ML
HBV SURFACE AB SER-ACNC: REACTIVE M[IU]/ML
PROT SERPL-MCNC: 7 G/DL (ref 6–8.4)

## 2023-04-24 PROCEDURE — 86704 HEP B CORE ANTIBODY TOTAL: CPT | Performed by: NURSE PRACTITIONER

## 2023-04-24 PROCEDURE — 86706 HEP B SURFACE ANTIBODY: CPT | Mod: 91 | Performed by: NURSE PRACTITIONER

## 2023-04-24 PROCEDURE — 36415 COLL VENOUS BLD VENIPUNCTURE: CPT | Performed by: NURSE PRACTITIONER

## 2023-04-24 PROCEDURE — 80076 HEPATIC FUNCTION PANEL: CPT | Performed by: NURSE PRACTITIONER

## 2023-04-24 PROCEDURE — 86790 VIRUS ANTIBODY NOS: CPT | Performed by: NURSE PRACTITIONER

## 2023-04-25 ENCOUNTER — PATIENT MESSAGE (OUTPATIENT)
Dept: OPTOMETRY | Facility: CLINIC | Age: 38
End: 2023-04-25
Payer: COMMERCIAL

## 2023-05-04 ENCOUNTER — OFFICE VISIT (OUTPATIENT)
Dept: INTERNAL MEDICINE | Facility: CLINIC | Age: 38
End: 2023-05-04
Payer: COMMERCIAL

## 2023-05-04 VITALS
HEIGHT: 64 IN | SYSTOLIC BLOOD PRESSURE: 130 MMHG | OXYGEN SATURATION: 99 % | HEART RATE: 92 BPM | BODY MASS INDEX: 36.59 KG/M2 | DIASTOLIC BLOOD PRESSURE: 82 MMHG | WEIGHT: 214.31 LBS

## 2023-05-04 DIAGNOSIS — I10 PRIMARY HYPERTENSION: ICD-10-CM

## 2023-05-04 DIAGNOSIS — K20.90 ESOPHAGITIS: Primary | ICD-10-CM

## 2023-05-04 PROCEDURE — 3008F BODY MASS INDEX DOCD: CPT | Mod: CPTII,S$GLB,, | Performed by: INTERNAL MEDICINE

## 2023-05-04 PROCEDURE — 99395 PR PREVENTIVE VISIT,EST,18-39: ICD-10-PCS | Mod: S$GLB,,, | Performed by: INTERNAL MEDICINE

## 2023-05-04 PROCEDURE — 3079F PR MOST RECENT DIASTOLIC BLOOD PRESSURE 80-89 MM HG: ICD-10-PCS | Mod: CPTII,S$GLB,, | Performed by: INTERNAL MEDICINE

## 2023-05-04 PROCEDURE — 99999 PR PBB SHADOW E&M-EST. PATIENT-LVL IV: CPT | Mod: PBBFAC,,, | Performed by: INTERNAL MEDICINE

## 2023-05-04 PROCEDURE — 3008F PR BODY MASS INDEX (BMI) DOCUMENTED: ICD-10-PCS | Mod: CPTII,S$GLB,, | Performed by: INTERNAL MEDICINE

## 2023-05-04 PROCEDURE — 99999 PR PBB SHADOW E&M-EST. PATIENT-LVL IV: ICD-10-PCS | Mod: PBBFAC,,, | Performed by: INTERNAL MEDICINE

## 2023-05-04 PROCEDURE — 3044F PR MOST RECENT HEMOGLOBIN A1C LEVEL <7.0%: ICD-10-PCS | Mod: CPTII,S$GLB,, | Performed by: INTERNAL MEDICINE

## 2023-05-04 PROCEDURE — 3079F DIAST BP 80-89 MM HG: CPT | Mod: CPTII,S$GLB,, | Performed by: INTERNAL MEDICINE

## 2023-05-04 PROCEDURE — 99395 PREV VISIT EST AGE 18-39: CPT | Mod: S$GLB,,, | Performed by: INTERNAL MEDICINE

## 2023-05-04 PROCEDURE — 3044F HG A1C LEVEL LT 7.0%: CPT | Mod: CPTII,S$GLB,, | Performed by: INTERNAL MEDICINE

## 2023-05-04 PROCEDURE — 3075F PR MOST RECENT SYSTOLIC BLOOD PRESS GE 130-139MM HG: ICD-10-PCS | Mod: CPTII,S$GLB,, | Performed by: INTERNAL MEDICINE

## 2023-05-04 PROCEDURE — 3075F SYST BP GE 130 - 139MM HG: CPT | Mod: CPTII,S$GLB,, | Performed by: INTERNAL MEDICINE

## 2023-05-04 RX ORDER — PANTOPRAZOLE SODIUM 40 MG/1
40 TABLET, DELAYED RELEASE ORAL DAILY
Qty: 90 TABLET | Refills: 1 | Status: SHIPPED | OUTPATIENT
Start: 2023-05-04 | End: 2023-10-03 | Stop reason: SDUPTHER

## 2023-05-04 RX ORDER — AMLODIPINE BESYLATE 2.5 MG/1
2.5 TABLET ORAL DAILY
Qty: 30 TABLET | Refills: 0
Start: 2023-05-04 | End: 2023-07-07

## 2023-05-04 NOTE — PROGRESS NOTES
Subjective:       Patient ID: Bushra Kahn is a 38 y.o. female.    Chief Complaint: Annual Exam    HPIPt is doing better - HA resolved - BP improved.  No CP or SOB.  Increased heartburn symptoms.   Review of Systems   Respiratory:  Negative for shortness of breath (PND or orthopnea).    Cardiovascular:  Negative for chest pain (arm pain or jaw pain).   Gastrointestinal:  Negative for abdominal pain, diarrhea, nausea and vomiting.   Genitourinary:  Negative for dysuria.   Neurological:  Negative for seizures, syncope and headaches.     Objective:      Physical Exam  Constitutional:       General: She is not in acute distress.     Appearance: She is well-developed.   HENT:      Head: Normocephalic.   Eyes:      Pupils: Pupils are equal, round, and reactive to light.   Neck:      Thyroid: No thyromegaly.      Vascular: No JVD.   Cardiovascular:      Rate and Rhythm: Normal rate and regular rhythm.      Heart sounds: Normal heart sounds. No murmur heard.    No friction rub. No gallop.   Pulmonary:      Effort: Pulmonary effort is normal.      Breath sounds: Normal breath sounds. No wheezing or rales.   Abdominal:      General: Bowel sounds are normal. There is no distension.      Palpations: Abdomen is soft. There is no mass.      Tenderness: There is no abdominal tenderness. There is no guarding or rebound.   Musculoskeletal:      Cervical back: Neck supple.   Lymphadenopathy:      Cervical: No cervical adenopathy.   Skin:     General: Skin is warm and dry.   Neurological:      Mental Status: She is alert and oriented to person, place, and time.      Deep Tendon Reflexes: Reflexes are normal and symmetric.   Psychiatric:         Behavior: Behavior normal.         Thought Content: Thought content normal.         Judgment: Judgment normal.       Assessment:       1. Esophagitis    2. Primary hypertension        Plan:   Esophagitis  -     Ambulatory referral/consult to Endo Procedure ; Future; Expected  date: 05/05/2023    Primary hypertension  -     amLODIPine (NORVASC) 5 MG tablet; Take 0.5 tablets (2.5 mg total) by mouth once daily.  Dispense: 30 tablet; Refill: 5    Other orders  -     pantoprazole (PROTONIX) 40 MG tablet; Take 1 tablet (40 mg total) by mouth once daily.  Dispense: 90 tablet; Refill: 1

## 2023-05-08 ENCOUNTER — OFFICE VISIT (OUTPATIENT)
Dept: RHEUMATOLOGY | Facility: CLINIC | Age: 38
End: 2023-05-08
Payer: COMMERCIAL

## 2023-05-08 DIAGNOSIS — M13.0 POLYARTICULAR ARTHRITIS: ICD-10-CM

## 2023-05-08 DIAGNOSIS — R76.8 POSITIVE ANA (ANTINUCLEAR ANTIBODY): Primary | ICD-10-CM

## 2023-05-08 PROCEDURE — 99499 UNLISTED E&M SERVICE: CPT | Mod: 95,,, | Performed by: INTERNAL MEDICINE

## 2023-05-08 PROCEDURE — 99499 NO LOS: ICD-10-PCS | Mod: 95,,, | Performed by: INTERNAL MEDICINE

## 2023-05-08 PROCEDURE — 3044F HG A1C LEVEL LT 7.0%: CPT | Mod: CPTII,95,, | Performed by: INTERNAL MEDICINE

## 2023-05-08 PROCEDURE — 3044F PR MOST RECENT HEMOGLOBIN A1C LEVEL <7.0%: ICD-10-PCS | Mod: CPTII,95,, | Performed by: INTERNAL MEDICINE

## 2023-05-08 RX ORDER — HYDROXYCHLOROQUINE SULFATE 200 MG/1
200 TABLET, FILM COATED ORAL 2 TIMES DAILY
Qty: 60 TABLET | Refills: 5 | Status: SHIPPED | OUTPATIENT
Start: 2023-05-08 | End: 2023-07-13

## 2023-05-08 NOTE — PROGRESS NOTES
Chief Complaint   Patient presents with    Disease Management     The patient location is: home  The chief complaint leading to consultation is: arthralgia    Visit type: audiovisual    Face to Face time with patient: 20   minutes of total time spent on the encounter, which includes face to face time and non-face to face time preparing to see the patient (eg, review of tests), Obtaining and/or reviewing separately obtained history, Documenting clinical information in the electronic or other health record, Independently interpreting results (not separately reported) and communicating results to the patient/family/caregiver, or Care coordination (not separately reported).         Each patient to whom he or she provides medical services by telemedicine is:  (1) informed of the relationship between the physician and patient and the respective role of any other health care provider with respect to management of the patient; and (2) notified that he or she may decline to receive medical services by telemedicine and may withdraw from such care at any time.    Notes:       History of presenting illness    38 year old female comes in with    Hand pain - right one hurts the most- entire hand hurts and swells  Limiting-cant screw on bottles,faucets  The hand joint pain- comes as a flare    Ankles hurt- left ankle hurts the most  Random pain episodes as flares  Not much swelling    These symptoms started in 2014- when she had a UTI  At that time she had pain in both knees,both ankles, wrists,elbows,hands,hips,feet and SI joints      Since then pain has been on and off  But not limiting    Now she cannot do certain things and that's why she is here    She has good and bad days    Morning stiffness- few minutes    No relieving factors  Pressure on the front and back of the forearm sends a shooting pain down the hand and relieves the pain and also makes it hurt more  Activity aggravates it-like picking up a baby can be  ranjith    Last UTI- 10/2022  Covid dec 2022    Past history : pre eclaMPSIA    Family history : mom has lupus,kidney disease,RA,HTN,sleep apnea    Social history : former smoker,quit in 2018        Review of Systems   Constitutional:  Negative for fever and unexpected weight change.   HENT:  Negative for mouth sores and trouble swallowing.    Eyes:  Negative for redness.   Respiratory:  Negative for cough and shortness of breath.    Cardiovascular:  Negative for chest pain.   Gastrointestinal:  Negative for constipation and diarrhea.   Genitourinary:  Negative for dysuria and genital sores.   Skin:  Negative for rash.   Neurological:  Negative for headaches.   Hematological:  Does not bruise/bleed easily.       Since first baby- red bumps on the left arm -doesn't itch,doesn't hurt,  Right arm- bumps as well  Stress causes redness on the neck  No skin rashes,malar rash,photosensitivity   No telangiectasias   No calcinosis   No psoriasis   No patchy alopecia   No oral and nasal ulcers   No dry eyes and dry mouth   No pleurisy or any cardiopulmonary complaints   No dysphagia,diplopia and dysphonia and muscle weakness   No n/v/c  Chronic diarrhea due to gall bladder removal   Mild acid reflux+   No raynaud's+   No digital ulcers   No cytopenias   No renal issues   No blood clots   No fever,chills,night sweats,weight loss and loss of appetite   No pregnancy losses/pre term deliveries  Pre eclampsia during the pregnancy and after delivery and once after delivery  Blood pressure elevation causes headaches,amlodipine helps  No recurrent conjunctivitis or uveitis or scleritis or episcleritis   No chronic or bloody diarrhea with no u colitis or crohn's /inflammatory bowel disease   No vaginal or urethral  d/c/STDs/no ulcers   No unexplained lymphadenopathy,parotitis   No seizures,strokes,psychosis  No sclerodactyly  No puffy hands  No perioral tightness       There is currently no information documented on the homunculus. Go  to the Rheumatology activity and complete the homunculus joint exam.    Physical Exam   Constitutional: She is oriented to person, place, and time. No distress.   HENT:   Head: Normocephalic.   Mouth/Throat: Oropharynx is clear and moist.   Eyes: Pupils are equal, round, and reactive to light. Conjunctivae are normal. Right eye exhibits no discharge. Left eye exhibits no discharge. No scleral icterus.   Neck: No thyromegaly present.   Cardiovascular: Normal rate, regular rhythm and normal heart sounds.   Pulmonary/Chest: Effort normal and breath sounds normal. No stridor.   Abdominal: Soft. Bowel sounds are normal.   Musculoskeletal:         General: Normal range of motion.      Cervical back: Normal range of motion.   Lymphadenopathy:     She has no cervical adenopathy.   Neurological: She is alert and oriented to person, place, and time.   Skin: Skin is warm. No rash noted. She is not diaphoretic.   Psychiatric: Affect and judgment normal.     Widespread pain index  Note the areas which the patient has had pain over the last week:                   Shoulder-girdle, left               Shoulder-girdle, right                         Upper arm left                       Upper arm right                         Lower arm left                       Lower arm right    Hip (buttock, trochanter) left  Hip (buttock, trochanter) right                           Upper leg, left                         Upper leg, right                           Lower leg, left                         Lower leg, right                                     Jaw, left                                   Jaw, right                                        Chest                                  Abdomen                               Upper back                              Lower back                                        Neck  Score will be from 0-19:5/19                                         Symptom severity score  Fatigue 1  Waking Unrefreshed  1  Cognitive Symptoms 0   0 = no problem, 1=slight or mild problem 2= moderate; considerable problems often present and/or at a moderate level, 3 = severe, pervasive, continuous, life disturbing problem  For each of the 3 symptoms, indicate the level of severity over the past week using the Scale.  The symptom severity score is the sum of the severity of the 3 symptoms (fatigue, waking unrefreshed, and cognitive symptoms) plus the number of the following symptoms occurring during the previous 6 months:   Headaches 0  Pain or cramps in the lower abdomen 0  Depression 0  The final score is between 0 and 12 : 2/12                                          Criteria  Patient has fibromyalgia if the following 3 conditions are met:  1.  Widespread pain index greater than or equal to 7 and symptom severity score greater than or equal to 5 or widespread pain index between 3- 6, and symptom severity score greater than or equal to 9.    2.  Symptoms have been present in a similar level for at least 3 months  3.  The patient does not have a disorder that would otherwise sufficiently explain the pain      Laboratory abnormalities    BK positive,6 times,profile negative  1: 320 homogenous,speckled    White count nml  Mild anemia 11.7/38.5  Plts nml  Lymphocyte nml    ALT 68/62    AST was high between march 2021 to July 2021-that's all-she had preeclapmsia in march 2021    ALT WAS HIGH between march 2021 to 3/2022  During the second pregnancy,ALT got better and then after the delivery LFTs are up again    GFR nml  Seeing hepatology- [facility] W/u pending  Mitochondrial ab neg  Smooth muscle ab neg    TSH nml      Vit d low,20    ESR,CRP nml    RF,CCP neg    Hand and wrist xrays nml  Left ankle xray nml  Right hip xray nml  MRI SI  joints  No inflammatory changes or erosions of the sacroiliac joints are detected.  Arthritis survey nml  NM joint scan neg    Assessment       38 year old female comes in with    Hand pain - right one hurts  the most- entire hand hurts and swells  Limiting-cant screw on bottles,faucets  The hand joint pain- comes as a flare    Ankles hurt- left ankle hurts the most  Random pain episodes as flares  Not much swelling    These symptoms started in 2014- when she had a UTI  At that time she had pain in both knees,both ankles, wrists,elbows,hands,hips,feet and SI joints    Since then pain has been on and off  But not limiting    Now she cannot do certain things and that's why she is here    She has good and bad days    Morning stiffness- few minutes    No relieving factors  Pressure on the front and back of the forearm sends a shooting pain down the hand and relieves the pain and also makes it hurt more  Activity aggravates it-like picking up a baby can be hurtful    Last UTI- 10/2022    Past history : pre eclaMPSIA    Since first baby- red bumps on the left arm -doesn't itch,doesn't hurt,  Right arm- bumps as well  Stress causes redness on the neck    Chronic diarrhea due to gall bladder removal   Mild acid reflux+     Pre eclampsia during the pregnancy and after delivery and once after delivery  Blood pressure elevation causes headaches,amlodipine helps    Laboratory abnormalities    BK positive,6 times,profile negative  1: 320 homogenous,speckled    White count nml  Mild anemia 11.7/38.5  Plts nml  Lymphocyte nml    ALT 68/62    AST was high between march 2021 to July 2021-that's all-she had preeclapmsia in march 2021    ALT was high between march 2021 to 3/2022  During the second pregnancy,ALT got better and then after the delivery LFTs are up again    GFR nml  Seeing hepatology- [facility] W/u neg  Hepatitis labs nml  Mitochondrial ab neg  Smooth muscle ab neg    TSH nml    Vit d low,20    ESR,CRP nml    RF,CCP neg    Hand and wrist xrays nml  Left ankle xray nml    Right hip xray nml  MRI SI  joints  No inflammatory changes or erosions of the sacroiliac joints are detected.  Arthritis survey nml  NM joint scan neg    Exam  :  Red bumps on the b/l arms- looks like keratosis pilaris  Redness on the neck        1. Positive BK (antinuclear antibody)    2. Polyarticular arthritis          Reviewed labs/xrays  Reviewed medications    Ordered labs /xrays    I have independently reviewed all labs and imaging       F/u problem     Plan    1) Positive BK   Joint pains   B/l arm rash  Negative profile  Nml blood counts  No symptoms of systemic lupus  2 pre eclampsias    Completed the lupus w/u- negative  UA,UPCR nml  Complements nml  MEHDI neg  APLAS neg    Rash on the b/l arms looks like keratosis pilaris  But will seek dermatology referral    Also has deranged LFTs  AST was deranged only for few months following first delivery when she had the pre eclampsia  ALT on the other hand went up at the same time,stayed elevated till the second pregnancy, got better throughout the pregnancy and now up again  [facility] w/u pending  Negative smooth muscle and mitochondrial ab    Rpt LFTs normal  Hepatitis A,B,C neg      2) Polyarticular joint pains    Pain started after a UTI in 2014  She has had multiple UTIs up until 2022  Covid dec 2022  Joint pains were more diffuse and severe in 2014  Since then she has had good and bad days  Now right hand and left ankle  seem to get the most flares  Right hand especially can get swollen  On exam today only CMC is tender on the right hand but when I squeezed the right forearm she had a shooting pain down the forearm to the hand and the pain was first a hurt and then it made her feel better  Left ankle is not tender to touch,has good flexion,extension and inversion/eversion but no swelling. But after we completed the exam and she stood on the ground,the left ankle started to hurt  Left hip hurts at the greater trochanteric bursal site    Both SI joints hurt   Right knee is tender when I apply pressure on the patella and force the extension    Left foot 2 MTPs tender    RA panel,inf markers are all negative  Multiple xrays  negative  MRI SI joints negative    Need to r/o inflammatory arthritis    NM JOINT scan negative     Since we have no evidence that she has fibromyalgia and the joints continue to hurt- treating this with plaquenil might be the next best step to assess the response  Started 200 mg bid    Rtc in 3 months      Bushra was seen today for disease management.    Diagnoses and all orders for this visit:    Positive BK (antinuclear antibody)    Polyarticular arthritis

## 2023-05-12 ENCOUNTER — OFFICE VISIT (OUTPATIENT)
Dept: PRIMARY CARE CLINIC | Facility: CLINIC | Age: 38
End: 2023-05-12
Payer: COMMERCIAL

## 2023-05-12 VITALS
SYSTOLIC BLOOD PRESSURE: 142 MMHG | DIASTOLIC BLOOD PRESSURE: 90 MMHG | WEIGHT: 226.19 LBS | TEMPERATURE: 98 F | HEART RATE: 86 BPM | BODY MASS INDEX: 38.62 KG/M2 | RESPIRATION RATE: 18 BRPM | OXYGEN SATURATION: 98 % | HEIGHT: 64 IN

## 2023-05-12 DIAGNOSIS — J06.9 UPPER RESPIRATORY TRACT INFECTION, UNSPECIFIED TYPE: Primary | ICD-10-CM

## 2023-05-12 LAB
CTP QC/QA: YES
SARS-COV-2 AG RESP QL IA.RAPID: NEGATIVE

## 2023-05-12 PROCEDURE — 87811 SARS-COV-2 COVID19 W/OPTIC: CPT | Mod: QW,S$GLB,, | Performed by: INTERNAL MEDICINE

## 2023-05-12 PROCEDURE — 99499 UNLISTED E&M SERVICE: CPT | Mod: S$GLB,,, | Performed by: INTERNAL MEDICINE

## 2023-05-12 PROCEDURE — 99999 PR PBB SHADOW E&M-EST. PATIENT-LVL IV: CPT | Mod: PBBFAC,,, | Performed by: INTERNAL MEDICINE

## 2023-05-12 PROCEDURE — 87811 SARS CORONAVIRUS 2 ANTIGEN POCT, MANUAL READ: ICD-10-PCS | Mod: QW,S$GLB,, | Performed by: INTERNAL MEDICINE

## 2023-05-12 PROCEDURE — 99999 PR PBB SHADOW E&M-EST. PATIENT-LVL IV: ICD-10-PCS | Mod: PBBFAC,,, | Performed by: INTERNAL MEDICINE

## 2023-05-12 PROCEDURE — 99499 NO LOS: ICD-10-PCS | Mod: S$GLB,,, | Performed by: INTERNAL MEDICINE

## 2023-05-12 NOTE — PATIENT INSTRUCTIONS
Thank you for coming to visit today.  Your COVID test was negative.  However this is still most likely viral requires no antibiotics.    Symptomatic treatment is the following:  Zyrtec 10 mg at bedtime  Saline nasal spray 4 squirts each nostril every 2-3 hours  For throat irritation and cough Cepacol lozenges  Tylenol arthritis or extended release Tylenol for fever 1-2 every 8 hours  If you develop cough 1 tbsp of honey every 2-3 hours

## 2023-05-12 NOTE — PROGRESS NOTES
38-year-old woman with a history of hypertension and a positive BK (recently given Plaquenil for arthralgias however has a negative CRP and sed rate) here with a chief complaint of fever and upper respiratory symptoms.      History of present illness and pertinent review of systems:  The patient has a 2 day history of symptoms.  Yesterday she felt feverish and had malaise.  She also noted fatigue.  She noted some neck soreness.  Her T-max was 101.2°.  The headache has returned today but she has no earache.  She still has some tightness of her neck and upper shoulders but the motion of her neck is normal.  She notes nasal congestion but not necessarily a postnasal drip.  She has no rhinorrhea.  There is a minimal cough.  There is no diarrhea, no rash, no loss sense of taste or smell.  Her appetite is diminished.  Her child had a virus and another 1 had croup last weekend.  Her  feels well.    Problem list, medication list, and allergies were reviewed.  Patient had a topical allergy to benzos salicylate otherwise develop side effects of various medications as listed.      Remaining review of systems is negative.    Physical Exam  Constitutional:       Appearance: She is obese.   HENT:      Head: Atraumatic.      Right Ear: Tympanic membrane, ear canal and external ear normal.      Left Ear: Tympanic membrane, ear canal and external ear normal.      Nose: Congestion present. No rhinorrhea.      Mouth/Throat:      Mouth: Mucous membranes are moist.      Pharynx: Oropharynx is clear. No oropharyngeal exudate or posterior oropharyngeal erythema.   Eyes:      General: No scleral icterus.     Comments: Bilateral conjunctivitis   Cardiovascular:      Rate and Rhythm: Normal rate and regular rhythm.      Pulses: Normal pulses.      Heart sounds: Normal heart sounds. No murmur heard.    No gallop.   Pulmonary:      Effort: Pulmonary effort is normal. No respiratory distress.      Breath sounds: Normal breath sounds. No  wheezing or rales.   Abdominal:      General: Bowel sounds are normal.      Palpations: Abdomen is soft.      Tenderness: There is no abdominal tenderness.      Comments: No hepatosplenomegaly   Musculoskeletal:         General: No swelling or tenderness.      Cervical back: Neck supple. No tenderness.      Right lower leg: No edema.      Left lower leg: No edema.   Lymphadenopathy:      Cervical: No cervical adenopathy.   Neurological:      General: No focal deficit present.      Mental Status: She is alert.   Psychiatric:         Mood and Affect: Mood normal.      POCT testing   COVID:  Negative    Assessment/plan:  URI:  The patient's symptoms appear to be viral and given the history of her children's illness 1 could expect that she should recover next 24-48 hours.  The presence of conjunctivitis though mild would also speak to a viral illness.  Treatment at this time would be symptomatic and does not require any antibiotics.  Plan:  Explanation of the above  Please see AVS for symptomatic treatment which includes Zyrtec at bedtime, saline nasal spray, warm Grant's baby shampoo washes of the eyelids and no use of contacts.  For sore throat and cough I would recommend Cepacol lozenges and if the cough gets worse a tbsp of honey every 3 hours.  For fever arthritis Tylenol 1-2 every 8 hours

## 2023-05-14 ENCOUNTER — OFFICE VISIT (OUTPATIENT)
Dept: URGENT CARE | Facility: CLINIC | Age: 38
End: 2023-05-14
Payer: COMMERCIAL

## 2023-05-14 VITALS
RESPIRATION RATE: 16 BRPM | WEIGHT: 220 LBS | HEART RATE: 82 BPM | BODY MASS INDEX: 37.76 KG/M2 | DIASTOLIC BLOOD PRESSURE: 92 MMHG | TEMPERATURE: 98 F | SYSTOLIC BLOOD PRESSURE: 140 MMHG | OXYGEN SATURATION: 98 %

## 2023-05-14 DIAGNOSIS — J02.9 SORE THROAT: Primary | ICD-10-CM

## 2023-05-14 LAB
CTP QC/QA: YES
MOLECULAR STREP A: NEGATIVE
POC MOLECULAR INFLUENZA A AGN: NEGATIVE
POC MOLECULAR INFLUENZA B AGN: NEGATIVE
SARS-COV-2 AG RESP QL IA.RAPID: NEGATIVE

## 2023-05-14 PROCEDURE — 87502 INFLUENZA DNA AMP PROBE: CPT | Mod: QW,S$GLB,, | Performed by: FAMILY MEDICINE

## 2023-05-14 PROCEDURE — 87651 POCT STREP A MOLECULAR: ICD-10-PCS | Mod: QW,S$GLB,, | Performed by: FAMILY MEDICINE

## 2023-05-14 PROCEDURE — 87811 SARS-COV-2 COVID19 W/OPTIC: CPT | Mod: QW,S$GLB,, | Performed by: FAMILY MEDICINE

## 2023-05-14 PROCEDURE — 87502 POCT INFLUENZA A/B MOLECULAR: ICD-10-PCS | Mod: QW,S$GLB,, | Performed by: FAMILY MEDICINE

## 2023-05-14 PROCEDURE — 87651 STREP A DNA AMP PROBE: CPT | Mod: QW,S$GLB,, | Performed by: FAMILY MEDICINE

## 2023-05-14 PROCEDURE — 99213 PR OFFICE/OUTPT VISIT, EST, LEVL III, 20-29 MIN: ICD-10-PCS | Mod: S$GLB,,, | Performed by: FAMILY MEDICINE

## 2023-05-14 PROCEDURE — 87811 SARS CORONAVIRUS 2 ANTIGEN POCT, MANUAL READ: ICD-10-PCS | Mod: QW,S$GLB,, | Performed by: FAMILY MEDICINE

## 2023-05-14 PROCEDURE — 99213 OFFICE O/P EST LOW 20 MIN: CPT | Mod: S$GLB,,, | Performed by: FAMILY MEDICINE

## 2023-05-14 NOTE — PROGRESS NOTES
"Subjective:      Patient ID: Bushra Kahn is a 38 y.o. female.    Vitals:  weight is 99.8 kg (220 lb). Her oral temperature is 98.4 °F (36.9 °C). Her blood pressure is 140/92 (abnormal) and her pulse is 82. Her respiration is 16 and oxygen saturation is 98%.     Chief Complaint: Sore Throat (Entered by patient)    This is a 38 y.o. female who presents today with a chief complaint of fever (101.9 highest) x 4 days. Pt developed sore throat, nasal congestion, body aches, "terrible" H/A and eye pressure.  No cough, no runny nose    Seen by doctor recently and neg for COVID. Exposed to Strep    PMH: adenoidectomy, gall bladder removed; joint px    Sore Throat   The current episode started in the past 7 days. The problem has been gradually worsening. The maximum temperature recorded prior to her arrival was 101 - 101.9 F. Associated symptoms include congestion, ear pain and headaches. Pertinent negatives include no coughing. She has had exposure to strep.     HENT:  Positive for ear pain, congestion and sore throat.    Respiratory:  Negative for cough.    Neurological:  Positive for headaches.    Objective:     Physical Exam   Constitutional: She does not appear ill. No distress. obesity  HENT:   Head: Normocephalic and atraumatic.   Ears:   Right Ear: Tympanic membrane and ear canal normal.   Left Ear: Tympanic membrane and ear canal normal.   Nose: Congestion present.   Mouth/Throat: Mucous membranes are moist. Posterior oropharyngeal erythema (mild) present.   Eyes: Pupils are equal, round, and reactive to light. Extraocular movement intact   Neck: Neck supple.   Cardiovascular: Normal rate, regular rhythm, normal heart sounds and normal pulses.   Pulmonary/Chest: Effort normal and breath sounds normal.   Abdominal: Normal appearance. Soft.   Neurological: She is alert.   Nursing note and vitals reviewed.  Results for orders placed or performed in visit on 05/14/23   POCT Influenza A/B MOLECULAR   Result " Value Ref Range    POC Molecular Influenza A Ag Negative Negative, Not Reported    POC Molecular Influenza B Ag Negative Negative, Not Reported     Acceptable Yes    POCT Strep A, Molecular   Result Value Ref Range    Molecular Strep A, POC Negative Negative     Acceptable Yes    SARS Coronavirus 2 Antigen, POCT Manual Read   Result Value Ref Range    SARS Coronavirus 2 Antigen Negative Negative     Acceptable Yes       Assessment:     1. Sore throat        Plan:       Sore throat  -     POCT Influenza A/B MOLECULAR  -     POCT Strep A, Molecular  -     SARS Coronavirus 2 Antigen, POCT Manual Read    OTC analgesia

## 2023-05-17 ENCOUNTER — TELEPHONE (OUTPATIENT)
Dept: URGENT CARE | Facility: CLINIC | Age: 38
End: 2023-05-17
Payer: COMMERCIAL

## 2023-05-18 ENCOUNTER — PATIENT MESSAGE (OUTPATIENT)
Dept: INTERNAL MEDICINE | Facility: CLINIC | Age: 38
End: 2023-05-18
Payer: COMMERCIAL

## 2023-05-19 ENCOUNTER — PATIENT MESSAGE (OUTPATIENT)
Dept: INTERNAL MEDICINE | Facility: CLINIC | Age: 38
End: 2023-05-19
Payer: COMMERCIAL

## 2023-06-02 ENCOUNTER — OFFICE VISIT (OUTPATIENT)
Dept: INTERNAL MEDICINE | Facility: CLINIC | Age: 38
End: 2023-06-02
Payer: COMMERCIAL

## 2023-06-02 VITALS
OXYGEN SATURATION: 98 % | DIASTOLIC BLOOD PRESSURE: 86 MMHG | WEIGHT: 224.88 LBS | HEART RATE: 64 BPM | SYSTOLIC BLOOD PRESSURE: 122 MMHG | BODY MASS INDEX: 38.39 KG/M2 | HEIGHT: 64 IN | RESPIRATION RATE: 16 BRPM

## 2023-06-02 DIAGNOSIS — M54.2 NECK PAIN: ICD-10-CM

## 2023-06-02 PROCEDURE — 3008F PR BODY MASS INDEX (BMI) DOCUMENTED: ICD-10-PCS | Mod: CPTII,S$GLB,, | Performed by: STUDENT IN AN ORGANIZED HEALTH CARE EDUCATION/TRAINING PROGRAM

## 2023-06-02 PROCEDURE — 3074F SYST BP LT 130 MM HG: CPT | Mod: CPTII,S$GLB,, | Performed by: STUDENT IN AN ORGANIZED HEALTH CARE EDUCATION/TRAINING PROGRAM

## 2023-06-02 PROCEDURE — 99999 PR PBB SHADOW E&M-EST. PATIENT-LVL III: ICD-10-PCS | Mod: PBBFAC,,, | Performed by: STUDENT IN AN ORGANIZED HEALTH CARE EDUCATION/TRAINING PROGRAM

## 2023-06-02 PROCEDURE — 99999 PR PBB SHADOW E&M-EST. PATIENT-LVL III: CPT | Mod: PBBFAC,,, | Performed by: STUDENT IN AN ORGANIZED HEALTH CARE EDUCATION/TRAINING PROGRAM

## 2023-06-02 PROCEDURE — 1159F MED LIST DOCD IN RCRD: CPT | Mod: CPTII,S$GLB,, | Performed by: STUDENT IN AN ORGANIZED HEALTH CARE EDUCATION/TRAINING PROGRAM

## 2023-06-02 PROCEDURE — 99213 OFFICE O/P EST LOW 20 MIN: CPT | Mod: S$GLB,,, | Performed by: STUDENT IN AN ORGANIZED HEALTH CARE EDUCATION/TRAINING PROGRAM

## 2023-06-02 PROCEDURE — 1159F PR MEDICATION LIST DOCUMENTED IN MEDICAL RECORD: ICD-10-PCS | Mod: CPTII,S$GLB,, | Performed by: STUDENT IN AN ORGANIZED HEALTH CARE EDUCATION/TRAINING PROGRAM

## 2023-06-02 PROCEDURE — 3079F PR MOST RECENT DIASTOLIC BLOOD PRESSURE 80-89 MM HG: ICD-10-PCS | Mod: CPTII,S$GLB,, | Performed by: STUDENT IN AN ORGANIZED HEALTH CARE EDUCATION/TRAINING PROGRAM

## 2023-06-02 PROCEDURE — 3079F DIAST BP 80-89 MM HG: CPT | Mod: CPTII,S$GLB,, | Performed by: STUDENT IN AN ORGANIZED HEALTH CARE EDUCATION/TRAINING PROGRAM

## 2023-06-02 PROCEDURE — 3008F BODY MASS INDEX DOCD: CPT | Mod: CPTII,S$GLB,, | Performed by: STUDENT IN AN ORGANIZED HEALTH CARE EDUCATION/TRAINING PROGRAM

## 2023-06-02 PROCEDURE — 3044F PR MOST RECENT HEMOGLOBIN A1C LEVEL <7.0%: ICD-10-PCS | Mod: CPTII,S$GLB,, | Performed by: STUDENT IN AN ORGANIZED HEALTH CARE EDUCATION/TRAINING PROGRAM

## 2023-06-02 PROCEDURE — 99213 PR OFFICE/OUTPT VISIT, EST, LEVL III, 20-29 MIN: ICD-10-PCS | Mod: S$GLB,,, | Performed by: STUDENT IN AN ORGANIZED HEALTH CARE EDUCATION/TRAINING PROGRAM

## 2023-06-02 PROCEDURE — 3044F HG A1C LEVEL LT 7.0%: CPT | Mod: CPTII,S$GLB,, | Performed by: STUDENT IN AN ORGANIZED HEALTH CARE EDUCATION/TRAINING PROGRAM

## 2023-06-02 PROCEDURE — 3074F PR MOST RECENT SYSTOLIC BLOOD PRESSURE < 130 MM HG: ICD-10-PCS | Mod: CPTII,S$GLB,, | Performed by: STUDENT IN AN ORGANIZED HEALTH CARE EDUCATION/TRAINING PROGRAM

## 2023-06-02 RX ORDER — MELOXICAM 15 MG/1
15 TABLET ORAL DAILY PRN
Qty: 30 TABLET | Refills: 0 | Status: SHIPPED | OUTPATIENT
Start: 2023-06-02 | End: 2023-07-02

## 2023-06-02 NOTE — ASSESSMENT & PLAN NOTE
Patient with neck pain. Suspect musculoskeletal from muscle tightness and inflammation. She has large breasts, carries her kids frequently which can contribute. Showed her neck exercises to do. Asked pt to apply ice 4 times a day, 10-15 mins each time. Will prescribe meloxicam but told her to only take after eating.

## 2023-06-02 NOTE — PROGRESS NOTES
Subjective     Patient ID: Bushra Kahn is a 38 y.o. female.    Chief Complaint: Back Pain and Cervical Spine Pain (C-spine)    Back Pain  Associated symptoms include headaches. Pertinent negatives include no chest pain, dysuria, fever, numbness or weakness.   Patient is a 39 yo female here for cervical spine pain. She first noticed it after may 8th weekend. She did carry her kids more that weekend. They are 2 yr old and a 7 month old. She tried ice, heat. Ice helped a little but she only did it once. She tried advil and tylenol in the beginning bu that did not help.she took meloxicam last night and Tuesday night which helped. No trauma. Denies numbness/weakness or tingling in her upper extremities. She does have large breasts.   Review of Systems   Constitutional:  Negative for chills and fever.   HENT:  Positive for rhinorrhea. Negative for sore throat.    Respiratory:  Positive for cough. Negative for chest tightness and shortness of breath.    Cardiovascular:  Negative for chest pain.   Gastrointestinal:  Negative for blood in stool, constipation and diarrhea.   Genitourinary:  Negative for dysuria and hematuria.   Musculoskeletal:  Positive for back pain and neck pain.   Neurological:  Positive for headaches. Negative for dizziness, weakness, light-headedness and numbness.        Objective     Physical Exam  Chaperone present: large breasts noted.   Constitutional:       Appearance: Normal appearance.   Eyes:      Conjunctiva/sclera: Conjunctivae normal.   Cardiovascular:      Rate and Rhythm: Normal rate and regular rhythm.   Pulmonary:      Effort: Pulmonary effort is normal. No respiratory distress.   Musculoskeletal:      Comments: Tightness noted over the lower posterior neck muscles.    Neurological:      Mental Status: She is alert and oriented to person, place, and time.   Psychiatric:         Mood and Affect: Mood normal.         Behavior: Behavior normal.          Assessment and Plan     1.  Neck pain  Assessment & Plan:  Patient with neck pain. Suspect musculoskeletal from muscle tightness and inflammation. She has large breasts, carries her kids frequently which can contribute. Showed her neck exercises to do. Asked pt to apply ice 4 times a day, 10-15 mins each time. Will prescribe meloxicam but told her to only take after eating.       Other orders  -     meloxicam (MOBIC) 15 MG tablet; Take 1 tablet (15 mg total) by mouth daily as needed for Pain.  Dispense: 30 tablet; Refill: 0

## 2023-06-16 ENCOUNTER — TELEPHONE (OUTPATIENT)
Dept: HEPATOLOGY | Facility: CLINIC | Age: 38
End: 2023-06-16
Payer: COMMERCIAL

## 2023-06-29 ENCOUNTER — PATIENT MESSAGE (OUTPATIENT)
Dept: INTERNAL MEDICINE | Facility: CLINIC | Age: 38
End: 2023-06-29
Payer: COMMERCIAL

## 2023-07-07 ENCOUNTER — TELEPHONE (OUTPATIENT)
Dept: INTERNAL MEDICINE | Facility: CLINIC | Age: 38
End: 2023-07-07
Payer: COMMERCIAL

## 2023-07-07 ENCOUNTER — PATIENT MESSAGE (OUTPATIENT)
Dept: INTERNAL MEDICINE | Facility: CLINIC | Age: 38
End: 2023-07-07
Payer: COMMERCIAL

## 2023-07-07 RX ORDER — AMLODIPINE BESYLATE 5 MG/1
5 TABLET ORAL DAILY
Qty: 90 TABLET | Refills: 1 | Status: SHIPPED | OUTPATIENT
Start: 2023-07-07 | End: 2024-01-09 | Stop reason: SDUPTHER

## 2023-07-13 ENCOUNTER — OFFICE VISIT (OUTPATIENT)
Dept: PRIMARY CARE CLINIC | Facility: CLINIC | Age: 38
End: 2023-07-13
Payer: COMMERCIAL

## 2023-07-13 VITALS
HEART RATE: 82 BPM | DIASTOLIC BLOOD PRESSURE: 86 MMHG | TEMPERATURE: 98 F | BODY MASS INDEX: 37.35 KG/M2 | WEIGHT: 217.63 LBS | OXYGEN SATURATION: 98 % | SYSTOLIC BLOOD PRESSURE: 124 MMHG

## 2023-07-13 DIAGNOSIS — H91.8X3 ASYMMETRICAL HEARING LOSS: ICD-10-CM

## 2023-07-13 DIAGNOSIS — R42 DIZZINESS: Primary | ICD-10-CM

## 2023-07-13 PROCEDURE — 99499 NO LOS: ICD-10-PCS | Mod: S$GLB,,, | Performed by: INTERNAL MEDICINE

## 2023-07-13 PROCEDURE — 99999 PR PBB SHADOW E&M-EST. PATIENT-LVL III: ICD-10-PCS | Mod: PBBFAC,,, | Performed by: INTERNAL MEDICINE

## 2023-07-13 PROCEDURE — 99499 UNLISTED E&M SERVICE: CPT | Mod: S$GLB,,, | Performed by: INTERNAL MEDICINE

## 2023-07-13 PROCEDURE — 99999 PR PBB SHADOW E&M-EST. PATIENT-LVL III: CPT | Mod: PBBFAC,,, | Performed by: INTERNAL MEDICINE

## 2023-07-13 RX ORDER — MECLIZINE HCL 12.5 MG 12.5 MG/1
12.5 TABLET ORAL 3 TIMES DAILY PRN
Qty: 45 TABLET | Refills: 0 | Status: SHIPPED | OUTPATIENT
Start: 2023-07-13 | End: 2023-09-21

## 2023-07-13 NOTE — PATIENT INSTRUCTIONS
Thank you for coming to visit today.  Your history suggests that this is dizziness sometimes called vestibular neuronitis or positional vertigo though the room does not spin and there is no true vertigo.  The only finding was that there was a hearing loss on the left side and 1 of the tuning fork tests localized when placed on top of your head to the left.  You may want to have this checked out by ENT if you notice this or come back here  Treatment for this condition is nonspecific but usually involves an over-the-counter medication called meclizine which is also available by prescription.  I recommend you get the 12.5 mg tablet and take 1 in the morning and take 2 at night.    Please stay well hydrated.  If the symptoms persist please let us know and we will try to facilitate an appointment ears nose and throat.

## 2023-07-13 NOTE — PROGRESS NOTES
38-year-old woman with a history of hypertension, acid reflux, and a positive BK here with a chief complaint of dizziness.    History of present illness and pertinent review of systems:  The patient has a 3 day history of dizziness.  She notes that her gait was affected yesterday.  She has a history of motion sickness.  There is a prior history of migraine but these are rare.  She currently notes nausea without vomiting.  She would a recent URI which resolved one-week or so ago.  She is had no tinnitus, hearing loss, sense of a muffling of her ears, or popping of her ears.  There is no earache.  She has no nasal congestion or nasal drip.  There is no sore throat, cough, diarrhea, or other associated symptoms.  The patient notes that the symptoms increase with movement of her head.  Symptoms also increase with change in position.  There is no neck stiffness.  She is tried no OTC meds.  Dizzy last lasts 1-2 hours.  She states she is keeping well hydrated.  She does take her amlodipine at night, however the symptoms may occur anytime during the day.  There is no history of head trauma    Problem list, medication list, and allergies have been reviewed.  The patient had a reaction to a topical anti acne medication benzos salicylate which is her only true allergy.  She is had side effects from cephalosporins, nifedipine, and Sudafed.    Review of systems is unremarkable.  The patient states her acid reflux is well-controlled.  The patient does note that she had palpitations for very short time in the last several days but has no other cardiovascular symptoms including that of angina, heart failure, or stroke.  The patient takes her blood pressure medication regularly    Physical Exam  Vitals reviewed.   Constitutional:       General: She is not in acute distress.     Appearance: She is obese. She is not ill-appearing.   HENT:      Head: Atraumatic.      Comments: No orbital bruit     Right Ear: Tympanic membrane and ear  canal normal.      Left Ear: Tympanic membrane and ear canal normal.      Ears:      Comments: Pritchard localizes to the left.  Yen is normal.  Patient has a slight hearing loss on the left based on a tuning fork test of comparison.     Nose: Nose normal. No congestion or rhinorrhea.      Mouth/Throat:      Mouth: Mucous membranes are moist.      Pharynx: Oropharynx is clear. No oropharyngeal exudate or posterior oropharyngeal erythema.   Eyes:      General: No scleral icterus.     Extraocular Movements: Extraocular movements intact.      Conjunctiva/sclera: Conjunctivae normal.      Pupils: Pupils are equal, round, and reactive to light.      Comments: Patient has no nystagmus.  Fundi are benign without exudate hemorrhage or scar.  The light shining on the patient's retina did create a sense of dizziness.   Neck:      Vascular: No carotid bruit.   Cardiovascular:      Rate and Rhythm: Normal rate and regular rhythm.      Pulses: Normal pulses.      Heart sounds: Normal heart sounds. No murmur heard.    No gallop.   Pulmonary:      Effort: Pulmonary effort is normal. No respiratory distress.      Breath sounds: No wheezing, rhonchi or rales.   Abdominal:      General: Bowel sounds are normal.      Palpations: Abdomen is soft.      Tenderness: There is no abdominal tenderness.      Comments: No hepatosplenomegaly   Musculoskeletal:         General: No swelling or tenderness.      Cervical back: Neck supple. No tenderness.      Right lower leg: No edema.      Left lower leg: No edema.   Lymphadenopathy:      Cervical: Cervical adenopathy present.   Skin:     Coloration: Skin is not jaundiced.      Findings: No bruising or rash.   Neurological:      General: No focal deficit present.      Mental Status: She is alert.      Motor: No weakness.      Coordination: Coordination normal.      Gait: Gait normal.      Deep Tendon Reflexes: Reflexes normal.   Psychiatric:         Mood and Affect: Mood normal.         Behavior:  Behavior normal.      Assessment/plan:  Dizziness:  The patient had a URI which predates but is in close proximity to the onset of dizziness.  This likely is a vestibular.  There are no focal findings such as nystagmus.  Associated with this was photophobia which could suggest a migraine phenomena.  The patient does not have any significant history of migraine. There is asymmetric hearing loss with decreased hearing on the left as demonstrated by comparison with the tuning fork as well as a positive Pritchard test which localizes to the left ear.  Plan:  Explanation of the above   Meclizine 12.5 mg 1 tablet in the morning or during the day and 2 tablets at bedtime as needed.  If the patient's symptoms persist she should either return to myself or see ENT.  Patient should remain well hydrated     Asymmetric hearing loss:  The etiology is not certain.  There was no cerumen blocking the TM.  I did not see any significant serous fluid on that side.  It is unclear if this is related to the above.  Plan:  This should be rechecked in the near future and the patient should either get a formal hearing test and see ENT or she can come here and I will repeat the testing.  The patient may also see her PCP

## 2023-07-17 ENCOUNTER — LAB VISIT (OUTPATIENT)
Dept: LAB | Facility: HOSPITAL | Age: 38
End: 2023-07-17
Payer: COMMERCIAL

## 2023-07-17 DIAGNOSIS — K76.0 FATTY LIVER: ICD-10-CM

## 2023-07-17 DIAGNOSIS — R74.8 ELEVATED LIVER ENZYMES: ICD-10-CM

## 2023-07-17 LAB
ALBUMIN SERPL BCP-MCNC: 4 G/DL (ref 3.5–5.2)
ALP SERPL-CCNC: 62 U/L (ref 55–135)
ALT SERPL W/O P-5'-P-CCNC: 43 U/L (ref 10–44)
AST SERPL-CCNC: 35 U/L (ref 10–40)
BILIRUB DIRECT SERPL-MCNC: 0.2 MG/DL (ref 0.1–0.3)
BILIRUB SERPL-MCNC: 0.6 MG/DL (ref 0.1–1)
IGG SERPL-MCNC: 981 MG/DL (ref 650–1600)
PROT SERPL-MCNC: 6.6 G/DL (ref 6–8.4)

## 2023-07-17 PROCEDURE — 86039 ANTINUCLEAR ANTIBODIES (ANA): CPT | Performed by: NURSE PRACTITIONER

## 2023-07-17 PROCEDURE — 36415 COLL VENOUS BLD VENIPUNCTURE: CPT | Performed by: NURSE PRACTITIONER

## 2023-07-17 PROCEDURE — 86235 NUCLEAR ANTIGEN ANTIBODY: CPT | Mod: 59 | Performed by: NURSE PRACTITIONER

## 2023-07-17 PROCEDURE — 80076 HEPATIC FUNCTION PANEL: CPT | Performed by: NURSE PRACTITIONER

## 2023-07-17 PROCEDURE — 86015 ACTIN ANTIBODY EACH: CPT | Performed by: NURSE PRACTITIONER

## 2023-07-17 PROCEDURE — 82784 ASSAY IGA/IGD/IGG/IGM EACH: CPT | Performed by: NURSE PRACTITIONER

## 2023-07-17 PROCEDURE — 86038 ANTINUCLEAR ANTIBODIES: CPT | Performed by: NURSE PRACTITIONER

## 2023-07-18 ENCOUNTER — HOSPITAL ENCOUNTER (OUTPATIENT)
Dept: RADIOLOGY | Facility: HOSPITAL | Age: 38
Discharge: HOME | End: 2023-07-18
Attending: NURSE PRACTITIONER
Payer: COMMERCIAL

## 2023-07-18 DIAGNOSIS — K76.0 FATTY LIVER: ICD-10-CM

## 2023-07-18 DIAGNOSIS — R74.8 ELEVATED LIVER ENZYMES: ICD-10-CM

## 2023-07-18 LAB
ANA PATTERN 1: NORMAL
ANA PATTERN 2: NORMAL
ANA SER QL IF: POSITIVE
ANA TITER 2: NORMAL
ANA TITR SER IF: NORMAL {TITER}

## 2023-07-18 PROCEDURE — 76391 MR ELASTOGRAPHY: CPT | Mod: 26,,, | Performed by: RADIOLOGY

## 2023-07-18 PROCEDURE — 76391 MR ELASTOGRAPHY: ICD-10-PCS | Mod: 26,,, | Performed by: RADIOLOGY

## 2023-07-18 PROCEDURE — 76391 MR ELASTOGRAPHY: CPT | Mod: TC

## 2023-07-19 LAB — SMOOTH MUSCLE AB TITR SER IF: NORMAL {TITER}

## 2023-07-20 LAB
ANTI SM ANTIBODY: 0.16 RATIO (ref 0–0.99)
ANTI SM/RNP ANTIBODY: 0.1 RATIO (ref 0–0.99)
ANTI-SM INTERPRETATION: NEGATIVE
ANTI-SM/RNP INTERPRETATION: NEGATIVE
ANTI-SSA ANTIBODY: 0.1 RATIO (ref 0–0.99)
ANTI-SSA INTERPRETATION: NEGATIVE
ANTI-SSB ANTIBODY: 0.06 RATIO (ref 0–0.99)
ANTI-SSB INTERPRETATION: NEGATIVE
DSDNA AB SER-ACNC: NORMAL [IU]/ML

## 2023-07-26 ENCOUNTER — PATIENT MESSAGE (OUTPATIENT)
Dept: INTERNAL MEDICINE | Facility: CLINIC | Age: 38
End: 2023-07-26
Payer: COMMERCIAL

## 2023-07-27 ENCOUNTER — OFFICE VISIT (OUTPATIENT)
Dept: OPTOMETRY | Facility: CLINIC | Age: 38
End: 2023-07-27
Payer: COMMERCIAL

## 2023-07-27 DIAGNOSIS — Z97.3 WEARS CONTACT LENSES: ICD-10-CM

## 2023-07-27 DIAGNOSIS — Z46.0 FITTING AND ADJUSTMENT OF SPECTACLES AND CONTACT LENSES: Primary | ICD-10-CM

## 2023-07-27 DIAGNOSIS — H52.13 MYOPIA OF BOTH EYES: Primary | ICD-10-CM

## 2023-07-27 PROCEDURE — 99999 PR PBB SHADOW E&M-EST. PATIENT-LVL II: CPT | Mod: PBBFAC,,, | Performed by: OPTOMETRIST

## 2023-07-27 PROCEDURE — 92014 COMPRE OPH EXAM EST PT 1/>: CPT | Mod: S$GLB,,, | Performed by: OPTOMETRIST

## 2023-07-27 PROCEDURE — 92310 CONTACT LENS FITTING OU: CPT | Mod: S$GLB,,, | Performed by: OPTOMETRIST

## 2023-07-27 PROCEDURE — 92015 PR REFRACTION: ICD-10-PCS | Mod: S$GLB,,, | Performed by: OPTOMETRIST

## 2023-07-27 PROCEDURE — 92310 PR CONTACT LENS FITTING (NO CHANGE): ICD-10-PCS | Mod: S$GLB,,, | Performed by: OPTOMETRIST

## 2023-07-27 PROCEDURE — 92015 DETERMINE REFRACTIVE STATE: CPT | Mod: S$GLB,,, | Performed by: OPTOMETRIST

## 2023-07-27 PROCEDURE — 99999 PR PBB SHADOW E&M-EST. PATIENT-LVL II: ICD-10-PCS | Mod: PBBFAC,,, | Performed by: OPTOMETRIST

## 2023-07-27 PROCEDURE — 92014 PR EYE EXAM, EST PATIENT,COMPREHESV: ICD-10-PCS | Mod: S$GLB,,, | Performed by: OPTOMETRIST

## 2023-07-27 NOTE — PROGRESS NOTES
HPI    39 Y/o female is here for routine eye exam with no C/o about ocular health     Pt denies pain and discomfort   No f/f    Eye med: saline OU BID PRN   Last edited by Librado Domínguez MA on 7/27/2023 10:39 AM.            Assessment /Plan     For exam results, see Encounter Report.    Myopia of both eyes    Wears contact lenses      Good fit w OASYS CLs, but slightly over-minussed    PLAN:    Wrote new spex/CLRx  Continue Daily Wear schedule.  NO SLEEPING IN CONTACT LENSES. Clean and disinfect nightly.  exchange monthly.     Pt is a new mom--discussed OASYS 1-DAY for convenience, but she is worried about cost.  She will check prices and if wishes to try will message me.  Otherwise rtc 1 yr

## 2023-07-28 ENCOUNTER — PATIENT MESSAGE (OUTPATIENT)
Dept: INTERNAL MEDICINE | Facility: CLINIC | Age: 38
End: 2023-07-28
Payer: COMMERCIAL

## 2023-08-03 ENCOUNTER — OFFICE VISIT (OUTPATIENT)
Dept: HEPATOLOGY | Facility: CLINIC | Age: 38
End: 2023-08-03
Payer: COMMERCIAL

## 2023-08-03 ENCOUNTER — PATIENT MESSAGE (OUTPATIENT)
Dept: HEPATOLOGY | Facility: CLINIC | Age: 38
End: 2023-08-03

## 2023-08-03 VITALS — RESPIRATION RATE: 18 BRPM | HEIGHT: 64 IN | BODY MASS INDEX: 37.68 KG/M2 | WEIGHT: 220.69 LBS

## 2023-08-03 DIAGNOSIS — R76.8 POSITIVE ANA (ANTINUCLEAR ANTIBODY): ICD-10-CM

## 2023-08-03 DIAGNOSIS — K76.0 FATTY LIVER: Primary | ICD-10-CM

## 2023-08-03 DIAGNOSIS — I10 PRIMARY HYPERTENSION: ICD-10-CM

## 2023-08-03 DIAGNOSIS — E66.9 OBESITY (BMI 30-39.9): ICD-10-CM

## 2023-08-03 DIAGNOSIS — R16.1 SPLENOMEGALY: ICD-10-CM

## 2023-08-03 PROBLEM — R74.8 ELEVATED LIVER ENZYMES: Status: RESOLVED | Noted: 2021-10-15 | Resolved: 2023-08-03

## 2023-08-03 PROCEDURE — 99214 OFFICE O/P EST MOD 30 MIN: CPT | Mod: S$GLB,,, | Performed by: NURSE PRACTITIONER

## 2023-08-03 PROCEDURE — 3008F BODY MASS INDEX DOCD: CPT | Mod: CPTII,S$GLB,, | Performed by: NURSE PRACTITIONER

## 2023-08-03 PROCEDURE — 1160F PR REVIEW ALL MEDS BY PRESCRIBER/CLIN PHARMACIST DOCUMENTED: ICD-10-PCS | Mod: CPTII,S$GLB,, | Performed by: NURSE PRACTITIONER

## 2023-08-03 PROCEDURE — 99999 PR PBB SHADOW E&M-EST. PATIENT-LVL III: ICD-10-PCS | Mod: PBBFAC,,, | Performed by: NURSE PRACTITIONER

## 2023-08-03 PROCEDURE — 3044F HG A1C LEVEL LT 7.0%: CPT | Mod: CPTII,S$GLB,, | Performed by: NURSE PRACTITIONER

## 2023-08-03 PROCEDURE — 99999 PR PBB SHADOW E&M-EST. PATIENT-LVL III: CPT | Mod: PBBFAC,,, | Performed by: NURSE PRACTITIONER

## 2023-08-03 PROCEDURE — 3008F PR BODY MASS INDEX (BMI) DOCUMENTED: ICD-10-PCS | Mod: CPTII,S$GLB,, | Performed by: NURSE PRACTITIONER

## 2023-08-03 PROCEDURE — 1160F RVW MEDS BY RX/DR IN RCRD: CPT | Mod: CPTII,S$GLB,, | Performed by: NURSE PRACTITIONER

## 2023-08-03 PROCEDURE — 3044F PR MOST RECENT HEMOGLOBIN A1C LEVEL <7.0%: ICD-10-PCS | Mod: CPTII,S$GLB,, | Performed by: NURSE PRACTITIONER

## 2023-08-03 PROCEDURE — 1159F PR MEDICATION LIST DOCUMENTED IN MEDICAL RECORD: ICD-10-PCS | Mod: CPTII,S$GLB,, | Performed by: NURSE PRACTITIONER

## 2023-08-03 PROCEDURE — 1159F MED LIST DOCD IN RCRD: CPT | Mod: CPTII,S$GLB,, | Performed by: NURSE PRACTITIONER

## 2023-08-03 PROCEDURE — 99214 PR OFFICE/OUTPT VISIT, EST, LEVL IV, 30-39 MIN: ICD-10-PCS | Mod: S$GLB,,, | Performed by: NURSE PRACTITIONER

## 2023-08-03 NOTE — PATIENT INSTRUCTIONS
1. Fibroscan to look for fat or scar tissue in the liver showed mild fatty liver, no scar tissue damage   2.  Labs in 6 months to make sure liver enzymes do not increase significantly, then Follow up in 1 year with MRI and labs a few days before    There is no FDA approved therapy for fatty liver disease. Therefore, these things are important:  Limit alcohol consumption  2 Weight loss goal of 30 lbs, referral for [facility] if interested. Also, if interested in a dietician visit to create a weight loss plan, contact the dietician team at [facility] at [email or web address] to schedule a visit to you can call [facility] in [place]: [phone] and the  will transfer the call to one of the dieticians to schedule an appointment. Or you can also call [phone] to schedule. They do offer video visits   3. Low carb/sugar, high fiber and protein diet.Try to limit your carb intake to LESS than 30-45 grams of carbs with a meal or LESS than 5-10 grams with any snack (total of any snack foods eaten during that time). Use [employer] Pal or Lose It nel to add up your carbs through the day. Do NOT drink any beverages with calories or carbs (this can lead to high blood sugar and weight gain). Also, some of our patients have been very successful with weight loss using the pre made/planned meal planning services that limit calories and portion size ( The main thing to look for is low calorie, high protein, low carb)  4. Exercise, 5 days per week, 30 minutes per day, as tolerated  5. Recommend good cholesterol, blood pressure, blood sugar levels     In some people, fatty liver can progress to steatohepatitis (inflamatory fatty liver) and possibly to cirrhosis, increasing the risk for liver cancer, liver failure, and death. Therefore, the lifestyle changes are very important to decrease this risk.     Website with information about fatty liver and inflammation related to fatty liver  ([place]) = www.nashtruth.com  AND www.NASHactually.com

## 2023-08-03 NOTE — PROGRESS NOTES
[facility] VISIT FOLLOW UP NOTE    PCP: Elsa Ferrari MD     CHIEF COMPLAINT: fatty liver, previously elevated liver enzymes    HPI: This is a 38 y.o. White female with PMH noted below, presenting for follow up of above    Serological workup was negative for Jorge Alberto's, alpha-1 antitrypsin deficiency, hemochromatosis,  and viral hepatitis.   + BK (titer improving), normal ASMA, IgG and AMA - liver enzymes normal so low/no suspicion for AIH at this time     Prior serologic workup:   Lab Results   Component Value Date    SMOOTHMUSCAB Negative 1:40 07/17/2023    AMAIFA Negative 1:40 10/15/2021    IGGSERUM 981 07/17/2023    ANASCREEN Positive (A) 07/17/2023    FERRITIN 34 10/15/2021    FESATURATED 8 (L) 10/15/2021    CERULOPLSM 32.0 10/15/2021    HEPBSAG Negative 03/07/2022    HEPCAB Negative 08/03/2021    HEPAIGM Negative 08/03/2021     Risk factors for fatty liver include obesity, HTN    Liver fibrosis staging:  -- fibroscan 10/2021 F2, S2 (kPA 8.7, )  -- MRI elsto 1/2023 noted F0, mild steatosis   -- MRI elasto 8/2023 noted F0, mild steatosis     Interval HPI: Presents today alone. Liver enzymes normal x2  Has had ~10 lb weight loss since last visit   Previously weight gain of ~80 lbs in ~2021, was pregnant and had a son in March 2021 then daughter 10/2022  Current weight 220 lbs  Trying to follow low carb, recently started Ozempic last week but having some GI issues  Dec COVID 2022  MRI elasto with mild fatty liver but no fibrosis   Splenomegaly remains on imaging     Labs done 7/2023 show normal transaminase levels (previously intermittently elevated 3/2021-3/2022, normalized 6/2022-10/2022, elevated 1/2023  Platelets WNL, alk phos WNL  Synthetic liver functioning WNL    Lab Results   Component Value Date    ALT 43 07/17/2023    AST 35 07/17/2023    ALKPHOS 62 07/17/2023    BILITOT 0.6 07/17/2023    ALBUMIN 4.0 07/17/2023    INR 1.0 01/17/2023     03/03/2023     MRI elasto 7/2023  "noted mild steatosis, no fibrosis. + splenomegaly    Denies family history of liver disease . Minimal Alcohol consumption, see below  Social History     Substance and Sexual Activity   Alcohol Use Not Currently    Comment: couple of servings per year, no daily or heavy alcohol use        + Immunity to Hep A and B        Allergy and medication list reviewed and updated     PMHX:  has a past medical history of Acid reflux, Allergy, Chronic hypertension with superimposed pre-eclampsia, post partum (10/21/2022), History of  (10/10/2022), Pre-eclampsia, severe, postpartum condition (3/29/2021), Severe hypertension affecting pregnancy in third trimester (3/22/2021), and Severe pre-eclampsia in third trimester (3/22/2021).    PSHX:  has a past surgical history that includes Adenoidectomy (age 16); biopsy of breast  (Right, 2014); Cholecystectomy (); Esophagogastroduodenoscopy (N/A, 2018);  section (N/A, 3/24/2021); and  section (N/A, 10/10/2022).    FAMILY HISTORY: Updated and [facility] in [facility]    SOCIAL HISTORY:   Social History     Substance and Sexual Activity   Alcohol Use Not Currently    Comment: couple of servings per year, no daily or heavy alcohol use        Social History     Substance and Sexual Activity   Drug Use No       ROS:   GENERAL: Denies fatigue  CARDIOVASCULAR: Denies edema  GI: Denies abdominal pain  SKIN: Denies rash, itching   NEURO: Denies confusion, memory loss, or mood changes    PHYSICAL EXAM:   Friendly White female, in no acute distress; alert and oriented to person, place and time  VITALS: Resp 18   Ht 5' 4" (1.626 m)   Wt 100.1 kg (220 lb 10.9 oz)   BMI 37.88 kg/m²   EYES: Sclerae anicteric  GI: Soft, non-tender, non-distended. No ascites.  EXTREMITIES:  No edema.  SKIN: Warm and dry. No jaundice. No telangectasias noted. No palmar erythema.  NEURO:  No asterixis.  PSYCH:  Thought and speech pattern appropriate. Behavior normal      EDUCATION:  See " instructions discussed with patient in Instructions section of the After Visit Summary     ASSESSMENT & PLAN:  38 y.o. White female with:  1.  Fatty liver, likely related to  metabolic risk factors   - MRI elasto 7/2023  noted fatty liver, no fibrosis - can repeat yearly   - Synthetic liver function WNL  - risk factors for fatty liver disease include obesity, HTN  -- Immunity to Hep A and B : Will check immunity markers for HBV/HAV  and arrange for vaccination if needed  -- MRI elasto 1/2023 mild steatosis, no fibrosis   -- Recommendations discussed with patient:  Limit alcohol consumption, no more than 1 serving(s) of alcohol in any day (1 serving is 5 ounces of wine, 12 ounces of beer, or 1.5 ounces of liquor) and do NOT recommend any daily alcohol use    2 Weight loss goal of 30 lbs  3. Low carb/sugar, high fiber and protein diet, limit your carb intake to LESS than 30-45 grams of carbs with a meal or LESS than 5-10 grams with any snack   4. Exercise, 5 days per week, 30 minutes per day, as tolerated  5. Recommend good cholesterol, blood pressure, blood sugar levels     2. Previously elevated liver enzymes, + BK  -- normal liver enzymes, low/no suspicion for AIH. Can repeat labs q6 months for now but normal labs reassuring, no biopsy needed at this time    -- followed by rheum for + BK    3. Obesity, HTN   -- Body mass index is 37.88 kg/m².   -- increases risk for fatty liver        Follow up in about 1 year (around 8/3/2024). With labs and MRI elasto before   Orders Placed This Encounter   Procedures    MR Elastography    Hepatic Function Panel          Thank you for allowing me to participate in the care of Bushratono Olmos PATTIE Gonzalez    I spent a total of 30 minutes on the day of the visit.This includes face to face time and non-face to face time preparing to see the patient (eg, review of tests), obtaining and/or reviewing separately obtained history, documenting clinical information in  the electronic or other health record, independently interpreting results and communicating results to the patient/family/caregiver, and coordinating care.         CC'ed note to:

## 2023-08-07 ENCOUNTER — PATIENT MESSAGE (OUTPATIENT)
Dept: INTERNAL MEDICINE | Facility: CLINIC | Age: 38
End: 2023-08-07
Payer: COMMERCIAL

## 2023-08-07 ENCOUNTER — OFFICE VISIT (OUTPATIENT)
Dept: INTERNAL MEDICINE | Facility: CLINIC | Age: 38
End: 2023-08-07
Payer: COMMERCIAL

## 2023-08-07 VITALS
OXYGEN SATURATION: 98 % | SYSTOLIC BLOOD PRESSURE: 142 MMHG | WEIGHT: 218.06 LBS | HEIGHT: 66 IN | BODY MASS INDEX: 35.04 KG/M2 | HEART RATE: 71 BPM | DIASTOLIC BLOOD PRESSURE: 82 MMHG

## 2023-08-07 DIAGNOSIS — M54.9 UPPER BACK PAIN: Primary | ICD-10-CM

## 2023-08-07 PROCEDURE — 3077F SYST BP >= 140 MM HG: CPT | Mod: CPTII,S$GLB,, | Performed by: INTERNAL MEDICINE

## 2023-08-07 PROCEDURE — 1159F PR MEDICATION LIST DOCUMENTED IN MEDICAL RECORD: ICD-10-PCS | Mod: CPTII,S$GLB,, | Performed by: INTERNAL MEDICINE

## 2023-08-07 PROCEDURE — 1159F MED LIST DOCD IN RCRD: CPT | Mod: CPTII,S$GLB,, | Performed by: INTERNAL MEDICINE

## 2023-08-07 PROCEDURE — 3079F PR MOST RECENT DIASTOLIC BLOOD PRESSURE 80-89 MM HG: ICD-10-PCS | Mod: CPTII,S$GLB,, | Performed by: INTERNAL MEDICINE

## 2023-08-07 PROCEDURE — 3077F PR MOST RECENT SYSTOLIC BLOOD PRESSURE >= 140 MM HG: ICD-10-PCS | Mod: CPTII,S$GLB,, | Performed by: INTERNAL MEDICINE

## 2023-08-07 PROCEDURE — 99999 PR PBB SHADOW E&M-EST. PATIENT-LVL III: ICD-10-PCS | Mod: PBBFAC,,, | Performed by: INTERNAL MEDICINE

## 2023-08-07 PROCEDURE — 3044F PR MOST RECENT HEMOGLOBIN A1C LEVEL <7.0%: ICD-10-PCS | Mod: CPTII,S$GLB,, | Performed by: INTERNAL MEDICINE

## 2023-08-07 PROCEDURE — 99999 PR PBB SHADOW E&M-EST. PATIENT-LVL III: CPT | Mod: PBBFAC,,, | Performed by: INTERNAL MEDICINE

## 2023-08-07 PROCEDURE — 3008F BODY MASS INDEX DOCD: CPT | Mod: CPTII,S$GLB,, | Performed by: INTERNAL MEDICINE

## 2023-08-07 PROCEDURE — 3008F PR BODY MASS INDEX (BMI) DOCUMENTED: ICD-10-PCS | Mod: CPTII,S$GLB,, | Performed by: INTERNAL MEDICINE

## 2023-08-07 PROCEDURE — 3079F DIAST BP 80-89 MM HG: CPT | Mod: CPTII,S$GLB,, | Performed by: INTERNAL MEDICINE

## 2023-08-07 PROCEDURE — 99213 OFFICE O/P EST LOW 20 MIN: CPT | Mod: S$GLB,,, | Performed by: INTERNAL MEDICINE

## 2023-08-07 PROCEDURE — 99213 PR OFFICE/OUTPT VISIT, EST, LEVL III, 20-29 MIN: ICD-10-PCS | Mod: S$GLB,,, | Performed by: INTERNAL MEDICINE

## 2023-08-07 PROCEDURE — 3044F HG A1C LEVEL LT 7.0%: CPT | Mod: CPTII,S$GLB,, | Performed by: INTERNAL MEDICINE

## 2023-08-07 RX ORDER — CYCLOBENZAPRINE HCL 5 MG
TABLET ORAL
Qty: 20 TABLET | Refills: 0 | Status: SHIPPED | OUTPATIENT
Start: 2023-08-07 | End: 2023-09-21

## 2023-08-07 NOTE — PROGRESS NOTES
Subjective:       Patient ID: Bushra Kahn is a 38 y.o. female.    Chief Complaint: Follow-up and Back Pain    Follow-up  Pertinent negatives include no abdominal pain, chest pain (arm pain or jaw pain), headaches, nausea or vomiting.   Back Pain  Pertinent negatives include no abdominal pain, chest pain (arm pain or jaw pain), dysuria or headaches.   Pt with several weeks of upper back pain after she held her two little ones for a long period.  No numbness or weakness - no bowel or bladder incontinence.    Review of Systems   Respiratory:  Negative for shortness of breath (PND or orthopnea).    Cardiovascular:  Negative for chest pain (arm pain or jaw pain).   Gastrointestinal:  Negative for abdominal pain, diarrhea, nausea and vomiting.   Genitourinary:  Negative for dysuria.   Musculoskeletal:  Positive for back pain.   Neurological:  Negative for seizures, syncope and headaches.       Objective:      Physical Exam  Constitutional:       General: She is not in acute distress.     Appearance: She is well-developed.   HENT:      Head: Normocephalic.   Eyes:      Pupils: Pupils are equal, round, and reactive to light.   Neck:      Thyroid: No thyromegaly.      Vascular: No JVD.   Cardiovascular:      Rate and Rhythm: Normal rate and regular rhythm.      Heart sounds: Normal heart sounds. No murmur heard.     No friction rub. No gallop.   Pulmonary:      Effort: Pulmonary effort is normal.      Breath sounds: Normal breath sounds. No wheezing or rales.   Abdominal:      General: Bowel sounds are normal. There is no distension.      Palpations: Abdomen is soft. There is no mass.      Tenderness: There is no abdominal tenderness. There is no guarding or rebound.   Musculoskeletal:      Cervical back: Neck supple.      Comments: Neck with FROM - she has paraspinous tenderness in  lower cervical spine and upper thoracic spine.    5/5 strength in BUE, 2+reflexes at biceps and brachioradialis bilaterally    Lymphadenopathy:      Cervical: No cervical adenopathy.   Skin:     General: Skin is warm and dry.   Neurological:      Mental Status: She is alert and oriented to person, place, and time.      Deep Tendon Reflexes: Reflexes are normal and symmetric.   Psychiatric:         Behavior: Behavior normal.         Thought Content: Thought content normal.         Judgment: Judgment normal.         Assessment:       1. Upper back pain        Plan:   Upper back pain  -     X-Ray Thoracic Spine AP Lateral; Future; Expected date: 08/07/2023  -     X-Ray Cervical Spine AP And Lateral; Future; Expected date: 08/07/2023    Other orders  -     cyclobenzaprine (FLEXERIL) 5 MG tablet; One at bedtime as needed for neck pain  Dispense: 20 tablet; Refill: 0

## 2023-08-09 ENCOUNTER — PATIENT OUTREACH (OUTPATIENT)
Dept: ADMINISTRATIVE | Facility: HOSPITAL | Age: 38
End: 2023-08-09
Payer: COMMERCIAL

## 2023-08-09 NOTE — PROGRESS NOTES
Health Maintenance Due   Topic Date Due    Pneumococcal Vaccines (Age 0-64) (1 - PCV) Never done    COVID-19 Vaccine (3 - Pfizer series) 07/17/2021     Triggered LINKS and reconciled immunizations. Updated Care Everywhere. Checked pt's [facility] flow sheet for an updated BP reading; no new BP readings recorded in flow sheet. Chart review completed.

## 2023-08-10 ENCOUNTER — HOSPITAL ENCOUNTER (OUTPATIENT)
Dept: RADIOLOGY | Facility: HOSPITAL | Age: 38
Discharge: HOME | End: 2023-08-10
Attending: INTERNAL MEDICINE
Payer: COMMERCIAL

## 2023-08-10 DIAGNOSIS — M54.9 UPPER BACK PAIN: ICD-10-CM

## 2023-08-10 PROCEDURE — 72040 X-RAY EXAM NECK SPINE 2-3 VW: CPT | Mod: TC

## 2023-08-10 PROCEDURE — 72070 XR THORACIC SPINE AP LATERAL: ICD-10-PCS | Mod: 26,,, | Performed by: INTERNAL MEDICINE

## 2023-08-10 PROCEDURE — 72040 XR CERVICAL SPINE AP LATERAL: ICD-10-PCS | Mod: 26,,, | Performed by: INTERNAL MEDICINE

## 2023-08-10 PROCEDURE — 72040 X-RAY EXAM NECK SPINE 2-3 VW: CPT | Mod: 26,,, | Performed by: INTERNAL MEDICINE

## 2023-08-10 PROCEDURE — 72070 X-RAY EXAM THORAC SPINE 2VWS: CPT | Mod: 26,,, | Performed by: INTERNAL MEDICINE

## 2023-08-10 PROCEDURE — 72070 X-RAY EXAM THORAC SPINE 2VWS: CPT | Mod: TC

## 2023-08-11 ENCOUNTER — PATIENT MESSAGE (OUTPATIENT)
Dept: INTERNAL MEDICINE | Facility: CLINIC | Age: 38
End: 2023-08-11
Payer: COMMERCIAL

## 2023-08-11 ENCOUNTER — TELEPHONE (OUTPATIENT)
Dept: INTERNAL MEDICINE | Facility: CLINIC | Age: 38
End: 2023-08-11

## 2023-08-17 ENCOUNTER — PATIENT MESSAGE (OUTPATIENT)
Dept: ENDOSCOPY | Facility: HOSPITAL | Age: 38
End: 2023-08-17
Payer: COMMERCIAL

## 2023-08-18 ENCOUNTER — TELEPHONE (OUTPATIENT)
Dept: INTERNAL MEDICINE | Facility: CLINIC | Age: 38
End: 2023-08-18
Payer: COMMERCIAL

## 2023-08-18 VITALS — DIASTOLIC BLOOD PRESSURE: 91 MMHG | SYSTOLIC BLOOD PRESSURE: 137 MMHG

## 2023-08-18 DIAGNOSIS — K20.90 ESOPHAGITIS: Primary | ICD-10-CM

## 2023-08-18 NOTE — TELEPHONE ENCOUNTER
BP reading of 137/91 was taken from pt's [facility] flow sheet on 8/18/2023. Pt is being followed by [facility].

## 2023-08-22 ENCOUNTER — PATIENT OUTREACH (OUTPATIENT)
Dept: ADMINISTRATIVE | Facility: HOSPITAL | Age: 38
End: 2023-08-22
Payer: COMMERCIAL

## 2023-08-24 ENCOUNTER — PATIENT MESSAGE (OUTPATIENT)
Dept: INTERNAL MEDICINE | Facility: CLINIC | Age: 38
End: 2023-08-24
Payer: COMMERCIAL

## 2023-08-25 ENCOUNTER — PATIENT OUTREACH (OUTPATIENT)
Dept: ADMINISTRATIVE | Facility: HOSPITAL | Age: 38
End: 2023-08-25
Payer: COMMERCIAL

## 2023-09-04 ENCOUNTER — PATIENT MESSAGE (OUTPATIENT)
Dept: INTERNAL MEDICINE | Facility: CLINIC | Age: 38
End: 2023-09-04
Payer: COMMERCIAL

## 2023-09-05 ENCOUNTER — TELEPHONE (OUTPATIENT)
Dept: INTERNAL MEDICINE | Facility: CLINIC | Age: 38
End: 2023-09-05
Payer: COMMERCIAL

## 2023-09-05 VITALS — DIASTOLIC BLOOD PRESSURE: 91 MMHG | SYSTOLIC BLOOD PRESSURE: 137 MMHG

## 2023-09-05 NOTE — TELEPHONE ENCOUNTER
BP reading of 137/91 was taken from pt's [facility] flow sheet on 9/1/2023. Pt is being followed by [facility].

## 2023-09-07 ENCOUNTER — TELEPHONE (OUTPATIENT)
Dept: INTERNAL MEDICINE | Facility: CLINIC | Age: 38
End: 2023-09-07

## 2023-09-07 DIAGNOSIS — M54.2 NECK PAIN: Primary | ICD-10-CM

## 2023-09-11 ENCOUNTER — ON-DEMAND VIRTUAL (OUTPATIENT)
Dept: URGENT CARE | Facility: CLINIC | Age: 38
End: 2023-09-11
Payer: COMMERCIAL

## 2023-09-11 DIAGNOSIS — H10.022 PINK EYE DISEASE OF LEFT EYE: Primary | ICD-10-CM

## 2023-09-11 PROCEDURE — 99213 OFFICE O/P EST LOW 20 MIN: CPT | Mod: 95,,,

## 2023-09-11 PROCEDURE — 99213 PR OFFICE/OUTPT VISIT, EST, LEVL III, 20-29 MIN: ICD-10-PCS | Mod: 95,,,

## 2023-09-11 RX ORDER — NEOMYCIN SULFATE, POLYMYXIN B SULFATE, AND DEXAMETHASONE 3.5; 10000; 1 MG/G; [USP'U]/G; MG/G
OINTMENT OPHTHALMIC 3 TIMES DAILY
Qty: 3.5 G | Refills: 0 | Status: SHIPPED | OUTPATIENT
Start: 2023-09-11 | End: 2023-09-21

## 2023-09-11 NOTE — PROGRESS NOTES
Subjective:      Patient ID: Bushra Kahn is a 38 y.o. female.    Vitals:  vitals were not taken for this visit.     Chief Complaint: No chief complaint on file.      Visit Type: TELE AUDIOVISUAL    Present with the patient at the time of consultation: TELEMED PRESENT WITH PATIENT: None    Past Medical History:   Diagnosis Date    Acid reflux     Allergy     Chronic hypertension with superimposed pre-eclampsia, post partum 10/21/2022    On chart review patient with essential HTN outside of pregnancy at initial [facility] visit     History of  10/10/2022    Pre-eclampsia, severe, postpartum condition 3/29/2021    Severe hypertension affecting pregnancy in third trimester 3/22/2021    Severe pre-eclampsia in third trimester 3/22/2021     Past Surgical History:   Procedure Laterality Date    ADENOIDECTOMY  age 16    biopsy of breast  Right 2014     SECTION N/A 3/24/2021    Procedure:  SECTION;  Surgeon: Denita Xiong MD;  Location: [facility];  Service: OB/GYN;  Laterality: N/A;     SECTION N/A 10/10/2022    Procedure:  SECTION;  Surgeon: Denita Xiong MD;  Location: [facility];  Service: OB/GYN;  Laterality: N/A;    CHOLECYSTECTOMY      Laparoscopic    ESOPHAGOGASTRODUODENOSCOPY N/A 2018    Procedure: ESOPHAGOGASTRODUODENOSCOPY (EGD);  Surgeon: Aurelio James MD;  Location: [address];  Service: Endoscopy;  Laterality: N/A;     Review of patient's allergies indicates:   Allergen Reactions    Benzyl salicylate Rash     acne    Cefdinir Other (See Comments)     Nausea/vomiting    Nifedipine Other (See Comments)     headache    Sudafed [pseudoephedrine hcl] Other (See Comments)     Emotional lability     Current Outpatient Medications on File Prior to Visit   Medication Sig Dispense Refill    amLODIPine (NORVASC) 5 MG tablet Take 1 tablet (5 mg total) by mouth once daily. 90 tablet 1    cyclobenzaprine (FLEXERIL) 5 MG tablet One at bedtime as needed  for neck pain 20 tablet 0    meclizine (ANTIVERT) 12.5 mg tablet Take 1 tablet (12.5 mg total) by mouth 3 (three) times daily as needed. 45 tablet 0    ondansetron (ZOFRAN-ODT) 4 MG TbDL DISSOLVE UNDER TONGUE 1 tablet (4 mg total) by mouth every 6 to 8 hours as needed. 12 tablet 1    pantoprazole (PROTONIX) 40 MG tablet Take 1 tablet (40 mg total) by mouth once daily. 90 tablet 1     No current facility-administered medications on file prior to visit.     Family History   Problem Relation Age of Onset    Lupus Mother     Kidney disease Mother     Hypertension Father     Sleep apnea Father     Cataracts Paternal Grandmother     Colon cancer Paternal Grandmother     Cataracts Paternal Grandfather     Cancer Paternal Grandfather         Lung cancer- smoker    Breast cancer Paternal Aunt 50    Amblyopia Neg Hx     Blindness Neg Hx     Glaucoma Neg Hx     Macular degeneration Neg Hx     Retinal detachment Neg Hx     Strabismus Neg Hx     Stroke Neg Hx     Ovarian cancer Neg Hx     Diabetes Neg Hx     Melanoma Neg Hx     Psoriasis Neg Hx     Asthma Neg Hx     Allergies Neg Hx     Allergic rhinitis Neg Hx     Angioedema Neg Hx     Eczema Neg Hx     Immunodeficiency Neg Hx     Rhinitis Neg Hx     Urticaria Neg Hx     Atopy Neg Hx        Medications Ordered                [facility]   [address] [address]    Telephone: [phone]   Fax: [phone]   Hours: Mon-Fri 7a-7p, Sat-Sun 10a-4p                         Internal Pharmacy (1 of 1)              neomycin-polymyxin-dexamethasone (DEXACINE) 3.5 mg/g-10,000 unit/g-0.1 % Oint    Sig: Place into the left eye 3 (three) times daily.       Start: 9/11/23     Quantity: 3.5 g Refills: 0                           [facility] Peq Odvv Intake    9/11/2023 10:14 AM CDT - Filed by Patient   Describe your reason for todays visit Eye pain, redness, mud in eye yesterday   What is your current physical address in the event of a medical emergency? [address]  [place]   Are you able to take your vital signs? No   Please attach any relevant images or files          Patient states that since yesterday she began to have eye drainage with redness in her left eye. Patient states that her eye continued to drain throughout the night. Patient states that she might have gotten some mud in her eye yesterday. Patient states that she believes she did get all of the mud out.         Constitution: Negative.   HENT: Negative.     Neck: neck negative.   Cardiovascular: Negative.    Eyes:  Positive for eye discharge and eye redness.   Respiratory: Negative.     Gastrointestinal: Negative.    Endocrine: negative.   Genitourinary: Negative.    Musculoskeletal: Negative.    Skin: Negative.    Allergic/Immunologic: Negative.    Neurological: Negative.    Hematologic/Lymphatic: Negative.    Psychiatric/Behavioral: Negative.          Objective:   The physical exam was conducted virtually.  Physical Exam   Constitutional: She is oriented to person, place, and time.   HENT:   Head: Normocephalic and atraumatic.   Eyes: Left eye exhibits discharge.      Comments: Positive left eye erythema   Neck: Neck supple.   Pulmonary/Chest: Effort normal.   Abdominal: Normal appearance.   Musculoskeletal: Normal range of motion.         General: Normal range of motion.   Neurological: no focal deficit. She is alert, oriented to person, place, and time and at baseline.   Skin: Skin is warm.   Psychiatric: Her behavior is normal. Mood, judgment and thought content normal.       Assessment:     1. Pink eye disease of left eye        Plan:       Pink eye disease of left eye  -     neomycin-polymyxin-dexamethasone (DEXACINE) 3.5 mg/g-10,000 unit/g-0.1 % Oint; Place into the left eye 3 (three) times daily.  Dispense: 3.5 g; Refill: 0

## 2023-09-13 ENCOUNTER — CLINICAL SUPPORT (OUTPATIENT)
Dept: ENDOSCOPY | Facility: HOSPITAL | Age: 38
End: 2023-09-13
Attending: INTERNAL MEDICINE
Payer: COMMERCIAL

## 2023-09-13 ENCOUNTER — TELEPHONE (OUTPATIENT)
Dept: ENDOSCOPY | Facility: HOSPITAL | Age: 38
End: 2023-09-13

## 2023-09-13 VITALS — WEIGHT: 218.06 LBS | HEIGHT: 66 IN | BODY MASS INDEX: 35.04 KG/M2

## 2023-09-13 DIAGNOSIS — K20.90 ESOPHAGITIS: ICD-10-CM

## 2023-09-15 RX ORDER — PANTOPRAZOLE SODIUM 40 MG/1
40 TABLET, DELAYED RELEASE ORAL DAILY
Qty: 90 TABLET | Refills: 3 | Status: CANCELLED | OUTPATIENT
Start: 2023-09-15 | End: 2024-09-14

## 2023-09-15 NOTE — TELEPHONE ENCOUNTER
No care due was identified.  [facility] Embedded Care Due Messages. Reference number: [id number].   9/14/2023 10:38:45 PM CDT

## 2023-09-15 NOTE — TELEPHONE ENCOUNTER
Refill Routing Note   Medication(s) are not appropriate for processing by [facility] for the following reason(s):      Pregnancy Warning: pantoprazole    [facility] action(s):  Defer Care Due:  None identified          Appointments  past 12m or future 3m with PCP    Date Provider   Last Visit   8/7/2023 Elsa Ferrari MD   Next Visit   Visit date not found Elsa Ferrari MD   ED visits in past 90 days: 0        Note composed:2:01 AM 09/15/2023

## 2023-09-18 ENCOUNTER — PATIENT MESSAGE (OUTPATIENT)
Dept: ADMINISTRATIVE | Facility: OTHER | Age: 38
End: 2023-09-18
Payer: COMMERCIAL

## 2023-09-21 ENCOUNTER — OFFICE VISIT (OUTPATIENT)
Dept: PRIMARY CARE CLINIC | Facility: CLINIC | Age: 38
End: 2023-09-21
Payer: COMMERCIAL

## 2023-09-21 VITALS
DIASTOLIC BLOOD PRESSURE: 76 MMHG | RESPIRATION RATE: 18 BRPM | OXYGEN SATURATION: 99 % | TEMPERATURE: 98 F | HEART RATE: 76 BPM | HEIGHT: 66 IN | SYSTOLIC BLOOD PRESSURE: 136 MMHG | BODY MASS INDEX: 32.95 KG/M2 | WEIGHT: 205 LBS

## 2023-09-21 DIAGNOSIS — J06.9 UPPER RESPIRATORY TRACT INFECTION, UNSPECIFIED TYPE: Primary | ICD-10-CM

## 2023-09-21 PROCEDURE — 99499 NO LOS: ICD-10-PCS | Mod: S$GLB,,, | Performed by: INTERNAL MEDICINE

## 2023-09-21 PROCEDURE — 99499 UNLISTED E&M SERVICE: CPT | Mod: S$GLB,,, | Performed by: INTERNAL MEDICINE

## 2023-09-21 NOTE — PROGRESS NOTES
38-year-old woman with a history of hypertension and acid reflux here with a chief complaint of right ear pain.    History of present illness and pertinent review of systems:  The patient has a 2 day history of symptoms.  She is had no fever but felt feverish.  She notes no headache but has not earache on the right associated with a possible swelling of lymph nodes on that side.  There is no rhinorrhea but there is nasal congestion.  There is no nasal drip there is a sore throat associated with the earache and the swollen gland.  There is no cough or diarrhea.  She notes no rash or myalgia.  She does feel somewhat fatigued with this might be due to the fact that both children had recent illnesses consisting of ear infections and diarrhea.  Both children were negative for COVID.    Problem list, medication list, and allergies have been reviewed.  The patient has a reaction to topical anti acne medication benzo salicylate which is her only true allergy.  Side effects from cephalosporins, nifedipine comment Sudafed are not true allergies.      Remaining review of systems is otherwise negative.    Physical Exam  Vitals and nursing note reviewed.   Constitutional:       General: She is not in acute distress.     Appearance: She is not ill-appearing.   HENT:      Right Ear: Ear canal and external ear normal.      Left Ear: Ear canal and external ear normal.      Ears:      Comments: Serous otitis of the right to back member     Nose: Congestion present.      Mouth/Throat:      Mouth: Mucous membranes are moist.      Pharynx: Oropharynx is clear. No posterior oropharyngeal erythema.   Eyes:      General: No scleral icterus.     Conjunctiva/sclera: Conjunctivae normal.   Neck:      Comments: Right submandibular lymph node is tender but not enlarged  Cardiovascular:      Rate and Rhythm: Normal rate and regular rhythm.      Pulses: Normal pulses.      Heart sounds: Normal heart sounds. No murmur heard.     No gallop.    Pulmonary:      Effort: No respiratory distress.      Breath sounds: No wheezing, rhonchi or rales.   Abdominal:      General: Bowel sounds are normal.      Palpations: Abdomen is soft.      Tenderness: There is no abdominal tenderness.      Comments: No hepatosplenomegaly   Musculoskeletal:         General: No swelling or tenderness.      Cervical back: Neck supple. No tenderness.      Right lower leg: No edema.      Left lower leg: No edema.   Lymphadenopathy:      Cervical: No cervical adenopathy.   Skin:     Findings: No bruising or rash.   Neurological:      General: No focal deficit present.      Mental Status: She is alert and oriented to person, place, and time.   Psychiatric:         Mood and Affect: Mood normal.     Assessment/plan:   URI associated with Eustachian tube dysfunction on the right  Plan: Explanation  Zyrtec 10 mg at bedtime   Saline nasal spray 4 squirts each nostril every 2-3 hours   Reviewed with the patient how to pop ears to open the Eustachian tube  Tylenol extended release 650 mg 1-2 tablets every 8 hours for general discomfort

## 2023-09-21 NOTE — PATIENT INSTRUCTIONS
Thank you for coming to visit today.    You have an upper respiratory tract infection associated with blockage of Eustachian tube producing right ear pain.  The following are my recommendations:  Zyrtec 10 mg at bedtime   Saline nasal spray 4 squirts each nostril every 2-3 hours   Acetaminophen 650 mg extended release 2 tablets every 8 hours as needed for general discomfort.

## 2023-09-22 ENCOUNTER — ANESTHESIA EVENT (OUTPATIENT)
Dept: ENDOSCOPY | Facility: HOSPITAL | Age: 38
End: 2023-09-22
Payer: COMMERCIAL

## 2023-09-22 NOTE — ANESTHESIA PREPROCEDURE EVALUATION
2023  Bushra Kahn is a 38 y.o., female.  [facility]  Anesthesia Pre-Operative Evaluation       Patient Name: Bushra Kahn  YOB: 1985  MRN: [id number]  CSN: [id number]      Code Status: Prior   Date of Procedure: 2023  Anesthesia: Choice Procedure: Procedure(s) (LRB):  EGD (ESOPHAGOGASTRODUODENOSCOPY) (N/A)  Pre-Operative Diagnosis: Esophagitis [K20.90]  Proceduralist: Surgeon(s) and Role:     * Uziel Duran MD - Primary        SUBJECTIVE:   Bushra Kahn is a 38 y.o. female who  has a past medical history of Acid reflux, Allergy, Chronic hypertension with superimposed pre-eclampsia, post partum (10/21/2022), History of  (10/10/2022), Pre-eclampsia, severe, postpartum condition (3/29/2021), Severe hypertension affecting pregnancy in third trimester (3/22/2021), and Severe pre-eclampsia in third trimester (3/22/2021)..     she has a current medication list which includes the following long-term medication(s): amlodipine and pantoprazole.     ALLERGIES:     Review of patient's allergies indicates:   Allergen Reactions    Benzyl salicylate Rash     acne    Cefdinir Other (See Comments)     Nausea/vomiting    Nifedipine Other (See Comments)     headache    Sudafed [pseudoephedrine hcl] Other (See Comments)     Emotional lability     LDA:          Lines/Drains/Airways     None                Anesthesia Evaluation      Airway   Mallampati: I  TM distance: Normal  Neck ROM: Normal ROM  Dental    (+) Intact    Pulmonary    Cardiovascular   Exercise tolerance: good  (+) hypertension,     Neuro/Psych      GI/Hepatic/Renal    (+) GERD,   Hepatitis: Spleenomegaly. Liver disease: Fatty liver.    Endo/Other    Abdominal                 MEDICATIONS:     Antibiotics (From admission, onward)    None        VTE Risk Mitigation (From  admission, onward)    None            No current facility-administered medications for this encounter.     Current Outpatient Medications   Medication Sig Dispense Refill    amLODIPine (NORVASC) 5 MG tablet Take 1 tablet (5 mg total) by mouth once daily. 90 tablet 1    pantoprazole (PROTONIX) 40 MG tablet Take 1 tablet (40 mg total) by mouth once daily. 90 tablet 1          History:   There are no hospital problems to display for this patient.    Surgical History:    has a past surgical history that includes Adenoidectomy (age 16); biopsy of breast  (Right, 2014); Cholecystectomy (); Esophagogastroduodenoscopy (N/A, 2018);  section (N/A, 3/24/2021); and  section (N/A, 10/10/2022).   Social History:    reports being sexually active and has had partner(s) who are male. She reports using the following method of birth control/protection: None.  reports that she quit smoking about 5 years ago. Her smoking use included cigarettes. She started smoking about 18 years ago. She has a 1.3 pack-year smoking history. She has never been exposed to tobacco smoke. She has never used smokeless tobacco. She reports that she does not currently use alcohol. She reports that she does not use drugs.     OBJECTIVE:     Vital Signs (Most Recent):    Vital Signs Range (Last 24H):  BP: ()/()   Arterial Line BP: ()/()        There is no height or weight on file to calculate BMI.   Wt Readings from Last 4 Encounters:   23 93 kg (205 lb 0.4 oz)   23 98.9 kg (218 lb 0.6 oz)   23 98.9 kg (218 lb 0.6 oz)   23 100.1 kg (220 lb 10.9 oz)       Significant Labs:  Lab Results   Component Value Date    WBC 4.87 2023    HGB 11.7 (L) 2023    HCT 38.5 2023     2023     2023    K 3.8 2023     2023    CREATININE 0.8 2023    BUN 11 2023    CO2 19 (L) 2023    GLU 97 2023    CALCIUM 9.3 2023    MG 2.2 2018     "ALKPHOS 62 07/17/2023    ALT 43 07/17/2023    AST 35 07/17/2023    ALBUMIN 4.0 07/17/2023    INR 1.0 01/17/2023    HGBA1C 5.2 03/03/2023    HCGQUANT <1.2 06/17/2020     No LMP recorded.  No results found for this or any previous visit (from the past 72 hour(s)).    EKG:   Results for orders placed or performed in visit on 10/20/22   EKG 12-lead    Collection Time: 10/20/22  6:58 PM    Narrative    Test Reason :     Vent. Rate : 051 BPM     Atrial Rate : 051 BPM     P-R Int : 154 ms          QRS Dur : 078 ms      QT Int : 422 ms       P-R-T Axes : 054 071 051 degrees     QTc Int : 388 ms    Sinus bradycardia  Otherwise normal ECG  When compared with ECG of 19-JUL-2021 11:57,  No significant change was found  Confirmed by Alfred Abdul MD ([id number]) on 3/6/2023 6:26:13 PM    Referred By: CHHAYA VENCES           Confirmed By:Alfred Abdul MD       TTE:  Results for orders placed or performed in visit on 07/19/21   Echo Saline Bubble? No   Result Value Ref Range    Ascending aorta 2.26 cm    STJ 2.06 cm    AV mean gradient 7 mmHg    Ao peak yannick 1.65 m/s    Ao VTI 34.37 cm    IVRT 85.63 msec    IVS 0.61 0.6 - 1.1 cm    LA size 2.84 cm    Left Atrium Major Axis 4.04 cm    Left Atrium Minor Axis 4.12 cm    LVIDd 4.65 3.5 - 6.0 cm    LVIDs 3.26 2.1 - 4.0 cm    LVOT diameter 1.97 cm    LVOT peak VTI 25.15 cm    Posterior Wall 0.62 0.6 - 1.1 cm    MV Peak A Yannick 0.53 m/s    E wave deceleration time 157.76 msec    MV Peak E Yannick 0.89 m/s    PV Peak D Yannick 0.47 m/s    PV Peak S Yannick 0.32 m/s    RA Major Axis 4.08 cm    RA Width 2.60 cm    Sinus 2.15 cm    TAPSE 2.02 cm    TR Max Yannick 1.76 m/s    TDI LATERAL 0.12 m/s    TDI SEPTAL 0.09 m/s    LA WIDTH 3.35 cm    PV PEAK VELOCITY 1.01 cm/s    MV stenosis pressure 1/2 time 45.75 ms    LV Diastolic Volume 99.72 mL    LV Systolic Volume 42.73 mL    LVOT peak yannick 1.34 m/s    RVOT peak VTI 17.84 cm    RVOT peak yannick 0.91 m/s    LA volume (mod) 31.25 cm3    MV "A" wave duration " "11.42 msec    PV mean gradient 2 mmHg    LV LATERAL E/E' RATIO 7.42 m/s    LV SEPTAL E/E' RATIO 9.89 m/s    FS 30 %    LA volume 32.99 cm3    LV mass 86.09 g    Left Ventricle Relative Wall Thickness 0.27 cm    AV valve area 2.23 cm2    AV Velocity Ratio 0.81     AV index (prosthetic) 0.73     MV valve area p 1/2 method 4.81 cm2    E/A ratio 1.68     Mean e' 0.11 m/s    Pulm vein S/D ratio 0.68     LVOT area 3.0 cm2    LVOT stroke volume 76.62 cm3    AV peak gradient 11 mmHg    E/E' ratio 8.48 m/s    Triscuspid Valve Regurgitation Peak Gradient 12 mmHg    BSA 2.15 m2    LV Systolic Volume Index 20.7 mL/m2    LV Diastolic Volume Index 48.41 mL/m2    LA Volume Index 16.0 mL/m2    LV Mass Index 42 g/m2    LA Volume Index (Mod) 15.2 mL/m2    Right Atrial Pressure (from IVC) 3 mmHg    EF 60 %    TV resting pulmonary artery pressure 15 mmHg    Narrative    · The left ventricle is normal in size with normal systolic function.  · The estimated ejection fraction is 60%.  · Normal left ventricular diastolic function.  · Normal right ventricular size with normal right ventricular systolic   function.  · The estimated PA systolic pressure is 15 mmHg.  · Normal central venous pressure (3 mmHg).        EF   Date Value Ref Range Status   08/24/2021 60 % Final      No results found for this or any previous visit.  MIR:  No results found for this or any previous visit.  Stress Test:  No results found for this or any previous visit.     LHC:  No results found for this or any previous visit.     PFT:  No results found for: "FEV1", "FVC", "QYG0LWB", "TLC", "DLCO"     ASSESSMENT/PLAN:       Pre-op Assessment    I have reviewed the Patient Summary Reports.     I have reviewed the Nursing Notes. I have reviewed the NPO Status.   I have reviewed the Medications.     Review of Systems  Anesthesia Hx:  No problems with previous Anesthesia  Denies Family Hx of Anesthesia complications.   Denies Personal Hx of Anesthesia complications. "   Social:  Non-Smoker    Hematology/Oncology:  Hematology Normal   Oncology Normal     EENT/Dental:EENT/Dental Normal   Cardiovascular:   Exercise tolerance: good Hypertension    Pulmonary:  Pulmonary Normal    Renal/:  Renal/ Normal     Hepatic/GI:   GERD Liver disease: Fatty liver. Hepatitis: Spleenomegaly.    Musculoskeletal:  Musculoskeletal Normal    Neurological:  Neurology Normal    Endocrine:  Endocrine Normal  Obesity / BMI > 30  Dermatological:  Skin Normal    Psych:  Psychiatric Normal           Physical Exam  General: Well nourished and Cooperative    Airway:  Mallampati: I   Mouth Opening: Normal  TM Distance: Normal  Tongue: Normal  Neck ROM: Normal ROM    Dental:  Intact        Anesthesia Plan  Type of Anesthesia, risks & benefits discussed:    Anesthesia Type: Gen Natural Airway  Intra-op Monitoring Plan: Standard ASA Monitors  Post Op Pain Control Plan: multimodal analgesia  Induction:  IV  Informed Consent: Informed consent signed with the Patient and all parties understand the risks and agree with anesthesia plan.  All questions answered. Patient consented to blood products? No  ASA Score: 2  Day of Surgery Review of History & Physical: H&P Update referred to the surgeon/provider.    Ready For Surgery From Anesthesia Perspective.     .       30-Jul-2023 12:30

## 2023-09-25 ENCOUNTER — TELEPHONE (OUTPATIENT)
Dept: GASTROENTEROLOGY | Facility: CLINIC | Age: 38
End: 2023-09-25
Payer: COMMERCIAL

## 2023-09-25 ENCOUNTER — PATIENT MESSAGE (OUTPATIENT)
Dept: ENDOSCOPY | Facility: HOSPITAL | Age: 38
End: 2023-09-25
Payer: COMMERCIAL

## 2023-09-26 ENCOUNTER — ANESTHESIA (OUTPATIENT)
Dept: ENDOSCOPY | Facility: HOSPITAL | Age: 38
End: 2023-09-26
Payer: COMMERCIAL

## 2023-09-26 ENCOUNTER — OFFICE VISIT (OUTPATIENT)
Dept: DERMATOLOGY | Facility: CLINIC | Age: 38
End: 2023-09-26
Payer: COMMERCIAL

## 2023-09-26 ENCOUNTER — HOSPITAL ENCOUNTER (OUTPATIENT)
Facility: HOSPITAL | Age: 38
Discharge: HOME | End: 2023-09-26
Attending: STUDENT IN AN ORGANIZED HEALTH CARE EDUCATION/TRAINING PROGRAM | Admitting: STUDENT IN AN ORGANIZED HEALTH CARE EDUCATION/TRAINING PROGRAM
Payer: COMMERCIAL

## 2023-09-26 VITALS
DIASTOLIC BLOOD PRESSURE: 77 MMHG | SYSTOLIC BLOOD PRESSURE: 116 MMHG | TEMPERATURE: 98 F | RESPIRATION RATE: 18 BRPM | HEART RATE: 52 BPM | OXYGEN SATURATION: 100 %

## 2023-09-26 DIAGNOSIS — B07.9 VERRUCA: Primary | ICD-10-CM

## 2023-09-26 DIAGNOSIS — L81.4 LENTIGO: ICD-10-CM

## 2023-09-26 DIAGNOSIS — L82.1 SK (SEBORRHEIC KERATOSIS): ICD-10-CM

## 2023-09-26 DIAGNOSIS — K20.90 ESOPHAGITIS: Primary | ICD-10-CM

## 2023-09-26 DIAGNOSIS — L85.8 KERATOSIS PILARIS: ICD-10-CM

## 2023-09-26 LAB
B-HCG UR QL: NEGATIVE
CTP QC/QA: YES

## 2023-09-26 PROCEDURE — 17110 PR DESTRUCTION BENIGN LESIONS UP TO 14: ICD-10-PCS | Mod: S$GLB,,, | Performed by: DERMATOLOGY

## 2023-09-26 PROCEDURE — 1159F PR MEDICATION LIST DOCUMENTED IN MEDICAL RECORD: ICD-10-PCS | Mod: CPTII,S$GLB,, | Performed by: DERMATOLOGY

## 2023-09-26 PROCEDURE — 94761 N-INVAS EAR/PLS OXIMETRY MLT: CPT

## 2023-09-26 PROCEDURE — D9220A PRA ANESTHESIA: Mod: ,,, | Performed by: NURSE ANESTHETIST, CERTIFIED REGISTERED

## 2023-09-26 PROCEDURE — 37000008 HC ANESTHESIA 1ST 15 MINUTES: Performed by: STUDENT IN AN ORGANIZED HEALTH CARE EDUCATION/TRAINING PROGRAM

## 2023-09-26 PROCEDURE — 27201012 HC FORCEPS, HOT/COLD, DISP: Performed by: STUDENT IN AN ORGANIZED HEALTH CARE EDUCATION/TRAINING PROGRAM

## 2023-09-26 PROCEDURE — 25000003 PHARM REV CODE 250: Performed by: NURSE ANESTHETIST, CERTIFIED REGISTERED

## 2023-09-26 PROCEDURE — 37000009 HC ANESTHESIA EA ADD 15 MINS: Performed by: STUDENT IN AN ORGANIZED HEALTH CARE EDUCATION/TRAINING PROGRAM

## 2023-09-26 PROCEDURE — 88305 TISSUE EXAM BY PATHOLOGIST: ICD-10-PCS | Mod: 26,,, | Performed by: PATHOLOGY

## 2023-09-26 PROCEDURE — 1159F MED LIST DOCD IN RCRD: CPT | Mod: CPTII,S$GLB,, | Performed by: DERMATOLOGY

## 2023-09-26 PROCEDURE — 43239 EGD BIOPSY SINGLE/MULTIPLE: CPT | Mod: ,,, | Performed by: STUDENT IN AN ORGANIZED HEALTH CARE EDUCATION/TRAINING PROGRAM

## 2023-09-26 PROCEDURE — 99213 PR OFFICE/OUTPT VISIT, EST, LEVL III, 20-29 MIN: ICD-10-PCS | Mod: 25,S$GLB,, | Performed by: DERMATOLOGY

## 2023-09-26 PROCEDURE — 88305 TISSUE EXAM BY PATHOLOGIST: CPT | Mod: 26,,, | Performed by: PATHOLOGY

## 2023-09-26 PROCEDURE — 99999 PR PBB SHADOW E&M-EST. PATIENT-LVL III: CPT | Mod: PBBFAC,,, | Performed by: DERMATOLOGY

## 2023-09-26 PROCEDURE — 63600175 PHARM REV CODE 636 W HCPCS: Performed by: NURSE ANESTHETIST, CERTIFIED REGISTERED

## 2023-09-26 PROCEDURE — 3044F PR MOST RECENT HEMOGLOBIN A1C LEVEL <7.0%: ICD-10-PCS | Mod: CPTII,S$GLB,, | Performed by: DERMATOLOGY

## 2023-09-26 PROCEDURE — 43239 PR EGD, FLEX, W/BIOPSY, SGL/MULTI: ICD-10-PCS | Mod: ,,, | Performed by: STUDENT IN AN ORGANIZED HEALTH CARE EDUCATION/TRAINING PROGRAM

## 2023-09-26 PROCEDURE — 3044F HG A1C LEVEL LT 7.0%: CPT | Mod: CPTII,S$GLB,, | Performed by: DERMATOLOGY

## 2023-09-26 PROCEDURE — 1160F RVW MEDS BY RX/DR IN RCRD: CPT | Mod: CPTII,S$GLB,, | Performed by: DERMATOLOGY

## 2023-09-26 PROCEDURE — 1160F PR REVIEW ALL MEDS BY PRESCRIBER/CLIN PHARMACIST DOCUMENTED: ICD-10-PCS | Mod: CPTII,S$GLB,, | Performed by: DERMATOLOGY

## 2023-09-26 PROCEDURE — 99999 PR PBB SHADOW E&M-EST. PATIENT-LVL III: ICD-10-PCS | Mod: PBBFAC,,, | Performed by: DERMATOLOGY

## 2023-09-26 PROCEDURE — 88305 TISSUE EXAM BY PATHOLOGIST: CPT | Performed by: PATHOLOGY

## 2023-09-26 PROCEDURE — 17110 DESTRUCTION B9 LES UP TO 14: CPT | Mod: S$GLB,,, | Performed by: DERMATOLOGY

## 2023-09-26 PROCEDURE — 25000003 PHARM REV CODE 250: Performed by: STUDENT IN AN ORGANIZED HEALTH CARE EDUCATION/TRAINING PROGRAM

## 2023-09-26 PROCEDURE — 43239 EGD BIOPSY SINGLE/MULTIPLE: CPT | Performed by: STUDENT IN AN ORGANIZED HEALTH CARE EDUCATION/TRAINING PROGRAM

## 2023-09-26 PROCEDURE — 81025 URINE PREGNANCY TEST: CPT | Performed by: STUDENT IN AN ORGANIZED HEALTH CARE EDUCATION/TRAINING PROGRAM

## 2023-09-26 PROCEDURE — D9220A PRA ANESTHESIA: ICD-10-PCS | Mod: ,,, | Performed by: NURSE ANESTHETIST, CERTIFIED REGISTERED

## 2023-09-26 PROCEDURE — 99213 OFFICE O/P EST LOW 20 MIN: CPT | Mod: 25,S$GLB,, | Performed by: DERMATOLOGY

## 2023-09-26 PROCEDURE — 99900035 HC TECH TIME PER 15 MIN (STAT)

## 2023-09-26 RX ORDER — DEXMEDETOMIDINE HYDROCHLORIDE 100 UG/ML
INJECTION, SOLUTION INTRAVENOUS
Status: DISCONTINUED | OUTPATIENT
Start: 2023-09-26 | End: 2023-09-26

## 2023-09-26 RX ORDER — SODIUM CHLORIDE 9 MG/ML
INJECTION, SOLUTION INTRAVENOUS CONTINUOUS
Status: DISCONTINUED | OUTPATIENT
Start: 2023-09-26 | End: 2023-09-26 | Stop reason: HOSPADM

## 2023-09-26 RX ORDER — PROPOFOL 10 MG/ML
VIAL (ML) INTRAVENOUS CONTINUOUS PRN
Status: DISCONTINUED | OUTPATIENT
Start: 2023-09-26 | End: 2023-09-26

## 2023-09-26 RX ORDER — LIDOCAINE HYDROCHLORIDE 20 MG/ML
INJECTION, SOLUTION EPIDURAL; INFILTRATION; INTRACAUDAL; PERINEURAL
Status: DISCONTINUED | OUTPATIENT
Start: 2023-09-26 | End: 2023-09-26

## 2023-09-26 RX ORDER — PROPOFOL 10 MG/ML
VIAL (ML) INTRAVENOUS
Status: DISCONTINUED | OUTPATIENT
Start: 2023-09-26 | End: 2023-09-26

## 2023-09-26 RX ADMIN — SODIUM CHLORIDE: 0.9 INJECTION, SOLUTION INTRAVENOUS at 10:09

## 2023-09-26 RX ADMIN — DEXMEDETOMIDINE 12 MCG: 200 INJECTION, SOLUTION INTRAVENOUS at 10:09

## 2023-09-26 RX ADMIN — GLYCOPYRROLATE 0.1 MG: 0.2 INJECTION INTRAMUSCULAR; INTRAVENOUS at 10:09

## 2023-09-26 RX ADMIN — LIDOCAINE HYDROCHLORIDE 50 MG: 20 INJECTION, SOLUTION EPIDURAL; INFILTRATION; INTRACAUDAL; PERINEURAL at 10:09

## 2023-09-26 RX ADMIN — PROPOFOL 80 MG: 10 INJECTION, EMULSION INTRAVENOUS at 10:09

## 2023-09-26 RX ADMIN — Medication 200 MCG/KG/MIN: at 10:09

## 2023-09-26 NOTE — PROVATION PATIENT INSTRUCTIONS
Discharge Summary/Instructions after an Endoscopic Procedure  Patient Name: Bushra Kahn  Patient MRN: [id number]  Patient YOB: 1985 Tuesday, September 26, 2023  Uziel Duran MD  Dear patient,  As a result of recent federal legislation (The Federal Cures Act), you may   receive lab or pathology results from your procedure in your [facility]   account before your physician is able to contact you. Your physician or   their representative will relay the results to you with their   recommendations at their soonest availability.  Thank you,  RESTRICTIONS:  During your procedure today, you received medications for sedation.  These   medications may affect your judgment, balance and coordination.  Therefore,   for 24 hours, you have the following restrictions:   - DO NOT drive a car, operate machinery, make legal/financial decisions,   sign important papers or drink alcohol.    ACTIVITY:  Today: no heavy lifting, straining or running due to procedural   sedation/anesthesia.  The following day: return to full activity including work.  DIET:  Eat and drink normally unless instructed otherwise.     TREATMENT FOR COMMON SIDE EFFECTS:  - Mild abdominal pain, nausea, belching, bloating or excessive gas:  rest,   eat lightly and use a heating pad.  - Sore Throat: treat with throat lozenges and/or gargle with warm salt   water.  - Because air was used during the procedure, expelling large amounts of air   from your rectum or belching is normal.  - If a bowel prep was taken, you may not have a bowel movement for 1-3 days.    This is normal.  SYMPTOMS TO WATCH FOR AND REPORT TO YOUR PHYSICIAN:  1. Abdominal pain or bloating, other than gas cramps.  2. Chest pain.  3. Back pain.  4. Signs of infection such as: chills or fever occurring within 24 hours   after the procedure.  5. Rectal bleeding, which would show as bright red, maroon, or black stools.   (A tablespoon of blood from the rectum is not serious,  especially if   hemorrhoids are present.)  6. Vomiting.  7. Weakness or dizziness.  GO DIRECTLY TO THE NEAREST EMERGENCY ROOM IF YOU HAVE ANY OF THE FOLLOWING:      Difficulty breathing              Chills and/or fever over 101 F   Persistent vomiting and/or vomiting blood   Severe abdominal pain   Severe chest pain   Black, tarry stools   Bleeding- more than one tablespoon   Any other symptom or condition that you feel may need urgent attention  Your doctor recommends these additional instructions:  If any biopsies were taken, your doctors clinic will contact you in 1 to 2   weeks with any results.  - Discharge patient to home.   - Await pathology results.   - The findings and recommendations were discussed with the patient.   - Patient has a contact number available for emergencies.  The signs and   symptoms of potential delayed complications were discussed with the   patient.  Return to normal activities tomorrow.  Written discharge   instructions were provided to the patient.   - Continue present medications including acid suppressive therapy.  For questions, problems or results please call your physician - Uziel Duran MD at Work:  [phone].  [facility], EMERGENCY ROOM PHONE NUMBER: [phone]  IF A COMPLICATION OR EMERGENCY SITUATION ARISES AND YOU ARE UNABLE TO REACH   YOUR PHYSICIAN - GO DIRECTLY TO THE EMERGENCY ROOM.  Uziel Duran MD  9/26/2023 11:03:08 AM  This report has been verified and signed electronically.  Dear patient,  As a result of recent federal legislation (The Federal Cures Act), you may   receive lab or pathology results from your procedure in your [facility]   account before your physician is able to contact you. Your physician or   their representative will relay the results to you with their   recommendations at their soonest availability.  Thank you,  PROVATION

## 2023-09-26 NOTE — PROGRESS NOTES
Subjective:      Patient ID:  Bushra Kahn is a 38 y.o. female who presents for   Chief Complaint   Patient presents with    Rash     Bilateral upper arms      Pt was seen by Rheum to rule out lupus, and she is BK positive     Rash - Initial  Affected locations: bilateral upper arms.  Duration: 3 years  Signs and Symptoms: rough in texture.  Severity: mild  Timing: waxes and wanes.  Aggravated by: nothing  Treatments tried: Cerve body wash and exfolative brush.    Review of Systems   Constitutional:  Negative for fever, chills and fatigue.   Skin:  Positive for rash. Negative for itching.       Objective:   Physical Exam   Constitutional: She appears well-developed and well-nourished. No distress.   Neurological: She is alert and oriented to person, place, and time. She is not disoriented.   Psychiatric: She has a normal mood and affect.   Skin:   Areas Examined (abnormalities noted in diagram):   RUE Inspected  LUE Inspection Performed  Nails and Digits Inspection Performed                Diagram Legend     Erythematous scaling macule/papule c/w actinic keratosis       Vascular papule c/w angioma      Pigmented verrucoid papule/plaque c/w seborrheic keratosis      Yellow umbilicated papule c/w sebaceous hyperplasia      Irregularly shaped tan macule c/w lentigo     1-2 mm smooth white papules consistent with Milia      Movable subcutaneous cyst with punctum c/w epidermal inclusion cyst      Subcutaneous movable cyst c/w pilar cyst      Firm pink to brown papule c/w dermatofibroma      Pedunculated fleshy papule(s) c/w skin tag(s)      Evenly pigmented macule c/w junctional nevus     Mildly variegated pigmented, slightly irregular-bordered macule c/w mildly atypical nevus      Flesh colored to evenly pigmented papule c/w intradermal nevus       Pink pearly papule/plaque c/w basal cell carcinoma      Erythematous hyperkeratotic cursted plaque c/w SCC      Surgical scar with no sign of skin cancer  recurrence      Open and closed comedones      Inflammatory papules and pustules      Verrucoid papule consistent consistent with wart     Erythematous eczematous patches and plaques     Dystrophic onycholytic nail with subungual debris c/w onychomycosis     Umbilicated papule    Erythematous-base heme-crusted tan verrucoid plaque consistent with inflamed seborrheic keratosis     Erythematous Silvery Scaling Plaque c/w Psoriasis     See annotation      Assessment / Plan:        Verruca  Cryosurgery procedure note:    Verbal consent from the patient is obtained including, but not limited to, risk of hypopigmentation/hyperpigmentation, scar, recurrence of lesion. Liquid nitrogen cryosurgery is applied to 5verruca with prior paring, as detailed in the physical exam, to produce a freeze injury. 3 consecutive freeze thaw cycles are applied to each verruca. The patient is aware that blisters (possibly blood blisters) may form.    Start OtC 40% salicylic acid pads to area once daily      Keratosis pilaris  Recommend using a mild, moisturizing soap as Keratosis pilaris is exacerbated by dry skin.    Discussed [place] apricot scrub or other gently exfoliating wash or gentle use of Buf Puf.    OTC keratolytics (Am lactin, Carmol, Ureamide, Kerasal) recommended for daily use after bath or shower.    Other OTC options:  Bare 40 - amazon $18  Gold bond rough and bumpy skin cream  Amber In Shower Wash Off Lotion  Glytone wash and Cerave SA cream  Glytone KP kit - it's 2 steps ([facility]) - SE: sunburn  Differin every evening, am lactin every morning    Lentigo  This is a benign hyperpigmented sun induced lesion. Recommend daily sun protection/avoidance and use of at least SPF 30, broad spectrum sunscreen (OTC drug) will reduce the number of new lesions. Treatment of these benign lesions are considered cosmetic.    Seborrheic keratosis  These are benign inherited growths without a malignant potential. Reassurance given to patient.  No treatment is necessary.     F/u 2 mo for warts/refreeze           No follow-ups on file.

## 2023-09-26 NOTE — ANESTHESIA POSTPROCEDURE EVALUATION
Anesthesia Post Evaluation    Patient: Bushra Kahn    Procedure(s) Performed: Procedure(s) (LRB):  EGD (ESOPHAGOGASTRODUODENOSCOPY) (N/A)    Final Anesthesia Type: general      Patient location during evaluation: PACU  Patient participation: Yes- Able to Participate  Level of consciousness: awake and alert  Post-procedure vital signs: reviewed and stable  Pain management: adequate  Airway patency: patent    PONV status at discharge: No PONV  Anesthetic complications: no      Cardiovascular status: stable  Respiratory status: room air  Hydration status: euvolemic  Follow-up not needed.          Vitals Value Taken Time   /78 09/26/23 1124   Temp 36.9 °C (98.4 °F) 09/26/23 1106   Pulse 61 09/26/23 1124   Resp 18 09/26/23 1124   SpO2 100 % 09/26/23 1124         Event Time   Out of Recovery 11:23:00         Pain/Caity Score: Caity Score: 10 (9/26/2023 11:24 AM)

## 2023-09-26 NOTE — TRANSFER OF CARE
Anesthesia Transfer of Care Note    Patient: Bushra Kahn    Procedure(s) Performed: Procedure(s) (LRB):  EGD (ESOPHAGOGASTRODUODENOSCOPY) (N/A)    Patient location: PACU    Anesthesia Type: general    Transport from OR: Transported from OR on 6-10 L/min O2 by face mask with adequate spontaneous ventilation    Post pain: adequate analgesia    Post assessment: no apparent anesthetic complications    Post vital signs: stable    Level of consciousness: sedated    Nausea/Vomiting: no nausea/vomiting    Complications: none    Transfer of care protocol was followed      Last vitals:   Visit Vitals  LMP 09/26/2023 (Exact Date)   Breastfeeding No

## 2023-09-26 NOTE — PLAN OF CARE
Pt arrived to recovery dosc via stretcher per ENDO team. Bedside report received. Pt attached to bedside monitor. VSS. Pt sedated post procedure. Pt on room air; oxygen sats 99%. Pt IV access saline locked. Warm blanket applied. Safety maintained.

## 2023-09-26 NOTE — PATIENT INSTRUCTIONS
Keratosis pilaris (arms)  Recommend using a mild, moisturizing soap as Keratosis pilaris is exacerbated by dry skin.     Discussed [place] apricot scrub or other gently exfoliating wash or gentle use of Buf Puf.     OTC keratolytics (Am lactin, Carmol, Ureamide, Kerasal) recommended for daily use after bath or shower.     Other OTC options:  Bare 40 - amazon $18  Gold bond rough and bumpy skin cream  Amber In Shower Wash Off Lotion  Glytone wash and Cerave SA cream  Glytone KP kit - it's 2 steps (Amazon) - SE: sunburn  Differin every evening, am lactin every morning      Warts on hand  Start OtC 40% salicylic acid pads to area once daily      CRYOSURGERY      Your doctor has used a method called cryosurgery to treat your skin condition. Cryosurgery refers to the use of very cold substances to treat a variety of skin conditions such as warts, pre-skin cancers, molluscum contagiosum, sun spots, and several benign growths. The substance we use in cryosurgery is liquid nitrogen and is so cold (-195 degrees Celsius) that is burns when administered.     Following treatment in the office, the skin may immediately burn and become red. You may find the area around the lesion is affected as well. It is sometimes necessary to treat not only the lesion, but a small area of the surrounding normal skin to achieve a good response.     A blister, and even a blood filled blister, may form after treatment.   This is a normal response. If the blister is painful, it is acceptable to sterilize a needle and with rubbing alcohol and gently pop the blister. It is important that you gently wash the area with soap and warm water as the blister fluid may contain wart virus if a wart was treated. Do no remove the roof of the blister.     The area treated can take anywhere from 1-3 weeks to heal. Healing time depends on the kind skin lesion treated, the location, and how aggressively the lesion was treated. It is recommended that the areas  treated are covered with Vaseline or bacitracin ointment and a band-aid. If a band-aid is not practical, just ointment applied several times per day will do. Keeping these areas moist will speed the healing time.    Treatment with liquid nitrogen can leave a scar. In dark skin, it may be a light or dark scar, in light skin it may be a white or pink scar. These will generally fade with time, but may never go away completely.     If you have any concerns after your treatment, please feel free to call the office.       [facility] [place] [address]/ [phone] FAX/ [email or web address]

## 2023-09-26 NOTE — H&P
Short Stay Endoscopy History and Physical    PCP - Elsa Ferrari MD  Referring Physician - Kendra Kwon RN  No address on file    Procedure - EGD  ASA - per anesthesia  Mallampati - per anesthesia  History of Anesthesia problems - no  Family history Anesthesia problems -  no   Plan of anesthesia - General    HPI  38 y.o. female  Reason for procedure:  Esophagitis [K20.90]      ROS:  Constitutional: No fevers, chills, No weight loss  CV: No chest pain  Pulm: No cough, No shortness of breath  GI: see HPI    Medical History:  has a past medical history of Acid reflux, Allergy, Chronic hypertension with superimposed pre-eclampsia, post partum (10/21/2022), History of  (10/10/2022), Pre-eclampsia, severe, postpartum condition (3/29/2021), Severe hypertension affecting pregnancy in third trimester (3/22/2021), and Severe pre-eclampsia in third trimester (3/22/2021).    Surgical History:  has a past surgical history that includes Adenoidectomy (age 16); biopsy of breast  (Right, 2014); Cholecystectomy (); Esophagogastroduodenoscopy (N/A, 2018);  section (N/A, 3/24/2021); and  section (N/A, 10/10/2022).    Family History: family history includes Breast cancer (age of onset: 50) in her paternal aunt; Cancer in her paternal grandfather; Cataracts in her paternal grandfather and paternal grandmother; Colon cancer in her paternal grandmother; Hypertension in her father; Kidney disease in her mother; Lupus in her mother; Sleep apnea in her father..    Social History:  reports that she quit smoking about 5 years ago. Her smoking use included cigarettes. She started smoking about 18 years ago. She has a 1.3 pack-year smoking history. She has never been exposed to tobacco smoke. She has never used smokeless tobacco. She reports that she does not currently use alcohol. She reports that she does not use drugs.    Review of patient's allergies indicates:   Allergen Reactions    Benzyl  salicylate Rash     acne    Cefdinir Other (See Comments)     Nausea/vomiting    Nifedipine Other (See Comments)     headache    Sudafed [pseudoephedrine hcl] Other (See Comments)     Emotional lability       Medications:   No medications prior to admission.       Physical Exam:    Vital Signs: There were no vitals filed for this visit.    General Appearance: Well appearing in no acute distress  Abdomen: Soft, non tender, non distended with normal bowel sounds, no masses      Labs:  Lab Results   Component Value Date    WBC 4.87 03/03/2023    HGB 11.7 (L) 03/03/2023    HCT 38.5 03/03/2023     03/03/2023    CHOL 182 03/03/2023    TRIG 120 03/03/2023    HDL 52 03/03/2023    ALT 43 07/17/2023    AST 35 07/17/2023     03/03/2023    K 3.8 03/03/2023     03/03/2023    CREATININE 0.8 03/03/2023    BUN 11 03/03/2023    CO2 19 (L) 03/03/2023    TSH 1.007 03/03/2023    INR 1.0 01/17/2023    HGBA1C 5.2 03/03/2023       I have explained the risks and benefits of this endoscopic procedure to the patient including but not limited to bleeding, inflammation, infection, perforation, and death.      Uziel Duran MD

## 2023-10-03 ENCOUNTER — PATIENT MESSAGE (OUTPATIENT)
Dept: GASTROENTEROLOGY | Facility: CLINIC | Age: 38
End: 2023-10-03
Payer: COMMERCIAL

## 2023-10-03 LAB
COMMENT: NORMAL
FINAL PATHOLOGIC DIAGNOSIS: NORMAL
GROSS: NORMAL
Lab: NORMAL

## 2023-10-03 RX ORDER — PANTOPRAZOLE SODIUM 40 MG/1
40 TABLET, DELAYED RELEASE ORAL 2 TIMES DAILY
Qty: 180 TABLET | Refills: 1 | Status: SHIPPED | OUTPATIENT
Start: 2023-10-03 | End: 2024-10-02

## 2023-10-04 ENCOUNTER — TELEPHONE (OUTPATIENT)
Dept: ENDOSCOPY | Facility: HOSPITAL | Age: 38
End: 2023-10-04
Payer: COMMERCIAL

## 2023-10-04 NOTE — TELEPHONE ENCOUNTER
Contacted the patient to schedule an endoscopy procedure(s) EGD. The patient answered and stated she will call back tomorrow once she has her calendar.

## 2023-10-04 NOTE — TELEPHONE ENCOUNTER
"----- Message from Rand Gallardo sent at 10/4/2023  8:31 AM CDT -----  Regarding: FW: Endo Case Request    ----- Message -----  From: Uziel Duran MD  Sent: 10/3/2023  12:01 PM CDT  To: [facility] Patients  Subject: Endo Case Request                                Procedure: EGD    Diagnosis: GERD    Procedure Timin weeks    #If within 4 weeks selected, please jeffrey as high priority#    #If greater than 12 weeks, please select "5-12 weeks" and delay sending until 2 months prior to requested date#     Provider: Myself    Location: No Preference    Additional Scheduling Information: No scheduling concerns    Prep Specifications:N/A    Is the patient taking a GLP-1 Agonist:no    Have you attached a patient to this message: yes        "

## 2023-10-05 ENCOUNTER — TELEPHONE (OUTPATIENT)
Dept: ENDOSCOPY | Facility: HOSPITAL | Age: 38
End: 2023-10-05
Payer: COMMERCIAL

## 2023-10-05 ENCOUNTER — PATIENT MESSAGE (OUTPATIENT)
Dept: PODIATRY | Facility: CLINIC | Age: 38
End: 2023-10-05
Payer: COMMERCIAL

## 2023-10-05 ENCOUNTER — PATIENT MESSAGE (OUTPATIENT)
Dept: DERMATOLOGY | Facility: CLINIC | Age: 38
End: 2023-10-05
Payer: COMMERCIAL

## 2023-10-05 DIAGNOSIS — K21.9 GASTROESOPHAGEAL REFLUX DISEASE, UNSPECIFIED WHETHER ESOPHAGITIS PRESENT: Primary | ICD-10-CM

## 2023-10-05 NOTE — TELEPHONE ENCOUNTER
"----- Message from Rand Gallardo sent at 10/4/2023  8:31 AM CDT -----  Regarding: FW: Endo Case Request     ----- Message -----  From: Uziel Duran MD  Sent: 10/3/2023  12:01 PM CDT  To: [facility] Endoscopist [facility] Patients  Subject: Endo Case Request                                 Procedure: EGD     Diagnosis: GERD     Procedure Timin weeks     #If within 4 weeks selected, please jeffrey as high priority#     #If greater than 12 weeks, please select "5-12 weeks" and delay sending until 2 months prior to requested date#      Provider: Myself     Location: No Preference     Additional Scheduling Information: No scheduling concerns     Prep Specifications:N/A     Is the patient taking a GLP-1 Agonist:no     Have you attached a patient to this message: yes     "

## 2023-10-05 NOTE — TELEPHONE ENCOUNTER
Contacted the patient to schedule an endoscopy procedure(s) EGD. The patient did not answer the call and unable to leave voice mail.

## 2023-10-05 NOTE — TELEPHONE ENCOUNTER
Spoke to patient to schedule procedure(s) Upper Endoscopy (EGD)       Physician to perform procedure(s) Dr. GRAYSON Duran  Date of Procedure (s) 1/8/24  Arrival Time 9:45 AM  Time of Procedure(s) 10:45 AM   Location of Procedure(s) [place] 2nd Floor  Type of Rx Prep sent to patient: N/A  Instructions provided to patient via [facility]    Patient was informed on the following information and verbalized understanding. Screening questionnaire reviewed with patient and complete. If procedure requires anesthesia, a responsible adult needs to be present to accompany the patient home, patient cannot drive after receiving anesthesia. Appointment details are tentative, especially check-in time. Patient will receive a prep-op call 4 days prior to confirm check-in time for procedure. If applicable the patient should contact their pharmacy to verify Rx for procedure prep is ready for pick-up. Patient was advised to call the scheduling department at [phone] if pharmacy states no Rx is available. Patient was advised to call the endoscopy scheduling department if any questions or concerns arise.      [facility] Scheduling Department

## 2023-10-05 NOTE — TELEPHONE ENCOUNTER
"----- Message from Rand Gallardo sent at 10/4/2023  8:31 AM CDT -----  Regarding: FW: Endo Case Request    ----- Message -----  From: Uziel Duran MD  Sent: 10/3/2023  12:01 PM CDT  To: [facility] Endoscop[facility] Patients  Subject: Endo Case Request                                Procedure: EGD    Diagnosis: GERD    Procedure Timin weeks    #If within 4 weeks selected, please jeffrey as high priority#    #If greater than 12 weeks, please select "5-12 weeks" and delay sending until 2 months prior to requested date#     Provider: Myself    Location: No Preference    Additional Scheduling Information: No scheduling concerns    Prep Specifications:N/A    Is the patient taking a GLP-1 Agonist:no    Have you attached a patient to this message: yes        " Impression: S/P CE/Premium [employer] [id number] 22.50  - 1 Day. Encounter for surgical aftercare following surgery on a sense organ  Z48.810. Post operative instructions reviewed - Plan: --Continue all meds--Advised patient to use artificial tears for comfort.

## 2023-10-06 ENCOUNTER — HOSPITAL ENCOUNTER (OUTPATIENT)
Dept: RADIOLOGY | Facility: HOSPITAL | Age: 38
Discharge: HOME | End: 2023-10-06
Attending: INTERNAL MEDICINE
Payer: COMMERCIAL

## 2023-10-06 DIAGNOSIS — M54.2 NECK PAIN: ICD-10-CM

## 2023-10-06 PROCEDURE — 72141 MRI NECK SPINE W/O DYE: CPT | Mod: TC

## 2023-10-06 PROCEDURE — 72146 MRI THORACIC SPINE WITHOUT CONTRAST: ICD-10-PCS | Mod: 26,,, | Performed by: RADIOLOGY

## 2023-10-06 PROCEDURE — 72146 MRI CHEST SPINE W/O DYE: CPT | Mod: 26,,, | Performed by: RADIOLOGY

## 2023-10-06 PROCEDURE — 72146 MRI CHEST SPINE W/O DYE: CPT | Mod: TC

## 2023-10-06 PROCEDURE — 72141 MRI NECK SPINE W/O DYE: CPT | Mod: 26,,, | Performed by: RADIOLOGY

## 2023-10-06 PROCEDURE — 72141 MRI CERVICAL SPINE WITHOUT CONTRAST: ICD-10-PCS | Mod: 26,,, | Performed by: RADIOLOGY

## 2023-10-08 ENCOUNTER — PATIENT MESSAGE (OUTPATIENT)
Dept: INTERNAL MEDICINE | Facility: CLINIC | Age: 38
End: 2023-10-08
Payer: COMMERCIAL

## 2023-10-08 NOTE — PROGRESS NOTES
MRI shows some scoliosis of the thoracic spine and some mild nerve pinching to the left at C3-4 and to the right at T3-4. Due to the mild changes surgery is not indicated.  I do recommend physical therapy and pain management for possible epidural if PT is not helpful.  Would you like me to proceed in setting these up?

## 2023-10-09 ENCOUNTER — TELEPHONE (OUTPATIENT)
Dept: INTERNAL MEDICINE | Facility: CLINIC | Age: 38
End: 2023-10-09
Payer: COMMERCIAL

## 2023-10-09 DIAGNOSIS — M54.2 NECK PAIN: Primary | ICD-10-CM

## 2023-10-17 ENCOUNTER — TELEPHONE (OUTPATIENT)
Dept: PODIATRY | Facility: CLINIC | Age: 38
End: 2023-10-17
Payer: COMMERCIAL

## 2023-10-17 ENCOUNTER — PATIENT MESSAGE (OUTPATIENT)
Dept: PODIATRY | Facility: CLINIC | Age: 38
End: 2023-10-17
Payer: COMMERCIAL

## 2023-10-17 NOTE — TELEPHONE ENCOUNTER
Message from Dr. Escobedo this is a patient of Dr. Alvarez and she is requesting a nail procedure     If patient would like to see me please offer her a 30 minute spot that would be available to do the procedure   [facility] which would not allow enough time for procedure      Left voice message for patient to give our office a call back at [phone]. Appointment scheduled for 10/19 @ 7:30 with DR. Escobedo

## 2023-10-17 NOTE — TELEPHONE ENCOUNTER
You can see her just advising she will not see you for a procedure. Your appointment can stay as scheduled   ===View-only below this line===      ----- Message -----       From:Bushra Kahn       Sent:10/17/2023  3:14 PM CDT         To:User Message Message List    Subject:appointmnet scheduled    Hi! I would like to see dr Sahu on Thursday if that is available

## 2023-11-14 ENCOUNTER — PROCEDURE VISIT (OUTPATIENT)
Dept: PODIATRY | Facility: CLINIC | Age: 38
End: 2023-11-14
Payer: COMMERCIAL

## 2023-11-14 VITALS
SYSTOLIC BLOOD PRESSURE: 136 MMHG | HEART RATE: 90 BPM | HEIGHT: 66 IN | WEIGHT: 199.06 LBS | BODY MASS INDEX: 31.99 KG/M2 | DIASTOLIC BLOOD PRESSURE: 88 MMHG

## 2023-11-14 DIAGNOSIS — L60.0 INGROWN NAIL: Primary | ICD-10-CM

## 2023-11-14 PROCEDURE — 11730 NAIL REMOVAL: ICD-10-PCS | Mod: T5,S$GLB,, | Performed by: PODIATRIST

## 2023-11-14 PROCEDURE — 99213 PR OFFICE/OUTPT VISIT, EST, LEVL III, 20-29 MIN: ICD-10-PCS | Mod: 25,S$GLB,, | Performed by: PODIATRIST

## 2023-11-14 PROCEDURE — 11730 AVULSION NAIL PLATE SIMPLE 1: CPT | Mod: T5,S$GLB,, | Performed by: PODIATRIST

## 2023-11-14 PROCEDURE — 99213 OFFICE O/P EST LOW 20 MIN: CPT | Mod: 25,S$GLB,, | Performed by: PODIATRIST

## 2023-11-14 NOTE — PROCEDURES
Nail Removal    Date/Time: 11/14/2023 10:45 AM    Performed by: Estefania Escobedo DPM  Authorized by: Estefania Escobedo DPM    Consent Done?:  Yes (Written)  Time out: Immediately prior to the procedure a time out was called    Prep: patient was prepped and draped in usual sterile fashion    Location:     Location:  Right foot    Location detail:  Right big toe  Anesthesia:     Anesthesia:  Digital block    Local anesthetic:  Bupivacaine 0.5% without epinephrine and lidocaine 2% without epinephrine    Anesthetic total (ml):  4  Procedure Details:     Preparation:  Skin prepped with alcohol    Amount removed:  Partial    Side:  Bilateral    Wedge excision of skin of nail fold: No      Nail bed sutured?: No      Nail matrix removed:  None    Removed nail replaced and anchored: No      Dressing applied:  4x4, antibiotic ointment and gauze roll    Patient tolerance:  Patient tolerated the procedure well with no immediate complications     Patient new to me  Mainly complaining of right toe pain  She did get proper fitting new balance tennis shoes  Works as a nurse  Patient relates last time she would difficulty with pain for procedure discussed with patient today Will mix lidocaine Marcaine patient did not have any pain during procedure today   Review chart.  Patient had bilateral P&A procedures which failed as nail reoccurred.  Advise patient to do a temporary procedure today to remove any inflammation infection that may be causing the painful ingrown toenail right foot patient's left foot is asymptomatic    Obtained written consent for non-permanent removal of right great toe nail plate bilateralborders. Skin prepped with alcohol. Skin anesthesia with ethyl chloride followed by digital nerve block with 4 mL of 2% lidocaine plain/0.5% plain Marcaine. Nail folds were freed from the offending nail borders followed by excision of nail borders using an english anvil. No remaining nail spicule noted.  Sliver nitrate. Wound site  irrigated with NS, Triple antibiotic cream was applied then covered with gauze and secured with coban.      Patient tolerated procedure well   After care dressings discuss   recommend returning for permanent nail procedures bilateral and a few months

## 2023-11-21 ENCOUNTER — TELEPHONE (OUTPATIENT)
Dept: OTOLARYNGOLOGY | Facility: CLINIC | Age: 38
End: 2023-11-21
Payer: COMMERCIAL

## 2023-11-27 ENCOUNTER — OFFICE VISIT (OUTPATIENT)
Dept: OBSTETRICS AND GYNECOLOGY | Facility: CLINIC | Age: 38
End: 2023-11-27
Payer: COMMERCIAL

## 2023-11-27 VITALS
WEIGHT: 195.56 LBS | SYSTOLIC BLOOD PRESSURE: 124 MMHG | BODY MASS INDEX: 31.43 KG/M2 | HEIGHT: 66 IN | DIASTOLIC BLOOD PRESSURE: 82 MMHG

## 2023-11-27 DIAGNOSIS — Z01.419 ENCOUNTER FOR GYNECOLOGICAL EXAMINATION WITHOUT ABNORMAL FINDING: Primary | ICD-10-CM

## 2023-11-27 PROCEDURE — 99999 PR PBB SHADOW E&M-EST. PATIENT-LVL III: CPT | Mod: PBBFAC,,, | Performed by: OBSTETRICS & GYNECOLOGY

## 2023-11-27 PROCEDURE — 3008F PR BODY MASS INDEX (BMI) DOCUMENTED: ICD-10-PCS | Mod: CPTII,S$GLB,, | Performed by: OBSTETRICS & GYNECOLOGY

## 2023-11-27 PROCEDURE — 3074F PR MOST RECENT SYSTOLIC BLOOD PRESSURE < 130 MM HG: ICD-10-PCS | Mod: CPTII,S$GLB,, | Performed by: OBSTETRICS & GYNECOLOGY

## 2023-11-27 PROCEDURE — 99999 PR PBB SHADOW E&M-EST. PATIENT-LVL III: ICD-10-PCS | Mod: PBBFAC,,, | Performed by: OBSTETRICS & GYNECOLOGY

## 2023-11-27 PROCEDURE — 99395 PREV VISIT EST AGE 18-39: CPT | Mod: S$GLB,,, | Performed by: OBSTETRICS & GYNECOLOGY

## 2023-11-27 PROCEDURE — 3044F HG A1C LEVEL LT 7.0%: CPT | Mod: CPTII,S$GLB,, | Performed by: OBSTETRICS & GYNECOLOGY

## 2023-11-27 PROCEDURE — 99395 PR PREVENTIVE VISIT,EST,18-39: ICD-10-PCS | Mod: S$GLB,,, | Performed by: OBSTETRICS & GYNECOLOGY

## 2023-11-27 PROCEDURE — 3044F PR MOST RECENT HEMOGLOBIN A1C LEVEL <7.0%: ICD-10-PCS | Mod: CPTII,S$GLB,, | Performed by: OBSTETRICS & GYNECOLOGY

## 2023-11-27 PROCEDURE — 1159F MED LIST DOCD IN RCRD: CPT | Mod: CPTII,S$GLB,, | Performed by: OBSTETRICS & GYNECOLOGY

## 2023-11-27 PROCEDURE — 1159F PR MEDICATION LIST DOCUMENTED IN MEDICAL RECORD: ICD-10-PCS | Mod: CPTII,S$GLB,, | Performed by: OBSTETRICS & GYNECOLOGY

## 2023-11-27 PROCEDURE — 1160F RVW MEDS BY RX/DR IN RCRD: CPT | Mod: CPTII,S$GLB,, | Performed by: OBSTETRICS & GYNECOLOGY

## 2023-11-27 PROCEDURE — 3074F SYST BP LT 130 MM HG: CPT | Mod: CPTII,S$GLB,, | Performed by: OBSTETRICS & GYNECOLOGY

## 2023-11-27 PROCEDURE — 3079F DIAST BP 80-89 MM HG: CPT | Mod: CPTII,S$GLB,, | Performed by: OBSTETRICS & GYNECOLOGY

## 2023-11-27 PROCEDURE — 3079F PR MOST RECENT DIASTOLIC BLOOD PRESSURE 80-89 MM HG: ICD-10-PCS | Mod: CPTII,S$GLB,, | Performed by: OBSTETRICS & GYNECOLOGY

## 2023-11-27 PROCEDURE — 3008F BODY MASS INDEX DOCD: CPT | Mod: CPTII,S$GLB,, | Performed by: OBSTETRICS & GYNECOLOGY

## 2023-11-27 PROCEDURE — 1160F PR REVIEW ALL MEDS BY PRESCRIBER/CLIN PHARMACIST DOCUMENTED: ICD-10-PCS | Mod: CPTII,S$GLB,, | Performed by: OBSTETRICS & GYNECOLOGY

## 2023-11-27 NOTE — PROGRESS NOTES
CC: Well woman exam    Bushra Kahn is a 38 y.o. female  presents for a well woman exam.    NL abnormal papsmear.    Past Medical History:   Diagnosis Date    Acid reflux     Allergy     Chronic hypertension with superimposed pre-eclampsia, post partum 10/21/2022    On chart review patient with essential HTN outside of pregnancy at initial [facility] visit     History of  10/10/2022    Pre-eclampsia, severe, postpartum condition 3/29/2021    Severe hypertension affecting pregnancy in third trimester 3/22/2021    Severe pre-eclampsia in third trimester 3/22/2021       Past Surgical History:   Procedure Laterality Date    ADENOIDECTOMY  age 16    biopsy of breast  Right 2014     SECTION N/A 3/24/2021    Procedure:  SECTION;  Surgeon: Denita Xiong MD;  [facility];  Service: OB/GYN;  Laterality: N/A;     SECTION N/A 10/10/2022    Procedure:  SECTION;  Surgeon: Denita Xiong MD;  Location: [facility];  Service: OB/GYN;  Laterality: N/A;    CHOLECYSTECTOMY      Laparoscopic    ESOPHAGOGASTRODUODENOSCOPY N/A 2018    Procedure: ESOPHAGOGASTRODUODENOSCOPY (EGD);  Surgeon: Aurelio James MD;  Location: [address]);  Service: Endoscopy;  Laterality: N/A;    ESOPHAGOGASTRODUODENOSCOPY N/A 2023    Procedure: EGD (ESOPHAGOGASTRODUODENOSCOPY);  Surgeon: Uziel Duran MD;  Location: [facility] ENDOSCOPY;  Service: Endoscopy;  Laterality: N/A;  8.18  inst sent via portal HS   9.25 pre call attempted; lm HSH       OB History    Para Term  AB Living   2 2 2 0 0 2   SAB IAB Ectopic Multiple Live Births   0 0 0 0 2      # Outcome Date GA Lbr Jonathan/2nd Weight Sex Delivery Anes PTL Lv   2 Term 10/10/22 39w1d  3.63 kg (8 lb) F CS-LTranv Spinal, EPI  MIC   1 Term 21 37w4d  3.28 kg (7 lb 3.7 oz) M CS-LTranv EPI N MIC       Family History   Problem Relation Age of Onset    Lupus Mother     Kidney disease Mother     Hypertension  "Father     Sleep apnea Father     Cataracts Paternal Grandmother     Colon cancer Paternal Grandmother     Cataracts Paternal Grandfather     Cancer Paternal Grandfather         Lung cancer- smoker    Breast cancer Paternal Aunt 50    Amblyopia Neg Hx     Blindness Neg Hx     Glaucoma Neg Hx     Macular degeneration Neg Hx     Retinal detachment Neg Hx     Strabismus Neg Hx     Stroke Neg Hx     Ovarian cancer Neg Hx     Diabetes Neg Hx     Melanoma Neg Hx     Psoriasis Neg Hx     Asthma Neg Hx     Allergies Neg Hx     Allergic rhinitis Neg Hx     Angioedema Neg Hx     Eczema Neg Hx     Immunodeficiency Neg Hx     Rhinitis Neg Hx     Urticaria Neg Hx     Atopy Neg Hx        Social History     Tobacco Use    Smoking status: Former     Current packs/day: 0.00     Average packs/day: 0.1 packs/day for 13.0 years (1.3 ttl pk-yrs)     Types: Cigarettes     Start date: 8/15/2005     Quit date: 8/15/2018     Years since quittin.2     Passive exposure: Never    Smokeless tobacco: Never   Substance Use Topics    Alcohol use: Not Currently     Comment: couple of servings per year, no daily or heavy alcohol use     Drug use: No       /82   Ht 5' 6" (1.676 m)   Wt 88.7 kg (195 lb 8.8 oz)   LMP 2023   Breastfeeding No   BMI 31.56 kg/m²     ROS:  GENERAL: Denies weight gain or weight loss. Feeling well overall.   SKIN: Denies rash or lesions.   HEAD: Denies head injury or headache.   NODES: Denies enlarged lymph nodes.   CHEST: Denies chest pain or shortness of breath.   CARDIOVASCULAR: Denies palpitations or left sided chest pain.   ABDOMEN: No abdominal pain, constipation, diarrhea, nausea, vomiting or rectal bleeding.   URINARY: No frequency, dysuria, hematuria, or burning on urination.  REPRODUCTIVE: See HPI.   BREASTS: The patient performs breast self-examination and denies pain, lumps, or nipple discharge.   HEMATOLOGIC: No easy bruisability or excessive bleeding.  MUSCULOSKELETAL: Denies joint pain or " swelling.   NEUROLOGIC: Denies syncope or weakness.   PSYCHIATRIC: Denies depression, anxiety or mood swings.    Physical Exam:    APPEARANCE: Well nourished, well developed, in no acute distress.  AFFECT: WNL, alert and oriented x 3  SKIN: No acne or hirsutism  NECK: Neck symmetric without masses or thyromegaly  NODES: No inguinal, cervical, axillary, or femoral lymph node enlargement  CHEST: Good respiratory effect  ABDOMEN: Soft.  No tenderness or masses.  No hepatosplenomegaly.  No hernias.  BREASTS: Symmetrical, no skin changes or visible lesions.  No palpable masses, nipple discharge bilaterally.  PELVIC: Normal external genitalia without lesions.  Normal hair distribution.  Adequate perineal body, normal urethral meatus.  Vagina moist and well rugated without lesions or discharge.  Cervix pink, without lesions, discharge or tenderness.  No significant cystocele or rectocele.  Bimanual exam shows uterus to be normal size, regular, mobile and nontender.  Adnexa without masses or tenderness.    EXTREMITIES: No edema.    ASSESSMENT AND PLAN  1. Encounter for gynecological examination without abnormal finding            Patient was counseled today on A.C.S. Pap guidelines and recommendations for yearly pelvic exams, mammograms and monthly self breast exams; to see her PCP for other health maintenance.       Follow up in about 1 year (around 11/27/2024), or if symptoms worsen or fail to improve.

## 2023-12-12 ENCOUNTER — OFFICE VISIT (OUTPATIENT)
Dept: PRIMARY CARE CLINIC | Facility: CLINIC | Age: 38
End: 2023-12-12
Payer: COMMERCIAL

## 2023-12-12 VITALS
WEIGHT: 200.19 LBS | BODY MASS INDEX: 32.17 KG/M2 | OXYGEN SATURATION: 97 % | HEART RATE: 77 BPM | DIASTOLIC BLOOD PRESSURE: 92 MMHG | HEIGHT: 66 IN | TEMPERATURE: 99 F | SYSTOLIC BLOOD PRESSURE: 138 MMHG | RESPIRATION RATE: 18 BRPM

## 2023-12-12 DIAGNOSIS — M79.671 FOOT PAIN, BILATERAL: Primary | ICD-10-CM

## 2023-12-12 DIAGNOSIS — M79.672 FOOT PAIN, BILATERAL: Primary | ICD-10-CM

## 2023-12-12 PROCEDURE — 99499 UNLISTED E&M SERVICE: CPT | Mod: S$GLB,,, | Performed by: INTERNAL MEDICINE

## 2023-12-12 PROCEDURE — 99499 NO LOS: ICD-10-PCS | Mod: S$GLB,,, | Performed by: INTERNAL MEDICINE

## 2023-12-12 NOTE — PATIENT INSTRUCTIONS
Thank you for visiting today.  I do agree with you your symptoms are consistent with tendinitis but other things certainly can cause this.  The treatment is similar for all so the specific diagnosis may not matter.  The following is what we discussed:  Get a tennis can and fill it with water and place it in the freezer.  Role your foot on it for 5-10 minutes twice a day.  Use your devices as directed that you bought these are fairly classic and plantar fasciitis in the only difference between them that I could see was cosmetic but no difference in purpose.    Diclofenac gel twice daily   Compression socks daily   Serve you tube for physical therapy exercises for plantar fasciitis and foot pain.    Consider buying a 2nd pair of shoes so you can alternate.  If there are any further difficulties please reach out to me

## 2023-12-12 NOTE — PROGRESS NOTES
38-year-old woman with a history of hypertension and acid reflux here today with a chief complaint of bilateral foot pain but worse on the right.      History of present illness and pertinent review of systems:  The patient has had intermittent symptoms since September.  The patient and I had discussed briefly when she was seen for URI the patient went and purchased new balance shoes with inappropriate inserts to limit the motion within the shoe.  She notes that the pain has been coming back in his now worse on the right.  It is along where the front of the heel meets the forefoot on the sole of the foot.  The patient went and had foot massages which did not help.  The patient notes that ice helps for short time.  The patient never tried heat or a hot bath.  Ibuprofen did not help.  She has not tried diclofenac gel.  The patient notes no numbness or ulcers.    Problem list, medication list, and allergies have been reviewed.  The patient had a reaction to topical acne medicine benzos salicylate which is her only true allergy.  She does have side effects from cephalosporins, nifedipine heat, and Sudafed which are not true allergies.    Remaining review of systems is    Physical Exam  Vitals and nursing note reviewed.   Constitutional:       General: She is not in acute distress.     Appearance: She is obese. She is not ill-appearing.   HENT:      Head: Atraumatic.      Nose: Nose normal.   Eyes:      General: No scleral icterus.     Conjunctiva/sclera: Conjunctivae normal.   Cardiovascular:      Rate and Rhythm: Normal rate and regular rhythm.   Pulmonary:      Effort: Pulmonary effort is normal. No respiratory distress.      Breath sounds: No wheezing, rhonchi or rales.   Abdominal:      General: There is no distension.      Palpations: Abdomen is soft.      Comments: No hepatosplenomegaly   Musculoskeletal:         General: Tenderness present. No swelling or deformity.      Cervical back: No tenderness.       Comments: The patient has pain in the mid sole of the foot just proximal to the heel.  This has consistent with plantar fasciitis but also tendinitis.   Skin:     Coloration: Skin is not pale.      Findings: No lesion or rash.   Neurological:      General: No focal deficit present.      Mental Status: She is alert and oriented to person, place, and time.   Psychiatric:         Mood and Affect: Mood normal.         Behavior: Behavior normal.     Assessment/plan:   Foot pain:  The patient has bilateral foot pain consistent with plantar fasciitis but also tendinitis.  There is some ankle pain associated with this.  She is gotten relief with better shoes and better insoles but is not been complete by the end of the day she continues to have symptoms.  The patient believes that this may be plantar fasciitis which I could not disagree with.  Different treatments were discussed including a steroid injection versus a short course of oral steroids.  Also discussed were various forms of physical therapy and devices.  The patient has already noted improvement not only with a gym shoes but with compression.  She is gotten relief with ice but is uncertain about heat.  She wears the same pair of shoes every day which may contribute.  On physical exam other than the area of tenderness described there was no findings.  The patient has also purchase devices to stretch her plantar fascia which many people find useful.  Plan:  Reviewed choices above   Suggested the patient fill a tennis can with water and place it in the freezer.  When the patient gets home she should roll her foot on it for 5-10 minutes.  Alternating ice and heat was also discussed as well as foot massages and foot devices.  The specific devices that she purchased are familiar with me and I suggested since she has purchase them she try them on a daily basis.  I have suggested the patient look at you tube videos for plantar fasciitis to see if any of the exercises may  be beneficial for her.  I have suggested a 2nd pair of shoes and places where the sales people may be more helpful.  The patient will contact me if she has any questions.  I suggested that each thing she does she give it a proximally 1 month before giving up on it.  I suggested she wear compression socks daily   I also suggested diclofenac gel locally because this has a spot where the gel can penetrate.  If the over-the-counter version or the 1% does not work I can tried the 3% version.

## 2024-01-05 ENCOUNTER — ANESTHESIA EVENT (OUTPATIENT)
Dept: ENDOSCOPY | Facility: HOSPITAL | Age: 39
End: 2024-01-05
Payer: COMMERCIAL

## 2024-01-05 NOTE — ANESTHESIA PREPROCEDURE EVALUATION
2024  Bushra Kahn is a 38 y.o., female.       Patient Active Problem List   Diagnosis    Pain of both shoulder joints    Fatty liver    Obesity (BMI 30-39.9)    Primary hypertension    Wears contact lenses    Positive BK (antinuclear antibody)    Neck pain    Splenomegaly       Past Medical History:   Diagnosis Date    Acid reflux     Allergy     Chronic hypertension with superimposed pre-eclampsia, post partum 10/21/2022    On chart review patient with essential HTN outside of pregnancy at initial C visit     History of  10/10/2022    Pre-eclampsia, severe, postpartum condition 3/29/2021    Severe hypertension affecting pregnancy in third trimester 3/22/2021    Severe pre-eclampsia in third trimester 3/22/2021       ECHO: Results for orders placed in visit on 21    Echo Saline Bubble? No    Interpretation Summary  · The left ventricle is normal in size with normal systolic function.  · The estimated ejection fraction is 60%.  · Normal left ventricular diastolic function.  · Normal right ventricular size with normal right ventricular systolic function.  · The estimated PA systolic pressure is 15 mmHg.  · Normal central venous pressure (3 mmHg).      There is no height or weight on file to calculate BMI.    Tobacco Use: Medium Risk (2024)    Patient History     Smoking Tobacco Use: Former     Smokeless Tobacco Use: Never     Passive Exposure: Never       Social History     Substance and Sexual Activity   Drug Use No        Alcohol Use: Not At Risk (2023)    AUDIT-C     Frequency of Alcohol Consumption: Never     Average Number of Drinks: Patient does not drink     Frequency of Binge Drinking: Never       Review of patient's allergies indicates:   Allergen Reactions    Benzyl salicylate Rash     acne    Cefdinir Other (See Comments)     Nausea/vomiting    Nifedipine  Other (See Comments)     headache    Sudafed [pseudoephedrine hcl] Other (See Comments)     Emotional lability         Airway:  No value filed.    Pre-op Assessment    I have reviewed the Patient Summary Reports.    I have reviewed the NPO Status.   I have reviewed the Medications.     Review of Systems  Anesthesia Hx:             Denies Family Hx of Anesthesia complications.    Denies Personal Hx of Anesthesia complications.                    Hematology/Oncology:  Hematology Normal   Oncology Normal                                   EENT/Dental:  EENT/Dental Normal           Cardiovascular:     Hypertension                                        Pulmonary:  Pulmonary Normal                       Renal/:  Renal/ Normal                 Hepatic/GI:     GERD   splenomegaly          Musculoskeletal:  Musculoskeletal Normal                Neurological:  Neurology Normal                                      Endocrine:  Endocrine Normal            Dermatological:  Skin Normal    Psych:  Psychiatric Normal                       Anesthesia Plan  Type of Anesthesia, risks & benefits discussed:    Anesthesia Type: Gen Natural Airway  Intra-op Monitoring Plan: Standard ASA Monitors  Post Op Pain Control Plan: multimodal analgesia  Induction:  IV  Informed Consent: Informed consent signed with the Patient and all parties understand the risks and agree with anesthesia plan.  All questions answered.   ASA Score: 3  Day of Surgery Review of History & Physical: H&P Update referred to the surgeon/provider.    Ready For Surgery From Anesthesia Perspective.     .

## 2024-01-07 ENCOUNTER — PATIENT MESSAGE (OUTPATIENT)
Dept: PODIATRY | Facility: CLINIC | Age: 39
End: 2024-01-07
Payer: COMMERCIAL

## 2024-01-07 ENCOUNTER — PATIENT MESSAGE (OUTPATIENT)
Dept: PRIMARY CARE CLINIC | Facility: CLINIC | Age: 39
End: 2024-01-07
Payer: COMMERCIAL

## 2024-01-08 ENCOUNTER — ANESTHESIA (OUTPATIENT)
Dept: ENDOSCOPY | Facility: HOSPITAL | Age: 39
End: 2024-01-08
Payer: COMMERCIAL

## 2024-01-08 ENCOUNTER — HOSPITAL ENCOUNTER (OUTPATIENT)
Facility: HOSPITAL | Age: 39
Discharge: HOME | End: 2024-01-08
Attending: STUDENT IN AN ORGANIZED HEALTH CARE EDUCATION/TRAINING PROGRAM | Admitting: STUDENT IN AN ORGANIZED HEALTH CARE EDUCATION/TRAINING PROGRAM
Payer: COMMERCIAL

## 2024-01-08 VITALS
RESPIRATION RATE: 36 BRPM | HEART RATE: 64 BPM | OXYGEN SATURATION: 100 % | DIASTOLIC BLOOD PRESSURE: 78 MMHG | SYSTOLIC BLOOD PRESSURE: 134 MMHG

## 2024-01-08 DIAGNOSIS — K21.9 GERD (GASTROESOPHAGEAL REFLUX DISEASE): ICD-10-CM

## 2024-01-08 DIAGNOSIS — K21.9 GASTROESOPHAGEAL REFLUX DISEASE, UNSPECIFIED WHETHER ESOPHAGITIS PRESENT: Primary | ICD-10-CM

## 2024-01-08 PROCEDURE — 37000008 HC ANESTHESIA 1ST 15 MINUTES: Performed by: STUDENT IN AN ORGANIZED HEALTH CARE EDUCATION/TRAINING PROGRAM

## 2024-01-08 PROCEDURE — 25000003 PHARM REV CODE 250: Performed by: NURSE ANESTHETIST, CERTIFIED REGISTERED

## 2024-01-08 PROCEDURE — 27201012 HC FORCEPS, HOT/COLD, DISP: Performed by: STUDENT IN AN ORGANIZED HEALTH CARE EDUCATION/TRAINING PROGRAM

## 2024-01-08 PROCEDURE — 43239 EGD BIOPSY SINGLE/MULTIPLE: CPT | Mod: ,,, | Performed by: STUDENT IN AN ORGANIZED HEALTH CARE EDUCATION/TRAINING PROGRAM

## 2024-01-08 PROCEDURE — 43239 EGD BIOPSY SINGLE/MULTIPLE: CPT | Performed by: STUDENT IN AN ORGANIZED HEALTH CARE EDUCATION/TRAINING PROGRAM

## 2024-01-08 PROCEDURE — 94761 N-INVAS EAR/PLS OXIMETRY MLT: CPT

## 2024-01-08 PROCEDURE — 88305 TISSUE EXAM BY PATHOLOGIST: CPT | Mod: 59 | Performed by: STUDENT IN AN ORGANIZED HEALTH CARE EDUCATION/TRAINING PROGRAM

## 2024-01-08 PROCEDURE — 88312 SPECIAL STAINS GROUP 1: CPT | Mod: 26,,, | Performed by: STUDENT IN AN ORGANIZED HEALTH CARE EDUCATION/TRAINING PROGRAM

## 2024-01-08 PROCEDURE — 88312 SPECIAL STAINS GROUP 1: CPT | Performed by: STUDENT IN AN ORGANIZED HEALTH CARE EDUCATION/TRAINING PROGRAM

## 2024-01-08 PROCEDURE — 88305 TISSUE EXAM BY PATHOLOGIST: CPT | Mod: 26,,, | Performed by: STUDENT IN AN ORGANIZED HEALTH CARE EDUCATION/TRAINING PROGRAM

## 2024-01-08 PROCEDURE — 99900035 HC TECH TIME PER 15 MIN (STAT)

## 2024-01-08 PROCEDURE — 63600175 PHARM REV CODE 636 W HCPCS: Performed by: NURSE ANESTHETIST, CERTIFIED REGISTERED

## 2024-01-08 PROCEDURE — D9220A PRA ANESTHESIA: Mod: ,,, | Performed by: NURSE ANESTHETIST, CERTIFIED REGISTERED

## 2024-01-08 RX ORDER — PROPOFOL 10 MG/ML
VIAL (ML) INTRAVENOUS
Status: DISCONTINUED | OUTPATIENT
Start: 2024-01-08 | End: 2024-01-08 | Stop reason: HOSPADM

## 2024-01-08 RX ORDER — PROPOFOL 10 MG/ML
VIAL (ML) INTRAVENOUS CONTINUOUS PRN
Status: DISCONTINUED | OUTPATIENT
Start: 2024-01-08 | End: 2024-01-08

## 2024-01-08 RX ORDER — PREDNISONE 20 MG/1
20 TABLET ORAL DAILY
Qty: 10 TABLET | Refills: 0 | Status: SHIPPED | OUTPATIENT
Start: 2024-01-08 | End: 2024-01-20

## 2024-01-08 RX ORDER — DEXMEDETOMIDINE HYDROCHLORIDE 100 UG/ML
INJECTION, SOLUTION INTRAVENOUS
Status: DISCONTINUED | OUTPATIENT
Start: 2024-01-08 | End: 2024-01-08

## 2024-01-08 RX ORDER — LIDOCAINE HYDROCHLORIDE 10 MG/ML
INJECTION, SOLUTION INTRAVENOUS
Status: DISCONTINUED | OUTPATIENT
Start: 2024-01-08 | End: 2024-01-08

## 2024-01-08 RX ORDER — SODIUM CHLORIDE 9 MG/ML
INJECTION, SOLUTION INTRAVENOUS CONTINUOUS
Status: DISCONTINUED | OUTPATIENT
Start: 2024-01-08 | End: 2024-01-08 | Stop reason: HOSPADM

## 2024-01-08 RX ADMIN — BENZOCAINE 1 EACH: 200 SPRAY DENTAL; ORAL; PERIODONTAL at 10:01

## 2024-01-08 RX ADMIN — SODIUM CHLORIDE: 0.9 INJECTION, SOLUTION INTRAVENOUS at 10:01

## 2024-01-08 RX ADMIN — DEXMEDETOMIDINE HYDROCHLORIDE 8 MCG: 100 INJECTION, SOLUTION INTRAVENOUS at 10:01

## 2024-01-08 RX ADMIN — PROPOFOL 80 MG: 10 INJECTION, EMULSION INTRAVENOUS at 10:01

## 2024-01-08 RX ADMIN — GLYCOPYRROLATE 0.2 MG: 0.2 INJECTION, SOLUTION INTRAMUSCULAR; INTRAVENOUS at 10:01

## 2024-01-08 RX ADMIN — PROPOFOL 125 MCG/KG/MIN: 10 INJECTION, EMULSION INTRAVENOUS at 10:01

## 2024-01-08 RX ADMIN — LIDOCAINE HYDROCHLORIDE 50 MG: 10 INJECTION, SOLUTION INTRAVENOUS at 10:01

## 2024-01-08 NOTE — TRANSFER OF CARE
Anesthesia Transfer of Care Note    Patient: Bushra Kahn    Procedure(s) Performed: Procedure(s) (LRB):  EGD (ESOPHAGOGASTRODUODENOSCOPY) (N/A)    Patient location: PACU    Anesthesia Type: general    Transport from OR: Transported from OR on room air with adequate spontaneous ventilation    Post pain: adequate analgesia    Post assessment: no apparent anesthetic complications    Post vital signs: stable    Level of consciousness: awake    Nausea/Vomiting: no nausea/vomiting    Complications: none    Transfer of care protocol was followed      Last vitals: Visit Vitals  Breastfeeding No

## 2024-01-08 NOTE — PROVATION PATIENT INSTRUCTIONS
Discharge Summary/Instructions after an Endoscopic Procedure  Patient Name: Bushra Kahn  Patient MRN: [id number]  Patient YOB: 1985 Monday, January 8, 2024  Uziel Duran MD  Dear patient,  As a result of recent federal legislation (The Federal Cures Act), you may   receive lab or pathology results from your procedure in your [facility]   account before your physician is able to contact you. Your physician or   their representative will relay the results to you with their   recommendations at their soonest availability.  Thank you,  RESTRICTIONS:  During your procedure today, you received medications for sedation.  These   medications may affect your judgment, balance and coordination.  Therefore,   for 24 hours, you have the following restrictions:   - DO NOT drive a car, operate machinery, make legal/financial decisions,   sign important papers or drink alcohol.    ACTIVITY:  Today: no heavy lifting, straining or running due to procedural   sedation/anesthesia.  The following day: return to full activity including work.  DIET:  Eat and drink normally unless instructed otherwise.     TREATMENT FOR COMMON SIDE EFFECTS:  - Mild abdominal pain, nausea, belching, bloating or excessive gas:  rest,   eat lightly and use a heating pad.  - Sore Throat: treat with throat lozenges and/or gargle with warm salt   water.  - Because air was used during the procedure, expelling large amounts of air   from your rectum or belching is normal.  - If a bowel prep was taken, you may not have a bowel movement for 1-3 days.    This is normal.  SYMPTOMS TO WATCH FOR AND REPORT TO YOUR PHYSICIAN:  1. Abdominal pain or bloating, other than gas cramps.  2. Chest pain.  3. Back pain.  4. Signs of infection such as: chills or fever occurring within 24 hours   after the procedure.  5. Rectal bleeding, which would show as bright red, maroon, or black stools.   (A tablespoon of blood from the rectum is not serious, especially  if   hemorrhoids are present.)  6. Vomiting.  7. Weakness or dizziness.  GO DIRECTLY TO THE NEAREST EMERGENCY ROOM IF YOU HAVE ANY OF THE FOLLOWING:      Difficulty breathing              Chills and/or fever over 101 F   Persistent vomiting and/or vomiting blood   Severe abdominal pain   Severe chest pain   Black, tarry stools   Bleeding- more than one tablespoon   Any other symptom or condition that you feel may need urgent attention  Your doctor recommends these additional instructions:  If any biopsies were taken, your doctors clinic will contact you in 1 to 2   weeks with any results.  - Discharge patient to home.   - The findings and recommendations were discussed with the patient.   - Patient has a contact number available for emergencies.  The signs and   symptoms of potential delayed complications were discussed with the   patient.  Return to normal activities tomorrow.  Written discharge   instructions were provided to the patient.   - Continue present medications.   - Await pathology results.  For questions, problems or results please call your physician - Uziel Duran MD at Work:  [phone].  [facility] [place], EMERGENCY ROOM PHONE NUMBER: [phone]  IF A COMPLICATION OR EMERGENCY SITUATION ARISES AND YOU ARE UNABLE TO REACH   YOUR PHYSICIAN - GO DIRECTLY TO THE EMERGENCY ROOM.  Uziel Duran MD  1/8/2024 11:03:42 AM  This report has been verified and signed electronically.  Dear patient,  As a result of recent federal legislation (The Federal Cures Act), you may   receive lab or pathology results from your procedure in your [facility]   account before your physician is able to contact you. Your physician or   their representative will relay the results to you with their   recommendations at their soonest availability.  Thank you,  PROVATION

## 2024-01-08 NOTE — H&P
Short Stay Endoscopy History and Physical    PCP - Elsa Ferrari MD  Referring Physician - Azeb Suarez RN  No address on file    Procedure - egd  ASA - per anesthesia  Mallampati - per anesthesia  History of Anesthesia problems - no  Family history Anesthesia problems -  no   Plan of anesthesia - General    HPI  38 y.o. female  Reason for procedure:   Gastroesophageal reflux disease, unspecified whether esophagitis present [K21.9]        ROS:  Constitutional: No fevers, chills, No weight loss  CV: No chest pain  Pulm: No cough, No shortness of breath  GI: see HPI    Medical History:  has a past medical history of Acid reflux, Allergy, Chronic hypertension with superimposed pre-eclampsia, post partum (10/21/2022), History of  (10/10/2022), Pre-eclampsia, severe, postpartum condition (3/29/2021), Severe hypertension affecting pregnancy in third trimester (3/22/2021), and Severe pre-eclampsia in third trimester (3/22/2021).    Surgical History:  has a past surgical history that includes Adenoidectomy (age 16); biopsy of breast  (Right, 2014); Cholecystectomy (); Esophagogastroduodenoscopy (N/A, 2018);  section (N/A, 3/24/2021);  section (N/A, 10/10/2022); and Esophagogastroduodenoscopy (N/A, 2023).    Family History: family history includes Breast cancer (age of onset: 50) in her paternal aunt; Cancer in her paternal grandfather; Cataracts in her paternal grandfather and paternal grandmother; Colon cancer in her paternal grandmother; Hypertension in her father; Kidney disease in her mother; Lupus in her mother; Sleep apnea in her father..    Social History:  reports that she quit smoking about 5 years ago. Her smoking use included cigarettes. She started smoking about 18 years ago. She has a 1.3 pack-year smoking history. She has never been exposed to tobacco smoke. She has never used smokeless tobacco. She reports that she does not currently use alcohol. She  reports that she does not use drugs.    Review of patient's allergies indicates:   Allergen Reactions    Benzyl salicylate Rash     acne    Cefdinir Other (See Comments)     Nausea/vomiting    Nifedipine Other (See Comments)     headache    Sudafed [pseudoephedrine hcl] Other (See Comments)     Emotional lability       Medications:   Medications Prior to Admission   Medication Sig Dispense Refill Last Dose    amLODIPine (NORVASC) 5 MG tablet Take 1 tablet (5 mg total) by mouth once daily. 90 tablet 1 1/7/2024 at 2100    pantoprazole (PROTONIX) 40 MG tablet Take 1 tablet (40 mg total) by mouth 2 (two) times daily. 180 tablet 1 1/8/2024       Physical Exam:    Vital Signs: There were no vitals filed for this visit.    General Appearance: Well appearing in no acute distress  Abdomen: Soft, non tender, non distended with normal bowel sounds, no masses    Labs:  Lab Results   Component Value Date    WBC 4.87 03/03/2023    HGB 11.7 (L) 03/03/2023    HCT 38.5 03/03/2023     03/03/2023    CHOL 182 03/03/2023    TRIG 120 03/03/2023    HDL 52 03/03/2023    ALT 43 07/17/2023    AST 35 07/17/2023     03/03/2023    K 3.8 03/03/2023     03/03/2023    CREATININE 0.8 03/03/2023    BUN 11 03/03/2023    CO2 19 (L) 03/03/2023    TSH 1.007 03/03/2023    INR 1.0 01/17/2023    HGBA1C 5.2 03/03/2023       I have explained the risks and benefits of this endoscopic procedure to the patient including but not limited to bleeding, inflammation, infection, perforation, and death.      Uziel Duran MD

## 2024-01-08 NOTE — PROGRESS NOTES
I received a patient message regarding failure of treatment related to plantar fasciitis using the measures outlined in my note.  I have sent a prescription to the [facility] listed as the patient's choice

## 2024-01-08 NOTE — PLAN OF CARE
Discharge instructions given and explained to patient and family with verbalization of understanding all instructions. Patients v/s stable, denies n/v and tolerating po, rates pain level tolerable, IV removed, and family at bedside for patient discharge home.

## 2024-01-08 NOTE — ANESTHESIA POSTPROCEDURE EVALUATION
Anesthesia Post Evaluation    Patient: Bushra Kahn    Procedure(s) Performed: Procedure(s) (LRB):  EGD (ESOPHAGOGASTRODUODENOSCOPY) (N/A)    Final Anesthesia Type: general      Patient location during evaluation: PACU  Patient participation: Yes- Able to Participate  Level of consciousness: awake and alert  Post-procedure vital signs: reviewed and stable  Pain management: adequate  Airway patency: patent    PONV status at discharge: No PONV  Anesthetic complications: no      Cardiovascular status: stable  Respiratory status: room air  Hydration status: euvolemic  Follow-up not needed.              Vitals Value Taken Time   /78 01/08/24 1116   Temp 36 01/08/24 1127   Pulse 67 01/08/24 1116   Resp 39 01/08/24 1116   SpO2 100 % 01/08/24 1116   Vitals shown include unvalidated device data.      Event Time   Out of Recovery 11:12:12         Pain/Caity Score: Caity Score: 10 (1/8/2024 11:12 AM)

## 2024-01-09 RX ORDER — AMLODIPINE BESYLATE 5 MG/1
5 TABLET ORAL DAILY
Qty: 90 TABLET | Refills: 2 | Status: SHIPPED | OUTPATIENT
Start: 2024-01-09

## 2024-01-09 NOTE — TELEPHONE ENCOUNTER
No care due was identified.  [facility] Embedded Care Due Messages. Reference number: [id number].   1/09/2024 8:27:20 AM CST

## 2024-01-10 NOTE — TELEPHONE ENCOUNTER
Refill Decision Note   Bushra Femi  is requesting a refill authorization.  Brief Assessment and Rationale for Refill:  Approve     Medication Therapy Plan:       Medication Reconciliation Completed: No   Comments:     No Care Gaps recommended.     Note composed:6:54 PM 01/09/2024

## 2024-01-12 ENCOUNTER — OFFICE VISIT (OUTPATIENT)
Dept: PRIMARY CARE CLINIC | Facility: CLINIC | Age: 39
End: 2024-01-12
Payer: COMMERCIAL

## 2024-01-12 VITALS
HEART RATE: 84 BPM | WEIGHT: 204.56 LBS | HEIGHT: 66 IN | DIASTOLIC BLOOD PRESSURE: 82 MMHG | TEMPERATURE: 98 F | RESPIRATION RATE: 18 BRPM | SYSTOLIC BLOOD PRESSURE: 136 MMHG | BODY MASS INDEX: 32.88 KG/M2 | OXYGEN SATURATION: 99 %

## 2024-01-12 DIAGNOSIS — H11.32 SUBCONJUNCTIVAL HEMORRHAGE OF LEFT EYE: Primary | ICD-10-CM

## 2024-01-12 PROCEDURE — 99499 UNLISTED E&M SERVICE: CPT | Mod: S$GLB,,, | Performed by: INTERNAL MEDICINE

## 2024-01-12 NOTE — PROGRESS NOTES
58-year-old woman with a history of hypertension acid reflux with a chief complaint of left eye hemorrhage.    History of present illness and pertinent review of systems:  The patient has a 1 day history of blood in the left conjunctiva.  She remembers her daughter hitting her in the eye several days prior to this.  There was no trauma on that day.  The patient does wear contacts.  Her vision is not affected.  There is no painful movement of the eyes you looks left right an up down.  There is no discharge from the eye.  The patient has no URI symptoms.    Problem list, medication list and allergies have been reviewed.  The patient had a reaction to topical acne medication benzos list weight which is her only true allergy.  She does have side effects to cephalosporins, nifedipine, and Sudafed    Review of systems is otherwise negative.    Physical Exam  Vitals and nursing note reviewed.   Constitutional:       General: She is not in acute distress.     Appearance: Normal appearance. She is not ill-appearing, toxic-appearing or diaphoretic.   HENT:      Head: Atraumatic.      Right Ear: Tympanic membrane, ear canal and external ear normal.      Left Ear: Tympanic membrane, ear canal and external ear normal.      Nose: Nose normal. No congestion or rhinorrhea.      Mouth/Throat:      Mouth: Mucous membranes are moist.      Pharynx: Oropharynx is clear.   Eyes:      General: No scleral icterus.     Extraocular Movements: Extraocular movements intact.      Pupils: Pupils are equal, round, and reactive to light.      Comments: The patient has a left subconjunctival hemorrhage.  Fundi are benign without exudate hemorrhage or scar.  There is no retinal hemorrhage and the retinal vessels are normal   Cardiovascular:      Rate and Rhythm: Normal rate and regular rhythm.      Pulses: Normal pulses.      Heart sounds: Normal heart sounds. No murmur heard.  Pulmonary:      Effort: Pulmonary effort is normal. No respiratory  distress.      Breath sounds: Normal breath sounds. No wheezing, rhonchi or rales.   Abdominal:      General: Bowel sounds are normal.      Palpations: Abdomen is soft.      Tenderness: There is no abdominal tenderness.   Musculoskeletal:         General: No tenderness.      Right lower leg: No edema.      Left lower leg: No edema.   Skin:     Coloration: Skin is not jaundiced or pale.      Findings: No rash.   Neurological:      General: No focal deficit present.      Mental Status: She is alert and oriented to person, place, and time.      Cranial Nerves: No cranial nerve deficit.   Psychiatric:         Mood and Affect: Mood normal.         Behavior: Behavior normal.      Assessment/plan:   Subconjunctival hemorrhage:  This is likely due to trauma when her child struck her in the eye though the hemorrhage did not appear for another day or so.  The patient may have also may the initial injury worse or may have injured her eye when removing or putting in her contact on the morning that she noted it.  Currently she has no visual change and there are no motor issues with the movement of the eye.  Plan:  Reviewed above with patient   Warm moist heat to 2 3 times a day.  If vision changes she needs to make an me appointment with the ophthalmologist.

## 2024-01-12 NOTE — PATIENT INSTRUCTIONS
Thank you for visiting today.    You have a subconjunctival hemorrhage most likely due to the sake receive for your child.  This hemorrhage like any bruise may take several weeks to totally resolve.  Currently it has not affecting her vision or the movement of your eye.  If any of that does happen you need to see an ophthalmologist immediately.  Warm moist soaks may be beneficial to 2 3 times a day

## 2024-01-15 LAB
FINAL PATHOLOGIC DIAGNOSIS: NORMAL
GROSS: NORMAL
Lab: NORMAL
SUPPLEMENTAL DIAGNOSIS: NORMAL

## 2024-01-18 ENCOUNTER — OFFICE VISIT (OUTPATIENT)
Dept: PODIATRY | Facility: CLINIC | Age: 39
End: 2024-01-18
Payer: COMMERCIAL

## 2024-01-18 VITALS
RESPIRATION RATE: 18 BRPM | HEART RATE: 73 BPM | WEIGHT: 204 LBS | HEIGHT: 66 IN | SYSTOLIC BLOOD PRESSURE: 141 MMHG | DIASTOLIC BLOOD PRESSURE: 85 MMHG | BODY MASS INDEX: 32.78 KG/M2

## 2024-01-18 DIAGNOSIS — L60.0 INGROWN RIGHT BIG TOENAIL: ICD-10-CM

## 2024-01-18 DIAGNOSIS — L60.0 INGROWN LEFT GREATER TOENAIL: Primary | ICD-10-CM

## 2024-01-18 PROCEDURE — 11750 EXCISION NAIL&NAIL MATRIX: CPT | Mod: T5,S$GLB,, | Performed by: PODIATRIST

## 2024-01-18 PROCEDURE — 99499 UNLISTED E&M SERVICE: CPT | Mod: S$GLB,,, | Performed by: PODIATRIST

## 2024-01-18 PROCEDURE — 99999 PR PBB SHADOW E&M-EST. PATIENT-LVL III: CPT | Mod: PBBFAC,,, | Performed by: PODIATRIST

## 2024-01-18 NOTE — PROCEDURES
Nail Removal    Date/Time: 1/18/2024 11:15 AM    Performed by: Estefania Escobedo DPM  Authorized by: Estefania Escobedo DPM    Consent Done?:  Yes (Written)  Time out: Immediately prior to the procedure a time out was called    Prep: patient was prepped and draped in usual sterile fashion    Location:     Location:  Right foot    Location detail:  Right big toe  Anesthesia:     Anesthesia:  Digital block    Local anesthetic:  Bupivacaine 0.5% without epinephrine and lidocaine 2% without epinephrine    Anesthetic total (ml):  3  Procedure Details:     Preparation:  Skin prepped with alcohol    Amount removed:  Partial    Side:  Bilateral    Wedge excision of skin of nail fold: No      Nail bed sutured?: No      Nail matrix removed:  Partial    Removal method:  Phenol and alcohol    Removed nail replaced and anchored: No      Dressing applied:  4x4, antibiotic ointment and gauze roll    Patient tolerance:  Patient tolerated the procedure well with no immediate complications       Procedure. After informed consent signed and time out preformed, the right hallux toe was prepped using cloroprep. A penrose drain was used as tourniquet and an anesthesia check was preformed. A freer elevator was used to separate the right hallux both border from the toenail and the edge of the toenail was  from the underlying nail bed. An English anvil nail nipper was used to cut the both border of the nail approximately 1-2mm from the edge. A curved hemostat was used to remove the offending ingrown portion of the nail in toto.  Three sticks of phenol were placed into the both border and spun for approximately 30 seconds each. The border was flushed with alcohol. The toe was dried and antibiotic ointment was placed on the nail border. The tourniquet was remove and a dry sterile dressing was applied to the toe. Good cft noted to digit.  The patient tolerated the procedure well with no complications. All post procedure instructions given to  patient verbally   Nail Removal    Date/Time: 1/18/2024 11:15 AM    Performed by: Estefania Escobedo DPM  Authorized by: Estefania Escobedo DPM    Consent Done?:  Yes (Written)  Time out: Immediately prior to the procedure a time out was called    Prep: patient was prepped and draped in usual sterile fashion    Location:     Location:  Left foot    Location detail:  Left big toe  Anesthesia:     Anesthesia:  Digital block    Local anesthetic:  Bupivacaine 0.5% without epinephrine and lidocaine 2% without epinephrine    Anesthetic total (ml):  3  Procedure Details:     Preparation:  Skin prepped with alcohol    Amount removed:  Partial    Side:  Bilateral    Wedge excision of skin of nail fold: No      Nail bed sutured?: No      Nail matrix removed:  Partial    Removal method:  Phenol and alcohol    Removed nail replaced and anchored: No      Dressing applied:  4x4, antibiotic ointment and gauze roll    Patient tolerance:  Patient tolerated the procedure well with no immediate complications       Procedure. After informed consent signed and time out preformed, the left hallux toe was prepped using cloroprep. A penrose drain was used as tourniquet and an anesthesia check was preformed. A freer elevator was used to separate the left hallux both border from the toenail and the edge of the toenail was  from the underlying nail bed. An English anvil nail nipper was used to cut the both border of the nail approximately 1-2mm from the edge. A curved hemostat was used to remove the offending ingrown portion of the nail in toto.  Three sticks of phenol were placed into the both border and spun for approximately 30 seconds each. The border was flushed with alcohol. The toe was dried and antibiotic ointment was placed on the nail border. The tourniquet was remove and a dry sterile dressing was applied to the toe. Good cft noted to digit.  The patient tolerated the procedure well with no complications. All post procedure  instructions given to patient verbally

## 2024-01-20 ENCOUNTER — PATIENT MESSAGE (OUTPATIENT)
Dept: PODIATRY | Facility: CLINIC | Age: 39
End: 2024-01-20
Payer: COMMERCIAL

## 2024-01-31 ENCOUNTER — LAB VISIT (OUTPATIENT)
Dept: LAB | Facility: HOSPITAL | Age: 39
End: 2024-01-31
Payer: COMMERCIAL

## 2024-01-31 DIAGNOSIS — K76.0 FATTY LIVER: ICD-10-CM

## 2024-01-31 LAB
ALBUMIN SERPL BCP-MCNC: 4 G/DL (ref 3.5–5.2)
ALP SERPL-CCNC: 58 U/L (ref 55–135)
ALT SERPL W/O P-5'-P-CCNC: 30 U/L (ref 10–44)
AST SERPL-CCNC: 23 U/L (ref 10–40)
BILIRUB DIRECT SERPL-MCNC: 0.2 MG/DL (ref 0.1–0.3)
BILIRUB SERPL-MCNC: 0.5 MG/DL (ref 0.1–1)
PROT SERPL-MCNC: 6.8 G/DL (ref 6–8.4)

## 2024-01-31 PROCEDURE — 36415 COLL VENOUS BLD VENIPUNCTURE: CPT | Performed by: NURSE PRACTITIONER

## 2024-01-31 PROCEDURE — 80076 HEPATIC FUNCTION PANEL: CPT | Performed by: NURSE PRACTITIONER

## 2024-02-02 ENCOUNTER — TELEPHONE (OUTPATIENT)
Dept: PODIATRY | Facility: CLINIC | Age: 39
End: 2024-02-02
Payer: COMMERCIAL

## 2024-02-02 NOTE — TELEPHONE ENCOUNTER
Spoke with pt in regards to 2/6/24 at 12:45pm [facility]. Pt confirmed and verbalized understanding.

## 2024-02-06 ENCOUNTER — OFFICE VISIT (OUTPATIENT)
Dept: PODIATRY | Facility: CLINIC | Age: 39
End: 2024-02-06
Payer: COMMERCIAL

## 2024-02-06 VITALS
WEIGHT: 204 LBS | SYSTOLIC BLOOD PRESSURE: 138 MMHG | DIASTOLIC BLOOD PRESSURE: 85 MMHG | HEIGHT: 66 IN | BODY MASS INDEX: 32.78 KG/M2 | HEART RATE: 82 BPM

## 2024-02-06 DIAGNOSIS — M79.671 FOOT PAIN, BILATERAL: Primary | ICD-10-CM

## 2024-02-06 DIAGNOSIS — L60.0 INGROWN NAIL: ICD-10-CM

## 2024-02-06 DIAGNOSIS — M79.672 FOOT PAIN, BILATERAL: Primary | ICD-10-CM

## 2024-02-06 PROCEDURE — 3079F DIAST BP 80-89 MM HG: CPT | Mod: CPTII,S$GLB,, | Performed by: PODIATRIST

## 2024-02-06 PROCEDURE — 1160F RVW MEDS BY RX/DR IN RCRD: CPT | Mod: CPTII,S$GLB,, | Performed by: PODIATRIST

## 2024-02-06 PROCEDURE — 3075F SYST BP GE 130 - 139MM HG: CPT | Mod: CPTII,S$GLB,, | Performed by: PODIATRIST

## 2024-02-06 PROCEDURE — 1159F MED LIST DOCD IN RCRD: CPT | Mod: CPTII,S$GLB,, | Performed by: PODIATRIST

## 2024-02-06 PROCEDURE — 99024 POSTOP FOLLOW-UP VISIT: CPT | Mod: S$GLB,,, | Performed by: PODIATRIST

## 2024-02-06 PROCEDURE — 99999 PR PBB SHADOW E&M-EST. PATIENT-LVL III: CPT | Mod: PBBFAC,,, | Performed by: PODIATRIST

## 2024-02-06 NOTE — PROGRESS NOTES
SUBJECTIVE: Patient returns to the clinic   status post ingrown nail procedure.  Patient has no complaints of fever chills or sweats.  + pain.  Patient has been using antibiotic ointment  Also wants to discuss p.fasitis. can she take the oral medication now that was rx by previous physician     PAST MEDICAL HISTORY: Unchanged    Physical examination: General: Pt. is in no acute distress, alert and oriented x 3.    Podiatric examination: Vascular:Capillary refill time is within normal limits.    Dermatologic: Surgical site is clean without any signs of infection. no drainage. + hypergranular tissue   No significant erythema.    IMPRESSION:s/ post  ingrown nails  procedure with satisfactory progress no signs of infection.    PLAN:   Mild maceratin/recommend betadine   Patient should call the clinic immediately if any signs of infection such as fever chills sweats increased redness or pain but was otherwise discharged.    Rx xrays    Pt to start prednisone that was rx by other physician. Will message for appt if still in pain. Consider p.therapy or injection

## 2024-02-07 ENCOUNTER — HOSPITAL ENCOUNTER (OUTPATIENT)
Dept: RADIOLOGY | Facility: HOSPITAL | Age: 39
Discharge: HOME | End: 2024-02-07
Attending: PODIATRIST
Payer: COMMERCIAL

## 2024-02-07 ENCOUNTER — PATIENT MESSAGE (OUTPATIENT)
Dept: PODIATRY | Facility: CLINIC | Age: 39
End: 2024-02-07
Payer: COMMERCIAL

## 2024-02-07 ENCOUNTER — CLINICAL SUPPORT (OUTPATIENT)
Dept: OTHER | Facility: CLINIC | Age: 39
End: 2024-02-07

## 2024-02-07 DIAGNOSIS — M79.671 FOOT PAIN, BILATERAL: ICD-10-CM

## 2024-02-07 DIAGNOSIS — Z00.8 ENCOUNTER FOR OTHER GENERAL EXAMINATION: ICD-10-CM

## 2024-02-07 DIAGNOSIS — M79.672 FOOT PAIN, BILATERAL: ICD-10-CM

## 2024-02-07 PROCEDURE — 73630 X-RAY EXAM OF FOOT: CPT | Mod: 26,50,, | Performed by: RADIOLOGY

## 2024-02-07 PROCEDURE — 73630 X-RAY EXAM OF FOOT: CPT | Mod: TC,50

## 2024-02-08 VITALS
DIASTOLIC BLOOD PRESSURE: 82 MMHG | BODY MASS INDEX: 35.68 KG/M2 | SYSTOLIC BLOOD PRESSURE: 133 MMHG | WEIGHT: 209 LBS | HEIGHT: 64 IN

## 2024-02-08 LAB
GLUCOSE SERPL-MCNC: 105 MG/DL (ref 60–140)
HDLC SERPL-MCNC: 78 MG/DL
POC CHOLESTEROL, LDL (DOCK): 83 MG/DL
POC CHOLESTEROL, TOTAL: 201 MG/DL
TRIGL SERPL-MCNC: 249 MG/DL

## 2024-02-15 ENCOUNTER — PATIENT MESSAGE (OUTPATIENT)
Dept: PODIATRY | Facility: CLINIC | Age: 39
End: 2024-02-15
Payer: COMMERCIAL

## 2024-02-20 ENCOUNTER — TELEPHONE (OUTPATIENT)
Dept: PODIATRY | Facility: CLINIC | Age: 39
End: 2024-02-20
Payer: COMMERCIAL

## 2024-02-20 DIAGNOSIS — M79.671 FOOT PAIN, BILATERAL: Primary | ICD-10-CM

## 2024-02-20 DIAGNOSIS — M72.2 PLANTAR FASCIITIS: ICD-10-CM

## 2024-02-20 DIAGNOSIS — M79.672 FOOT PAIN, BILATERAL: Primary | ICD-10-CM

## 2024-02-20 NOTE — TELEPHONE ENCOUNTER
Please let pt know I have placed an order for p.thearpy and someone should be calling her to set it up. Please make her an appt for 4-6 weeks for a re-evaluation with me,.       Pt advise message she will call back to schedule once PT starts

## 2024-03-02 ENCOUNTER — CLINICAL SUPPORT (OUTPATIENT)
Dept: REHABILITATION | Facility: HOSPITAL | Age: 39
End: 2024-03-02
Attending: PODIATRIST
Payer: COMMERCIAL

## 2024-03-02 DIAGNOSIS — M79.671 FOOT PAIN, BILATERAL: ICD-10-CM

## 2024-03-02 DIAGNOSIS — G89.29 CHRONIC BILATERAL LOW BACK PAIN WITH RIGHT-SIDED SCIATICA: ICD-10-CM

## 2024-03-02 DIAGNOSIS — G89.29 CHRONIC PAIN IN RIGHT FOOT: Primary | ICD-10-CM

## 2024-03-02 DIAGNOSIS — M54.41 CHRONIC BILATERAL LOW BACK PAIN WITH RIGHT-SIDED SCIATICA: ICD-10-CM

## 2024-03-02 DIAGNOSIS — M79.672 FOOT PAIN, BILATERAL: ICD-10-CM

## 2024-03-02 DIAGNOSIS — M79.671 CHRONIC PAIN IN RIGHT FOOT: Primary | ICD-10-CM

## 2024-03-02 DIAGNOSIS — M72.2 PLANTAR FASCIITIS: ICD-10-CM

## 2024-03-02 DIAGNOSIS — M79.672 LEFT FOOT PAIN: ICD-10-CM

## 2024-03-02 PROCEDURE — 97162 PT EVAL MOD COMPLEX 30 MIN: CPT

## 2024-03-02 PROCEDURE — 97112 NEUROMUSCULAR REEDUCATION: CPT

## 2024-03-03 ENCOUNTER — PATIENT MESSAGE (OUTPATIENT)
Dept: REHABILITATION | Facility: HOSPITAL | Age: 39
End: 2024-03-03
Payer: COMMERCIAL

## 2024-03-03 NOTE — PLAN OF CARE
[facility]   Physical Therapy Initial Evaluation      Name: Bushra Olmos [facility]  Clinic Number: [id number]    Therapy Diagnosis:   Encounter Diagnoses   Name Primary?    Foot pain, bilateral     Plantar fasciitis     Chronic pain in right foot Yes    Left foot pain     Chronic bilateral low back pain with right-sided sciatica         Physician: Estefania Escobedo DPM    Physician Orders: PT Eval and Treat   Medical Diagnosis from Referral:     M79.671,M79.672 (ICD-10-CM) - Foot pain, bilateral   M72.2 (ICD-10-CM) - Plantar fasciitis     Evaluation Date: 3/2/2024  Authorization Period Expiration: 2/19/2025  Plan of Care Expiration: 6/2/2024  Progress Note Due: 5/2/2024  Visit # / Visits authorized: 1/ 1   FOTO: 1/3    Precautions: Standard     Time In: 1105  Time Out: 1205  Total Appointment Time (timed & untimed codes): 60 minutes    Subjective     Date of onset: Summer 2023; Increased pain diane since Nov 2023    History of current condition - Bushra reports:     Patient reports onset of R > L foot pain sometime this past summer when she was walking in sandals. Reports pain was relatively manageable, but did not subside. In November she reports significant increase in pain w/o known MOA. She trailed change of shoes, which helped some with symptoms, again symptoms did not fully resolve. Recently she was prescribed a steroid pack that eliminated symptoms. Since finishing this about a few weeks ago symptoms are slowly returning.    Pain is located on plantar foot with some associated radiating pain up the back of her leg. 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 Her ADLs and work activities are very active constantly. Symptoms aggravated with increased activity levels and she reports she rarely is able to rest. Denies first step morning pain with mainly increased symptoms at night.    She reports her foot pain may be linked to LBP; history of SIJ pain as a child, though she  reports global soreness in general. She has had x2 rheumatological work ups with no positive findings related to global soreness.    Falls: 0    Imaging:     X-ray foot:    Impression:     No fracture dislocation or other abnormality    MRI spine:    Impression:     1. Cervicothoracic spondylosis detailed above.  Mild neural foraminal narrowing at C3-C4 and T3-T4.  No spinal stenosis.  2. Thoracic dextroscoliosis.    Prior Therapy: none  Social History: lives with their family  Occupation: nurse  Prior Level of Function: chronic ADLs w/ pain  Current Level of Function: improved symptoms following steroid pack    Pain:  Current 3/10, worst 9/10, best 0/10   Location: R > L foot and R > L Lower back   Description: Aching, Dull, Throbbing, Tight, Sharp, and Shooting  Aggravating Factors: Standing, Walking, and Night Time  Easing Factors: shoes and steroid pack    Patients goals: ADLs w/o complaint.     Medical History:   Past Medical History:   Diagnosis Date    Acid reflux     Allergy     Chronic hypertension with superimposed pre-eclampsia, post partum 10/21/2022    On chart review patient with essential HTN outside of pregnancy at initial [facility] visit     History of  10/10/2022    Pre-eclampsia, severe, postpartum condition 3/29/2021    Severe hypertension affecting pregnancy in third trimester 3/22/2021    Severe pre-eclampsia in third trimester 3/22/2021       Surgical History:   Bushra Kahn  has a past surgical history that includes Adenoidectomy (age 16); biopsy of breast  (Right, 2014); Cholecystectomy (); Esophagogastroduodenoscopy (N/A, 2018);  section (N/A, 3/24/2021);  section (N/A, 10/10/2022); Esophagogastroduodenoscopy (N/A, 2023); and Esophagogastroduodenoscopy (N/A, 2024).    Medications:   Bushra has a current medication list which includes the following prescription(s): amlodipine and pantoprazole.    Allergies:   Review of patient's allergies  indicates:   Allergen Reactions    Benzyl salicylate Rash     acne    Cefdinir Other (See Comments)     Nausea/vomiting    Nifedipine Other (See Comments)     headache    Sudafed [pseudoephedrine hcl] Other (See Comments)     Emotional lability        Objective      Observation / Gait: WFL    Posture: foot posture NWB at rest Pablo inversion    Palpation: TTP tarsal tunnel    Sensation: WNL    Range of Motion:   Ankle Right Left   1st MTP Ext 50 deg hypomobile 70 deg hypomobile   1st MTP Flexion WNL WNL   DF ([facility]) np np   DF ([facility]) <10 deg <10 deg   Plantarflexion WNL WNL   Inversion  WNL to excessive WNL to excessive   Eversion WNL WNL      Strength:  Ankle Right Left   1st MTP Ext 4-/5 4+/5   1st MTP Flexion 3+/5 4-/5   Dorsiflexion 5/5 5/5   Plantarflexion np np   Inversion 4-/5 4+/5   Eversion 4-/5 5/5     Joint Mobility:   Ankle Right Left   A/P TCJ hypomobile hypomobile     Special Tests:  Anterior Drawer -   Talar tilt hypermobile   Squeeze test np   Windlass test - (high spc - fascitis)   Panda np       Lumbar Range of Motion:    Limitations Pain   Flexion WNL with neural tension   -        Extension WNL   -        Left Side Bending WNL -        Right Side Bending WNL -        Left Rotation WNL -   Right Rotation WNL -     Hip Range of Motion:    Limitations Pain   Flexion WNL   Relief of hip / low back discomfort          Lower Extremity Strength:  Right LE  Left LE    Quadriceps: 4/5 Quadriceps: 5/5   Hip Flexion: 4/5 Hip Flexion: 5/5   Hip Extension:  4/5 Hip Extension: 3/5     Flexibility:   Prone Knee Bend (RF): R = - ; L = -  90/90 Hamstrings: R = + ; L = -     Special Tests:  -SLR (nerve mobility): + hypomobility R > L      Limitation/Restriction for FOTO Foot Survey    Therapist reviewed FOTO scores for Bushra Kahn on 3/2/2024.   FOTO documents entered into [employer] - see Media section.    Limitation Score: see media%         Treatment     Total Treatment time (time-based codes) separate from  Evaluation: 30 minutes     Bushra received the treatments listed below:      neuromuscular re-education activities to improve: Balance, Coordination, Kinesthetic, Sense, Proprioception, and Posture for 30 minutes. The following activities were included:    Patient education:  - diagnosis, prognosis  - foot impairments -> neural tension -> L/S impairments  - HEP    SKTC 3x30 sec  MET for R posterior / L anterior rotation    PF towel stretch 15x10 sec  GTB eversion 3x20  EHL raises 15x5-10 sec    NEXT VISIT - reassess HEP; focus on foot; DN if needed    FUTURE VISITS - transition to core / hip muscle imbalances    therapeutic activities to improve functional performance for 00  minutes, including:    NT    Patient Education and Home Exercises     Education provided:   - see above    Written Home Exercises Provided: yes. Exercises were reviewed and Bushra was able to demonstrate them prior to the end of the session.  Bushra demonstrated good  understanding of the education provided. See EMR under Patient Instructions for exercises provided during therapy sessions.    Assessment     Bushra is a 38 y.o. female referred to outpatient Physical Therapy with a medical diagnosis of M79.671,M79.672 (ICD-10-CM) - Foot pain, bilateral and M72.2 (ICD-10-CM) - Plantar fasciitis. Patient presents with foot and lower back pain, + neural tension, G+S flexibility deficits and joint hypomobility diane of 1st ray leading to decreased DF ROM, lower leg and intrinsic NM activation and strength deficits, along with proximal chain and core stability deficits. Impairments contributory to pain affecting lisandra to ADLs diane involving weightbearing activities.    Patient prognosis is Good.   Patient will benefit from skilled outpatient Physical Therapy to address the deficits stated above and in the chart below, provide patient /family education, and to maximize patientt's level of independence.     Plan of care discussed with patient:  Yes  Patient's spiritual, cultural and educational needs considered and patient is agreeable to the plan of care and goals as stated below:     Anticipated Barriers for therapy: BMI, history of global pain    Medical Necessity is demonstrated by the following  History  Co-morbidities and personal factors that may impact the plan of care [] LOW: no personal factors / co-morbidities  [x] MODERATE: 1-2 personal factors / co-morbidities  [] HIGH: 3+ personal factors / co-morbidities    Moderate / High Support Documentation:   Co-morbidities affecting plan of care: BMI, history of global pain    Personal Factors:   no deficits  See above     Examination  Body Structures and Functions, activity limitations and participation restrictions that may impact the plan of care [] LOW: addressing 1-2 elements  [] MODERATE: 3+ elements  [x] HIGH: 4+ elements (please support below)    Moderate / High Support Documentation: pain, + neural tension, G+S flexibility deficits and joint hypomobility diane of 1st ray leading to decreased DF ROM, lower leg and intrinsic NM activation and strength deficits, along with proximal chain and core stability deficits.     Clinical Presentation [] LOW: stable  [x] MODERATE: Evolving  [] HIGH: Unstable     Decision Making/ Complexity Score: moderate       GOALS: Short Term Goals:  0-5 weeks  1.Report decreased R > L foot pain  < / =  1/10 at worst to increase tolerance for ADLs  2. Increase ROM by 5-10 degrees in order to walk with min to no compensation.  3. Increase strength by 1/3 MMT grade for  peroneals and EHL  to increase tolerance for ADL and work activities.  4. Negative SLR for neural tension  5. Pt to tolerate HEP to improve ROM and independence with ADL's.    Long Term Goals: 6-10 weeks  1.Report decreased LBP and foot pain  < / =  1/10 at worst to increase tolerance for ADLs  2.Patient goal: ADLs w/o complaint  3.Increase strength to 4+/5 for  core and foot musculature to increase  tolerance for ADL and work activities.  4. Pt will report at CJ level (20-40% impaired) on Modified FIM score for mobility to demonstrate increase in LE function and mobility in home and community environment.     Plan     Plan of care Certification: 3/2/2024 to 6/2/2024.    Outpatient Physical Therapy 1-2 times weekly for 10-12 weeks to include the following interventions: Manual Therapy, Moist Heat/ Ice, Neuromuscular Re-ed, Patient Education, Self Care, Therapeutic Activities, Therapeutic Exercise, and DN.     GLENYS TOPETE, PT, DPT, OCS

## 2024-03-08 ENCOUNTER — CLINICAL SUPPORT (OUTPATIENT)
Dept: REHABILITATION | Facility: HOSPITAL | Age: 39
End: 2024-03-08
Attending: PODIATRIST
Payer: COMMERCIAL

## 2024-03-08 DIAGNOSIS — M79.671 CHRONIC PAIN IN RIGHT FOOT: Primary | ICD-10-CM

## 2024-03-08 DIAGNOSIS — G89.29 CHRONIC PAIN IN RIGHT FOOT: Primary | ICD-10-CM

## 2024-03-08 DIAGNOSIS — M54.41 CHRONIC BILATERAL LOW BACK PAIN WITH RIGHT-SIDED SCIATICA: ICD-10-CM

## 2024-03-08 DIAGNOSIS — G89.29 CHRONIC BILATERAL LOW BACK PAIN WITH RIGHT-SIDED SCIATICA: ICD-10-CM

## 2024-03-08 DIAGNOSIS — M79.672 LEFT FOOT PAIN: ICD-10-CM

## 2024-03-08 PROCEDURE — 97112 NEUROMUSCULAR REEDUCATION: CPT

## 2024-03-08 NOTE — PROGRESS NOTES
[facility]   Physical Therapy Treatment Note      Name: Bushra Olmos [facility]  Clinic Number: [id number]    Therapy Diagnosis:   Encounter Diagnoses   Name Primary?    Chronic pain in right foot Yes    Left foot pain     Chronic bilateral low back pain with right-sided sciatica      Physician: Estefania Escobedo DPM    Visit Date: 3/8/2024    Physician Orders: PT Eval and Treat   Medical Diagnosis from Referral:      M79.671,M79.672 (ICD-10-CM) - Foot pain, bilateral   M72.2 (ICD-10-CM) - Plantar fasciitis      Evaluation Date: 3/2/2024  Authorization Period Expiration: 2/19/2025  Plan of Care Expiration: 6/2/2024  Progress Note Due: 5/2/2024  Visit # / Visits authorized: 1/ 1; 1/29  FOTO: 1/3     Precautions: Standard      Time In: 0955  Time Out: 1055  Total Appointment Time (timed & untimed codes): 60 minutes    Subjective     Pt reports: Reports felt great after she was in the pool on Saturday. Following doing her stretches she felt increased pain. Has tried modifying, but cont to have low back and foot pain with these.    She was compliant with home exercise program.  Response to previous treatment: HEP compliance  Functional change: ongoing    Pain: 6/10  Location: R > L foot    Objective      Objective Measures updated at progress report unless specified.     Range of Motion:   Ankle Right Left   1st MTP Ext 50 deg hypomobile 70 deg hypomobile   1st MTP Flexion WNL WNL   DF ([facility]) np np   DF ([facility]) <10 deg <10 deg   Plantarflexion WNL WNL   Inversion  WNL to excessive WNL to excessive   Eversion WNL WNL      Strength:  Ankle Right Left   1st MTP Ext 4-/5 4+/5   1st MTP Flexion 3+/5 4-/5   Dorsiflexion 5/5 5/5   Plantarflexion np np   Inversion 4-/5 4+/5   Eversion 4-/5 5/5     Lower Extremity Strength:  Right LE   Left LE     Quadriceps: 4/5 Quadriceps: 5/5   Hip Flexion: 4/5 Hip Flexion: 5/5   Hip Extension:  4/5 Hip Extension: 3/5       Treatment     Bushra received the treatments  listed below:      neuromuscular re-education activities to improve: Balance, Coordination, Kinesthetic, Sense, Proprioception, and Posture for 60 minutes. The following activities were included:     Patient education:  - diagnosis, prognosis  - L/S impairments -> neural tension  - HEP adjustments 3/8/2024     SKTC 3x30 sec ea mitra    PPT at 90/90 training  - cuing  - supported 90/90 sh flexion 3x10  - supported 90/90 alt sh flexion 3x20  - unsupported 90/90 alt sh flexion 3x20  - unsupported 90/90 heel tap 3x10    Big toe stretch in sitting w/ leg crossed 3x1 min  EHL raises 15x5-10 sec - NT      NEXT VISIT - core stability -> glute as able for neural mobility      Held:  MET for R posterior / L anterior rotation  PF towel stretch 15x10 sec  GTB eversion 3x20    therapeutic activities to improve functional performance for 00  minutes, including:     NT    Patient Education and Home Exercises       Education provided:   - see above    Written Home Exercises Provided: yes. Exercises were reviewed and Bushra was able to demonstrate them prior to the end of the session.  Bushra demonstrated good  understanding of the education provided. See EMR under Patient Instructions for exercises provided during therapy sessions    Assessment     Flare of symptoms post last visit likely secondary to increased tension of nervous system with foot / ankle stretching. Adjusted focus to core NM activation and strength. Patient had reduced symptoms upon completion of interventions and lisandra modified big toe stretching w/o neural tension positioning.     HEP adjusted accordingly.    Cont focus on proximal stability to help reduce neural tension and L/S -> R foot pain.    Bushra Is progressing well towards her goals.   Pt prognosis is Good.     Pt will continue to benefit from skilled outpatient physical therapy to address the deficits listed in the problem list box on initial evaluation, provide pt/family education and to maximize pt's  level of independence in the home and community environment.     Pt's spiritual, cultural and educational needs considered and pt agreeable to plan of care and goals.     Anticipated barriers to physical therapy: BMI, history of global pain     GOALS: Short Term Goals:  0-5 weeks  1.Report decreased R > L foot pain  < / =  1/10 at worst to increase tolerance for ADLs  2. Increase ROM by 5-10 degrees in order to walk with min to no compensation.  3. Increase strength by 1/3 MMT grade for  peroneals and EHL  to increase tolerance for ADL and work activities.  4. Negative SLR for neural tension  5. Pt to tolerate HEP to improve ROM and independence with ADL's.     Long Term Goals: 6-10 weeks  1.Report decreased LBP and foot pain  < / =  1/10 at worst to increase tolerance for ADLs  2.Patient goal: ADLs w/o complaint  3.Increase strength to 4+/5 for  core and foot musculature to increase tolerance for ADL and work activities.  4. Pt will report at CJ level (20-40% impaired) on Modified FIM score for mobility to demonstrate increase in LE function and mobility in home and community environment.     Plan     Plan of care Certification: 3/2/2024 to 6/2/2024.     Outpatient Physical Therapy 1-2 times weekly for 10-12 weeks to include the following interventions: Manual Therapy, Moist Heat/ Ice, Neuromuscular Re-ed, Patient Education, Self Care, Therapeutic Activities, Therapeutic Exercise, and DN.     GLENYS TOPETE, PT, DPT, OCS

## 2024-03-12 ENCOUNTER — PATIENT MESSAGE (OUTPATIENT)
Dept: ADMINISTRATIVE | Facility: OTHER | Age: 39
End: 2024-03-12
Payer: COMMERCIAL

## 2024-03-12 ENCOUNTER — CLINICAL SUPPORT (OUTPATIENT)
Dept: REHABILITATION | Facility: HOSPITAL | Age: 39
End: 2024-03-12
Payer: COMMERCIAL

## 2024-03-12 DIAGNOSIS — G89.29 CHRONIC PAIN IN RIGHT FOOT: Primary | ICD-10-CM

## 2024-03-12 DIAGNOSIS — M79.671 CHRONIC PAIN IN RIGHT FOOT: Primary | ICD-10-CM

## 2024-03-12 DIAGNOSIS — M54.41 CHRONIC BILATERAL LOW BACK PAIN WITH RIGHT-SIDED SCIATICA: ICD-10-CM

## 2024-03-12 DIAGNOSIS — G89.29 CHRONIC BILATERAL LOW BACK PAIN WITH RIGHT-SIDED SCIATICA: ICD-10-CM

## 2024-03-12 DIAGNOSIS — M79.672 LEFT FOOT PAIN: ICD-10-CM

## 2024-03-12 PROCEDURE — 97140 MANUAL THERAPY 1/> REGIONS: CPT

## 2024-03-12 PROCEDURE — 97112 NEUROMUSCULAR REEDUCATION: CPT

## 2024-03-12 NOTE — PROGRESS NOTES
[facility]   Physical Therapy Treatment Note      Name: Bushra Olmos [facility]  Clinic Number: [id number]    Therapy Diagnosis:   Encounter Diagnoses   Name Primary?    Chronic pain in right foot Yes    Left foot pain     Chronic bilateral low back pain with right-sided sciatica        Physician: Estefania Escobedo DPM    Visit Date: 3/12/2024    Physician Orders: PT Eval and Treat   Medical Diagnosis from Referral:      M79.671,M79.672 (ICD-10-CM) - Foot pain, bilateral   M72.2 (ICD-10-CM) - Plantar fasciitis      Evaluation Date: 3/2/2024  Authorization Period Expiration: 2/19/2025  Plan of Care Expiration: 6/2/2024  Progress Note Due: 5/2/2024  Visit # / Visits authorized: 1/ 1; 2/29   FOTO: 1/3     Precautions: Standard      Time In: 1155  Time Out: 1300  Total Appointment Time (timed & untimed codes): 65 minutes    Subjective     Pt reports: Back feels better, but having some posterior thigh discomfort mitra and R foot pain is increased. HEP going well for low back, but had to hold on toe stretching.     She was compliant with home exercise program.  Response to previous treatment: HEP compliance  Functional change: ongoing    Pain: 6/10  Location: R > L foot    Objective      Objective Measures updated at progress report unless specified.     Range of Motion:   Ankle Right Left   1st MTP Ext 50 deg hypomobile 70 deg hypomobile   1st MTP Flexion WNL WNL   DF ([facility]) np np   DF ([facility]) <10 deg <10 deg   Plantarflexion WNL WNL   Inversion  WNL to excessive WNL to excessive   Eversion WNL WNL      Strength:  Ankle Right Left   1st MTP Ext 4-/5 4+/5   1st MTP Flexion 3+/5 4-/5   Dorsiflexion 5/5 5/5   Plantarflexion np np   Inversion 4-/5 4+/5   Eversion 4-/5 5/5     Lower Extremity Strength:  Right LE   Left LE     Quadriceps: 4/5 Quadriceps: 5/5   Hip Flexion: 4/5 Hip Flexion: 5/5   Hip Extension:  4/5 Hip Extension: 3/5       Treatment     Bushra received the treatments listed below:       Bushra received the following manual therapy techniques: see below were applied to the: L/S and LE for 20 minutes, including:    Dry Needling:  Patient educated on intervention of dry needling including risks, benefits, and post administration expectations including potential soreness, bruising, and fatigue. Patient demonstrated verbal understanding and consented to intervention today.    DN performed to calf TrP, perineural tibial nerve, and L/S paraspinal TrP mitra for 15 min with manual stimulation.      neuromuscular re-education activities to improve: Balance, Coordination, Kinesthetic, Sense, Proprioception, and Posture for 45 minutes. The following activities were included:     Patient education:  - diagnosis, prognosis  - L/S impairments -> neural tension  - HEP adjustments 3/8/2024     SKTC 3x30 sec ea mitra    PPT at 90/90 training  - cuing  - supported 90/90 sh flexion 3x10  - supported 90/90 alt sh flexion 3x20  - unsupported 90/90 alt sh flexion 3x20  - unsupported 90/90 heel tap 3x20    Clam holds 3x30 sec ea mitra - added to HEP  Bridge YTB 3x10x3 sec      Big toe stretch in sitting w/ leg crossed 3x1 min - NT  EHL raises 15x5-10 sec - NT      NEXT VISIT - core stability -> glute as able for neural mobility      Held:  MET for R posterior / L anterior rotation  PF towel stretch 15x10 sec  GTB eversion 3x20    therapeutic activities to improve functional performance for 00  minutes, including:     NT    Patient Education and Home Exercises       Education provided:   - see above    Written Home Exercises Provided: yes. Exercises were reviewed and Bushra was able to demonstrate them prior to the end of the session.  Bushra demonstrated good  understanding of the education provided. See EMR under Patient Instructions for exercises provided during therapy sessions    Assessment     Reduced LE and foot discomfort following core activation activities. Cont to get symptoms with distal mobilization so  advised to switch this for clam holds and glute isolation. Needs cont focus on core / glute isolation in activation.    Warren trial of DN well within visit as well.    Cont focus on proximal stability to help reduce neural tension and L/S -> R foot pain.    Bushra Is progressing well towards her goals.   Pt prognosis is Good.     Pt will continue to benefit from skilled outpatient physical therapy to address the deficits listed in the problem list box on initial evaluation, provide pt/family education and to maximize pt's level of independence in the home and community environment.     Pt's spiritual, cultural and educational needs considered and pt agreeable to plan of care and goals.     Anticipated barriers to physical therapy: BMI, history of global pain     GOALS: Short Term Goals:  0-5 weeks  1.Report decreased R > L foot pain  < / =  1/10 at worst to increase tolerance for ADLs  2. Increase ROM by 5-10 degrees in order to walk with min to no compensation.  3. Increase strength by 1/3 MMT grade for  peroneals and EHL  to increase tolerance for ADL and work activities.  4. Negative SLR for neural tension  5. Pt to tolerate HEP to improve ROM and independence with ADL's.     Long Term Goals: 6-10 weeks  1.Report decreased LBP and foot pain  < / =  1/10 at worst to increase tolerance for ADLs  2.Patient goal: ADLs w/o complaint  3.Increase strength to 4+/5 for  core and foot musculature to increase tolerance for ADL and work activities.  4. Pt will report at CJ level (20-40% impaired) on Modified FIM score for mobility to demonstrate increase in LE function and mobility in home and community environment.     Plan     Plan of care Certification: 3/2/2024 to 6/2/2024.     Outpatient Physical Therapy 1-2 times weekly for 10-12 weeks to include the following interventions: Manual Therapy, Moist Heat/ Ice, Neuromuscular Re-ed, Patient Education, Self Care, Therapeutic Activities, Therapeutic Exercise, and DN.      GLENYS TOPETE, PT, DPT, OCS

## 2024-03-15 ENCOUNTER — PATIENT MESSAGE (OUTPATIENT)
Dept: REHABILITATION | Facility: HOSPITAL | Age: 39
End: 2024-03-15
Payer: COMMERCIAL

## 2024-03-18 ENCOUNTER — CLINICAL SUPPORT (OUTPATIENT)
Dept: REHABILITATION | Facility: HOSPITAL | Age: 39
End: 2024-03-18
Payer: COMMERCIAL

## 2024-03-18 DIAGNOSIS — M54.41 CHRONIC BILATERAL LOW BACK PAIN WITH RIGHT-SIDED SCIATICA: ICD-10-CM

## 2024-03-18 DIAGNOSIS — G89.29 CHRONIC BILATERAL LOW BACK PAIN WITH RIGHT-SIDED SCIATICA: ICD-10-CM

## 2024-03-18 DIAGNOSIS — M79.671 CHRONIC PAIN IN RIGHT FOOT: Primary | ICD-10-CM

## 2024-03-18 DIAGNOSIS — M79.672 LEFT FOOT PAIN: ICD-10-CM

## 2024-03-18 DIAGNOSIS — G89.29 CHRONIC PAIN IN RIGHT FOOT: Primary | ICD-10-CM

## 2024-03-18 PROCEDURE — 97112 NEUROMUSCULAR REEDUCATION: CPT

## 2024-03-18 NOTE — PROGRESS NOTES
[facility]   Physical Therapy Treatment Note      Name: Bushra Olmos [facility]  Clinic Number: [id number]    Therapy Diagnosis:   Encounter Diagnoses   Name Primary?    Chronic pain in right foot Yes    Left foot pain     Chronic bilateral low back pain with right-sided sciatica      Physician: Estefania Escobedo DPM    Visit Date: 3/18/2024    Physician Orders: PT Eval and Treat   Medical Diagnosis from Referral:      M79.671,M79.672 (ICD-10-CM) - Foot pain, bilateral   M72.2 (ICD-10-CM) - Plantar fasciitis      Evaluation Date: 3/2/2024  Authorization Period Expiration: 2/19/2025  Plan of Care Expiration: 6/2/2024  Progress Note Due: 5/2/2024  Visit # / Visits authorized: 1/ 1; 3/29   FOTO: 1/3     Precautions: Standard      Time In: 1400  Time Out: 1500  Total Appointment Time (timed & untimed codes): 65 minutes    Subjective     Pt reports: reports severe pain in feet / low back since last seen. She wasn't sure if this was due to exercises, DN, but realized she had started doing of trial of new inserts; since removing these her foot pain has improved. HEP not painful and non aggravating.    Back and hips feeling better. Some lingering foot pain upon arrival today.    She was compliant with home exercise program.  Response to previous treatment: HEP compliance  Functional change: ongoing    Pain: 6/10  Location: R > L foot    Objective      Objective Measures updated at progress report unless specified.     Range of Motion:   Ankle Right Left   1st MTP Ext 50 deg hypomobile 70 deg hypomobile   1st MTP Flexion WNL WNL   DF ([facility]) np np   DF ([facility]) <10 deg <10 deg   Plantarflexion WNL WNL   Inversion  WNL to excessive WNL to excessive   Eversion WNL WNL      Strength:  Ankle Right Left   1st MTP Ext 4-/5 4+/5   1st MTP Flexion 3+/5 4-/5   Dorsiflexion 5/5 5/5   Plantarflexion np np   Inversion 4-/5 4+/5   Eversion 4-/5 5/5     Lower Extremity Strength:  Right LE   Left LE     Quadriceps: 4/5  Quadriceps: 5/5   Hip Flexion: 4/5 Hip Flexion: 5/5   Hip Extension:  4/5 Hip Extension: 3/5       Treatment     Bushra received the treatments listed below:      Bushra received the following manual therapy techniques: see below were applied to the: L/S and LE for 00 minutes, including:    Dry Needling:  Patient educated on intervention of dry needling including risks, benefits, and post administration expectations including potential soreness, bruising, and fatigue. Patient demonstrated verbal understanding and consented to intervention today.    DN performed to calf TrP, perineural tibial nerve, and L/S paraspinal TrP mitra for 15 min with manual stimulation.      neuromuscular re-education activities to improve: Balance, Coordination, Kinesthetic, Sense, Proprioception, and Posture for 60 minutes. The following activities were included:     Patient education:  - diagnosis, prognosis  - L/S impairments -> neural tension  - HEP adjustments 3/8/2024    90/90 paraspinal inhibition x10 min   SKTC 3x30 sec ea mitra    PPT at 90/90 training  - supported PPT lower abs x5 min w/ 10 sec hold  - supported PPT HS x5 min w/ 10 sec hold  - supported 90/90 (HS + lower abs) sh flexion 3x10   - supported 90/90 (HS + lower abs) alt sh flexion 3x20  - unsupported 90/90 heel tap 3x20    Birddog 3x20    Clam holds 3x30 sec ea mitra - HEP - NT  Bridge YTB 3x10x3 sec - NT      NEXT VISIT - Trial SLR + Intrinsics      Held:  Big toe stretch in sitting w/ leg crossed 3x1 min - NT  EHL raises 15x5-10 sec - NT  MET for R posterior / L anterior rotation  PF towel stretch 15x10 sec  GTB eversion 3x20    therapeutic activities to improve functional performance for 00  minutes, including:     NT    Patient Education and Home Exercises       Education provided:   - see above    Written Home Exercises Provided: yes. Exercises were reviewed and Bushra was able to demonstrate them prior to the end of the session.  Bushra demonstrated good   understanding of the education provided. See EMR under Patient Instructions for exercises provided during therapy sessions    Assessment     Reduced foot pain following elevation in 90/90 position; symptoms of feet not aggravated again by any core activities today. Proximal discomfort improving over time, though cont foot pain diane with weightbearing activities.     Advised to cont HEP for consistency and utilize elevation to help reduce ankle pain.    Needs cont focus on proximal -> distal stabilization to help reduce neural tension and reduce R foot pain.    Bushra Is progressing well towards her goals.   Pt prognosis is Good.     Pt will continue to benefit from skilled outpatient physical therapy to address the deficits listed in the problem list box on initial evaluation, provide pt/family education and to maximize pt's level of independence in the home and community environment.     Pt's spiritual, cultural and educational needs considered and pt agreeable to plan of care and goals.     Anticipated barriers to physical therapy: BMI, history of global pain     GOALS: Short Term Goals:  0-5 weeks  1.Report decreased R > L foot pain  < / =  1/10 at worst to increase tolerance for ADLs  2. Increase ROM by 5-10 degrees in order to walk with min to no compensation.  3. Increase strength by 1/3 MMT grade for  peroneals and EHL  to increase tolerance for ADL and work activities.  4. Negative SLR for neural tension  5. Pt to tolerate HEP to improve ROM and independence with ADL's.     Long Term Goals: 6-10 weeks  1.Report decreased LBP and foot pain  < / =  1/10 at worst to increase tolerance for ADLs  2.Patient goal: ADLs w/o complaint  3.Increase strength to 4+/5 for  core and foot musculature to increase tolerance for ADL and work activities.  4. Pt will report at CJ level (20-40% impaired) on Modified FIM score for mobility to demonstrate increase in LE function and mobility in home and community environment.      Plan     Plan of care Certification: 3/2/2024 to 6/2/2024.     Outpatient Physical Therapy 1-2 times weekly for 10-12 weeks to include the following interventions: Manual Therapy, Moist Heat/ Ice, Neuromuscular Re-ed, Patient Education, Self Care, Therapeutic Activities, Therapeutic Exercise, and DN.     GLENYS TOPETE, PT, DPT, OCS

## 2024-03-19 ENCOUNTER — PATIENT MESSAGE (OUTPATIENT)
Dept: PODIATRY | Facility: CLINIC | Age: 39
End: 2024-03-19
Payer: COMMERCIAL

## 2024-03-20 ENCOUNTER — PATIENT MESSAGE (OUTPATIENT)
Dept: REHABILITATION | Facility: HOSPITAL | Age: 39
End: 2024-03-20
Payer: COMMERCIAL

## 2024-03-27 ENCOUNTER — CLINICAL SUPPORT (OUTPATIENT)
Dept: REHABILITATION | Facility: HOSPITAL | Age: 39
End: 2024-03-27
Payer: COMMERCIAL

## 2024-03-27 DIAGNOSIS — M79.671 CHRONIC PAIN IN RIGHT FOOT: Primary | ICD-10-CM

## 2024-03-27 DIAGNOSIS — M54.41 CHRONIC BILATERAL LOW BACK PAIN WITH RIGHT-SIDED SCIATICA: ICD-10-CM

## 2024-03-27 DIAGNOSIS — G89.29 CHRONIC PAIN IN RIGHT FOOT: Primary | ICD-10-CM

## 2024-03-27 DIAGNOSIS — G89.29 CHRONIC BILATERAL LOW BACK PAIN WITH RIGHT-SIDED SCIATICA: ICD-10-CM

## 2024-03-27 DIAGNOSIS — M79.672 LEFT FOOT PAIN: ICD-10-CM

## 2024-03-27 PROCEDURE — 97112 NEUROMUSCULAR REEDUCATION: CPT

## 2024-03-27 NOTE — PROGRESS NOTES
[facility]   Physical Therapy Treatment Note      Name: Bushra Olmos [facility]  Clinic Number: [id number]    Therapy Diagnosis:   Encounter Diagnoses   Name Primary?    Chronic pain in right foot Yes    Left foot pain     Chronic bilateral low back pain with right-sided sciatica      Physician: Estefania Escobedo DPM    Visit Date: 3/27/2024    Physician Orders: PT Eval and Treat   Medical Diagnosis from Referral:      M79.671,M79.672 (ICD-10-CM) - Foot pain, bilateral   M72.2 (ICD-10-CM) - Plantar fasciitis      Evaluation Date: 3/2/2024  Authorization Period Expiration: 2/19/2025  Plan of Care Expiration: 6/2/2024  Progress Note Due: 5/2/2024  Visit # / Visits authorized: 1/ 1; 4/29   FOTO: 1/3     Precautions: Standard      Time In: 0900  Time Out: 1000  Total Appointment Time (timed & untimed codes): 60 minutes    Subjective     Pt reports: Back has been feeling good. Recent shoe change has helped with foot pain, but again agg with return to old shoes.    She was compliant with home exercise program.  Response to previous treatment: HEP compliance  Functional change: ongoing    Pain: 3-6/10  Location: R > L foot    Objective      Objective Measures updated at progress report unless specified.     Range of Motion:   Ankle Right Left   1st MTP Ext 50 deg hypomobile 70 deg hypomobile   1st MTP Flexion WNL WNL   DF ([facility]) np np   DF ([facility]) <10 deg <10 deg   Plantarflexion WNL WNL   Inversion  WNL to excessive WNL to excessive   Eversion WNL WNL      Strength:  Ankle Right Left   1st MTP Ext 4-/5 4+/5   1st MTP Flexion 3+/5 4-/5   Dorsiflexion 5/5 5/5   Plantarflexion np np   Inversion 4-/5 4+/5   Eversion 4-/5 5/5     Lower Extremity Strength:  Right LE   Left LE     Quadriceps: 4/5 Quadriceps: 5/5   Hip Flexion: 4/5 Hip Flexion: 5/5   Hip Extension:  4/5 Hip Extension: 3/5       Treatment     Bushra received the treatments listed below:      Bushra received the following manual therapy  techniques: see below were applied to the: L/S and LE for 00 minutes, including:    Dry Needling:  Patient educated on intervention of dry needling including risks, benefits, and post administration expectations including potential soreness, bruising, and fatigue. Patient demonstrated verbal understanding and consented to intervention today.    DN performed to calf TrP, perineural tibial nerve, and L/S paraspinal TrP mitra for 15 min with manual stimulation.      neuromuscular re-education activities to improve: Balance, Coordination, Kinesthetic, Sense, Proprioception, and Posture for 60 minutes. The following activities were included:     Patient education:  - diagnosis, prognosis  - L/S impairments -> neural tension  - HEP adjustments 3/27/2024    SKTC 3x30 sec ea mitra    PPT at 90/90 training  - supported PPT HS/lower abs x5 min  - supported PPT HS/lower abs/YTB for lateral hip x5 min  - unsupported 90/90 heel tap 3x20 - NT    V holds 3x burn  SLR 2x10x5 sec ea mitra - toes up  Bridge YTB 2x10x5 sec - toes up    Toe yoga 20x5 sec ea   Toe raises 20x5 sec ea    NEXT VISIT - cont proximal stabilization with SLR / intrinsic NM activation      Held:  Birddog 3x20  Clam holds 3x30 sec ea mitra  Big toe stretch in sitting w/ leg crossed 3x1 min - NT  MET for R posterior / L anterior rotation  PF towel stretch 15x10 sec  GTB eversion 3x20    therapeutic activities to improve functional performance for 00  minutes, including:     NT    Patient Education and Home Exercises       Education provided:   - see above    Written Home Exercises Provided: yes. Exercises were reviewed and Bushra was able to demonstrate them prior to the end of the session.  Bushra demonstrated good  understanding of the education provided. See EMR under Patient Instructions for exercises provided during therapy sessions    Assessment     Cont improvement in proximal discomfort over time with cont foot pain diane with weightbearing activities / shoe  wear. HEP adjusted and progressed for patient to cont to progress proximal strength in part to help offload feet along with initiating NM activation of intrinsics.    Needs cont focus on proximal -> distal stabilization to help reduce neural tension and reduce R foot pain.    Bushra Is progressing well towards her goals.   Pt prognosis is Good.     Pt will continue to benefit from skilled outpatient physical therapy to address the deficits listed in the problem list box on initial evaluation, provide pt/family education and to maximize pt's level of independence in the home and community environment.     Pt's spiritual, cultural and educational needs considered and pt agreeable to plan of care and goals.     Anticipated barriers to physical therapy: BMI, history of global pain     GOALS: Short Term Goals:  0-5 weeks  1.Report decreased R > L foot pain  < / =  1/10 at worst to increase tolerance for ADLs  2. Increase ROM by 5-10 degrees in order to walk with min to no compensation.  3. Increase strength by 1/3 MMT grade for  peroneals and EHL  to increase tolerance for ADL and work activities.  4. Negative SLR for neural tension  5. Pt to tolerate HEP to improve ROM and independence with ADL's.     Long Term Goals: 6-10 weeks  1.Report decreased LBP and foot pain  < / =  1/10 at worst to increase tolerance for ADLs  2.Patient goal: ADLs w/o complaint  3.Increase strength to 4+/5 for  core and foot musculature to increase tolerance for ADL and work activities.  4. Pt will report at CJ level (20-40% impaired) on Modified FIM score for mobility to demonstrate increase in LE function and mobility in home and community environment.     Plan     Plan of care Certification: 3/2/2024 to 6/2/2024.     Outpatient Physical Therapy 1-2 times weekly for 10-12 weeks to include the following interventions: Manual Therapy, Moist Heat/ Ice, Neuromuscular Re-ed, Patient Education, Self Care, Therapeutic Activities, Therapeutic  Exercise, and DN.     GLENYS TOPETE, PT, DPT, OCS

## 2024-04-04 ENCOUNTER — PATIENT MESSAGE (OUTPATIENT)
Dept: REHABILITATION | Facility: HOSPITAL | Age: 39
End: 2024-04-04
Payer: COMMERCIAL

## 2024-04-08 ENCOUNTER — TELEPHONE (OUTPATIENT)
Dept: SURGERY | Facility: CLINIC | Age: 39
End: 2024-04-08
Payer: COMMERCIAL

## 2024-04-09 ENCOUNTER — CLINICAL SUPPORT (OUTPATIENT)
Dept: REHABILITATION | Facility: HOSPITAL | Age: 39
End: 2024-04-09
Payer: COMMERCIAL

## 2024-04-09 DIAGNOSIS — M79.672 LEFT FOOT PAIN: ICD-10-CM

## 2024-04-09 DIAGNOSIS — M54.41 CHRONIC BILATERAL LOW BACK PAIN WITH RIGHT-SIDED SCIATICA: ICD-10-CM

## 2024-04-09 DIAGNOSIS — G89.29 CHRONIC PAIN IN RIGHT FOOT: Primary | ICD-10-CM

## 2024-04-09 DIAGNOSIS — G89.29 CHRONIC BILATERAL LOW BACK PAIN WITH RIGHT-SIDED SCIATICA: ICD-10-CM

## 2024-04-09 DIAGNOSIS — M79.671 CHRONIC PAIN IN RIGHT FOOT: Primary | ICD-10-CM

## 2024-04-09 PROCEDURE — 97112 NEUROMUSCULAR REEDUCATION: CPT

## 2024-04-09 NOTE — PROGRESS NOTES
[facility]   Physical Therapy Treatment Note      [facility]  Clinic Number: [id number]    Therapy Diagnosis:   Encounter Diagnoses   Name Primary?    Chronic pain in right foot Yes    Left foot pain     Chronic bilateral low back pain with right-sided sciatica      Physician: Estefania Escobedo DPM    Visit Date: 4/9/2024    Physician Orders: PT Eval and Treat   Medical Diagnosis from Referral:      M79.671,M79.672 (ICD-10-CM) - Foot pain, bilateral   M72.2 (ICD-10-CM) - Plantar fasciitis      Evaluation Date: 3/2/2024  Authorization Period Expiration: 2/19/2025  Plan of Care Expiration: 6/2/2024  Progress Note Due: 5/2/2024  Visit # / Visits authorized: 1/ 1; 4/29   FOTO: 1/3     Precautions: Standard      Time In: 1:00 pm  Time Out: 1:59 pm  Total Appointment Time (timed & untimed codes): 59 minutes    Subjective     Pt reports: she is doing okay, still having leg pain.  She was compliant with home exercise program.  Response to previous treatment: HEP compliance  Functional change: ongoing    Pain: 3-6/10  Location: R > L foot    Objective      Objective Measures updated at progress report unless specified.     Range of Motion:   Ankle Right Left   1st MTP Ext 50 deg hypomobile 70 deg hypomobile   1st MTP Flexion WNL WNL   DF ([facility]) np np   DF ([facility]) <10 deg <10 deg   Plantarflexion WNL WNL   Inversion  WNL to excessive WNL to excessive   Eversion WNL WNL      Strength:  Ankle Right Left   1st MTP Ext 4-/5 4+/5   1st MTP Flexion 3+/5 4-/5   Dorsiflexion 5/5 5/5   Plantarflexion np np   Inversion 4-/5 4+/5   Eversion 4-/5 5/5     Lower Extremity Strength:  Right LE   Left LE     Quadriceps: 4/5 Quadriceps: 5/5   Hip Flexion: 4/5 Hip Flexion: 5/5   Hip Extension:  4/5 Hip Extension: 3/5       Treatment     Bushra received the treatments listed below:      Bushra received the following manual therapy techniques: see below were applied to the: L/S and LE for 00 minutes,  including:    Dry Needling:  Patient educated on intervention of dry needling including risks, benefits, and post administration expectations including potential soreness, bruising, and fatigue. Patient demonstrated verbal understanding and consented to intervention today.    DN performed to calf TrP, perineural tibial nerve, and L/S paraspinal TrP mitra for 15 min with manual stimulation.      neuromuscular re-education activities to improve: Balance, Coordination, Kinesthetic, Sense, Proprioception, and Posture for 59 minutes. The following activities were included:     Patient education:  - diagnosis, prognosis  - L/S impairments -> neural tension  - HEP adjustments 3/27/2024    SKTC 3x30 sec ea mitra    90/90 with chair:  -pelvic tilts: 2 breaths 2x10  - pelvic tilts; 5 second hold, 2 x 10 reps + yellow theraband UE horizontal abduction: 2x10 with 2 diaphragmatic breaths   Pelvic tilt + march (table top): cue for breath: 4x5   Bridge YTB 2x10x5 sec - toes up  pelvic tilt + hip abd iso with red theraband + theraband UE horizontal abductiony: 2x10 with 2 diaphragmatic breaths   Toe yoga 20x5 sec ea   Toe raises 20x5 sec ea  Seated heel raise with tennis ball between heels: 3x10    Hold:  V holds 3x burn    NEXT VISIT - cont proximal stabilization with SLR / intrinsic NM activation      Held:  Birddog 3x20  Clam holds 3x30 sec ea mitra  Big toe stretch in sitting w/ leg crossed 3x1 min - NT  MET for R posterior / L anterior rotation  PF towel stretch 15x10 sec  GTB eversion 3x20    therapeutic activities to improve functional performance for 00  minutes, including:     NT    Patient Education and Home Exercises       Education provided:   - see above    Written Home Exercises Provided: yes. Exercises were reviewed and Bushra was able to demonstrate them prior to the end of the session.  Bushra demonstrated good  understanding of the education provided. See EMR under Patient Instructions for exercises provided during  therapy sessions    Assessment   Decreaed pain to 1/10 from 5/10 post therapeutic interventions. Significant difficulty with core stability due to poor motor control and endurance.     Needs cont focus on proximal -> distal stabilization to help reduce neural tension and reduce R foot pain.    Bushra Is progressing well towards her goals.   Pt prognosis is Good.     Pt will continue to benefit from skilled outpatient physical therapy to address the deficits listed in the problem list box on initial evaluation, provide pt/family education and to maximize pt's level of independence in the home and community environment.     Pt's spiritual, cultural and educational needs considered and pt agreeable to plan of care and goals.     Anticipated barriers to physical therapy: BMI, history of global pain     GOALS: Short Term Goals:  0-5 weeks  1.Report decreased R > L foot pain  < / =  1/10 at worst to increase tolerance for ADLs  2. Increase ROM by 5-10 degrees in order to walk with min to no compensation.  3. Increase strength by 1/3 MMT grade for  peroneals and EHL  to increase tolerance for ADL and work activities.  4. Negative SLR for neural tension  5. Pt to tolerate HEP to improve ROM and independence with ADL's.     Long Term Goals: 6-10 weeks  1.Report decreased LBP and foot pain  < / =  1/10 at worst to increase tolerance for ADLs  2.Patient goal: ADLs w/o complaint  3.Increase strength to 4+/5 for  core and foot musculature to increase tolerance for ADL and work activities.  4. Pt will report at CJ level (20-40% impaired) on Modified FIM score for mobility to demonstrate increase in LE function and mobility in home and community environment.     Plan     Plan of care Certification: 3/2/2024 to 6/2/2024.     Outpatient Physical Therapy 1-2 times weekly for 10-12 weeks to include the following interventions: Manual Therapy, Moist Heat/ Ice, Neuromuscular Re-ed, Patient Education, Self Care, Therapeutic Activities,  Therapeutic Exercise, and STACI.     Daniella Herbert, PT, DPT

## 2024-04-11 ENCOUNTER — PATIENT MESSAGE (OUTPATIENT)
Dept: REHABILITATION | Facility: HOSPITAL | Age: 39
End: 2024-04-11
Payer: COMMERCIAL

## 2024-04-15 ENCOUNTER — PATIENT MESSAGE (OUTPATIENT)
Dept: PODIATRY | Facility: CLINIC | Age: 39
End: 2024-04-15
Payer: COMMERCIAL

## 2024-04-18 ENCOUNTER — PATIENT MESSAGE (OUTPATIENT)
Dept: PODIATRY | Facility: CLINIC | Age: 39
End: 2024-04-18

## 2024-04-18 ENCOUNTER — CLINICAL SUPPORT (OUTPATIENT)
Dept: REHABILITATION | Facility: HOSPITAL | Age: 39
End: 2024-04-18
Payer: COMMERCIAL

## 2024-04-18 ENCOUNTER — PATIENT MESSAGE (OUTPATIENT)
Dept: PLASTIC SURGERY | Facility: CLINIC | Age: 39
End: 2024-04-18
Payer: COMMERCIAL

## 2024-04-18 ENCOUNTER — OFFICE VISIT (OUTPATIENT)
Dept: PODIATRY | Facility: CLINIC | Age: 39
End: 2024-04-18
Payer: COMMERCIAL

## 2024-04-18 VITALS
SYSTOLIC BLOOD PRESSURE: 129 MMHG | HEART RATE: 81 BPM | DIASTOLIC BLOOD PRESSURE: 82 MMHG | WEIGHT: 209 LBS | HEIGHT: 64 IN | TEMPERATURE: 99 F | BODY MASS INDEX: 35.68 KG/M2

## 2024-04-18 DIAGNOSIS — M54.17 LUMBOSACRAL RADICULOPATHY: ICD-10-CM

## 2024-04-18 DIAGNOSIS — G89.29 CHRONIC PAIN OF BOTH FEET: ICD-10-CM

## 2024-04-18 DIAGNOSIS — M79.671 CHRONIC PAIN OF BOTH FEET: ICD-10-CM

## 2024-04-18 DIAGNOSIS — G89.29 CHRONIC BILATERAL LOW BACK PAIN WITH RIGHT-SIDED SCIATICA: Primary | ICD-10-CM

## 2024-04-18 DIAGNOSIS — M79.672 CHRONIC PAIN OF BOTH FEET: ICD-10-CM

## 2024-04-18 DIAGNOSIS — G89.29 CENTRAL SENSITIZATION TO PAIN: Primary | ICD-10-CM

## 2024-04-18 DIAGNOSIS — B35.3 TINEA PEDIS OF BOTH FEET: ICD-10-CM

## 2024-04-18 DIAGNOSIS — M54.41 CHRONIC BILATERAL LOW BACK PAIN WITH RIGHT-SIDED SCIATICA: Primary | ICD-10-CM

## 2024-04-18 PROCEDURE — 99999 PR PBB SHADOW E&M-EST. PATIENT-LVL III: CPT | Mod: PBBFAC,,, | Performed by: STUDENT IN AN ORGANIZED HEALTH CARE EDUCATION/TRAINING PROGRAM

## 2024-04-18 PROCEDURE — 99214 OFFICE O/P EST MOD 30 MIN: CPT | Mod: S$GLB,,, | Performed by: STUDENT IN AN ORGANIZED HEALTH CARE EDUCATION/TRAINING PROGRAM

## 2024-04-18 PROCEDURE — 1159F MED LIST DOCD IN RCRD: CPT | Mod: CPTII,S$GLB,, | Performed by: STUDENT IN AN ORGANIZED HEALTH CARE EDUCATION/TRAINING PROGRAM

## 2024-04-18 PROCEDURE — 97112 NEUROMUSCULAR REEDUCATION: CPT

## 2024-04-18 PROCEDURE — 3008F BODY MASS INDEX DOCD: CPT | Mod: CPTII,S$GLB,, | Performed by: STUDENT IN AN ORGANIZED HEALTH CARE EDUCATION/TRAINING PROGRAM

## 2024-04-18 PROCEDURE — 3074F SYST BP LT 130 MM HG: CPT | Mod: CPTII,S$GLB,, | Performed by: STUDENT IN AN ORGANIZED HEALTH CARE EDUCATION/TRAINING PROGRAM

## 2024-04-18 PROCEDURE — 3079F DIAST BP 80-89 MM HG: CPT | Mod: CPTII,S$GLB,, | Performed by: STUDENT IN AN ORGANIZED HEALTH CARE EDUCATION/TRAINING PROGRAM

## 2024-04-18 RX ORDER — CICLOPIROX OLAMINE 7.7 MG/G
CREAM TOPICAL 2 TIMES DAILY
Qty: 90 G | Refills: 1 | Status: SHIPPED | OUTPATIENT
Start: 2024-04-18 | End: 2024-05-17

## 2024-04-18 RX ORDER — DEXAMETHASONE 4 MG/1
4 TABLET ORAL DAILY
Qty: 7 TABLET | Refills: 0 | Status: SHIPPED | OUTPATIENT
Start: 2024-04-18 | End: 2024-05-17

## 2024-04-18 RX ORDER — DULOXETIN HYDROCHLORIDE 30 MG/1
30 CAPSULE, DELAYED RELEASE ORAL DAILY
Qty: 30 CAPSULE | Refills: 11 | Status: SHIPPED | OUTPATIENT
Start: 2024-04-18 | End: 2024-04-30

## 2024-04-18 NOTE — PROGRESS NOTES
Subjective:     Patient    Bushra Kahn is a 38 y.o. female.    Problem    Previously seen by Dr Alvarez and Dr Escobedo; last seen 2 months ago post ingrown nail procedures. Has always had some pain throughout her body; sensitive to smells and motion; gets dizzy. Had flareup of bilateral foot pain in November; was given steroids by her doctor but she was afraid to take them, saw Dr Escobedo who encouraged the steroids and she tried them and the pain did temporarily improve but then returned. Currently has bilateral heel pain which sometimes extends into the lower legs; also with burning and sparking sensations in both feet and the right thigh; also with low back pain for which she is in PT. Tried ibuprofen, meloxicam, and compression socks which did not help, tried ice and massage which helps some. The pain affects her sleep.         History    History obtained from patient and review of medical records.     Past Medical History:   Diagnosis Date    Acid reflux     Allergy     Chronic hypertension with superimposed pre-eclampsia, post partum 10/21/2022    On chart review patient with essential HTN outside of pregnancy at initial [facility] visit     History of  10/10/2022    Pre-eclampsia, severe, postpartum condition 3/29/2021    Severe hypertension affecting pregnancy in third trimester 3/22/2021    Severe pre-eclampsia in third trimester 3/22/2021       Past Surgical History:   Procedure Laterality Date    ADENOIDECTOMY  age 16    biopsy of breast  Right 2014     SECTION N/A 3/24/2021    Procedure:  SECTION;  Surgeon: Denita Xiong MD;  Location: [facility] L&D;  Service: OB/GYN;  Laterality: N/A;     SECTION N/A 10/10/2022    Procedure:  SECTION;  Surgeon: Denita Xiong MD;  Location: [facility] L&D;  Service: OB/GYN;  Laterality: N/A;    CHOLECYSTECTOMY      Laparoscopic    ESOPHAGOGASTRODUODENOSCOPY N/A 2018    Procedure: ESOPHAGOGASTRODUODENOSCOPY (EGD);   Surgeon: Aurelio James MD;  Location: [facility] (4TH FLR);  Service: Endoscopy;  Laterality: N/A;    ESOPHAGOGASTRODUODENOSCOPY N/A 9/26/2023    Procedure: EGD (ESOPHAGOGASTRODUODENOSCOPY);  Surgeon: Uziel Duran MD;  Location: [facility] ENDOSCOPY;  Service: Endoscopy;  Laterality: N/A;  8.18  inst sent via [facility]   9.25 pre call attempted; [facility]    ESOPHAGOGASTRODUODENOSCOPY N/A 1/8/2024    Procedure: EGD (ESOPHAGOGASTRODUODENOSCOPY);  Surgeon: Uziel Duran MD;  Location: [facility] ENDOSCOPY;  Service: Endoscopy;  Laterality: N/A;  10/5 ref by Dr. Duran, instr. to [place]-  1/2- pre call complete. DBM  1/5 Left VM. SG        Objective:     Vitals  Wt Readings from Last 1 Encounters:   04/18/24 94.8 kg (208 lb 15.9 oz)     Temp Readings from Last 1 Encounters:   04/18/24 98.7 °F (37.1 °C) (Oral)     BP Readings from Last 1 Encounters:   04/18/24 129/82     Pulse Readings from Last 1 Encounters:   04/18/24 81       Dermatological Exam    Skin:  Pedal hair growth, skin color, and skin texture normal on right; dry peeling skin  Pedal hair growth, skin color, and skin texture normal on left; dry peeling skin    Nails:  All nails normal in length, thickness, color    Vascular Exam    Arteries:  Posterior tibial artery palpable on right  Dorsalis pedis artery palpable on right  Posterior tibial artery palpable on left  Dorsalis pedis artery palpable on left    Veins:  Superficial veins unremarkable on right  Superficial veins unremarkable on left    Swelling:  None on right  None on left    Neurological Exam    [place] touch test:  6/6 sites sensed, light touch intact    Provocation Sign:  + at tibial nerve and sural nerve bilaterally     Musculoskeletal Exam    Footwear:  Casual on right  Casual on left    Gait Exam:   Ambulatory Status: Ambulatory  Gait: Antalgic  Assistive Devices: None    Foot Progression Angle:  Normal on right  Normal on left    Right Lower Extremity Additional Findings:  Negative  windlass test  Right foot and ankle function, strength, and range of motion unremarkable except as noted above.     Left Lower Extremity Additional Findings:  Negative windlass test  Left foot and ankle function, strength, and range of motion unremarkable except as noted above.    Imaging and Other Tests    Imaging:  Independently reviewed and interpreted imaging, findings are as follows: N/A     Assessment:     Encounter Diagnoses   Name Primary?    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    Central sensitization, lumbosacral radiculopathy, bilateral foot pain: chronic exacerbated  -Dexamethasone + duloxetine.   -Recommended continuing PT.  -If not improving consider imaging, talk therapy.     Tinea pedis of both feet: chronic stable  -Topical ciclopirox until cleared.        Return to clinic in 1 month for nerve pain, call sooner PRN.

## 2024-04-18 NOTE — PROGRESS NOTES
[facility]   Physical Therapy Treatment Note      Name: Bushra Olmos [facility]  Clinic Number: [id number]    Therapy Diagnosis:   Encounter Diagnosis   Name Primary?    Chronic bilateral low back pain with right-sided sciatica Yes     Physician: Estefania Escobedo DPM    Visit Date: 4/18/2024    Physician Orders: PT Eval and Treat   Medical Diagnosis from Referral:      M79.671,M79.672 (ICD-10-CM) - Foot pain, bilateral   M72.2 (ICD-10-CM) - Plantar fasciitis      Evaluation Date: 3/2/2024  Authorization Period Expiration: 2/19/2025  Plan of Care Expiration: 6/2/2024  Progress Note Due: 5/2/2024  Visit # / Visits authorized: 1/ 1; 7/29   FOTO: 1/3     Precautions: Standard      Time In: 1255  Time Out: 1355  Total Appointment Time (timed & untimed codes): 60 minutes    Subjective     Pt reports: Flare of back and foot pain since last visit. Had FU with new podiatrist today. Will be starting steroids again. Reports L sided low back pain today that is significant.    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    She was compliant with home exercise program.  Response to previous treatment: HEP compliance  Functional change: ongoing    Pain: 3-6/10  Location: R > L foot    Objective      Objective Measures updated at progress report unless specified.     Range of Motion:   Ankle Right Left   1st MTP Ext 50 deg hypomobile 70 deg hypomobile   1st MTP Flexion WNL WNL   DF ([facility]) np np   DF ([facility]) <10 deg <10 deg   Plantarflexion WNL WNL   Inversion  WNL to excessive WNL to excessive   Eversion WNL WNL      Strength:  Ankle Right Left   1st MTP Ext 4-/5 4+/5   1st MTP Flexion 3+/5 4-/5   Dorsiflexion 5/5 5/5   Plantarflexion np np   Inversion 4-/5 4+/5   Eversion 4-/5 5/5     Lower Extremity Strength:  Right LE   Left LE     Quadriceps: 4/5 Quadriceps: 5/5   Hip Flexion: 4/5 Hip Flexion: 5/5   Hip Extension:  4/5 Hip Extension: 3/5        Treatment     Bushra received the treatments listed below:      neuromuscular re-education activities to improve: Balance, Coordination, Kinesthetic, Sense, Proprioception, and Posture for 60 minutes. The following activities were included:     Patient education:  - diagnosis, prognosis  - L/S impairments -> neural tension  - HEP adjustments 4/18/2024  - consistency with stability  - ice and swelling management    90/90 paraspinal inhibition x10 min    Cat/camel 30x5 sec  Thaddeus pose 10x10 sec  Birddog 3x10x3 sec    Birddog at counter 3x10x3 sec  Shoulder taps at counter 3x20      NEXT - stability      Held:  Heel taps  V holds 3x burn  SKTC 3x30 sec ea mitra  Bridge YTB 2x10x5 sec - toes up  Clam holds 3x30 sec ea mitra  Big toe stretch in sitting w/ leg crossed 3x1 min - NT  PF towel stretch 15x10 sec  GTB eversion 3x20        Patient Education and Home Exercises       Education provided:   - see above    Written Home Exercises Provided: yes. Exercises were reviewed and Bushra was able to demonstrate them prior to the end of the session.  Bushra demonstrated good  understanding of the education provided. See EMR under Patient Instructions for exercises provided during therapy sessions    Assessment     Patient presents with cont presentation of L/S radicular symptoms with movement coordination deficits that run into feet. Cont to have flares of symptoms that subside with management of inflammation e.g. ice at night / previous steroid use.     Today symptoms again reduced following interventions focused on L/S coordination and stability similar to at last visit. Likely had increased inflammatory response following last visit with difficulty in reducing this discomfort. 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 HEP option variety provided to patient to help with  agus Tomlinson Is progressing well towards her goals.   Pt prognosis is Good.     Pt will continue to benefit from skilled outpatient physical therapy to address the deficits listed in the problem list box on initial evaluation, provide pt/family education and to maximize pt's level of independence in the home and community environment.     Pt's spiritual, cultural and educational needs considered and pt agreeable to plan of care and goals.     Anticipated barriers to physical therapy: BMI, history of global pain     GOALS: Short Term Goals:  0-5 weeks  1.Report decreased R > L foot pain  < / =  1/10 at worst to increase tolerance for ADLs  2. Increase ROM by 5-10 degrees in order to walk with min to no compensation.  3. Increase strength by 1/3 MMT grade for  peroneals and EHL  to increase tolerance for ADL and work activities.  4. Negative SLR for neural tension  5. Pt to tolerate HEP to improve ROM and independence with ADL's.     Long Term Goals: 6-10 weeks  1.Report decreased LBP and foot pain  < / =  1/10 at worst to increase tolerance for ADLs  2.Patient goal: ADLs w/o complaint  3.Increase strength to 4+/5 for  core and foot musculature to increase tolerance for ADL and work activities.  4. Pt will report at CJ level (20-40% impaired) on Modified FIM score for mobility to demonstrate increase in LE function and mobility in home and community environment.     Plan     Plan of care Certification: 3/2/2024 to 6/2/2024.     Outpatient Physical Therapy 1-2 times weekly for 10-12 weeks to include the following interventions: Manual Therapy, Moist Heat/ Ice, Neuromuscular Re-ed, Patient Education, Self Care, Therapeutic Activities, Therapeutic Exercise, and DN.     GLENYS TOPETE, PT, DPT, OCS

## 2024-04-22 ENCOUNTER — CLINICAL SUPPORT (OUTPATIENT)
Dept: REHABILITATION | Facility: HOSPITAL | Age: 39
End: 2024-04-22
Payer: COMMERCIAL

## 2024-04-22 DIAGNOSIS — M54.41 CHRONIC BILATERAL LOW BACK PAIN WITH RIGHT-SIDED SCIATICA: Primary | ICD-10-CM

## 2024-04-22 DIAGNOSIS — G89.29 CHRONIC BILATERAL LOW BACK PAIN WITH RIGHT-SIDED SCIATICA: Primary | ICD-10-CM

## 2024-04-22 PROCEDURE — 97112 NEUROMUSCULAR REEDUCATION: CPT

## 2024-04-22 PROCEDURE — 97530 THERAPEUTIC ACTIVITIES: CPT

## 2024-04-22 RX ORDER — KETOROLAC TROMETHAMINE 10 MG/1
10 TABLET, FILM COATED ORAL EVERY 6 HOURS PRN
Qty: 20 TABLET | Refills: 0 | Status: SHIPPED | OUTPATIENT
Start: 2024-04-22 | End: 2024-04-29

## 2024-04-22 NOTE — PROGRESS NOTES
[facility]   Physical Therapy Treatment Note      Name: Bushra Olmos [facility]  Clinic Number: [id number]    Therapy Diagnosis:   Encounter Diagnosis   Name Primary?    Chronic bilateral low back pain with right-sided sciatica Yes     Physician: Estefania Escobedo DPM    Visit Date: 4/22/2024    Physician Orders: PT Eval and Treat   Medical Diagnosis from Referral:      M79.671,M79.672 (ICD-10-CM) - Foot pain, bilateral   M72.2 (ICD-10-CM) - Plantar fasciitis      Evaluation Date: 3/2/2024  Authorization Period Expiration: 2/19/2025  Plan of Care Expiration: 6/2/2024  Progress Note Due: 5/2/2024  Visit # / Visits authorized: 1/ 1; 8/29   FOTO: 1/3     Precautions: Standard      Time In: 1300  Time Out: 1355  Total Appointment Time (timed & untimed codes): 55 minutes    Subjective     Pt reports: Feeling better since last seen. Has been able to be more consistent with HEP adjustments. Also currently on steroid pack.     She was compliant with home exercise program.  Response to previous treatment: HEP compliance  Functional change: ongoing    Pain: 3-6/10  Location: R > L foot    Objective      Objective Measures updated at progress report unless specified.     Range of Motion:   Ankle Right Left   1st MTP Ext 50 deg hypomobile 70 deg hypomobile   1st MTP Flexion WNL WNL   DF ([facility]) np np   DF ([facility]) <10 deg <10 deg   Plantarflexion WNL WNL   Inversion  WNL to excessive WNL to excessive   Eversion WNL WNL      Strength:  Ankle Right Left   1st MTP Ext 4-/5 4+/5   1st MTP Flexion 3+/5 4-/5   Dorsiflexion 5/5 5/5   Plantarflexion np np   Inversion 4-/5 4+/5   Eversion 4-/5 5/5     Lower Extremity Strength:  Right LE   Left LE     Quadriceps: 4/5 Quadriceps: 5/5   Hip Flexion: 4/5 Hip Flexion: 5/5   Hip Extension:  4/5 Hip Extension: 3/5       Treatment     Bushra received the treatments listed below:      neuromuscular re-education activities to improve: Balance, Coordination, Kinesthetic, Sense,  Proprioception, and Posture for 30 minutes. The following activities were included:     Patient education:  - diagnosis, prognosis  - L/S impairments -> neural tension  - HEP adjustments 4/18/2024  - consistency with stability  - ice and swelling management    Birddog at counter 2x20x5 sec hold  Shoulder taps at counter 2x20x5 sec hold  Cat/camel -> child's pose 20x5 sec  Bridge YTB 20x5 sec  Dead bug 4x10    Review:  Birddog 3x10x3 sec      NEXT - stability / consistency -> ice at night      Held:  Heel taps  V holds 3x burn  SKTC 3x30 sec ea mitra  Clam holds 3x30 sec ea mitra  Big toe stretch in sitting w/ leg crossed 3x1 min - NT  PF towel stretch 15x10 sec  GTB eversion 3x20      Bushra participated in dynamic functional therapeutic activities to improve functional performance for 25  minutes, including:    Waddles YTB at knee x5 laps  Shuttle DL 2B with YTB 2x10 - cue slow pace  Shuttle SL 1B 2x10 ea mitra  Shuttle sidelying SL 1R 2x10 ea mitra      Patient Education and Home Exercises       Education provided:   - see above    Written Home Exercises Provided: yes. Exercises were reviewed and Bushra was able to demonstrate them prior to the end of the session.  Bushra demonstrated good  understanding of the education provided. See EMR under Patient Instructions for exercises provided during therapy sessions    Assessment     Patient presents with cont presentation of L/S radicular symptoms with movement coordination deficits that run into feet. Cont to have flares of symptoms that subside with management of inflammation e.g. ice at night / previous steroid use.     Improving ability to be consistent with HEP and improved symptoms at night diane with swelling management / steroid use.     Good lisandra to all interventions within visit.    Cont to require consistency with stability activities outside of PT.    Bushra Is progressing well towards her goals.   Pt prognosis is Good.     Pt will continue to benefit from  skilled outpatient physical therapy to address the deficits listed in the problem list box on initial evaluation, provide pt/family education and to maximize pt's level of independence in the home and community environment.     Pt's spiritual, cultural and educational needs considered and pt agreeable to plan of care and goals.     Anticipated barriers to physical therapy: BMI, history of global pain     GOALS: Short Term Goals:  0-5 weeks  1.Report decreased R > L foot pain  < / =  1/10 at worst to increase tolerance for ADLs  2. Increase ROM by 5-10 degrees in order to walk with min to no compensation.  3. Increase strength by 1/3 MMT grade for  peroneals and EHL  to increase tolerance for ADL and work activities.  4. Negative SLR for neural tension  5. Pt to tolerate HEP to improve ROM and independence with ADL's.     Long Term Goals: 6-10 weeks  1.Report decreased LBP and foot pain  < / =  1/10 at worst to increase tolerance for ADLs  2.Patient goal: ADLs w/o complaint  3.Increase strength to 4+/5 for  core and foot musculature to increase tolerance for ADL and work activities.  4. Pt will report at CJ level (20-40% impaired) on Modified FIM score for mobility to demonstrate increase in LE function and mobility in home and community environment.     Plan     Plan of care Certification: 3/2/2024 to 6/2/2024.     Outpatient Physical Therapy 1-2 times weekly for 10-12 weeks to include the following interventions: Manual Therapy, Moist Heat/ Ice, Neuromuscular Re-ed, Patient Education, Self Care, Therapeutic Activities, Therapeutic Exercise, and DN.     GLENYS TOPETE, PT, DPT, OCS

## 2024-04-30 ENCOUNTER — PATIENT MESSAGE (OUTPATIENT)
Dept: REHABILITATION | Facility: HOSPITAL | Age: 39
End: 2024-04-30
Payer: COMMERCIAL

## 2024-04-30 ENCOUNTER — CLINICAL SUPPORT (OUTPATIENT)
Dept: REHABILITATION | Facility: HOSPITAL | Age: 39
End: 2024-04-30
Payer: COMMERCIAL

## 2024-04-30 DIAGNOSIS — M54.41 CHRONIC BILATERAL LOW BACK PAIN WITH RIGHT-SIDED SCIATICA: Primary | ICD-10-CM

## 2024-04-30 DIAGNOSIS — G89.29 CHRONIC BILATERAL LOW BACK PAIN WITH RIGHT-SIDED SCIATICA: Primary | ICD-10-CM

## 2024-04-30 PROCEDURE — 97112 NEUROMUSCULAR REEDUCATION: CPT

## 2024-04-30 PROCEDURE — 97530 THERAPEUTIC ACTIVITIES: CPT

## 2024-04-30 RX ORDER — DULOXETIN HYDROCHLORIDE 20 MG/1
20 CAPSULE, DELAYED RELEASE ORAL DAILY
Qty: 30 CAPSULE | Refills: 11 | Status: SHIPPED | OUTPATIENT
Start: 2024-04-30 | End: 2024-05-17

## 2024-04-30 NOTE — TELEPHONE ENCOUNTER
Having sedative effect from 30 mg/day duloxetine; but with pain reduction. Discussed staying on 30 mg/day a little longer to see if see effects wane vs switching to 20 mg/day; will switch to 20 mg/day for now.     Followup as scheduled.    Ravi Sales DPM  Podiatric Medicine & Surgery  [facility]

## 2024-04-30 NOTE — PROGRESS NOTES
[facility]   Physical Therapy Treatment Note      Name: Bushra Olmos [facility]  Clinic Number: [id number]    Therapy Diagnosis:   Encounter Diagnosis   Name Primary?    Chronic bilateral low back pain with right-sided sciatica Yes       Physician: Estefania Escobedo DPM    Visit Date: 4/30/2024    Physician Orders: PT Eval and Treat   Medical Diagnosis from Referral:      M79.671,M79.672 (ICD-10-CM) - Foot pain, bilateral   M72.2 (ICD-10-CM) - Plantar fasciitis      Evaluation Date: 3/2/2024  Authorization Period Expiration: 2/19/2025  Plan of Care Expiration: 6/2/2024  Progress Note Due: 5/2/2024  Visit # / Visits authorized: 1/ 1; 9/29   FOTO: 1/3     Precautions: Standard      Time In: 1405  Time Out: 1500  Total Appointment Time (timed & untimed codes): 55 minutes    Subjective     Pt reports: Not consistent with HEP; maybe doing it every 3 days. Wants to know if she should join a gym. Some posterior thigh discomfort today.       FOTO IE  - 66%  FOTO 4/30/24 - 67%  FOTO Goal - 74%      She was compliant with home exercise program.  Response to previous treatment: HEP compliance  Functional change: ongoing    Pain: 3-6/10  Location: R > L foot    Objective      Objective Measures updated at progress report unless specified.     Range of Motion:   Ankle Right Left   1st MTP Ext 50 deg hypomobile 70 deg hypomobile   1st MTP Flexion WNL WNL   DF ([facility]) np np   DF ([facility]) <10 deg <10 deg   Plantarflexion WNL WNL   Inversion  WNL to excessive WNL to excessive   Eversion WNL WNL      Strength:  Ankle Right Left   1st MTP Ext 4-/5 4+/5   1st MTP Flexion 3+/5 4-/5   Dorsiflexion 5/5 5/5   Plantarflexion np np   Inversion 4-/5 4+/5   Eversion 4-/5 5/5     Lower Extremity Strength:  Right LE   Left LE     Quadriceps: 4/5 Quadriceps: 5/5   Hip Flexion: 4/5 Hip Flexion: 5/5   Hip Extension:  4/5 Hip Extension: 3/5       Treatment     Bushra received the treatments listed below:      neuromuscular  re-education activities to improve: Balance, Coordination, Kinesthetic, Sense, Proprioception, and Posture for 45 minutes. The following activities were included:     Patient education:  - diagnosis, prognosis  - L/S impairments -> neural tension  - HEP adjustments 4/18/2024  - consistency with stability  - ice and swelling management    Birddog 3x10x3 sec  Cat/camel -> child's pose 2x30x5 sec  Shoulder taps at counter 3x20x5 sec hold  Bridge YTB 20x5 sec    Review:  Birddog at counter 2x20x5 sec hold  Dead bug 4x10      NEXT - stability / consistency -> ice at night      Held:  Heel taps  V holds 3x burn  SKTC 3x30 sec ea mitra  Clam holds 3x30 sec ea mitra  Big toe stretch in sitting w/ leg crossed 3x1 min - NT  PF towel stretch 15x10 sec  GTB eversion 3x20      Bushra participated in dynamic functional therapeutic activities to improve functional performance for 10  minutes, including:    Patient education:  - consistency with HEP  - x5 min a day built into routine    Waddles YTB at knee x8 laps    REVIEW:  Shuttle DL 2B with YTB 2x10 - cue slow pace  Shuttle SL 1B 2x10 ea mitra  Shuttle sidelying SL 1R 2x10 ea mitra      Patient Education and Home Exercises       Education provided:   - see above    Written Home Exercises Provided: yes. Exercises were reviewed and Bushra was able to demonstrate them prior to the end of the session.  Bushra demonstrated good  understanding of the education provided. See EMR under Patient Instructions for exercises provided during therapy sessions    Assessment     Patient presents with cont s/s of L/S radicular symptoms with movement coordination deficits that run into feet. Cont to have flares of symptoms that subside with management of inflammation e.g. ice at night / steroid use.    Today she is feeling well diane with good swelling management, but cont to lack consistency with stability interventions for lasting changes. Cont to advise in minimal consistency with HEP.    Good lisandra to  all interventions within visit and reduced posterior thigh pain upon completion of interventions.    Bushra Is progressing well towards her goals.   Pt prognosis is Good.     Pt will continue to benefit from skilled outpatient physical therapy to address the deficits listed in the problem list box on initial evaluation, provide pt/family education and to maximize pt's level of independence in the home and community environment.     Pt's spiritual, cultural and educational needs considered and pt agreeable to plan of care and goals.     Anticipated barriers to physical therapy: BMI, history of global pain     GOALS: Short Term Goals:  0-5 weeks  1.Report decreased R > L foot pain  < / =  1/10 at worst to increase tolerance for ADLs  2. Increase ROM by 5-10 degrees in order to walk with min to no compensation.  3. Increase strength by 1/3 MMT grade for  peroneals and EHL  to increase tolerance for ADL and work activities.  4. Negative SLR for neural tension  5. Pt to tolerate HEP to improve ROM and independence with ADL's.     Long Term Goals: 6-10 weeks  1.Report decreased LBP and foot pain  < / =  1/10 at worst to increase tolerance for ADLs  2.Patient goal: ADLs w/o complaint  3.Increase strength to 4+/5 for  core and foot musculature to increase tolerance for ADL and work activities.  4. Pt will report at CJ level (20-40% impaired) on Modified FIM score for mobility to demonstrate increase in LE function and mobility in home and community environment.     Plan     Plan of care Certification: 3/2/2024 to 6/2/2024.     Outpatient Physical Therapy 1-2 times weekly for 10-12 weeks to include the following interventions: Manual Therapy, Moist Heat/ Ice, Neuromuscular Re-ed, Patient Education, Self Care, Therapeutic Activities, Therapeutic Exercise, and DN.     GLENYS TOPETE, PT, DPT, OCS

## 2024-05-15 ENCOUNTER — OFFICE VISIT (OUTPATIENT)
Dept: PLASTIC SURGERY | Facility: CLINIC | Age: 39
End: 2024-05-15
Payer: COMMERCIAL

## 2024-05-15 VITALS
DIASTOLIC BLOOD PRESSURE: 103 MMHG | OXYGEN SATURATION: 100 % | HEIGHT: 64 IN | SYSTOLIC BLOOD PRESSURE: 147 MMHG | BODY MASS INDEX: 35.68 KG/M2 | HEART RATE: 66 BPM | WEIGHT: 209 LBS

## 2024-05-15 DIAGNOSIS — N62 HYPERTROPHY OF BREAST: Primary | ICD-10-CM

## 2024-05-15 DIAGNOSIS — M25.511 CHRONIC PAIN IN RIGHT SHOULDER: ICD-10-CM

## 2024-05-15 DIAGNOSIS — L30.4 ERYTHEMA INTERTRIGO: ICD-10-CM

## 2024-05-15 DIAGNOSIS — M54.2 CERVICALGIA: ICD-10-CM

## 2024-05-15 DIAGNOSIS — G89.29 CHRONIC PAIN IN LEFT SHOULDER: ICD-10-CM

## 2024-05-15 DIAGNOSIS — M25.512 CHRONIC PAIN IN LEFT SHOULDER: ICD-10-CM

## 2024-05-15 DIAGNOSIS — G89.29 CHRONIC PAIN IN RIGHT SHOULDER: ICD-10-CM

## 2024-05-15 PROCEDURE — 3077F SYST BP >= 140 MM HG: CPT | Mod: CPTII,S$GLB,, | Performed by: SURGERY

## 2024-05-15 PROCEDURE — 1159F MED LIST DOCD IN RCRD: CPT | Mod: CPTII,S$GLB,, | Performed by: SURGERY

## 2024-05-15 PROCEDURE — 3008F BODY MASS INDEX DOCD: CPT | Mod: CPTII,S$GLB,, | Performed by: SURGERY

## 2024-05-15 PROCEDURE — 99204 OFFICE O/P NEW MOD 45 MIN: CPT | Mod: S$GLB,,, | Performed by: SURGERY

## 2024-05-15 PROCEDURE — 99999 PR PBB SHADOW E&M-EST. PATIENT-LVL III: CPT | Mod: PBBFAC,,, | Performed by: SURGERY

## 2024-05-15 PROCEDURE — 3080F DIAST BP >= 90 MM HG: CPT | Mod: CPTII,S$GLB,, | Performed by: SURGERY

## 2024-05-15 NOTE — PROGRESS NOTES
"  History & Physical    SUBJECTIVE:   Chief complaint: large breasts    History of Present Illness:  Bushra Kahn presents to [facility] 2ND FLOOR on 5/15/2024 for evaluation for bilateral breast reduction secondary to symptomatic bilateral large pendulous breast. She has a chief complaint of chronic neck and back pain for many years. She has tried ibuprofen, aleve, tylenol, and muscle relaxants without alleviation of pain. She also complains of deep shoulder grooving from her bra straps as well as macerating rashes below each breast that have not significantly improved with application of medicated ointments and creams. Pt has used cortisone and baby powder without relief. She currently reports a bra size of HH. She is employed as a nurse. She denies smoking tobacco or the use of any nicotine containing products.     PMH: hypertension, sciatica, GERD, increased liver enzymes    Review of Systems:   HENT: Positive for neck pain.    Musculoskeletal: Positive for back pain.   Neurological: Negative for headaches or dizziness      OBJECTIVE:     BP (!) 147/103 (BP Location: Left arm, Patient Position: Sitting, BP Method: Large (Automatic))   Pulse 66   Ht 5' 4" (1.626 m)   Wt 94.8 kg (208 lb 15.9 oz)   SpO2 100%   BMI 35.87 kg/m²       Physical Exam:  WD WN NAD  VSS  Normal resp effort  Chest: Effort normal and breath sounds normal. Bilaterally enlarged breasts, evidence of previous rashes, shoulder grooving, no palpable masses, nipple everted      Last MMG N/A    ASSESSMENT/PLAN:     1.Symptomatic Bilateral Macromastia  2. Chronic neck pain  3. Chronic back pain  4. Chronic breast rashes    PLAN:Plan    -Pt was seen and evaluated by myself and Dr. Shawn Holder.   -Plan for bilateral breast reduction and will need approximately 1600 gm reduction per side with the intention of symptomatic relief.   -Will submit paper work for insurance approval  -Photos to be obtained  -Pt aware " of potential risk of nipple loss. Pt informed that we can do a 3D nipple tattoo or free nipple graft. Pt aware she must be prepared if it should come to this.   -Risk, benefits, and alternatives explained. She understands that the risks include but are not limited to bleeding, scarring, infection, pain, numbness, asymmetry, deformity, open wound, skin necrosis, wound dehiscence, permanent or temporary loss of sensation to the nipple, partial or total nipple loss requiring removal, nipple malposition, poor cosmetic outcome, hematoma, seroma and pulmonary emobolus. Discussed with patient that we will not remove any axillary breast tissue or any tissue towards the back as we will not flip the patient during surgery. The patient fully understands that she may have axillary fullness and would like to proceed.   - Patient understands that this may not relieve her of all of her symptoms; however, she wishes to proceed.   - Patient would like to proceed with scheduling bilateral breast reduction pending insurance authorization  - My time with the patient includes face to face time and non-face to face time preparing to see the patient (eg, review of tests), obtaining and/or reviewing separately obtained history, documenting clinical information in the electronic or other health record, independently interpreting results and communicating results to the patient/family/caregiver, or care coordinator.    All questions were answered. The patient was advised to call the clinic with any questions or concerns prior to their next visit.     Esme Durbin PA-C  Plastic and Reconstructive Surgery   [phone]

## 2024-05-17 ENCOUNTER — OFFICE VISIT (OUTPATIENT)
Dept: PODIATRY | Facility: CLINIC | Age: 39
End: 2024-05-17
Payer: COMMERCIAL

## 2024-05-17 DIAGNOSIS — G89.29 CENTRAL SENSITIZATION TO PAIN: Primary | ICD-10-CM

## 2024-05-17 PROCEDURE — 99213 OFFICE O/P EST LOW 20 MIN: CPT | Mod: 95,,, | Performed by: STUDENT IN AN ORGANIZED HEALTH CARE EDUCATION/TRAINING PROGRAM

## 2024-05-17 RX ORDER — PREDNISONE 10 MG/1
10 TABLET ORAL DAILY
Qty: 30 TABLET | Refills: 0 | Status: SHIPPED | OUTPATIENT
Start: 2024-05-17 | End: 2024-05-23

## 2024-05-17 RX ORDER — DULOXETIN HYDROCHLORIDE 30 MG/1
30 CAPSULE, DELAYED RELEASE ORAL DAILY
Qty: 30 CAPSULE | Refills: 11 | Status: SHIPPED | OUTPATIENT
Start: 2024-05-17 | End: 2024-05-23

## 2024-05-17 NOTE — PROGRESS NOTES
The patient location is: home  The chief complaint leading to consultation is: BLE nerve pain    Visit type: audiovisual    Face to Face time with patient: 10 min  15 minutes of total time spent on the encounter, which includes face to face time and non-face to face time preparing to see the patient (eg, review of tests), Obtaining and/or reviewing separately obtained history, Documenting clinical information in the electronic or other health record, Independently interpreting results (not separately reported) and communicating results to the patient/family/caregiver, or Care coordination (not separately reported).         Each patient to whom he or she provides medical services by telemedicine is:  (1) informed of the relationship between the physician and patient and the respective role of any other health care provider with respect to management of the patient; and (2) notified that he or she may decline to receive medical services by telemedicine and may withdraw from such care at any time.    Notes:     At last visit started dexamethasone + 30 mg/day duloxetine for central sensitization, radiculopathy, bilateral foot pain. Had significant improvement in pain but with brain fog and sedation; decreased dose to 20 mg/day and added temporary Ketorolac. No improvement with Ketorolac. Pain has worsened since decreasing to 20 mg but is still better than before the medications. She is also sleeping much better than she has in a while.     -Increase duloxetine to 30 mg/day.  -Prednisone PRN.     RTC in 1 month for nerve pain, sooner PRN.      pcp

## 2024-05-20 ENCOUNTER — PATIENT MESSAGE (OUTPATIENT)
Dept: PODIATRY | Facility: CLINIC | Age: 39
End: 2024-05-20
Payer: COMMERCIAL

## 2024-05-23 ENCOUNTER — TELEPHONE (OUTPATIENT)
Dept: INTERNAL MEDICINE | Facility: CLINIC | Age: 39
End: 2024-05-23

## 2024-05-23 ENCOUNTER — PATIENT MESSAGE (OUTPATIENT)
Dept: INTERNAL MEDICINE | Facility: CLINIC | Age: 39
End: 2024-05-23

## 2024-05-23 ENCOUNTER — PATIENT MESSAGE (OUTPATIENT)
Dept: PODIATRY | Facility: CLINIC | Age: 39
End: 2024-05-23
Payer: COMMERCIAL

## 2024-05-23 ENCOUNTER — LAB VISIT (OUTPATIENT)
Dept: LAB | Facility: HOSPITAL | Age: 39
End: 2024-05-23
Payer: COMMERCIAL

## 2024-05-23 ENCOUNTER — HOSPITAL ENCOUNTER (OUTPATIENT)
Dept: RADIOLOGY | Facility: HOSPITAL | Age: 39
Discharge: HOME | End: 2024-05-23
Attending: SURGERY
Payer: COMMERCIAL

## 2024-05-23 ENCOUNTER — OFFICE VISIT (OUTPATIENT)
Dept: INTERNAL MEDICINE | Facility: CLINIC | Age: 39
End: 2024-05-23
Payer: COMMERCIAL

## 2024-05-23 VITALS
BODY MASS INDEX: 34.89 KG/M2 | DIASTOLIC BLOOD PRESSURE: 86 MMHG | SYSTOLIC BLOOD PRESSURE: 144 MMHG | HEART RATE: 76 BPM | HEIGHT: 64 IN | WEIGHT: 204.38 LBS | OXYGEN SATURATION: 96 %

## 2024-05-23 DIAGNOSIS — Z12.31 SCREENING MAMMOGRAM FOR HIGH-RISK PATIENT: ICD-10-CM

## 2024-05-23 DIAGNOSIS — Z01.810 PRE-OPERATIVE CARDIOVASCULAR EXAMINATION: ICD-10-CM

## 2024-05-23 DIAGNOSIS — L98.9 SKIN DISORDER: ICD-10-CM

## 2024-05-23 DIAGNOSIS — M25.519 PAIN IN JOINT, SHOULDER REGION: ICD-10-CM

## 2024-05-23 DIAGNOSIS — Z01.812 PRE-OPERATIVE LABORATORY EXAMINATION: ICD-10-CM

## 2024-05-23 DIAGNOSIS — N62 HYPERTROPHY OF BREAST: ICD-10-CM

## 2024-05-23 DIAGNOSIS — I10 HYPERTENSION, UNSPECIFIED TYPE: ICD-10-CM

## 2024-05-23 DIAGNOSIS — M54.2 NECK PAIN: ICD-10-CM

## 2024-05-23 DIAGNOSIS — Z12.31 VISIT FOR SCREENING MAMMOGRAM: ICD-10-CM

## 2024-05-23 DIAGNOSIS — Z42.1 ENCOUNTER FOR BREAST RECONSTRUCTION FOLLOWING MASTECTOMY: ICD-10-CM

## 2024-05-23 DIAGNOSIS — M54.6 PAIN IN THORACIC SPINE: ICD-10-CM

## 2024-05-23 DIAGNOSIS — E66.01 SEVERE OBESITY (BMI 35.0-39.9) WITH COMORBIDITY: ICD-10-CM

## 2024-05-23 DIAGNOSIS — M25.50 ARTHRALGIA, UNSPECIFIED JOINT: Primary | ICD-10-CM

## 2024-05-23 LAB
APTT PPP: 26.5 SEC (ref 21–32)
ERYTHROCYTE [DISTWIDTH] IN BLOOD BY AUTOMATED COUNT: 13 % (ref 11.5–14.5)
HCT VFR BLD AUTO: 44.2 % (ref 37–48.5)
HGB BLD-MCNC: 14.1 G/DL (ref 12–16)
INR PPP: 0.9 (ref 0.8–1.2)
MCH RBC QN AUTO: 28.2 PG (ref 27–31)
MCHC RBC AUTO-ENTMCNC: 31.9 G/DL (ref 32–36)
MCV RBC AUTO: 88 FL (ref 82–98)
PLATELET # BLD AUTO: 232 K/UL (ref 150–450)
PMV BLD AUTO: 10.8 FL (ref 9.2–12.9)
PROTHROMBIN TIME: 10.1 SEC (ref 9–12.5)
RBC # BLD AUTO: 5 M/UL (ref 4–5.4)
WBC # BLD AUTO: 6.58 K/UL (ref 3.9–12.7)

## 2024-05-23 PROCEDURE — 3079F DIAST BP 80-89 MM HG: CPT | Mod: CPTII,S$GLB,, | Performed by: INTERNAL MEDICINE

## 2024-05-23 PROCEDURE — 85610 PROTHROMBIN TIME: CPT

## 2024-05-23 PROCEDURE — 99214 OFFICE O/P EST MOD 30 MIN: CPT | Mod: S$GLB,,, | Performed by: INTERNAL MEDICINE

## 2024-05-23 PROCEDURE — 77063 BREAST TOMOSYNTHESIS BI: CPT | Mod: 26,,, | Performed by: RADIOLOGY

## 2024-05-23 PROCEDURE — 77063 BREAST TOMOSYNTHESIS BI: CPT | Mod: TC

## 2024-05-23 PROCEDURE — 85027 COMPLETE CBC AUTOMATED: CPT

## 2024-05-23 PROCEDURE — 99999 PR PBB SHADOW E&M-EST. PATIENT-LVL III: CPT | Mod: PBBFAC,,, | Performed by: INTERNAL MEDICINE

## 2024-05-23 PROCEDURE — 77067 SCR MAMMO BI INCL CAD: CPT | Mod: 26,,, | Performed by: RADIOLOGY

## 2024-05-23 PROCEDURE — 3077F SYST BP >= 140 MM HG: CPT | Mod: CPTII,S$GLB,, | Performed by: INTERNAL MEDICINE

## 2024-05-23 PROCEDURE — 36415 COLL VENOUS BLD VENIPUNCTURE: CPT

## 2024-05-23 PROCEDURE — 3008F BODY MASS INDEX DOCD: CPT | Mod: CPTII,S$GLB,, | Performed by: INTERNAL MEDICINE

## 2024-05-23 PROCEDURE — 85730 THROMBOPLASTIN TIME PARTIAL: CPT

## 2024-05-23 RX ORDER — AMLODIPINE BESYLATE 2.5 MG/1
2.5 TABLET ORAL DAILY
Qty: 90 TABLET | Refills: 3 | Status: SHIPPED | OUTPATIENT
Start: 2024-05-23 | End: 2025-05-23

## 2024-05-23 RX ORDER — GABAPENTIN 100 MG/1
CAPSULE ORAL
Qty: 90 CAPSULE | Refills: 6 | Status: SHIPPED | OUTPATIENT
Start: 2024-05-23

## 2024-05-23 RX ORDER — PANTOPRAZOLE SODIUM 40 MG/1
40 TABLET, DELAYED RELEASE ORAL 2 TIMES DAILY
Qty: 180 TABLET | Refills: 1 | Status: SHIPPED | OUTPATIENT
Start: 2024-05-23 | End: 2025-05-23

## 2024-05-23 NOTE — PROGRESS NOTES
Subjective:       Patient ID: Bushra Kahn is a 39 y.o. female.    Chief Complaint: Annual Exam and Stress    HPIPt with back pain going into her R leg and severe R foot pain.  Feels more anxious on cymbalta.  No CP or SOB.  Review of Systems   Constitutional:  Positive for unexpected weight change. Negative for activity change.   HENT:  Negative for hearing loss, rhinorrhea and trouble swallowing.    Eyes:  Negative for discharge and visual disturbance.   Respiratory:  Negative for chest tightness, shortness of breath (PND or orthopnea) and wheezing.    Cardiovascular:  Negative for chest pain and palpitations.   Gastrointestinal:  Negative for abdominal pain, blood in stool, constipation, diarrhea, nausea and vomiting.   Endocrine: Negative for polydipsia and polyuria.   Genitourinary:  Negative for difficulty urinating, dysuria, hematuria and menstrual problem.   Musculoskeletal:  Positive for arthralgias. Negative for joint swelling and neck pain.   Neurological:  Negative for seizures, syncope, weakness and headaches.   Psychiatric/Behavioral:  Negative for confusion and dysphoric mood.        Objective:      Physical Exam  Constitutional:       General: She is not in acute distress.     Appearance: She is well-developed.   HENT:      Head: Normocephalic.   Eyes:      Pupils: Pupils are equal, round, and reactive to light.   Neck:      Thyroid: No thyromegaly.      Vascular: No JVD.   Cardiovascular:      Rate and Rhythm: Normal rate and regular rhythm.      Heart sounds: Normal heart sounds. No murmur heard.     No friction rub. No gallop.   Pulmonary:      Effort: Pulmonary effort is normal.      Breath sounds: Normal breath sounds. No wheezing or rales.   Abdominal:      General: Bowel sounds are normal. There is no distension.      Palpations: Abdomen is soft. There is no mass.      Tenderness: There is no abdominal tenderness. There is no guarding or rebound.   Musculoskeletal:      Cervical  back: Neck supple.      Comments: Tender in arch of R foot.    Lymphadenopathy:      Cervical: No cervical adenopathy.   Skin:     General: Skin is warm and dry.   Neurological:      Mental Status: She is alert and oriented to person, place, and time.      Deep Tendon Reflexes: Reflexes are normal and symmetric.   Psychiatric:         Behavior: Behavior normal.         Thought Content: Thought content normal.         Judgment: Judgment normal.         Assessment:       1. Arthralgia, unspecified joint    2. Hypertension, unspecified type        Plan:   Arthralgia, unspecified joint  -     Ambulatory referral/consult to Rheumatology; Future; Expected date: 05/30/2024  -     Ambulatory referral/consult to [facility] ([employer]); Future; Expected date: 05/30/2024  -     Sedimentation rate; Future; Expected date: 05/23/2024  -     C-Reactive Protein; Future; Expected date: 05/23/2024    Hypertension, unspecified type  -     Comprehensive Metabolic Panel; Future; Expected date: 05/23/2024  -     TSH; Future; Expected date: 05/23/2024  -     Hemoglobin A1C; Future; Expected date: 05/23/2024    Other orders  -     [facility] ASSIGN QUESTIONNAIRE SERIES ([employer])  -     [facility] Patient Entered [facility] ([employer])  -     gabapentin (NEURONTIN) 100 MG capsule; Take 1 capsule at bedtime for 4 days then 2 capsules at bedtime for 4 days then 3 capsules at bedtime indefinitely  Dispense: 90 capsule; Refill: 6  -     amLODIPine (NORVASC) 2.5 MG tablet; Take 1 tablet (2.5 mg total) by mouth once daily.  Dispense: 90 tablet; Refill: 3  -     pantoprazole (PROTONIX) 40 MG tablet; Take 1 tablet (40 mg total) by mouth 2 (two) times daily.  Dispense: 180 tablet; Refill: 1    Wean off cymbalta  Increase support to treat plantar fasciitis - wants to work on foot before her back  Severe Obesity  WOrking on diet and exericse  Answers submitted by the patient for this visit:  Review of Systems Questionnaire (Submitted on  5/23/2024)  activity change: No  unexpected weight change: Yes  neck pain: No  hearing loss: No  rhinorrhea: No  trouble swallowing: No  eye discharge: No  visual disturbance: No  chest tightness: No  wheezing: No  chest pain: No  palpitations: No  blood in stool: No  constipation: No  vomiting: No  diarrhea: No  polydipsia: No  polyuria: No  difficulty urinating: No  hematuria: No  menstrual problem: No  dysuria: No  joint swelling: No  arthralgias: Yes  headaches: No  weakness: No  confusion: No  dysphoric mood: No

## 2024-05-27 NOTE — TELEPHONE ENCOUNTER
Patient is asking who is able to provide injections for her foot pain. She is asking if pain management could do this

## 2024-05-29 PROBLEM — E66.01 SEVERE OBESITY (BMI 35.0-39.9) WITH COMORBIDITY: Status: ACTIVE | Noted: 2024-05-29

## 2024-06-04 ENCOUNTER — OFFICE VISIT (OUTPATIENT)
Dept: PAIN MEDICINE | Facility: CLINIC | Age: 39
End: 2024-06-04
Payer: COMMERCIAL

## 2024-06-04 ENCOUNTER — HOSPITAL ENCOUNTER (OUTPATIENT)
Dept: RADIOLOGY | Facility: HOSPITAL | Age: 39
Discharge: HOME | End: 2024-06-04
Attending: EMERGENCY MEDICINE
Payer: COMMERCIAL

## 2024-06-04 ENCOUNTER — PATIENT MESSAGE (OUTPATIENT)
Dept: PAIN MEDICINE | Facility: CLINIC | Age: 39
End: 2024-06-04

## 2024-06-04 VITALS
SYSTOLIC BLOOD PRESSURE: 143 MMHG | BODY MASS INDEX: 34.89 KG/M2 | HEART RATE: 78 BPM | HEIGHT: 64 IN | WEIGHT: 204.38 LBS | DIASTOLIC BLOOD PRESSURE: 86 MMHG

## 2024-06-04 DIAGNOSIS — M46.1 SACROILIITIS: ICD-10-CM

## 2024-06-04 DIAGNOSIS — M46.1 SACROILIITIS: Primary | ICD-10-CM

## 2024-06-04 PROCEDURE — 73501 X-RAY EXAM HIP UNI 1 VIEW: CPT | Mod: TC,RT

## 2024-06-04 PROCEDURE — 73501 X-RAY EXAM HIP UNI 1 VIEW: CPT | Mod: 26,RT,, | Performed by: RADIOLOGY

## 2024-06-04 PROCEDURE — 99204 OFFICE O/P NEW MOD 45 MIN: CPT | Mod: S$GLB,,, | Performed by: EMERGENCY MEDICINE

## 2024-06-04 PROCEDURE — 99999 PR PBB SHADOW E&M-EST. PATIENT-LVL III: CPT | Mod: PBBFAC,,, | Performed by: EMERGENCY MEDICINE

## 2024-06-04 PROCEDURE — 1159F MED LIST DOCD IN RCRD: CPT | Mod: CPTII,S$GLB,, | Performed by: EMERGENCY MEDICINE

## 2024-06-04 PROCEDURE — 3079F DIAST BP 80-89 MM HG: CPT | Mod: CPTII,S$GLB,, | Performed by: EMERGENCY MEDICINE

## 2024-06-04 PROCEDURE — 3008F BODY MASS INDEX DOCD: CPT | Mod: CPTII,S$GLB,, | Performed by: EMERGENCY MEDICINE

## 2024-06-04 PROCEDURE — 3077F SYST BP >= 140 MM HG: CPT | Mod: CPTII,S$GLB,, | Performed by: EMERGENCY MEDICINE

## 2024-06-04 RX ORDER — MELOXICAM 15 MG/1
15 TABLET ORAL DAILY PRN
Qty: 30 TABLET | Refills: 0 | Status: SHIPPED | OUTPATIENT
Start: 2024-06-04 | End: 2024-07-04

## 2024-06-04 NOTE — PROGRESS NOTES
.  Chief Complaint   Patient presents with    Hip Pain    Foot Pain        Original HPI 24: Bushra Kahn is a 39 y.o. year old female patient who has a past medical history of Acid reflux, Allergy, Chronic hypertension with superimposed pre-eclampsia, post partum, History of , Pre-eclampsia, severe, postpartum condition, Severe hypertension affecting pregnancy in third trimester, and Severe pre-eclampsia in third trimester. She presents in referral from No ref. provider found for chronic pain    Original Pain Description:  The pain is located in the right hip, buttock and posterior thigh. The pain is described as burning, sharp, and shooting. Exacerbating factors: Walking. Mitigating factors heat and medications. Symptoms interfere with daily activity. The patient feels like symptoms have been worsening. Patient denies loss of sensations. She also reports a pain near her left ASIS that is worse with movements.     The patient also complaints of pain in her bilateral feet pain, right is worse than left. Patient has been evaluated by Podiatry for evaluation of these pains. She tried Cymbalta but it gave her side effects. She reports that it did help her pain.  Patient has been evaluated by rheumatology for polyarthralgia and was told she had inconclusive etiology. She was offered plaquenil, but hasn't started it. She has been started on hali but has been concerned about starting it.       Pain Assessment  Pain Score:   3     PAIN SCORES:  Best: Pain is 3  Current: Pain is 3  Worst: Pain is 10        2024     8:17 AM   Last 3 PDI Scores   Pain Disability Index (PDI) 56     6 weeks of Conservative therapy:  PT: Completed  Chiro:  HEP: Participating     Treatments / Medications: (Ice/Heat/NSAIDS/APAP/etc):  Gabapentin 100 mg   Advil PRN    Antiplatelets/Anticoagulants:  NA    Interventional Pain Procedures: (Previous injections)  NA    IMAGING:    Pending  Past Surgical History:   Procedure  Laterality Date    ADENOIDECTOMY  age 16    biopsy of breast  Right 2014    BREAST BIOPSY Right     benign     SECTION N/A 2021    Procedure:  SECTION;  Surgeon: Denita Xiong MD;  Location: [facility] L&D;  Service: OB/GYN;  Laterality: N/A;     SECTION N/A 10/10/2022    Procedure:  SECTION;  Surgeon: Denita Xiong MD;  Location: [facility] L&D;  Service: OB/GYN;  Laterality: N/A;    CHOLECYSTECTOMY      Laparoscopic    ESOPHAGOGASTRODUODENOSCOPY N/A 2018    Procedure: ESOPHAGOGASTRODUODENOSCOPY (EGD);  Surgeon: Aurelio James MD;  Location: [address]);  Service: Endoscopy;  Laterality: N/A;    ESOPHAGOGASTRODUODENOSCOPY N/A 2023    Procedure: EGD (ESOPHAGOGASTRODUODENOSCOPY);  Surgeon: Uziel Duran MD;  Location: [facility] ENDOSCOPY;  Service: Endoscopy;  Laterality: N/A;  8.18  inst sent via [facility]   9.25 pre call attempted; [facility]    ESOPHAGOGASTRODUODENOSCOPY N/A 2024    Procedure: EGD (ESOPHAGOGASTRODUODENOSCOPY);  Surgeon: Uziel Duran MD;  Location: [facility] ENDOSCOPY;  Service: Endoscopy;  Laterality: N/A;  10/5 ref by Dr. Duran, instr. to [facility]-  - pre call complete. DB   Left VM. SG       Social History     Socioeconomic History    Marital status:    Tobacco Use    Smoking status: Former     Current packs/day: 0.00     Average packs/day: 0.1 packs/day for 13.0 years (1.3 ttl pk-yrs)     Types: Cigarettes     Start date: 8/15/2005     Quit date: 8/15/2018     Years since quittin.8     Passive exposure: Never    Smokeless tobacco: Never   Substance and Sexual Activity    Alcohol use: Not Currently     Comment: couple of servings per year, no daily or heavy alcohol use     Drug use: No    Sexual activity: Yes     Partners: Male     Birth control/protection: None     Comment:  2016, together since : Huang     Social Determinants of Health     Financial Resource Strain: Patient  "Declined (1/18/2024)    Overall Financial Resource Strain (CARDIA)     Difficulty of Paying Living Expenses: Patient declined   Food Insecurity: Patient Declined (1/18/2024)    Hunger Vital Sign     Worried About Running Out of Food in the Last Year: Patient declined     Ran Out of Food in the Last Year: Patient declined   Transportation Needs: Patient Declined (1/18/2024)    PRAPARE - Transportation     Lack of Transportation (Medical): Patient declined     Lack of Transportation (Non-Medical): Patient declined   Physical Activity: Unknown (1/18/2024)    Exercise Vital Sign     Days of Exercise per Week: Patient declined     Minutes of Exercise per Session: 20 min   Stress: Patient Declined (1/18/2024)    [employer] [place] [employer] - Occupational Stress Questionnaire     Feeling of Stress : Patient declined   Housing Stability: Patient Declined (1/18/2024)    Housing Stability Vital Sign     Unable to Pay for Housing in the Last Year: Patient declined     Number of Places Lived in the Last Year: 1     Unstable Housing in the Last Year: Patient declined       Medications/Allergies: See med card    ROS:  GENERAL: No fever. No chills. No fatigue. Denies weight loss. Denies weight gain.  Back / musculoskeletal / neuro : See HPI    VITALS:   Vitals:    06/04/24 0817   BP: (!) 143/86   Pulse: 78   Weight: 92.7 kg (204 lb 5.9 oz)   Height: 5' 4" (1.626 m)   PainSc:   3   PainLoc: Hip     Body mass index is 35.08 kg/m².      6/4/2024     8:17 AM   Last 3 PDI Scores   Pain Disability Index (PDI) 56       PHYSICAL EXAM:   GENERAL: Well appearing, in no acute distress, alert and oriented x3.  PSYCH:  Mood and affect appropriate.  SKIN: Skin color, texture, turgor normal, no rashes or lesions.  HEENT:  Normocephalic, atraumatic. Cranial nerves grossly intact.  NECK: No pain to palpation over the cervical paraspinous muscles. No pain to palpation over facets. No pain with neck flexion, extension, or lateral " flexion.   PULM: No evidence of respiratory difficulty, symmetric chest rise.  GI:  Non-distended  BACK: Normal range of motion. No pain to palpation over the spinous processes. No pain to palpation over facet joints. Positive tenderness to palpation over right SIJ. Positive FERNANDO, Ganselin, and thigh thrust tests. FADIR negative.   EXTREMITIES: No deformities, edema, or skin discoloration.   MUSCULOSKELETAL: Shoulder, hip, and knee provocative maneuvers are negative. No atrophy is noted. TTP left GTB  NEURO: Sensation is equal and appropriate bilaterally. Bilateral upper and lower extremity strength is normal and symmetric. Bilateral upper and lower extremity coordination and muscle stretch reflexes are physiologic and symmetric. Plantar response are downgoing. Straight leg raising in the supine position is negative to radicular pain.   GAIT: normal.      LABS:    Lab Results   Component Value Date    HGBA1C 5.2 03/03/2023       Lab Results   Component Value Date    WBC 6.58 05/23/2024    HGB 14.1 05/23/2024    HCT 44.2 05/23/2024    MCV 88 05/23/2024     05/23/2024           ASSESSMENT: 39 y.o. year old female with pain, consistent with:    Encounter Diagnosis   Name Primary?    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  She was previously under the care of a rheumatologist and currently under the care of a podiatrist.  We discussed that she should continue her care with Podiatry for her bilateral foot pain as this does not appear to be a radiculopathy.  We discussed following up with rheumatology to continue care for her polyarthralgia. For now I will focus on her sacroiliitis    PLAN:  - I have stressed the importance of physical activity and a home exercise plan to help with pain and improve health.  - Patient can continue with medications for now since they are providing benefits, using them  appropriately, and without side effects.  - Counseled patient regarding the importance of activity modification and constant sleeping habits.  - Interventions: Schedule for a Diagnostic/Therapeutic right SI joint injection under fluoroscopy to help with their pain and to progress with a home exercise program.. Explained the risks and benefits of the procedure in detail with the patient today in clinic along with alternative treatment options, and the patient elected to pursue the intervention at this time.    - Medications: Start Mobic 15mg daily PRN with food to help with pain and inflammation.      Encourage patient to weigh the pros and cons of taking the gabapentin and the potential pain relief versus side effects and to discuss with prescribing provider  - Imaging: Reviewed available imaging with patient and answered any questions they had regarding study. XR- right hip and pelvis  - The patient's pathophysiology was explained in detail with reference to x-rays, models, other visual aids as appropriate.   - Follow up visit: return to clinic in 3-4 weeks after proceed      I would like to thank No ref. provider found for the opportunity to assist in the care of this patient. We had a very nice visit and I look forward to continuing their care. Please let me know if I can be of further assistance.     Pierce Chaves MD  06/04/2024

## 2024-06-07 ENCOUNTER — TELEPHONE (OUTPATIENT)
Dept: PAIN MEDICINE | Facility: CLINIC | Age: 39
End: 2024-06-07
Payer: COMMERCIAL

## 2024-06-07 DIAGNOSIS — M46.1 SACROILIITIS: Primary | ICD-10-CM

## 2024-06-07 NOTE — TELEPHONE ENCOUNTER
----- Message from Pierce Rajan MD sent at 2024 10:59 AM CDT -----  Regarding: Order for HERMILA VILLALOBOS    Patient Name: HERMILA VILLALOBOS([id number])  Sex: Female  : 1985      PCP: CHHAYA VENCES    Center: [facility] CENTRAL BILLING OFFICE     Level of Service:62555     PA OFFICE/OUTPT VISIT, NEW, LEVL IV, 45-59 MIN    Types of orders made on 2024: Procedure Request    Order Date:2024  Ordering User:  PIERCE RAJAN [[id number]]  Encounter Provider:Pierce Rajan MD [[id number]]  Authorizing Provider: Pierce Rajan MD [[id number]]  Department:[facility][[id number]]    Common Order Information  Procedure -> Sacroiliac Injection (Specify laterality) Cmt: Right    Order Specific Information  Order: Procedure Order to Pain Management [Custom: [id number]]  Order #:          [id number]: 1 FUTURE    Priority  : Routine  Class: Clinic Performed    Future Order Information      Expires on:2025            Expected by:2024                   Associated Diagnoses      M46.1 Sacroiliitis      Physician -> marsahll         Facility Name: -> [place]           Priority: Routine  Class: Clinic Performed    Future Order Information      Expires on:2025            Expected by:                   Associated Diagnoses      M46.1 Sacroiliitis      Procedure -> Sacroiliac Injection (Specify laterality) Cmt: Right        Physician -> marshall         Facility Name: -> [place] [facility]

## 2024-06-09 ENCOUNTER — PATIENT MESSAGE (OUTPATIENT)
Dept: PODIATRY | Facility: CLINIC | Age: 39
End: 2024-06-09
Payer: COMMERCIAL

## 2024-06-13 ENCOUNTER — PATIENT MESSAGE (OUTPATIENT)
Dept: ADMINISTRATIVE | Facility: OTHER | Age: 39
End: 2024-06-13
Payer: COMMERCIAL

## 2024-07-01 ENCOUNTER — PATIENT MESSAGE (OUTPATIENT)
Dept: PAIN MEDICINE | Facility: CLINIC | Age: 39
End: 2024-07-01
Payer: COMMERCIAL

## 2024-07-05 ENCOUNTER — OFFICE VISIT (OUTPATIENT)
Dept: OPTOMETRY | Facility: CLINIC | Age: 39
End: 2024-07-05
Payer: COMMERCIAL

## 2024-07-05 DIAGNOSIS — H52.13 MYOPIA OF BOTH EYES: Primary | ICD-10-CM

## 2024-07-05 DIAGNOSIS — Z97.3 WEARS CONTACT LENSES: ICD-10-CM

## 2024-07-05 DIAGNOSIS — Z46.0 FITTING AND ADJUSTMENT OF SPECTACLES AND CONTACT LENSES: Primary | ICD-10-CM

## 2024-07-05 PROCEDURE — 99999 PR PBB SHADOW E&M-EST. PATIENT-LVL III: CPT | Mod: PBBFAC,,, | Performed by: OPTOMETRIST

## 2024-07-05 NOTE — PROGRESS NOTES
Contact lens exam done, see exam of same date for documentation.    Smoking Cessation Group Session #3    Site: [facility]  Date:  3/7/2019  Clinical Status of Patient: Outpatient   Length of Service and Code: 90 minutes - 58209   Number in Attendance: 4  Group Activities/Focus of Group:  completion of TCRS (Tobacco Cessation Rating Scale) reviewed strategies, controlling environment, cues, triggers, new goals set. Introduced high risk situations with preparation interventions, caffeine similarities with withdrawal issues of habit and nicotine, Alcohol, Understanding urges, cravings, stress and relaxation. Open discussion with intervention discussion.    Target symptoms:  withdrawal and medication side effects             The following were rated moderate (3) to severe (4) on TCRS:       Moderate 3: desire/crave tobacco, blurred vision; reviewed with patient     Severe 4:   Back pain; reviewed with patient  Patient's Response to Intervention: 0ppm; 5-6 cig/day; Patient remains on prescribed tobacco cessation medication regimen of 2 mg lozenge without any negative side effects at this time; desire to quit = 10, confidence = 9      Progress Toward Goals and Other Mental Status Changes: The patient denies any abnormal behavioral or mental changes at this time.     Diagnosis: F17.210    Plan:The patient will continue with group therapy sessions and medication monitoring by CTTS. Prescribed medication management will be by physician.   Return to Clinic: 1 week    Quit Date:    Planned Quit Date:

## 2024-07-05 NOTE — PROGRESS NOTES
HPI    40 Y/o female is here for routine eye exam with no C/o about ocular health     Pt denies pain and discomfort   No f/f    Eye med: Clear eye's OU PRN   Last edited by Librado Domínguez MA on 7/5/2024  3:01 PM.            Assessment /Plan     For exam results, see Encounter Report.    Myopia of both eyes    Wears contact lenses      Good fit w OASYS CLs, but having dry eye issues.  Discussed frequent ATs/proper cleaning to help hydration      PLAN:    Wrote new spex/CLRx  Continue Daily Wear schedule.  NO SLEEPING IN CONTACT LENSES. Clean and disinfect nightly.  exchange monthly.     If dryness persists discussed daily disp, or switch to [employer]

## 2024-07-10 ENCOUNTER — PATIENT MESSAGE (OUTPATIENT)
Dept: OPTOMETRY | Facility: CLINIC | Age: 39
End: 2024-07-10
Payer: COMMERCIAL

## 2024-07-16 ENCOUNTER — PATIENT MESSAGE (OUTPATIENT)
Dept: PODIATRY | Facility: CLINIC | Age: 39
End: 2024-07-16
Payer: COMMERCIAL

## 2024-07-17 ENCOUNTER — TELEPHONE (OUTPATIENT)
Dept: PODIATRY | Facility: CLINIC | Age: 39
End: 2024-07-17
Payer: COMMERCIAL

## 2024-07-17 NOTE — TELEPHONE ENCOUNTER
Patient gave verbal understanding of being scheduled in September for nail procedure following upcomming surgery that is already scheduled. Patient would change if needed.

## 2024-07-24 ENCOUNTER — LAB VISIT (OUTPATIENT)
Dept: LAB | Facility: HOSPITAL | Age: 39
End: 2024-07-24
Attending: NURSE PRACTITIONER
Payer: COMMERCIAL

## 2024-07-24 DIAGNOSIS — K76.0 FATTY LIVER: ICD-10-CM

## 2024-07-24 DIAGNOSIS — I10 HYPERTENSION, UNSPECIFIED TYPE: ICD-10-CM

## 2024-07-24 DIAGNOSIS — M25.50 ARTHRALGIA, UNSPECIFIED JOINT: ICD-10-CM

## 2024-07-24 LAB
ALBUMIN SERPL BCP-MCNC: 4 G/DL (ref 3.5–5.2)
ALBUMIN SERPL BCP-MCNC: 4 G/DL (ref 3.5–5.2)
ALP SERPL-CCNC: 53 U/L (ref 55–135)
ALP SERPL-CCNC: 53 U/L (ref 55–135)
ALT SERPL W/O P-5'-P-CCNC: 26 U/L (ref 10–44)
ALT SERPL W/O P-5'-P-CCNC: 26 U/L (ref 10–44)
ANION GAP SERPL CALC-SCNC: 9 MMOL/L (ref 8–16)
AST SERPL-CCNC: 24 U/L (ref 10–40)
AST SERPL-CCNC: 24 U/L (ref 10–40)
BILIRUB DIRECT SERPL-MCNC: 0.2 MG/DL (ref 0.1–0.3)
BILIRUB SERPL-MCNC: 0.6 MG/DL (ref 0.1–1)
BILIRUB SERPL-MCNC: 0.6 MG/DL (ref 0.1–1)
BUN SERPL-MCNC: 13 MG/DL (ref 6–20)
CALCIUM SERPL-MCNC: 8.9 MG/DL (ref 8.7–10.5)
CHLORIDE SERPL-SCNC: 107 MMOL/L (ref 95–110)
CO2 SERPL-SCNC: 22 MMOL/L (ref 23–29)
CREAT SERPL-MCNC: 0.9 MG/DL (ref 0.5–1.4)
CRP SERPL-MCNC: 2 MG/L (ref 0–8.2)
ERYTHROCYTE [SEDIMENTATION RATE] IN BLOOD BY PHOTOMETRIC METHOD: 3 MM/HR (ref 0–36)
EST. GFR  (NO RACE VARIABLE): >60 ML/MIN/1.73 M^2
ESTIMATED AVG GLUCOSE: 91 MG/DL (ref 68–131)
GLUCOSE SERPL-MCNC: 83 MG/DL (ref 70–110)
HBA1C MFR BLD: 4.8 % (ref 4–5.6)
POTASSIUM SERPL-SCNC: 3.8 MMOL/L (ref 3.5–5.1)
PROT SERPL-MCNC: 6.7 G/DL (ref 6–8.4)
PROT SERPL-MCNC: 6.7 G/DL (ref 6–8.4)
SODIUM SERPL-SCNC: 138 MMOL/L (ref 136–145)
TSH SERPL DL<=0.005 MIU/L-ACNC: 0.81 UIU/ML (ref 0.4–4)

## 2024-07-24 PROCEDURE — 80053 COMPREHEN METABOLIC PANEL: CPT | Performed by: INTERNAL MEDICINE

## 2024-07-24 PROCEDURE — 84443 ASSAY THYROID STIM HORMONE: CPT | Performed by: INTERNAL MEDICINE

## 2024-07-24 PROCEDURE — 36415 COLL VENOUS BLD VENIPUNCTURE: CPT | Performed by: NURSE PRACTITIONER

## 2024-07-24 PROCEDURE — 80076 HEPATIC FUNCTION PANEL: CPT | Performed by: NURSE PRACTITIONER

## 2024-07-24 PROCEDURE — 83036 HEMOGLOBIN GLYCOSYLATED A1C: CPT | Performed by: INTERNAL MEDICINE

## 2024-07-24 PROCEDURE — 85652 RBC SED RATE AUTOMATED: CPT | Performed by: INTERNAL MEDICINE

## 2024-07-24 PROCEDURE — 86140 C-REACTIVE PROTEIN: CPT | Performed by: INTERNAL MEDICINE

## 2024-07-25 ENCOUNTER — HOSPITAL ENCOUNTER (OUTPATIENT)
Dept: RADIOLOGY | Facility: HOSPITAL | Age: 39
Discharge: HOME | End: 2024-07-25
Attending: NURSE PRACTITIONER
Payer: COMMERCIAL

## 2024-07-25 DIAGNOSIS — K76.0 FATTY LIVER: ICD-10-CM

## 2024-07-25 PROCEDURE — 76391 MR ELASTOGRAPHY: CPT | Mod: 26,,, | Performed by: STUDENT IN AN ORGANIZED HEALTH CARE EDUCATION/TRAINING PROGRAM

## 2024-07-25 PROCEDURE — 76391 MR ELASTOGRAPHY: CPT | Mod: TC

## 2024-08-01 ENCOUNTER — OFFICE VISIT (OUTPATIENT)
Dept: HEPATOLOGY | Facility: CLINIC | Age: 39
End: 2024-08-01
Payer: COMMERCIAL

## 2024-08-01 VITALS — BODY MASS INDEX: 35.68 KG/M2 | WEIGHT: 209 LBS | HEIGHT: 64 IN

## 2024-08-01 DIAGNOSIS — K76.0 METABOLIC DYSFUNCTION-ASSOCIATED STEATOTIC LIVER DISEASE (MASLD): Primary | ICD-10-CM

## 2024-08-01 DIAGNOSIS — E66.09 CLASS 2 OBESITY DUE TO EXCESS CALORIES WITHOUT SERIOUS COMORBIDITY WITH BODY MASS INDEX (BMI) OF 35.0 TO 35.9 IN ADULT: ICD-10-CM

## 2024-08-01 PROBLEM — E66.812 CLASS 2 OBESITY DUE TO EXCESS CALORIES WITHOUT SERIOUS COMORBIDITY WITH BODY MASS INDEX (BMI) OF 35.0 TO 35.9 IN ADULT: Status: ACTIVE | Noted: 2024-08-01

## 2024-08-01 PROBLEM — E66.01 SEVERE OBESITY (BMI 35.0-39.9) WITH COMORBIDITY: Status: RESOLVED | Noted: 2024-05-29 | Resolved: 2024-08-01

## 2024-08-01 PROCEDURE — 99999 PR PBB SHADOW E&M-EST. PATIENT-LVL III: CPT | Mod: PBBFAC,,, | Performed by: NURSE PRACTITIONER

## 2024-08-01 PROCEDURE — 99214 OFFICE O/P EST MOD 30 MIN: CPT | Mod: S$GLB,,, | Performed by: NURSE PRACTITIONER

## 2024-08-01 PROCEDURE — 1159F MED LIST DOCD IN RCRD: CPT | Mod: CPTII,S$GLB,, | Performed by: NURSE PRACTITIONER

## 2024-08-01 PROCEDURE — 3044F HG A1C LEVEL LT 7.0%: CPT | Mod: CPTII,S$GLB,, | Performed by: NURSE PRACTITIONER

## 2024-08-01 PROCEDURE — 3008F BODY MASS INDEX DOCD: CPT | Mod: CPTII,S$GLB,, | Performed by: NURSE PRACTITIONER

## 2024-08-01 PROCEDURE — 1160F RVW MEDS BY RX/DR IN RCRD: CPT | Mod: CPTII,S$GLB,, | Performed by: NURSE PRACTITIONER

## 2024-08-01 NOTE — PATIENT INSTRUCTIONS
1. MRI to look for fat or scar tissue in the liver showed very minimal fatty liver, no scar tissue   2.  Follow up in 2 year with MRI before    These things are important to improve fatty liver:  Limit alcohol consumption, no more than 1 serving(s) of alcohol in any day (1 serving is 5 ounces of wine, 12 ounces of beer, or 1.5 ounces of liquor) and do NOT recommend any daily alcohol use    2 Weight loss goal of 25 lbs. [facility] offers benefits to patients so let me know if you want a referral to the [facility] gym. Also, [facility] has dieticians that can create a weight loss plan. If you are interested let me know and I can place a referral. If so, contact the dietician team at [facility] at email [email or web address] or call [phone] to get scheduled. They do offer video visits   3. Avoid processed foods. No fried/fast foods. No sugary drinks or drinks with any calories.   4. Low carb/sugar and high protein diet (~1 g/kg/day of protein).Try to limit your carb intake to LESS than 30-45 grams of carbs with a meal or LESS than 5-10 grams with any snack (total of any snack foods eaten during that time). Use [employer] Pal or Lose It nel to add up your carbs through the day. Do NOT drink any beverages with calories or carbs (this can lead to high blood sugar and weight gain). The main thing to focus on is high protein, low carb)  5. Exercise, 5 days per week, 30 minutes per day, as tolerated  6. Recommend good cholesterol, blood pressure, blood sugar levels   7. There is a new medication called Rezdiffra for the treatment of F2-F3 liver scarring due to fatty liver. It is only indicated for patients with significant scar tissue from fatty liver (not you currently)    In some people, fatty liver can progress to steatohepatitis (inflamatory fatty liver) and possibly to cirrhosis, increasing the risk for liver cancer, liver failure, and death. Therefore, the lifestyle  changes are very important to decrease this risk.

## 2024-08-01 NOTE — PROGRESS NOTES
[facility] VISIT FOLLOW UP NOTE    PCP: Elsa Ferrari MD     CHIEF COMPLAINT: fatty liver    HPI: This is a 39 y.o. White female with PMH noted below, presenting for follow up of above    Serological workup was negative for Jorge Alberto's, alpha-1 antitrypsin deficiency, hemochromatosis,  and viral hepatitis.   + BK (titer improving), normal ASMA, IgG and AMA - liver enzymes normal so low/no suspicion for AIH at this time, seen by rheum    Prior serologic workup:   Lab Results   Component Value Date    SMOOTHMUSCAB Negative 1:40 07/17/2023    AMAIFA Negative 1:40 10/15/2021    IGGSERUM 981 07/17/2023    ANASCREEN Positive (A) 07/17/2023    FERRITIN 34 10/15/2021    FESATURATED 8 (L) 10/15/2021    CERULOPLSM 32.0 10/15/2021    HEPBSAG Negative 03/07/2022    HEPCAB Negative 08/03/2021    HEPAIGM Negative 08/03/2021     Risk factors for fatty liver include obesity, HTN    Liver fibrosis staging:  -- fibroscan 10/2021 F2, S2 (kPA 8.7, )  -- MRI elsto 1/2023 noted F0, mild steatosis   -- MRI elasto 8/2023 noted F0, mild steatosis   -- MRI elasto 7/2024 noted mild steatosis (5.2%), no fibrosis     Interval HPI: Presents today alone. Current weight 208 lbs, highest wt 250-260s  Trying to follow low carb  Very minimal steatosis on MRI, continues to improve     Labs done 7/2024 show normal transaminase levels   Platelets WNL, alk phos WNL  Synthetic liver functioning WNL    Lab Results   Component Value Date    ALT 26 07/24/2024    ALT 26 07/24/2024    AST 24 07/24/2024    AST 24 07/24/2024    ALKPHOS 53 (L) 07/24/2024    ALKPHOS 53 (L) 07/24/2024    BILITOT 0.6 07/24/2024    BILITOT 0.6 07/24/2024    ALBUMIN 4.0 07/24/2024    ALBUMIN 4.0 07/24/2024    INR 0.9 05/23/2024     05/23/2024     MRI elasto 7/2024 noted mild steatosis, no fibrosis    Denies family history of liver disease . Minimal Alcohol consumption, see below  Social History     Substance and Sexual Activity   Alcohol Use Not  "Currently    Comment: couple of servings per year, no daily or heavy alcohol use        + Immunity to Hep A and B        Allergy and medication list reviewed and updated     PMHX:  has a past medical history of Acid reflux, Allergy, Chronic hypertension with superimposed pre-eclampsia, post partum (10/21/2022), History of  (10/10/2022), Pre-eclampsia, severe, postpartum condition (3/29/2021), Severe hypertension affecting pregnancy in third trimester (3/22/2021), and Severe pre-eclampsia in third trimester (3/22/2021).    PSHX:  has a past surgical history that includes Adenoidectomy (age 16); biopsy of breast  (Right, 2014); Cholecystectomy (); Esophagogastroduodenoscopy (N/A, 2018);  section (N/A, 2021);  section (N/A, 10/10/2022); Esophagogastroduodenoscopy (N/A, 2023); Esophagogastroduodenoscopy (N/A, 2024); and Breast biopsy (Right).    FAMILY HISTORY: Updated and reviewed in [facility]    SOCIAL HISTORY:   Social History     Substance and Sexual Activity   Alcohol Use Not Currently    Comment: couple of servings per year, no daily or heavy alcohol use        Social History     Substance and Sexual Activity   Drug Use No       ROS:   GENERAL: Denies fatigue  CARDIOVASCULAR: Denies edema  GI: Denies abdominal pain  SKIN: Denies rash, itching   NEURO: Denies confusion, memory loss, or mood changes    PHYSICAL EXAM:   Friendly White female, in no acute distress; alert and oriented to person, place and time  VITALS: Ht 5' 4" (1.626 m)   Wt 94.8 kg (208 lb 15.9 oz)   BMI 35.87 kg/m²   EYES: Sclerae anicteric  GI: Soft, non-tender, non-distended. No ascites.  EXTREMITIES:  No edema.  SKIN: Warm and dry. No jaundice. No telangectasias noted. No palmar erythema.  NEURO:  No asterixis.  PSYCH:  Thought and speech pattern appropriate. Behavior normal      EDUCATION:  See instructions discussed with patient in Instructions section of the After Visit Summary "     ASSESSMENT & PLAN:  39 y.o. White female with:  1.  Metabolic associated steatotic liver disease   - MRI elasto 7/2024  noted minimal fatty liver, no fibrosis - can repeat q2 years given improvement   - Synthetic liver function WNL  - risk factors for fatty liver disease include obesity, HTN  -- Recommendations discussed with patient:  Limit alcohol consumption, no more than 1 serving(s) of alcohol in any day (1 serving is 5 ounces of wine, 12 ounces of beer, or 1.5 ounces of liquor) and do NOT recommend any daily alcohol use    2 Continue Weight loss   3. Low carb/sugar, high fiber and protein diet, limit your carb intake to LESS than 30-45 grams of carbs with a meal or LESS than 5-10 grams with any snack   4. Exercise, 5 days per week, 30 minutes per day, as tolerated  5. Recommend good cholesterol, blood pressure, blood sugar levels   6. No indicatio for Rezdiffra, no fibrosis     2. Obesity, HTN   -- Body mass index is 35.87 kg/m².   -- increases risk for fatty liver        Follow up in about 2 years (around 8/1/2026). With MRI elasto before   No orders of the defined types were placed in this encounter.         Thank you for allowing me to participate in the care of PATTIE Morillo    I spent a total of 30 minutes on the day of the visit.This includes face to face time and non-face to face time preparing to see the patient (eg, review of tests), obtaining and/or reviewing separately obtained history, documenting clinical information in the electronic or other health record, independently interpreting results and communicating results to the patient/family/caregiver, and coordinating care.         CC'ed note to:

## 2024-08-05 ENCOUNTER — PATIENT MESSAGE (OUTPATIENT)
Dept: ADMINISTRATIVE | Facility: OTHER | Age: 39
End: 2024-08-05
Payer: COMMERCIAL

## 2024-08-05 ENCOUNTER — ANESTHESIA EVENT (OUTPATIENT)
Dept: SURGERY | Facility: HOSPITAL | Age: 39
End: 2024-08-05
Payer: COMMERCIAL

## 2024-08-05 NOTE — DISCHARGE INSTRUCTIONS
Breast Reduction Discharge Instructions     What care is needed at home?   Sleep with your head and chest elevated on 2 to 3 pillows to reduce swelling.  Cough and take deep breaths to help keep your lungs clear.  Be sure to wash your hands before and after touching your wound or dressing.   Do not remove your dressings. Keep clean, dry, and intact at all times. Wear your surgical bra 24 hours a day.  Sponge bathe until your follow up appointment. You may shower when released by your doctor.   Do not lift, pull, or push anything over 10 pounds (4.5 kg).   Avoid using creams, lotions, or oils on your incision until your skin has healed. This will help to lower your risk of infection.  Ask your doctor about the best kind of bra for you to wear.  Tops that button up the front are easier to put on and take off than those that slip over your head.  Your bowel movements may take some time to get back to normal. Eat small meals high in fiber to avoid hard stools. Drink 6 to 8 glasses of water each day.  Try to walk each day. Start by walking a little more than you did the day before. Walking boosts blood flow and helps prevent lung, belly, and blood problems.  What follow-up care is needed?   Your doctor may ask you to make visits to the office to check on your progress. Be sure to keep these visits.   Will physical activity be limited?   You will need to limit your activity for a while. Avoid lifting, sports, and sex until your doctor says it is ok to do these things. Talk with your doctor about the right amount of activity for you.  What problems could happen?   Bleeding  Infection  Scarring  Breast sensation may not be the same  May not be able to breastfeed  Uneven breasts  May limit movements of your shoulder and arm   When do I need to call the doctor?   Signs of infection. These include a fever of 100.4°F (38°C) or higher, chills, wound that will not heal.  Signs of wound infection. These include swelling, redness,  warmth around the wound; too much pain when touched; yellowish, greenish, or bloody discharge; foul smell coming from the cut site; cut site opens up.  Cough, shortness of breath, chest pain  Too much pain, upset stomach, or throwing up that is not helped by pain or nausea drugs  Redness, warmth, swelling, stiffness, or hardness in the arm or hand

## 2024-08-06 ENCOUNTER — ANESTHESIA (OUTPATIENT)
Dept: SURGERY | Facility: HOSPITAL | Age: 39
End: 2024-08-06
Payer: COMMERCIAL

## 2024-08-06 ENCOUNTER — HOSPITAL ENCOUNTER (OUTPATIENT)
Facility: HOSPITAL | Age: 39
Discharge: HOME | End: 2024-08-07
Attending: SURGERY | Admitting: SURGERY
Payer: COMMERCIAL

## 2024-08-06 DIAGNOSIS — M25.511 PAIN OF BOTH SHOULDER JOINTS: ICD-10-CM

## 2024-08-06 DIAGNOSIS — M54.6 PAIN IN THORACIC SPINE: ICD-10-CM

## 2024-08-06 DIAGNOSIS — M54.2 NECK PAIN: ICD-10-CM

## 2024-08-06 DIAGNOSIS — L98.9 SKIN ABNORMALITIES: ICD-10-CM

## 2024-08-06 DIAGNOSIS — M25.512 PAIN OF BOTH SHOULDER JOINTS: ICD-10-CM

## 2024-08-06 DIAGNOSIS — N62 MACROMASTIA: ICD-10-CM

## 2024-08-06 PROCEDURE — 36000706: Performed by: SURGERY

## 2024-08-06 PROCEDURE — 63600175 PHARM REV CODE 636 W HCPCS: Performed by: SURGERY

## 2024-08-06 PROCEDURE — 25000003 PHARM REV CODE 250: Performed by: NURSE ANESTHETIST, CERTIFIED REGISTERED

## 2024-08-06 PROCEDURE — 25000003 PHARM REV CODE 250

## 2024-08-06 PROCEDURE — 63600175 PHARM REV CODE 636 W HCPCS: Performed by: ANESTHESIOLOGY

## 2024-08-06 PROCEDURE — 37000008 HC ANESTHESIA 1ST 15 MINUTES: Performed by: SURGERY

## 2024-08-06 PROCEDURE — 25000003 PHARM REV CODE 250: Performed by: ANESTHESIOLOGY

## 2024-08-06 PROCEDURE — D9220A PRA ANESTHESIA: Mod: ANES,,, | Performed by: ANESTHESIOLOGY

## 2024-08-06 PROCEDURE — 71000015 HC POSTOP RECOV 1ST HR: Performed by: SURGERY

## 2024-08-06 PROCEDURE — 63600175 PHARM REV CODE 636 W HCPCS: Mod: JZ,JG | Performed by: SURGERY

## 2024-08-06 PROCEDURE — 63600175 PHARM REV CODE 636 W HCPCS: Mod: JZ,JG

## 2024-08-06 PROCEDURE — C9290 INJ, BUPIVACAINE LIPOSOME: HCPCS | Performed by: SURGERY

## 2024-08-06 PROCEDURE — 36000707: Performed by: SURGERY

## 2024-08-06 PROCEDURE — 25000003 PHARM REV CODE 250: Performed by: SURGERY

## 2024-08-06 PROCEDURE — 71000039 HC RECOVERY, EACH ADD'L HOUR: Performed by: SURGERY

## 2024-08-06 PROCEDURE — 71000033 HC RECOVERY, INTIAL HOUR: Performed by: SURGERY

## 2024-08-06 PROCEDURE — 63600175 PHARM REV CODE 636 W HCPCS: Performed by: NURSE ANESTHETIST, CERTIFIED REGISTERED

## 2024-08-06 PROCEDURE — 19318 BREAST REDUCTION: CPT | Mod: 50,,, | Performed by: SURGERY

## 2024-08-06 PROCEDURE — 71000016 HC POSTOP RECOV ADDL HR: Performed by: SURGERY

## 2024-08-06 PROCEDURE — D9220A PRA ANESTHESIA: Mod: CRNA,,, | Performed by: NURSE ANESTHETIST, CERTIFIED REGISTERED

## 2024-08-06 PROCEDURE — 37000009 HC ANESTHESIA EA ADD 15 MINS: Performed by: SURGERY

## 2024-08-06 RX ORDER — HYDROMORPHONE HYDROCHLORIDE 2 MG/ML
0.2 INJECTION, SOLUTION INTRAMUSCULAR; INTRAVENOUS; SUBCUTANEOUS EVERY 5 MIN PRN
Status: DISCONTINUED | OUTPATIENT
Start: 2024-08-06 | End: 2024-08-06

## 2024-08-06 RX ORDER — PROPOFOL 10 MG/ML
VIAL (ML) INTRAVENOUS
Status: DISCONTINUED | OUTPATIENT
Start: 2024-08-06 | End: 2024-08-06

## 2024-08-06 RX ORDER — MORPHINE SULFATE 4 MG/ML
2 INJECTION, SOLUTION INTRAMUSCULAR; INTRAVENOUS EVERY 4 HOURS PRN
Status: DISCONTINUED | OUTPATIENT
Start: 2024-08-06 | End: 2024-08-07 | Stop reason: HOSPADM

## 2024-08-06 RX ORDER — ACETAMINOPHEN 500 MG
1000 TABLET ORAL EVERY 6 HOURS PRN
Status: DISCONTINUED | OUTPATIENT
Start: 2024-08-06 | End: 2024-08-07

## 2024-08-06 RX ORDER — LIDOCAINE HYDROCHLORIDE 10 MG/ML
INJECTION, SOLUTION EPIDURAL; INFILTRATION; INTRACAUDAL; PERINEURAL
Status: DISCONTINUED | OUTPATIENT
Start: 2024-08-06 | End: 2024-08-06

## 2024-08-06 RX ORDER — CYCLOBENZAPRINE HCL 10 MG
10 TABLET ORAL 3 TIMES DAILY PRN
Status: DISCONTINUED | OUTPATIENT
Start: 2024-08-06 | End: 2024-08-07 | Stop reason: HOSPADM

## 2024-08-06 RX ORDER — LIDOCAINE HYDROCHLORIDE 10 MG/ML
1 INJECTION, SOLUTION EPIDURAL; INFILTRATION; INTRACAUDAL; PERINEURAL ONCE
Status: DISCONTINUED | OUTPATIENT
Start: 2024-08-06 | End: 2024-08-06 | Stop reason: HOSPADM

## 2024-08-06 RX ORDER — SODIUM CHLORIDE, SODIUM GLUCONATE, SODIUM ACETATE, POTASSIUM CHLORIDE AND MAGNESIUM CHLORIDE 30; 37; 368; 526; 502 MG/100ML; MG/100ML; MG/100ML; MG/100ML; MG/100ML
INJECTION, SOLUTION INTRAVENOUS CONTINUOUS
Status: DISCONTINUED | OUTPATIENT
Start: 2024-08-06 | End: 2024-08-07 | Stop reason: HOSPADM

## 2024-08-06 RX ORDER — MUPIROCIN 20 MG/G
OINTMENT TOPICAL 2 TIMES DAILY
Status: DISCONTINUED | OUTPATIENT
Start: 2024-08-06 | End: 2024-08-07

## 2024-08-06 RX ORDER — DEXAMETHASONE SODIUM PHOSPHATE 4 MG/ML
INJECTION, SOLUTION INTRA-ARTICULAR; INTRALESIONAL; INTRAMUSCULAR; INTRAVENOUS; SOFT TISSUE
Status: DISCONTINUED | OUTPATIENT
Start: 2024-08-06 | End: 2024-08-06

## 2024-08-06 RX ORDER — ACETAMINOPHEN 500 MG
1000 TABLET ORAL
Status: COMPLETED | OUTPATIENT
Start: 2024-08-06 | End: 2024-08-06

## 2024-08-06 RX ORDER — MIDAZOLAM HYDROCHLORIDE 2 MG/2ML
2 INJECTION, SOLUTION INTRAMUSCULAR; INTRAVENOUS ONCE AS NEEDED
Status: COMPLETED | OUTPATIENT
Start: 2024-08-06 | End: 2024-08-06

## 2024-08-06 RX ORDER — ROCURONIUM BROMIDE 10 MG/ML
INJECTION, SOLUTION INTRAVENOUS
Status: DISCONTINUED | OUTPATIENT
Start: 2024-08-06 | End: 2024-08-06

## 2024-08-06 RX ORDER — METHOCARBAMOL 100 MG/ML
1000 INJECTION, SOLUTION INTRAMUSCULAR; INTRAVENOUS ONCE AS NEEDED
Status: COMPLETED | OUTPATIENT
Start: 2024-08-06 | End: 2024-08-06

## 2024-08-06 RX ORDER — SCOLOPAMINE TRANSDERMAL SYSTEM 1 MG/1
1 PATCH, EXTENDED RELEASE TRANSDERMAL
Status: DISCONTINUED | OUTPATIENT
Start: 2024-08-06 | End: 2024-08-06 | Stop reason: HOSPADM

## 2024-08-06 RX ORDER — CLINDAMYCIN PHOSPHATE 900 MG/50ML
900 INJECTION, SOLUTION INTRAVENOUS
Status: COMPLETED | OUTPATIENT
Start: 2024-08-06 | End: 2024-08-06

## 2024-08-06 RX ORDER — ONDANSETRON HYDROCHLORIDE 2 MG/ML
4 INJECTION, SOLUTION INTRAVENOUS DAILY PRN
Status: DISCONTINUED | OUTPATIENT
Start: 2024-08-06 | End: 2024-08-06 | Stop reason: HOSPADM

## 2024-08-06 RX ORDER — DIPHENHYDRAMINE HYDROCHLORIDE 50 MG/ML
25 INJECTION INTRAMUSCULAR; INTRAVENOUS EVERY 6 HOURS PRN
Status: DISCONTINUED | OUTPATIENT
Start: 2024-08-06 | End: 2024-08-06 | Stop reason: HOSPADM

## 2024-08-06 RX ORDER — METOCLOPRAMIDE HYDROCHLORIDE 5 MG/ML
10 INJECTION INTRAMUSCULAR; INTRAVENOUS EVERY 10 MIN PRN
Status: DISCONTINUED | OUTPATIENT
Start: 2024-08-06 | End: 2024-08-06 | Stop reason: HOSPADM

## 2024-08-06 RX ORDER — PROMETHAZINE HYDROCHLORIDE 25 MG/ML
INJECTION, SOLUTION INTRAMUSCULAR; INTRAVENOUS
Status: DISCONTINUED | OUTPATIENT
Start: 2024-08-06 | End: 2024-08-06

## 2024-08-06 RX ORDER — FENTANYL CITRATE 50 UG/ML
INJECTION, SOLUTION INTRAMUSCULAR; INTRAVENOUS
Status: DISCONTINUED | OUTPATIENT
Start: 2024-08-06 | End: 2024-08-06

## 2024-08-06 RX ORDER — ONDANSETRON HYDROCHLORIDE 2 MG/ML
INJECTION, SOLUTION INTRAMUSCULAR; INTRAVENOUS
Status: DISCONTINUED | OUTPATIENT
Start: 2024-08-06 | End: 2024-08-06

## 2024-08-06 RX ORDER — HYDROMORPHONE HYDROCHLORIDE 2 MG/ML
0.4 INJECTION, SOLUTION INTRAMUSCULAR; INTRAVENOUS; SUBCUTANEOUS EVERY 5 MIN PRN
Status: DISCONTINUED | OUTPATIENT
Start: 2024-08-06 | End: 2024-08-06 | Stop reason: HOSPADM

## 2024-08-06 RX ORDER — CLINDAMYCIN PHOSPHATE 600 MG/50ML
600 INJECTION, SOLUTION INTRAVENOUS
Status: COMPLETED | OUTPATIENT
Start: 2024-08-06 | End: 2024-08-06

## 2024-08-06 RX ORDER — SODIUM CHLORIDE 9 MG/ML
INJECTION, SOLUTION INTRAVENOUS CONTINUOUS
Status: DISCONTINUED | OUTPATIENT
Start: 2024-08-06 | End: 2024-08-07 | Stop reason: HOSPADM

## 2024-08-06 RX ORDER — PROCHLORPERAZINE EDISYLATE 5 MG/ML
5 INJECTION INTRAMUSCULAR; INTRAVENOUS EVERY 6 HOURS PRN
Status: DISCONTINUED | OUTPATIENT
Start: 2024-08-06 | End: 2024-08-07 | Stop reason: HOSPADM

## 2024-08-06 RX ORDER — ONDANSETRON 4 MG/1
4 TABLET, ORALLY DISINTEGRATING ORAL ONCE
Status: COMPLETED | OUTPATIENT
Start: 2024-08-06 | End: 2024-08-06

## 2024-08-06 RX ORDER — GABAPENTIN 300 MG/1
300 CAPSULE ORAL
Status: COMPLETED | OUTPATIENT
Start: 2024-08-06 | End: 2024-08-06

## 2024-08-06 RX ORDER — OXYCODONE AND ACETAMINOPHEN 5; 325 MG/1; MG/1
2 TABLET ORAL EVERY 4 HOURS PRN
Status: DISCONTINUED | OUTPATIENT
Start: 2024-08-06 | End: 2024-08-07

## 2024-08-06 RX ORDER — ONDANSETRON HYDROCHLORIDE 2 MG/ML
4 INJECTION, SOLUTION INTRAVENOUS EVERY 12 HOURS PRN
Status: DISCONTINUED | OUTPATIENT
Start: 2024-08-06 | End: 2024-08-07 | Stop reason: HOSPADM

## 2024-08-06 RX ADMIN — METHOCARBAMOL 1000 MG: 100 INJECTION INTRAMUSCULAR; INTRAVENOUS at 10:08

## 2024-08-06 RX ADMIN — PROMETHAZINE HYDROCHLORIDE 6.25 MG: 25 INJECTION INTRAMUSCULAR; INTRAVENOUS at 09:08

## 2024-08-06 RX ADMIN — ROCURONIUM BROMIDE 50 MG: 10 INJECTION, SOLUTION INTRAVENOUS at 06:08

## 2024-08-06 RX ADMIN — SODIUM CHLORIDE, POTASSIUM CHLORIDE, SODIUM LACTATE AND CALCIUM CHLORIDE: 600; 310; 30; 20 INJECTION, SOLUTION INTRAVENOUS at 08:08

## 2024-08-06 RX ADMIN — SODIUM CHLORIDE: 9 INJECTION, SOLUTION INTRAVENOUS at 11:08

## 2024-08-06 RX ADMIN — FENTANYL CITRATE 100 MCG: 50 INJECTION, SOLUTION INTRAMUSCULAR; INTRAVENOUS at 06:08

## 2024-08-06 RX ADMIN — ONDANSETRON 4 MG: 2 INJECTION INTRAMUSCULAR; INTRAVENOUS at 11:08

## 2024-08-06 RX ADMIN — ONDANSETRON 4 MG: 2 INJECTION INTRAMUSCULAR; INTRAVENOUS at 09:08

## 2024-08-06 RX ADMIN — CLINDAMYCIN IN 5 PERCENT DEXTROSE 600 MG: 12 INJECTION, SOLUTION INTRAVENOUS at 06:08

## 2024-08-06 RX ADMIN — DEXMEDETOMIDINE HYDROCHLORIDE 4 MCG: 400 INJECTION INTRAVENOUS at 08:08

## 2024-08-06 RX ADMIN — CLINDAMYCIN PHOSPHATE 900 MG: 900 INJECTION, SOLUTION INTRAVENOUS at 11:08

## 2024-08-06 RX ADMIN — SODIUM CHLORIDE: 9 INJECTION, SOLUTION INTRAVENOUS at 07:08

## 2024-08-06 RX ADMIN — GABAPENTIN 300 MG: 300 CAPSULE ORAL at 06:08

## 2024-08-06 RX ADMIN — ONDANSETRON 4 MG: 4 TABLET, ORALLY DISINTEGRATING ORAL at 06:08

## 2024-08-06 RX ADMIN — ACETAMINOPHEN 1000 MG: 500 TABLET ORAL at 06:08

## 2024-08-06 RX ADMIN — DEXMEDETOMIDINE HYDROCHLORIDE 4 MCG: 400 INJECTION INTRAVENOUS at 09:08

## 2024-08-06 RX ADMIN — DEXMEDETOMIDINE HYDROCHLORIDE 2 MCG: 400 INJECTION INTRAVENOUS at 09:08

## 2024-08-06 RX ADMIN — OXYCODONE HYDROCHLORIDE AND ACETAMINOPHEN 2 TABLET: 5; 325 TABLET ORAL at 07:08

## 2024-08-06 RX ADMIN — FENTANYL CITRATE 50 MCG: 50 INJECTION, SOLUTION INTRAMUSCULAR; INTRAVENOUS at 07:08

## 2024-08-06 RX ADMIN — DEXMEDETOMIDINE HYDROCHLORIDE 4 MCG: 400 INJECTION INTRAVENOUS at 07:08

## 2024-08-06 RX ADMIN — HYDROMORPHONE HYDROCHLORIDE 0.4 MG: 2 INJECTION INTRAMUSCULAR; INTRAVENOUS; SUBCUTANEOUS at 11:08

## 2024-08-06 RX ADMIN — OXYCODONE HYDROCHLORIDE AND ACETAMINOPHEN 1 TABLET: 5; 325 TABLET ORAL at 03:08

## 2024-08-06 RX ADMIN — DEXAMETHASONE SODIUM PHOSPHATE 8 MG: 4 INJECTION, SOLUTION INTRA-ARTICULAR; INTRALESIONAL; INTRAMUSCULAR; INTRAVENOUS; SOFT TISSUE at 07:08

## 2024-08-06 RX ADMIN — PROPOFOL 200 MG: 10 INJECTION, EMULSION INTRAVENOUS at 06:08

## 2024-08-06 RX ADMIN — DEXMEDETOMIDINE HYDROCHLORIDE 4 MCG: 400 INJECTION INTRAVENOUS at 10:08

## 2024-08-06 RX ADMIN — DEXMEDETOMIDINE HYDROCHLORIDE 6 MCG: 400 INJECTION INTRAVENOUS at 09:08

## 2024-08-06 RX ADMIN — SCOPOLAMINE 1 PATCH: 1 PATCH TRANSDERMAL at 06:08

## 2024-08-06 RX ADMIN — LIDOCAINE HYDROCHLORIDE 20 MG: 10 INJECTION, SOLUTION EPIDURAL; INFILTRATION; INTRACAUDAL; PERINEURAL at 06:08

## 2024-08-06 RX ADMIN — SODIUM CHLORIDE, POTASSIUM CHLORIDE, SODIUM LACTATE AND CALCIUM CHLORIDE: 600; 310; 30; 20 INJECTION, SOLUTION INTRAVENOUS at 06:08

## 2024-08-06 RX ADMIN — CYCLOBENZAPRINE 10 MG: 10 TABLET, FILM COATED ORAL at 05:08

## 2024-08-06 RX ADMIN — DEXMEDETOMIDINE HYDROCHLORIDE 6 MCG: 400 INJECTION INTRAVENOUS at 06:08

## 2024-08-06 RX ADMIN — SUGAMMADEX 200 MG: 100 INJECTION, SOLUTION INTRAVENOUS at 09:08

## 2024-08-06 RX ADMIN — ROCURONIUM BROMIDE 20 MG: 10 INJECTION, SOLUTION INTRAVENOUS at 07:08

## 2024-08-06 RX ADMIN — DEXMEDETOMIDINE HYDROCHLORIDE 2 MCG: 400 INJECTION INTRAVENOUS at 08:08

## 2024-08-06 RX ADMIN — CLINDAMYCIN PHOSPHATE 900 MG: 900 INJECTION, SOLUTION INTRAVENOUS at 03:08

## 2024-08-06 RX ADMIN — MIDAZOLAM HYDROCHLORIDE 2 MG: 1 INJECTION, SOLUTION INTRAMUSCULAR; INTRAVENOUS at 06:08

## 2024-08-06 RX ADMIN — FENTANYL CITRATE 50 MCG: 50 INJECTION, SOLUTION INTRAMUSCULAR; INTRAVENOUS at 08:08

## 2024-08-06 NOTE — PROGRESS NOTES
Rec'd report from bakari venegas rn.  Pt sitting up in bed AAOx4.  In good spirits without any complaints at this time.  Her  Huang is at the bedside.  Assessment remains as charted.  Instructed to call for any assistance needed. Both verbalized understanding. Will continue to monitor.

## 2024-08-07 ENCOUNTER — PATIENT MESSAGE (OUTPATIENT)
Dept: ADMINISTRATIVE | Facility: OTHER | Age: 39
End: 2024-08-07
Payer: COMMERCIAL

## 2024-08-07 ENCOUNTER — PATIENT OUTREACH (OUTPATIENT)
Dept: ADMINISTRATIVE | Facility: HOSPITAL | Age: 39
End: 2024-08-07
Payer: COMMERCIAL

## 2024-08-07 VITALS — SYSTOLIC BLOOD PRESSURE: 118 MMHG | DIASTOLIC BLOOD PRESSURE: 72 MMHG

## 2024-08-07 PROBLEM — N62 MACROMASTIA: Status: ACTIVE | Noted: 2024-08-07

## 2024-08-07 LAB — PSYCHE PATHOLOGY RESULT: NORMAL

## 2024-08-07 PROCEDURE — 25000003 PHARM REV CODE 250

## 2024-08-07 PROCEDURE — 25000003 PHARM REV CODE 250: Performed by: SURGERY

## 2024-08-07 PROCEDURE — 99231 SBSQ HOSP IP/OBS SF/LOW 25: CPT | Mod: ,,, | Performed by: SURGERY

## 2024-08-07 RX ORDER — OXYCODONE AND ACETAMINOPHEN 10; 325 MG/1; MG/1
1 TABLET ORAL EVERY 4 HOURS PRN
Status: DISCONTINUED | OUTPATIENT
Start: 2024-08-07 | End: 2024-08-07 | Stop reason: HOSPADM

## 2024-08-07 RX ADMIN — OXYCODONE HYDROCHLORIDE AND ACETAMINOPHEN 2 TABLET: 5; 325 TABLET ORAL at 12:08

## 2024-08-07 RX ADMIN — OXYCODONE AND ACETAMINOPHEN 1 TABLET: 10; 325 TABLET ORAL at 07:08

## 2024-08-07 NOTE — DISCHARGE SUMMARY
[facility] Surgical - Med Surg  Discharge Note  Short Stay    Procedure(s) (LRB):  MAMMOPLASTY, REDUCTION, BILATERAL  ///bilateral with free nipple graft (Bilateral)      OUTCOME: Patient tolerated treatment/procedure well without complication and is now ready for discharge.    DISPOSITION: Home or Self Care    FINAL DIAGNOSIS:  <principal problem not specified>    FOLLOWUP: In clinic    DISCHARGE INSTRUCTIONS:  No discharge procedures on file.      Clinical Reference Documents Added to Patient Instructions         Document    DOCUSATE, ADULT (ENGLISH)            TIME SPENT ON DISCHARGE: 5 minutes

## 2024-08-07 NOTE — PROGRESS NOTES
PRS    S/p bilateral mammoplasty with free nipple grafts 8/6. AF HDS. Resting comfortably in bed, no acute complaints this morning. Surgical dressings are CDI.         A/P:    - Nicotine: avoid use of all nicotine-containing products, including nicotine patches  - Bathing: do not wet incisions, otherwise OK to sponge bathe.  - Surgical dressings: leave surgical dressing in place, do not remove at home; surgical dressings to be removed in PRS clinic.  - Activity modification: rest and keep arms at sides at all times; avoid lifting any weight > 1 lb.  - Compression bra: wear compression bra/binder at all times  - Follow up in clinic on Monday      Speedy Severino MD  General Surgery PGY-1  [facility]

## 2024-08-07 NOTE — OP NOTE
[facility] [address] [place] [address]    PATIENT NAME:      HERMILA VILLALOBOS  YOB: 1985  CSN:               [id number]  MRN:               [id number]  ADMIT DATE:        08/06/2024 05:27:00  PHYSICIAN:         Luis Antonio Meek MD                          OPERATIVE REPORT      DATE OF SURGERY:    08/06/2024 00:00:00    SURGEON:  Luis Antonio Meek MD    ASSISTANT:  Speedy Severino MD, surgical resident.    PREOPERATIVE DIAGNOSIS:  Macromastia.    POSTOPERATIVE DIAGNOSIS:  Macromastia.    PROCEDURE:  Bilateral reduction mammoplasty with the use of free nipple grafts.    INDICATION FOR PROCEDURE:  This is a 39-year-old female who presented to our   clinic with very symptomatic macromastia for extended period of time.  She was   deemed to be an appropriate candidate for bilateral reduction mammoplasty.  Due   to her severe macromastia and severe ptosis, I felt that a free nipple graft   approach would be indicated.  She understood that this would result in the   complete loss of nipple sensitivity and inability to breast feed in the future.    The risks, benefits, and potential complications of surgery were discussed in   detail including wound healing complications.  She understood that perfect   symmetry is not possible, we would try our best.  She signed the informed   consent.    DESCRIPTION OF PROCEDURE:  The patient was identified in the preoperative   holding area.  She was marked for a Shields pattern reduction mammoplasty.  She was   taken to the operating room, placed in supine position, general endotracheal   anesthesia provided.  She was prepped and draped in a sterile surgical fashion.    Time-out was taken and everyone was in agreement.  We initially started the   procedure.  I marked out a superior type pedicle that was de-epithelialized with   a 10 blade scalpel.  We also removed the  nipple-areolar complex at the dermal   level using a 42 mm template around the nipple-areolar complex for diameter.    These were passed off the field for later use for free nipple grafting.  We then   incised the remainder of our markings and removed breast tissue inferior to the   pedicle within the Wise pattern using an electrocautery.  This was done on both   sides.  Hemostasis was ensured.  The incisions were temporarily tacked closed   with staples after injecting Exparel throughout the field, and the patient was   evaluated in upright supine position for symmetry, it was noted to be very good   area nipple-areolar complex, translocation was also marked out.  After this was   accomplished, we then closed the deep subcutaneous tissue and deep dermis with   0-Vicryl sutures in interrupted fashion.  IMF incisions were closed with 2-0   Stratafix in a subcuticular fashion.  Vertical incision was closed with 3-0   subcuticular Stratafix.  Area of nipple-areolar complex translocation was   de-epithelialized.  Nipple graft was fenestrated and they were secured with   interrupted running 4-0 chromic sutures, 3-0 silk tie-over bolsters were then   created using mineral oil soaked cotton balls and Xeroform and these were tied   down.  Dermabond tape was used as dressing.  The patient tolerated the procedure   well.  There were no complications.  All counts were correct.        ______________________________  MD AMIE Love/AQS  DD:  08/07/2024  Time:  07:32AM  DT:  08/07/2024  Time:  08:02AM  Job #:  [id number]      OPERATIVE REPORT

## 2024-08-09 VITALS
SYSTOLIC BLOOD PRESSURE: 118 MMHG | RESPIRATION RATE: 18 BRPM | OXYGEN SATURATION: 98 % | TEMPERATURE: 99 F | DIASTOLIC BLOOD PRESSURE: 72 MMHG | BODY MASS INDEX: 36.13 KG/M2 | HEART RATE: 71 BPM | WEIGHT: 211.63 LBS | HEIGHT: 64 IN

## 2024-08-11 ENCOUNTER — PATIENT MESSAGE (OUTPATIENT)
Dept: ADMINISTRATIVE | Facility: OTHER | Age: 39
End: 2024-08-11
Payer: COMMERCIAL

## 2024-08-19 ENCOUNTER — TELEPHONE (OUTPATIENT)
Dept: PODIATRY | Facility: CLINIC | Age: 39
End: 2024-08-19
Payer: COMMERCIAL

## 2024-08-19 ENCOUNTER — PATIENT MESSAGE (OUTPATIENT)
Dept: PODIATRY | Facility: CLINIC | Age: 39
End: 2024-08-19
Payer: COMMERCIAL

## 2024-08-19 NOTE — TELEPHONE ENCOUNTER
----- Message from Niko Solis sent at 8/19/2024 11:28 AM CDT -----  Regarding: Patient advice  Contact: Pt  Pt called in regards to rescheduling an appointment , Unable to schedule Pt       Pt can be reached at [phone]

## 2024-08-28 ENCOUNTER — PATIENT MESSAGE (OUTPATIENT)
Dept: PODIATRY | Facility: CLINIC | Age: 39
End: 2024-08-28
Payer: COMMERCIAL

## 2024-08-31 ENCOUNTER — PATIENT MESSAGE (OUTPATIENT)
Dept: PODIATRY | Facility: CLINIC | Age: 39
End: 2024-08-31
Payer: COMMERCIAL

## 2024-09-06 ENCOUNTER — PROCEDURE VISIT (OUTPATIENT)
Dept: PODIATRY | Facility: CLINIC | Age: 39
End: 2024-09-06
Payer: COMMERCIAL

## 2024-09-06 VITALS
HEIGHT: 64 IN | DIASTOLIC BLOOD PRESSURE: 84 MMHG | RESPIRATION RATE: 18 BRPM | WEIGHT: 196 LBS | SYSTOLIC BLOOD PRESSURE: 131 MMHG | BODY MASS INDEX: 33.46 KG/M2 | HEART RATE: 79 BPM

## 2024-09-06 DIAGNOSIS — L60.0 INGROWN NAIL: Primary | ICD-10-CM

## 2024-09-06 RX ORDER — CYCLOBENZAPRINE HCL 5 MG
5 TABLET ORAL
COMMUNITY
Start: 2024-08-19

## 2024-09-06 NOTE — PROCEDURES
Nail Removal    Date/Time: 9/6/2024 10:15 AM    Performed by: Estefania Escobedo DPM  Authorized by: Estefania Escobedo DPM    Consent Done?:  Yes (Written)  Time out: Immediately prior to the procedure a time out was called    Prep: patient was prepped and draped in usual sterile fashion    Location:     Location:  Right foot    Location detail:  Right big toe  Anesthesia:     Anesthesia:  Digital block    Local anesthetic:  Bupivacaine 0.5% without epinephrine and lidocaine 1% without epinephrine    Anesthetic total (ml):  5  Procedure Details:     Preparation:  Skin prepped with alcohol    Amount removed:  Partial    Side:  Medial    Wedge excision of skin of nail fold: No      Nail bed sutured?: No      Nail matrix removed:  Partial    Removal method:  Phenol and alcohol    Removed nail replaced and anchored: No      Dressing applied:  4x4, antibiotic ointment and gauze roll    Patient tolerance:  Patient tolerated the procedure well with no immediate complications     Pt request re-do of medial borders. Spicules have returned.   Lateral borders did not re-occur      Procedure. After informed consent signed and time out preformed, the right hallux toe was prepped using cloroprep. Additional 2cc marcaine plain needed to achieve anesthesia.  A penrose drain was used as tourniquet and an anesthesia check was preformed. A freer elevator was used to separate the right hallux  medial border from the toenail and the edge of the toenail was  from the underlying nail bed. An English anvil nail nipper was used to cut the medial border of the nail approximately 1-2mm from the edge. A curved hemostat was used to remove the offending ingrown portion of the nail in toto.  Three sticks of phenol were placed into the medial border and spun for approximately 30 seconds each. The border was flushed with alcohol. The toe was dried and antibiotic ointment was placed on the nail border. The tourniquet was remove and a dry sterile  dressing was applied to the toe. Good cft noted to digit.  The patient tolerated the procedure well with no complications. All post procedure instructions given to patient verbally   Nail Removal    Date/Time: 9/6/2024 10:15 AM    Performed by: Estefania Escobedo DPM  Authorized by: Estefania Escobedo DPM    Consent Done?:  Yes (Written)  Time out: Immediately prior to the procedure a time out was called    Prep: patient was prepped and draped in usual sterile fashion    Location:     Location:  Left foot    Location detail:  Left big toe  Anesthesia:     Anesthesia:  Digital block    Local anesthetic:  Bupivacaine 0.5% without epinephrine and lidocaine 1% without epinephrine    Anesthetic total (ml):  3  Procedure Details:     Preparation:  Skin prepped with alcohol    Amount removed:  Partial    Side:  Medial    Wedge excision of skin of nail fold: No      Nail bed sutured?: No      Nail matrix removed:  Partial    Removal method:  Phenol and alcohol    Removed nail replaced and anchored: No      Dressing applied:  4x4, antibiotic ointment and gauze roll    Patient tolerance:  Patient tolerated the procedure well with no immediate complications       Procedure. After informed consent signed and time out preformed, the left hallux toe was prepped using cloroprep. Additional 3 cc marcaine plain needed to achieve anesthesia.  A penrose drain was used as tourniquet and an anesthesia check was preformed. A freer elevator was used to separate the left hallux  medial  border from the toenail and the edge of the toenail was  from the underlying nail bed. An English anvil nail nipper was used to cut the medial border of the nail approximately 1-2mm from the edge. A curved hemostat was used to remove the offending ingrown portion of the nail in toto.  Three sticks of phenol were placed into the medial border and spun for approximately 30 seconds each. The border was flushed with alcohol. The toe was dried and antibiotic  ointment was placed on the nail border. The tourniquet was remove and a dry sterile dressing was applied to the toe. Good cft noted to digit.  The patient tolerated the procedure well with no complications. All post procedure instructions given to patient verbally

## 2024-09-12 ENCOUNTER — PATIENT MESSAGE (OUTPATIENT)
Dept: PODIATRY | Facility: CLINIC | Age: 39
End: 2024-09-12
Payer: COMMERCIAL

## 2024-09-19 ENCOUNTER — PATIENT MESSAGE (OUTPATIENT)
Dept: PODIATRY | Facility: CLINIC | Age: 39
End: 2024-09-19
Payer: COMMERCIAL

## 2024-09-29 ENCOUNTER — PATIENT MESSAGE (OUTPATIENT)
Dept: PAIN MEDICINE | Facility: CLINIC | Age: 39
End: 2024-09-29
Payer: COMMERCIAL

## 2024-09-30 ENCOUNTER — TELEPHONE (OUTPATIENT)
Dept: PAIN MEDICINE | Facility: CLINIC | Age: 39
End: 2024-09-30
Payer: COMMERCIAL

## 2024-09-30 NOTE — TELEPHONE ENCOUNTER
Hi Dr. Chaves,    I called the pt to schedule an appt with your next available but pt was unavailable. LVM requesting a call back to schedule appt. Thank you.

## 2024-10-06 ENCOUNTER — PATIENT MESSAGE (OUTPATIENT)
Dept: PODIATRY | Facility: CLINIC | Age: 39
End: 2024-10-06
Payer: COMMERCIAL

## 2024-10-06 DIAGNOSIS — G89.29 CHRONIC PAIN OF BOTH FEET: ICD-10-CM

## 2024-10-06 DIAGNOSIS — M79.671 CHRONIC PAIN OF BOTH FEET: ICD-10-CM

## 2024-10-06 DIAGNOSIS — G89.29 CENTRAL SENSITIZATION TO PAIN: Primary | ICD-10-CM

## 2024-10-06 DIAGNOSIS — M79.672 CHRONIC PAIN OF BOTH FEET: ICD-10-CM

## 2024-10-07 ENCOUNTER — PATIENT MESSAGE (OUTPATIENT)
Dept: INTERNAL MEDICINE | Facility: CLINIC | Age: 39
End: 2024-10-07
Payer: COMMERCIAL

## 2024-10-08 RX ORDER — GABAPENTIN 300 MG/1
300 CAPSULE ORAL DAILY
Qty: 30 CAPSULE | Refills: 11 | Status: SHIPPED | OUTPATIENT
Start: 2024-10-08 | End: 2025-10-08

## 2024-11-01 ENCOUNTER — OFFICE VISIT (OUTPATIENT)
Dept: PRIMARY CARE CLINIC | Facility: CLINIC | Age: 39
End: 2024-11-01
Payer: COMMERCIAL

## 2024-11-01 VITALS
SYSTOLIC BLOOD PRESSURE: 138 MMHG | RESPIRATION RATE: 16 BRPM | WEIGHT: 209.44 LBS | BODY MASS INDEX: 35.76 KG/M2 | DIASTOLIC BLOOD PRESSURE: 80 MMHG | OXYGEN SATURATION: 99 % | HEART RATE: 89 BPM | HEIGHT: 64 IN

## 2024-11-01 DIAGNOSIS — F43.20 ADJUSTMENT DISORDER, UNSPECIFIED TYPE: ICD-10-CM

## 2024-11-01 DIAGNOSIS — S20.212A HEMATOMA OF LEFT CHEST WALL, INITIAL ENCOUNTER: Primary | ICD-10-CM

## 2024-11-01 PROBLEM — G89.29 CENTRAL SENSITIZATION TO PAIN: Status: RESOLVED | Noted: 2024-04-18 | Resolved: 2024-11-01

## 2024-11-01 PROBLEM — R16.1 SPLENOMEGALY: Status: RESOLVED | Noted: 2023-08-03 | Resolved: 2024-11-01

## 2024-11-01 PROBLEM — N62 MACROMASTIA: Status: RESOLVED | Noted: 2024-08-07 | Resolved: 2024-11-01

## 2024-11-02 ENCOUNTER — PATIENT MESSAGE (OUTPATIENT)
Dept: PRIMARY CARE CLINIC | Facility: CLINIC | Age: 39
End: 2024-11-02
Payer: COMMERCIAL

## 2024-11-04 ENCOUNTER — PATIENT MESSAGE (OUTPATIENT)
Dept: PODIATRY | Facility: CLINIC | Age: 39
End: 2024-11-04
Payer: COMMERCIAL

## 2024-11-05 ENCOUNTER — CLINICAL SUPPORT (OUTPATIENT)
Dept: PRIMARY CARE CLINIC | Facility: CLINIC | Age: 39
End: 2024-11-05
Payer: COMMERCIAL

## 2024-11-05 DIAGNOSIS — F43.20 ADJUSTMENT DISORDER, UNSPECIFIED TYPE: ICD-10-CM

## 2024-11-05 NOTE — PROGRESS NOTES
[facility] BEHAVIORAL HEALTH INTAKE    DATE:  11/5/2024  REFERRAL SOURCE:  Elsa Ferrari MD  TYPE OF VISIT:  In person  LENGTH OF SESSION: 60  .  HISTORY OF PRESENTING ILLNESS:  Bushra Kahn, a 39 y.o. female with history of Adjustment disorders; with anxious mood [F43.22].    CHIEF COMPLAINT/REASON FOR ENCOUNTER: Pt presented for initial assessment. Met with patient. Pt's chief complaint includes the following: anxiety.    Patient does not currently have a psychiatrist.    Patient does not currently have a therapist.      They are not interested in medication changes.    Current symptoms:  Depression: dysphoric mood and worthlessness/guilt.  Anxiety: excessive worrying, restlessness, and muscle tension.  Insomnia:  denies .  Linda:  denies.  Psychosis: denies .      Session Content/Presenting Problem Hx:    Current social stressors:       Risk assessment:  Patient reports no suicidal ideation  Patient reports no homicidal ideation  Patient reports no self-injurious behavior  Patient reports no violent behavior    PSYCHIATRIC HISTORY:  History of Linda or diagnosis of Bipolar Disorder in the past:  No  History of Psychosis or diagnosis of Schizophrenia in the past:  No  Previous Psychiatric Hospitalizations:  No  Previous SI/HI:   No  Previous Suicide Attempts:  No  Previous Medication Trials: No   Previous Psychiatric Outpatient Treatment:  No  History of Trauma:  No  History of Violence:  No  Access to a Gun:  No    SUBSTANCE ABUSE HISTORY:  Tobacco:  No   Alcohol: none  Illicit Substances: No  Misuse of Prescription Medications:  No    MEDICAL HISTORY:  Past Medical History:   Diagnosis Date    Acid reflux     Allergy     Chronic hypertension with superimposed pre-eclampsia, post partum 10/21/2022    On chart review patient with essential HTN outside of pregnancy at initial PNC visit     Fatty liver     Pre-eclampsia, severe, postpartum condition 03/29/2021    Severe hypertension affecting pregnancy  in third trimester 03/22/2021    Severe pre-eclampsia in third trimester 03/22/2021       NEUROLOGIC HISTORY:  Seizures:  No  Head trauma:  No  Memory loss:  No    SOCIAL HISTORY (MARRIAGE, EMPLOYMENT, etc.):  Living Situation: lives with  and 2 children  Family Life Cycle:   Family:   Nuclear/Marriage:   Extended Family:   Supports:  Education/Vocation:   Mormonism/Spirituality:   Hobbies and Interests:     PSYCHIATRIC FAMILY HISTORY: not known      MENTAL HEALTH STATUS EXAM  General Appearance:  unremarkable, age appropriate   Speech: normal tone, normal rate, normal pitch, normal volume      Level of Cooperation: cooperative      Thought Processes: normal and logical   Mood: steady      Thought Content: normal, no suicidality, no homicidality, delusions, or paranoia   Affect: congruent and appropriate   Orientation: Oriented x3   Memory: recent >  intact, remote >  intact   Attention Span & Concentration: intact   Fund of General Knowledge: intact and appropriate to age and level of education   Abstract Reasoning: interpretation of similarities was abstract   Judgment & Insight: good     Language  intact       IMPRESSION:   My diagnostic impression is Adjustment disorders; with anxious mood [F43.22], as evidenced by .     PROVISIONAL DIAGNOSES:  1. Adjustment disorder, unspecified type         STRENGTHS AND LIABILITIES: Strength: Patient accepts guidance/feedback, Strength: Patient is expressive/articulate., Strength: Patient is intelligent., Liability: Patient lacks coping skills.    TREATMENT GOALS: Anxiety: acquiring relapse prevention skills, reducing negative automatic thoughts, and reducing physical symptoms of anxiety    PLAN: In this session a psych evaluation was conducted to get history and process pt's life. Motivational Interviewing will be utilized in future individual  therapy sessions to increase support and parent/behavior management.     RETURN TO CLINIC: No follow-ups on file.

## 2024-11-06 ENCOUNTER — TELEPHONE (OUTPATIENT)
Dept: RADIOLOGY | Facility: HOSPITAL | Age: 39
End: 2024-11-06
Payer: COMMERCIAL

## 2024-11-06 NOTE — TELEPHONE ENCOUNTER
----- Message from Andressa sent at 11/6/2024  2:33 PM CST -----  Regarding: sooner appt  Contact: @ [phone]  Pt is calling to reschedule appointment on 11/14 to a sooner appointment  ...no available dates Pleaes call and adv @ [phone]

## 2024-11-07 ENCOUNTER — HOSPITAL ENCOUNTER (OUTPATIENT)
Dept: RADIOLOGY | Facility: HOSPITAL | Age: 39
Discharge: HOME | End: 2024-11-07
Attending: SURGERY
Payer: COMMERCIAL

## 2024-11-07 ENCOUNTER — PATIENT MESSAGE (OUTPATIENT)
Dept: PRIMARY CARE CLINIC | Facility: CLINIC | Age: 39
End: 2024-11-07
Payer: COMMERCIAL

## 2024-11-07 DIAGNOSIS — S20.02XA: ICD-10-CM

## 2024-11-07 PROCEDURE — 76642 ULTRASOUND BREAST LIMITED: CPT | Mod: 26,LT,, | Performed by: RADIOLOGY

## 2024-11-07 PROCEDURE — 76642 ULTRASOUND BREAST LIMITED: CPT | Mod: TC,LT

## 2024-11-13 ENCOUNTER — PATIENT MESSAGE (OUTPATIENT)
Dept: ADMINISTRATIVE | Facility: OTHER | Age: 39
End: 2024-11-13
Payer: COMMERCIAL

## 2024-11-13 ENCOUNTER — PATIENT MESSAGE (OUTPATIENT)
Dept: OTHER | Facility: OTHER | Age: 39
End: 2024-11-13
Payer: COMMERCIAL

## 2024-11-20 ENCOUNTER — DOCUMENTATION ONLY (OUTPATIENT)
Dept: PRIMARY CARE CLINIC | Facility: CLINIC | Age: 39
End: 2024-11-20
Payer: COMMERCIAL

## 2024-11-20 ENCOUNTER — OFFICE VISIT (OUTPATIENT)
Dept: PODIATRY | Facility: CLINIC | Age: 39
End: 2024-11-20
Payer: COMMERCIAL

## 2024-11-20 VITALS
BODY MASS INDEX: 34.92 KG/M2 | DIASTOLIC BLOOD PRESSURE: 89 MMHG | WEIGHT: 204.56 LBS | SYSTOLIC BLOOD PRESSURE: 138 MMHG | HEART RATE: 77 BPM | HEIGHT: 64 IN

## 2024-11-20 DIAGNOSIS — L60.0 INGROWN NAIL: ICD-10-CM

## 2024-11-20 DIAGNOSIS — G89.29 CHRONIC PAIN OF BOTH FEET: ICD-10-CM

## 2024-11-20 DIAGNOSIS — M79.671 CHRONIC PAIN OF BOTH FEET: ICD-10-CM

## 2024-11-20 DIAGNOSIS — M79.672 CHRONIC PAIN OF BOTH FEET: ICD-10-CM

## 2024-11-20 DIAGNOSIS — G89.29 CENTRAL SENSITIZATION TO PAIN: Primary | ICD-10-CM

## 2024-11-20 PROCEDURE — 99999 PR PBB SHADOW E&M-EST. PATIENT-LVL IV: CPT | Mod: PBBFAC,,, | Performed by: STUDENT IN AN ORGANIZED HEALTH CARE EDUCATION/TRAINING PROGRAM

## 2024-11-20 PROCEDURE — 3008F BODY MASS INDEX DOCD: CPT | Mod: CPTII,S$GLB,, | Performed by: STUDENT IN AN ORGANIZED HEALTH CARE EDUCATION/TRAINING PROGRAM

## 2024-11-20 PROCEDURE — 3079F DIAST BP 80-89 MM HG: CPT | Mod: CPTII,S$GLB,, | Performed by: STUDENT IN AN ORGANIZED HEALTH CARE EDUCATION/TRAINING PROGRAM

## 2024-11-20 PROCEDURE — 99214 OFFICE O/P EST MOD 30 MIN: CPT | Mod: S$GLB,,, | Performed by: STUDENT IN AN ORGANIZED HEALTH CARE EDUCATION/TRAINING PROGRAM

## 2024-11-20 PROCEDURE — 3044F HG A1C LEVEL LT 7.0%: CPT | Mod: CPTII,S$GLB,, | Performed by: STUDENT IN AN ORGANIZED HEALTH CARE EDUCATION/TRAINING PROGRAM

## 2024-11-20 PROCEDURE — 1159F MED LIST DOCD IN RCRD: CPT | Mod: CPTII,S$GLB,, | Performed by: STUDENT IN AN ORGANIZED HEALTH CARE EDUCATION/TRAINING PROGRAM

## 2024-11-20 PROCEDURE — 3075F SYST BP GE 130 - 139MM HG: CPT | Mod: CPTII,S$GLB,, | Performed by: STUDENT IN AN ORGANIZED HEALTH CARE EDUCATION/TRAINING PROGRAM

## 2024-11-20 NOTE — PROGRESS NOTES
Individual Psychotherapy (LCSW/PhD)  Bushra Olmos Femi,  11/20/2024    Site:  other         Therapeutic Intervention: Met with patient for individual psychotherapy.    Chief complaint/reason for encounter: anxiety     Interval history and content of current session: Pt and LCSW discussed CBT concepts and ways to utilize CBT triangle to help pt increase self-awareness. Pt states they have been making more effort to communicate their needs with family to good results.    LCSW and pt practiced deep breathing and emotional recognition to help pt's process emotions.    Goals:  1) practice deep breathing  2) practice emotional recognition and naming    Treatment plan:  Target symptoms: anxiety   Why chosen therapy is appropriate versus another modality: relevant to diagnosis, evidence based practice  Outcome monitoring methods: self-report, checklist/rating scale  Therapeutic intervention type: behavior modifying psychotherapy, supportive psychotherapy    Risk parameters:  Patient reports no suicidal ideation  Patient reports no homicidal ideation  Patient reports no self-injurious behavior  Patient reports no violent behavior    Verbal deficits: None    Patient's response to intervention:  The patient's response to intervention is accepting.    Progress toward goals and other mental status changes:  The patient's progress toward goals is fair .    Diagnosis:   No diagnosis found.    Plan: Pt plans to continue individual psychotherapy    Return to clinic: as scheduled    Length of Service (minutes): 60

## 2024-11-20 NOTE — PROGRESS NOTES
Subjective:     Patient    Bushra Kahn is a 39 y.o. female.    Problem    04/18/24: Previously seen by Dr Alvarez and Dr Escobedo; last seen 2 months ago post ingrown nail procedures. Has always had some pain throughout her body; sensitive to smells and motion; gets dizzy. Had flareup of bilateral foot pain in November; was given steroids by her doctor but she was afraid to take them, saw Dr Escobedo who encouraged the steroids and she tried them and the pain did temporarily improve but then returned. Currently has bilateral heel pain which sometimes extends into the lower legs; also with burning and sparking sensations in both feet and the right thigh; also with low back pain for which she is in PT. Tried ibuprofen, meloxicam, and compression socks which did not help, tried ice and massage which helps some. The pain affects her sleep.       11/20/24: Attempted duloxetine for chronic pain but limited by side effects. Now on gabapentin 300 mg/day with significant improvement in chronic pain, no side effects. Dr Escobedo has performed multiple permanent partial nail avulsions on her with phenol, this worked on the lateral borders of the 1st nails but has not worked on the medial borders with the most recent attempt being about 2 months ago. The nail edges are already regrowing, causing pain and catching on socks.  She also does not numb easily with numbing medications, making the clinic procedures very painful for her.       History    History obtained from patient and review of medical records.     Past Medical History:   Diagnosis Date    Acid reflux     Allergy     Chronic hypertension with superimposed pre-eclampsia, post partum 10/21/2022    On chart review patient with essential HTN outside of pregnancy at initial PNC visit     Fatty liver     Pre-eclampsia, severe, postpartum condition 03/29/2021    Severe hypertension affecting pregnancy in third trimester 03/22/2021    Severe pre-eclampsia in  third trimester 2021       Past Surgical History:   Procedure Laterality Date    ADENOIDECTOMY  age 16    BREAST BIOPSY Right     benign     SECTION N/A 2021    Procedure:  SECTION;  Surgeon: Denita Xiong MD;  Location: [facility] L&D;  Service: OB/GYN;  Laterality: N/A;     SECTION N/A 10/10/2022    Procedure:  SECTION;  Surgeon: Denita Xiong MD;  Location: [facility] L&D;  Service: OB/GYN;  Laterality: N/A;    CHOLECYSTECTOMY      Laparoscopic    ESOPHAGOGASTRODUODENOSCOPY N/A 2018    Procedure: ESOPHAGOGASTRODUODENOSCOPY (EGD);  Surgeon: Aurelio James MD;  Location: [address]);  Service: Endoscopy;  Laterality: N/A;    ESOPHAGOGASTRODUODENOSCOPY N/A 2023    Procedure: EGD (ESOPHAGOGASTRODUODENOSCOPY);  Surgeon: Uziel Duran MD;  Location: [facility] ENDOSCOPY;  Service: Endoscopy;  Laterality: N/A;  8.18  inst sent via [facility]   9.25 pre call attempted; [facility]    ESOPHAGOGASTRODUODENOSCOPY N/A 2024    Procedure: EGD (ESOPHAGOGASTRODUODENOSCOPY);  Surgeon: Uziel Duran MD;  Location: [facility] ENDOSCOPY;  Service: Endoscopy;  Laterality: N/A;  10/5 ref by Dr. Duran, instr. to [place]-  - pre call complete. DBM   Left VM. SG    REDUCTION OF BOTH BREASTS Bilateral 2024    Procedure: MAMMOPLASTY, REDUCTION, BILATERAL  ///bilateral with free nipple graft;  Surgeon: Luis Antonio Meek MD;  Location: [facility] OR;  Service: Plastics;  Laterality: Bilateral;  1822 grams removed from right breast, 1612 grams removed from left breast    TOTAL REDUCTION MAMMOPLASTY Bilateral 2024        Objective:     Vitals  Wt Readings from Last 1 Encounters:   24 92.8 kg (204 lb 9.4 oz)     Temp Readings from Last 1 Encounters:   24 98.6 °F (37 °C)     BP Readings from Last 1 Encounters:   24 138/89     Pulse Readings from Last 1 Encounters:   24 77       Dermatological Exam    Skin:  Pedal hair growth, skin color,  and skin texture normal on right; dry peeling skin  Pedal hair growth, skin color, and skin texture normal on left; dry peeling skin    Nails:  All nails normal in length, thickness, color except bilateral 1st toenail medial borders about 1/3 regrown, tender     Vascular Exam    Arteries:  Posterior tibial artery palpable on right  Dorsalis pedis artery palpable on right  Posterior tibial artery palpable on left  Dorsalis pedis artery palpable on left    Veins:  Superficial veins unremarkable on right  Superficial veins unremarkable on left    Swelling:  None on right  None on left    Neurological Exam    [place] touch test:  6/6 sites sensed, light touch intact    [place] Sign:  + at tibial nerve and sural nerve bilaterally (improved)    Musculoskeletal Exam    Footwear:  Casual on right  Casual on left    Gait Exam:   Ambulatory Status: Ambulatory  Gait: Normal  Assistive Devices: None    Foot Progression Angle:  Normal on right  Normal on left    Right Lower Extremity Additional Findings:  Negative windlass test  Right foot and ankle function, strength, and range of motion unremarkable except as noted above.     Left Lower Extremity Additional Findings:  Negative windlass test  Left foot and ankle function, strength, and range of motion unremarkable except as noted above.    Imaging and Other Tests    Imaging:  Independently reviewed and interpreted imaging, findings are as follows: N/A     Assessment:     Encounter Diagnoses   Name Primary?    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    Central sensitization, lumbosacral radiculopathy, bilateral foot pain: chronic stable  -Continue gabapentin 300 mg/day.      Bilateral 1st toenail medial borders ingrown, painful: chronic exacerbated  Surgical Decision Making  -Discussed further attempts at conservative care versus  repeat clinic phenol procedures versus surgical intervention consisting of bilateral 1st toenail medial border surgical matrixectomies. Patient declined further conservative care and would like to proceed with surgical intervention.   -Reviewed potential risks, benefits, alternatives. Answered all questions.    -Discussed anticipated postop course including immediate postop weightbearing status which is WBAT in surgical shoe. Driving foot, driving allowed once FWB. No assistive device needed.  -Cleared by myself.   -Case request submitted for 01/13/25.         Return to clinic 1 week postop, call sooner PRN.

## 2024-12-10 ENCOUNTER — OFFICE VISIT (OUTPATIENT)
Dept: INTERNAL MEDICINE | Facility: CLINIC | Age: 39
End: 2024-12-10
Payer: COMMERCIAL

## 2024-12-10 DIAGNOSIS — J02.0 PHARYNGITIS DUE TO STREPTOCOCCUS SPECIES: Primary | ICD-10-CM

## 2024-12-10 PROCEDURE — 3044F HG A1C LEVEL LT 7.0%: CPT | Mod: CPTII,95,, | Performed by: FAMILY MEDICINE

## 2024-12-10 PROCEDURE — 99213 OFFICE O/P EST LOW 20 MIN: CPT | Mod: 95,,, | Performed by: FAMILY MEDICINE

## 2024-12-10 RX ORDER — AMOXICILLIN AND CLAVULANATE POTASSIUM 875; 125 MG/1; MG/1
1 TABLET, FILM COATED ORAL EVERY 12 HOURS
Qty: 14 TABLET | Refills: 0 | Status: SHIPPED | OUTPATIENT
Start: 2024-12-10 | End: 2024-12-17

## 2024-12-10 NOTE — PROGRESS NOTES
Subjective:     Patient ID: Bushra Kahn is a 39 y.o. female.   Chief Complaint: No chief complaint on file.    HPI:  The patient location is: [place]  The chief complaint leading to consultation is: sore throat    Visit type: audiovisual    Face to Face time with patient: 4 minutes  11 minutes of total time spent on the encounter, which includes face to face time and non-face to face time preparing to see the patient (eg, review of tests), Obtaining and/or reviewing separately obtained history, Documenting clinical information in the electronic or other health record, Independently interpreting results (not separately reported) and communicating results to the patient/family/caregiver, or Care coordination (not separately reported).         Each patient to whom he or she provides medical services by telemedicine is:  (1) informed of the relationship between the physician and patient and the respective role of any other health care provider with respect to management of the patient; and (2) notified that he or she may decline to receive medical services by telemedicine and may withdraw from such care at any time.    Notes:   Patient of Dr. Elsa Ferrari, seen acutely today for sore throat  History of Present Illness    CHIEF COMPLAINT:  Patient presents today for sore throat.    She reports exposure to strep through her son last week. He required antibiotic treatment. She has had onset of sore throat within the past 24 hours. She experiences difficulty swallowing due to pain and feels ill. She has minimal congestion and mild nausea associated with swallowing difficulty. She denies cough or vomiting. Over the weekend, she experienced fever with temperatures ranging from 100.4°F to 100.9°F. Her current temperature is 99.8°F but says she feels acutely ill/worse.    She has been using OTC chloraseptic spray and cough drops for temporary symptom relief.         Review of Systems   HENT:  Positive for sore throat  and trouble swallowing. Negative for nasal congestion, drooling, ear discharge and ear pain.    Respiratory:  Negative for cough, shortness of breath and stridor.    Gastrointestinal:  Positive for abdominal pain. Negative for diarrhea and vomiting.   Musculoskeletal:  Negative for neck pain.   Neurological:  Negative for headaches.     Answers submitted by the patient for this visit:  Sore Throat Questionnaire (Submitted on 12/10/2024)  Chief Complaint: Sore throat  Chronicity: new  Onset: yesterday  Progression since onset: waxing and waning  Pain worse on: neither  Fever: 100 - 100.9 F  Fever duration: 1 to 2 days  Pain - numeric: 6/10  hoarse voice: No  plugged ear sensation: No  swollen glands: Yes  strep: Yes  Treatments tried: NSAIDs, cool liquids, gargles  Improvement on treatment: mild  Pain severity: moderate         Objective:      There were no vitals filed for this visit.   Physical Exam  Constitutional:       General: She is not in acute distress.     Appearance: Normal appearance. She is not ill-appearing.   Pulmonary:      Effort: Pulmonary effort is normal. No respiratory distress.   Neurological:      General: No focal deficit present.      Mental Status: She is alert and oriented to person, place, and time. Mental status is at baseline.      Cranial Nerves: No dysarthria.   Psychiatric:         Mood and Affect: Mood normal.         Behavior: Behavior normal.         Thought Content: Thought content normal.         Judgment: Judgment normal.     As this visit was accomplished virtually, physical exam is limited only to what may be observed via the telehealth audiovisual connection.      Assessment/Plan:             ICD-10-CM ICD-9-CM   1. Pharyngitis due to Streptococcus species  J02.0 034.0     No orders of the defined types were placed in this encounter.      Assessment & Plan    - Patient at risk for strep pharyngitis based on exposure and symptom presentation  - Empirically treating for  presumed strep pharyngitis    1. Pharyngitis due to Streptococcus species (Primary)  Presumptive strep pharyngitis w/ known exposure and pathognomonic symptom presentation  Start empiric tx w/ augmentin BID x7 days  Continue OTC sx relief  F/u for testing/in-person eval if no improvement by end of week  - amoxicillin-clavulanate 875-125mg (AUGMENTIN) 875-125 mg per tablet; Take 1 tablet by mouth every 12 (twelve) hours. for 7 days  Dispense: 14 tablet; Refill: 0              No follow-ups on file.     Vladimir Hathaway MD, [facility]  Family Medicine Physician  [facility]  12/10/2024      This note was generated with the assistance of ambient listening technology. Verbal consent was obtained by the patient and accompanying visitor(s) for the recording of patient appointment to facilitate this note. I attest to having reviewed and edited the generated note for accuracy, though some syntax or spelling errors may persist. Please contact the author of this note for any clarification.

## 2024-12-20 ENCOUNTER — OFFICE VISIT (OUTPATIENT)
Dept: OBSTETRICS AND GYNECOLOGY | Facility: CLINIC | Age: 39
End: 2024-12-20
Payer: COMMERCIAL

## 2024-12-20 ENCOUNTER — PATIENT MESSAGE (OUTPATIENT)
Dept: PODIATRY | Facility: CLINIC | Age: 39
End: 2024-12-20
Payer: COMMERCIAL

## 2024-12-20 VITALS
DIASTOLIC BLOOD PRESSURE: 70 MMHG | SYSTOLIC BLOOD PRESSURE: 114 MMHG | BODY MASS INDEX: 35.19 KG/M2 | WEIGHT: 206.13 LBS | HEIGHT: 64 IN

## 2024-12-20 DIAGNOSIS — Z01.419 ENCOUNTER FOR GYNECOLOGICAL EXAMINATION WITHOUT ABNORMAL FINDING: Primary | ICD-10-CM

## 2024-12-20 DIAGNOSIS — G89.29 CENTRAL SENSITIZATION TO PAIN: Primary | ICD-10-CM

## 2024-12-20 DIAGNOSIS — M79.672 CHRONIC PAIN OF BOTH FEET: ICD-10-CM

## 2024-12-20 DIAGNOSIS — G89.29 CHRONIC PAIN OF BOTH FEET: ICD-10-CM

## 2024-12-20 DIAGNOSIS — M79.671 CHRONIC PAIN OF BOTH FEET: ICD-10-CM

## 2024-12-20 PROCEDURE — 99999 PR PBB SHADOW E&M-EST. PATIENT-LVL III: CPT | Mod: PBBFAC,,, | Performed by: OBSTETRICS & GYNECOLOGY

## 2024-12-20 RX ORDER — GABAPENTIN 300 MG/1
300 CAPSULE ORAL 2 TIMES DAILY
Qty: 60 CAPSULE | Refills: 11 | Status: SHIPPED | OUTPATIENT
Start: 2024-12-20 | End: 2025-12-20

## 2024-12-20 RX ORDER — CHLORHEXIDINE GLUCONATE ORAL RINSE 1.2 MG/ML
SOLUTION DENTAL
COMMUNITY
Start: 2024-10-25

## 2024-12-20 RX ORDER — TRAMADOL HYDROCHLORIDE 50 MG/1
50 TABLET ORAL EVERY 12 HOURS PRN
COMMUNITY
Start: 2024-08-19

## 2024-12-20 NOTE — PROGRESS NOTES
CC: Well woman exam    Bushra Kahn is a 39 y.o. female  presents for a well woman exam.    NL cycles  She is not trying to conceive at this time    Past Medical History:   Diagnosis Date    Acid reflux     Allergy     Chronic hypertension with superimposed pre-eclampsia, post partum 10/21/2022    On chart review patient with essential HTN outside of pregnancy at initial PNC visit     Fatty liver     Pre-eclampsia, severe, postpartum condition 2021    Severe hypertension affecting pregnancy in third trimester 2021    Severe pre-eclampsia in third trimester 2021       Past Surgical History:   Procedure Laterality Date    ADENOIDECTOMY  age 16    BREAST BIOPSY Right     benign     SECTION N/A 2021    Procedure:  SECTION;  Surgeon: Denita Xiong MD;  Location: [facility] L&D;  Service: OB/GYN;  Laterality: N/A;     SECTION N/A 10/10/2022    Procedure:  SECTION;  Surgeon: Denita Xiong MD;  Location: [facility]&D;  Service: OB/GYN;  Laterality: N/A;    CHOLECYSTECTOMY      Laparoscopic    ESOPHAGOGASTRODUODENOSCOPY N/A 2018    Procedure: ESOPHAGOGASTRODUODENOSCOPY (EGD);  Surgeon: Aurelio James MD;  Location: [address];  Service: Endoscopy;  Laterality: N/A;    ESOPHAGOGASTRODUODENOSCOPY N/A 2023    Procedure: EGD (ESOPHAGOGASTRODUODENOSCOPY);  Surgeon: Uziel Duran MD;  [facility];  Service: Endoscopy;  Laterality: N/A;  8.18  inst sent via [facility]   9.25 pre call attempted; [facility]    ESOPHAGOGASTRODUODENOSCOPY N/A 2024    Procedure: EGD (ESOPHAGOGASTRODUODENOSCOPY);  Surgeon: Uziel Duran MD;  [facility];  Service: Endoscopy;  Laterality: N/A;  10/5 ref by anette Rivera to [place]  - pre call complete. DBM   Left VM. SG    REDUCTION OF BOTH BREASTS Bilateral 2024    Procedure: MAMMOPLASTY, REDUCTION, BILATERAL  ///bilateral with free nipple graft;  " Surgeon: Luis Antonio Meek MD;  Location: [facility] OR;  Service: Plastics;  Laterality: Bilateral;  1822 grams removed from right breast, 1612 grams removed from left breast    TOTAL REDUCTION MAMMOPLASTY Bilateral 2024       OB History    Para Term  AB Living   2 2 2 0 0 2   SAB IAB Ectopic Multiple Live Births   0 0 0 0 2      # Outcome Date GA Lbr Jonathan/2nd Weight Sex Type Anes PTL Lv   2 Term 10/10/22 39w1d  3.63 kg (8 lb) F CS-LTranv Spinal, EPI  MIC   1 Term 21 37w4d  3.28 kg (7 lb 3.7 oz) M CS-LTranv EPI N MIC       Family History   Problem Relation Name Age of Onset    Lupus Mother      Kidney disease Mother      Hypertension Father      Sleep apnea Father      Breast cancer Paternal Aunt  50    Breast cancer Paternal Aunt      Cataracts Paternal Grandmother      Colon cancer Paternal Grandmother      Cataracts Paternal Grandfather      Cancer Paternal Grandfather          Lung cancer- smoker    Amblyopia Neg Hx      Blindness Neg Hx      Glaucoma Neg Hx      Macular degeneration Neg Hx      Retinal detachment Neg Hx      Strabismus Neg Hx      Stroke Neg Hx      Ovarian cancer Neg Hx      Diabetes Neg Hx      Melanoma Neg Hx      Psoriasis Neg Hx      Asthma Neg Hx      Allergies Neg Hx      Allergic rhinitis Neg Hx      Angioedema Neg Hx      Eczema Neg Hx      Immunodeficiency Neg Hx      Rhinitis Neg Hx      Urticaria Neg Hx      Atopy Neg Hx         Social History     Tobacco Use    Smoking status: Former     Current packs/day: 0.00     Average packs/day: 0.1 packs/day for 13.0 years (1.3 ttl pk-yrs)     Types: Cigarettes     Start date: 8/15/2005     Quit date: 8/15/2018     Years since quittin.3     Passive exposure: Never    Smokeless tobacco: Never   Substance Use Topics    Alcohol use: Not Currently     Comment: has not had alcohol in 3 yrs    Drug use: No       /70 (BP Location: Left arm, Patient Position: Sitting)   Ht 5' 4" (1.626 m)   Wt 93.5 kg (206 lb 2.1 oz)   " LMP 12/13/2024   BMI 35.38 kg/m²     ROS:  GENERAL: Denies weight gain or weight loss. Feeling well overall.   SKIN: Denies rash or lesions.   HEAD: Denies head injury or headache.   NODES: Denies enlarged lymph nodes.   CHEST: Denies chest pain or shortness of breath.   CARDIOVASCULAR: Denies palpitations or left sided chest pain.   ABDOMEN: No abdominal pain, constipation, diarrhea, nausea, vomiting or rectal bleeding.   URINARY: No frequency, dysuria, hematuria, or burning on urination.  REPRODUCTIVE: See HPI.   BREASTS: The patient performs breast self-examination and denies pain, lumps, or nipple discharge.   HEMATOLOGIC: No easy bruisability or excessive bleeding.  MUSCULOSKELETAL: Denies joint pain or swelling.   NEUROLOGIC: Denies syncope or weakness.   PSYCHIATRIC: Denies depression, anxiety or mood swings.    Physical Exam:    APPEARANCE: Well nourished, well developed, in no acute distress.  AFFECT: WNL, alert and oriented x 3  SKIN: No acne or hirsutism  NECK: Neck symmetric without masses or thyromegaly  NODES: No inguinal, cervical, axillary, or femoral lymph node enlargement  CHEST: Good respiratory effect  ABDOMEN: Soft.  No tenderness or masses.  No hepatosplenomegaly.  No hernias.  BREASTS: Symmetrical, no skin changes or visible lesions.  No palpable masses, nipple discharge bilaterally.  PELVIC: Normal external genitalia without lesions.  Normal hair distribution.  Adequate perineal body, normal urethral meatus.  Vagina moist and well rugated without lesions or discharge.  Cervix pink, without lesions, discharge or tenderness.  No significant cystocele or rectocele.  Bimanual exam shows uterus to be normal size, regular, mobile and nontender.  Adnexa without masses or tenderness.    EXTREMITIES: No edema.    ASSESSMENT AND PLAN  1. Encounter for gynecological examination without abnormal finding  Liquid-Based Pap Smear, Screening          Patient was counseled today on A.C.S. Pap guidelines and  recommendations for yearly pelvic exams, mammograms and monthly self breast exams; to see her PCP for other health maintenance.     Follow up in about 1 year (around 12/20/2025), or if symptoms worsen or fail to improve.

## 2025-01-05 ENCOUNTER — PATIENT MESSAGE (OUTPATIENT)
Dept: PODIATRY | Facility: CLINIC | Age: 40
End: 2025-01-05
Payer: COMMERCIAL

## 2025-01-09 ENCOUNTER — PATIENT MESSAGE (OUTPATIENT)
Dept: OBSTETRICS AND GYNECOLOGY | Facility: CLINIC | Age: 40
End: 2025-01-09
Payer: COMMERCIAL

## 2025-01-09 ENCOUNTER — PATIENT MESSAGE (OUTPATIENT)
Dept: PODIATRY | Facility: CLINIC | Age: 40
End: 2025-01-09
Payer: COMMERCIAL

## 2025-01-16 ENCOUNTER — PATIENT MESSAGE (OUTPATIENT)
Dept: OBSTETRICS AND GYNECOLOGY | Facility: CLINIC | Age: 40
End: 2025-01-16

## 2025-01-16 ENCOUNTER — OFFICE VISIT (OUTPATIENT)
Dept: OBSTETRICS AND GYNECOLOGY | Facility: CLINIC | Age: 40
End: 2025-01-16
Payer: COMMERCIAL

## 2025-01-16 DIAGNOSIS — Z32.01 POSITIVE PREGNANCY TEST: Primary | ICD-10-CM

## 2025-01-16 DIAGNOSIS — F41.9 ANXIETY: ICD-10-CM

## 2025-01-16 NOTE — PROGRESS NOTES
The patient location is: home    The chief complaint leading to consultation is: Medication discussion  with her + UPT    Visit type: audiovisual    Face to Face time with patient: 20   minutes of total time spent on the encounter, which includes face to face time and non-face to face time preparing to see the patient (eg, review of tests), Obtaining and/or reviewing separately obtained history, Documenting clinical information in the electronic or other health record, Independently interpreting results (not separately reported) and communicating results to the patient/family/caregiver, or Care coordination (not separately reported).         Each patient to whom he or she provides medical services by telemedicine is:  (1) informed of the relationship between the physician and patient and the respective role of any other health care provider with respect to management of the patient; and (2) notified that he or she may decline to receive medical services by telemedicine and may withdraw from such care at any time.    Notes:      CC: + UPT, discussion on her meds    Bushra Kahn is a 39 y.o. female  presents for a discussion on her meds and + UPT  LMP Dec 13.   She has increased anxiety and stress but not having any severe sx.  She is wanting to avoid meds at this time      Past Medical History:   Diagnosis Date    Acid reflux     Allergy     Chronic hypertension with superimposed pre-eclampsia, post partum 10/21/2022    On chart review patient with essential HTN outside of pregnancy at initial [facility] visit     Fatty liver     Pre-eclampsia, severe, postpartum condition 2021    Severe hypertension affecting pregnancy in third trimester 2021    Severe pre-eclampsia in third trimester 2021       Past Surgical History:   Procedure Laterality Date    ADENOIDECTOMY  age 16    BREAST BIOPSY Right     benign     SECTION N/A 2021    Procedure:  SECTION;  Surgeon: Denita VELIZ  MD Tyrese;  Location: [facility] L&D;  Service: OB/GYN;  Laterality: N/A;     SECTION N/A 10/10/2022    Procedure:  SECTION;  Surgeon: Denita Xiong MD;  Location: [facility] L&D;  Service: OB/GYN;  Laterality: N/A;    CHOLECYSTECTOMY      Laparoscopic    ESOPHAGOGASTRODUODENOSCOPY N/A 2018    Procedure: ESOPHAGOGASTRODUODENOSCOPY (EGD);  Surgeon: Aurelio James MD;  [address]  Service: Endoscopy;  Laterality: N/A;    ESOPHAGOGASTRODUODENOSCOPY N/A 2023    Procedure: EGD (ESOPHAGOGASTRODUODENOSCOPY);  Surgeon: Uizel Duran MD;  Location: [facility] ENDOSCOPY;  Service: Endoscopy;  Laterality: N/A;  8.18  inst sent via [facility]   9.25 pre call attempted; [facility]    ESOPHAGOGASTRODUODENOSCOPY N/A 2024    Procedure: EGD (ESOPHAGOGASTRODUODENOSCOPY);  Surgeon: Uziel Duran MD;  Location: [facility] ENDOSCOPY;  Service: Endoscopy;  Laterality: N/A;  10/5 ref by Dr. Duran, instr. to [place]-  - pre call complete. DBM   Left VM. SG    REDUCTION OF BOTH BREASTS Bilateral 2024    Procedure: MAMMOPLASTY, REDUCTION, BILATERAL  ///bilateral with free nipple graft;  Surgeon: Luis Antonio Meek MD;  Location: [facility] OR;  Service: Plastics;  Laterality: Bilateral;  1822 grams removed from right breast, 1612 grams removed from left breast    TOTAL REDUCTION MAMMOPLASTY Bilateral 2024       OB History    Para Term  AB Living   2 2 2 0 0 2   SAB IAB Ectopic Multiple Live Births   0 0 0 0 2      # Outcome Date GA Lbr Jonathan/2nd Weight Sex Type Anes PTL Lv   2 Term 10/10/22 39w1d  3.63 kg (8 lb) F CS-LTranv Spinal, EPI  MIC   1 Term 21 37w4d  3.28 kg (7 lb 3.7 oz) M CS-LTranv EPI N MIC       Family History   Problem Relation Name Age of Onset    Lupus Mother      Kidney disease Mother      Hypertension Father      Sleep apnea Father      Breast cancer Paternal Aunt  50    Breast cancer Paternal Aunt      Cataracts Paternal Grandmother       Colon cancer Paternal Grandmother      Cataracts Paternal Grandfather      Cancer Paternal Grandfather          Lung cancer- smoker    Amblyopia Neg Hx      Blindness Neg Hx      Glaucoma Neg Hx      Macular degeneration Neg Hx      Retinal detachment Neg Hx      Strabismus Neg Hx      Stroke Neg Hx      Ovarian cancer Neg Hx      Diabetes Neg Hx      Melanoma Neg Hx      Psoriasis Neg Hx      Asthma Neg Hx      Allergies Neg Hx      Allergic rhinitis Neg Hx      Angioedema Neg Hx      Eczema Neg Hx      Immunodeficiency Neg Hx      Rhinitis Neg Hx      Urticaria Neg Hx      Atopy Neg Hx         Social History     Tobacco Use    Smoking status: Former     Current packs/day: 0.00     Average packs/day: 0.1 packs/day for 13.0 years (1.3 ttl pk-yrs)     Types: Cigarettes     Start date: 8/15/2005     Quit date: 8/15/2018     Years since quittin.4     Passive exposure: Never    Smokeless tobacco: Never   Substance Use Topics    Alcohol use: Not Currently     Comment: has not had alcohol in 3 yrs    Drug use: No       LMP 2024     ROS:  GENERAL: Denies weight gain or weight loss. Feeling well overall.   SKIN: Denies rash or lesions.   HEAD: Denies head injury or headache.   NODES: Denies enlarged lymph nodes.   CHEST: Denies chest pain or shortness of breath.   CARDIOVASCULAR: Denies palpitations or left sided chest pain.   ABDOMEN: No abdominal pain, constipation, diarrhea, nausea, vomiting or rectal bleeding.   URINARY: No frequency, dysuria, hematuria, or burning on urination.  REPRODUCTIVE: See HPI.   BREASTS: The patient performs breast self-examination and denies pain, lumps, or nipple discharge.   HEMATOLOGIC: No easy bruisability or excessive bleeding.  MUSCULOSKELETAL: Denies joint pain or swelling.   NEUROLOGIC: Denies syncope or weakness.   PSYCHIATRIC: Denies depression, anxiety or mood swings.    Physical Exam:    APPEARANCE: Well nourished, well developed, in no acute distress.  AFFECT: WNL, alert  and oriented x 3  SKIN: No acne or hirsutism  Virtual       ASSESSMENT AND PLAN  1. Positive pregnancy test  HCG, Quantitative    PROGESTERONE    GROUP & RH      2. Anxiety          PNV daily    Will follow up with therapist for now- she declines medication as this time for anxiety  Patient was counseled today on A.C.S. Pap guidelines and recommendations for yearly pelvic exams, mammograms and monthly self breast exams; to see her PCP for other health maintenance.       Follow up in about 4 weeks (around 2/13/2025), or if symptoms worsen or fail to improve.  Will follow up for initial OB visit and US  Bleeding and pain precuations

## 2025-01-17 ENCOUNTER — LAB VISIT (OUTPATIENT)
Dept: LAB | Facility: HOSPITAL | Age: 40
End: 2025-01-17
Payer: COMMERCIAL

## 2025-01-17 DIAGNOSIS — Z32.01 POSITIVE PREGNANCY TEST: ICD-10-CM

## 2025-01-17 LAB
ABO + RH BLD: NORMAL
HCG INTACT+B SERPL-ACNC: 5144 MIU/ML
PROGEST SERPL-MCNC: 18 NG/ML

## 2025-01-17 PROCEDURE — 84702 CHORIONIC GONADOTROPIN TEST: CPT | Performed by: OBSTETRICS & GYNECOLOGY

## 2025-01-17 PROCEDURE — 86900 BLOOD TYPING SEROLOGIC ABO: CPT | Performed by: OBSTETRICS & GYNECOLOGY

## 2025-01-17 PROCEDURE — 84144 ASSAY OF PROGESTERONE: CPT | Performed by: OBSTETRICS & GYNECOLOGY

## 2025-01-17 PROCEDURE — 36415 COLL VENOUS BLD VENIPUNCTURE: CPT | Performed by: OBSTETRICS & GYNECOLOGY

## 2025-01-29 ENCOUNTER — PATIENT MESSAGE (OUTPATIENT)
Dept: ADMINISTRATIVE | Facility: OTHER | Age: 40
End: 2025-01-29
Payer: COMMERCIAL

## 2025-01-29 ENCOUNTER — PATIENT MESSAGE (OUTPATIENT)
Dept: OBSTETRICS AND GYNECOLOGY | Facility: CLINIC | Age: 40
End: 2025-01-29
Payer: COMMERCIAL

## 2025-01-29 ENCOUNTER — CLINICAL SUPPORT (OUTPATIENT)
Dept: OBSTETRICS AND GYNECOLOGY | Facility: CLINIC | Age: 40
End: 2025-01-29
Payer: COMMERCIAL

## 2025-01-29 DIAGNOSIS — N91.2 AMENORRHEA: Primary | ICD-10-CM

## 2025-01-29 PROCEDURE — 99999 PR PBB SHADOW E&M-EST. PATIENT-LVL II: CPT | Mod: PBBFAC,,,

## 2025-01-29 RX ORDER — FAMOTIDINE 20 MG/1
20 TABLET, FILM COATED ORAL 2 TIMES DAILY
COMMUNITY

## 2025-01-29 NOTE — PROGRESS NOTES
Spoke with patient for approximately 30 minutes during virtual visit.  Updated chart to reflect up to date patient demographics.  Allergies, medications, pharmacy, medical, surgical and family history updated. Substance and sexual activity, marital status, gender identity and OB/Gyn history updated.  Patient was guided through expectations of care during pregnancy.  Pregnancy confirmation, dating u/s & first OB appts scheduled.  New pregnancy education provided & questions answered. Encouraged to send message or call office with any questions/concerns. Verbalized understanding.     Discussed with pt:    Lmp 12/13/24  Encouraged to begin  taking PNV daily    C/o n/v:   common in 1st tri  discussed safe options per Pregnancy A to Z guide   denies cramping/spotting:   may be normal to have mild cramping/light spotting, diane in 1st tri & with sexual activity  Precautions/warning signs discussed:   encouraged to go to ED for heavy vag discharge, saturating a pad, bright red bleeding, painful cramping/abd pain that is interrupting daily activity  Encouraged and discussed healthy diet:   nothing raw; meat well done; heat deli meats & hot dogs prior to eating   avoid soft cheeses that are not fully pasteurized-ex: brie, feta, blue cheese   avoid fish high in mercury-limit canned tuna to once per week   rinse fruits/veggies thoroughly    Encouraged adequate fluids:  8-12 cups of water/healthy liquids each day  Limit caffeine to <200 mg/day (approx 1-2 cups coffee, tea or soda)   Extra rest can be beneficial, especially during the 1st trimester when it's normal to feel tired   Sleep on side rather than back or abdomen, especially as pregnancy progresses  Ok to continue to exercise but should not start anything new or more strenuous during pregnancy  Normal weight gain:  1st trimester-may not gain anything at all or up to 1-5 lbs   Total weight gain approximately 25-35 lbs depending on starting weight   Should avoid alcohol,  smoking, vaping, drugs, herbal supplements  Should check with provider regarding safety of any prescription or OTC medications  Avoid ibuprofen, motrin, advil, aleve  Ok to take regular strength tylenol for headache or body aches  Avoid cleaning cat litter box  Wear gloves with gardening   Important to wear seat belt when riding in vehicle-lower belt should be below abdomen   Will need car seat to bring baby home from hospital    Discussed normal breast changes during early pregnancy-breasts may feel full/tender   Discussed breastfeeding recommendations/benefits for mom & baby -    Discussed need for pediatrician-will have to schedule baby's 1st appt prior to leaving the hospital to be seen within 2-3 days of d/c  Sent [employer] message to pt through pt portal with information regarding the [email or web address] for access to the Pregnancy A to Z guide and Prenatal Class schedule. Informed of the ConnectedMOM program, Get Ready to Meet Your Baby pamphlet, Coffective nel and how to obtain a breast pump through insurance toward end of pregnancy

## 2025-02-14 ENCOUNTER — OFFICE VISIT (OUTPATIENT)
Dept: OBSTETRICS AND GYNECOLOGY | Facility: CLINIC | Age: 40
End: 2025-02-14
Payer: COMMERCIAL

## 2025-02-14 ENCOUNTER — HOSPITAL ENCOUNTER (OUTPATIENT)
Dept: PERINATAL CARE | Facility: OTHER | Age: 40
Discharge: HOME | End: 2025-02-14
Attending: OBSTETRICS & GYNECOLOGY
Payer: COMMERCIAL

## 2025-02-14 VITALS
HEIGHT: 64 IN | WEIGHT: 213.88 LBS | DIASTOLIC BLOOD PRESSURE: 81 MMHG | SYSTOLIC BLOOD PRESSURE: 120 MMHG | BODY MASS INDEX: 36.51 KG/M2

## 2025-02-14 DIAGNOSIS — Z32.01 POSITIVE PREGNANCY TEST: ICD-10-CM

## 2025-02-14 DIAGNOSIS — E66.9 OBESITY (BMI 30-39.9): ICD-10-CM

## 2025-02-14 DIAGNOSIS — N91.2 AMENORRHEA: ICD-10-CM

## 2025-02-14 DIAGNOSIS — N91.4 SECONDARY OLIGOMENORRHEA: Primary | ICD-10-CM

## 2025-02-14 DIAGNOSIS — I10 PRIMARY HYPERTENSION: ICD-10-CM

## 2025-02-14 DIAGNOSIS — F43.23 ADJUSTMENT DISORDER WITH MIXED ANXIETY AND DEPRESSED MOOD: ICD-10-CM

## 2025-02-14 DIAGNOSIS — O09.299 HX OF PREECLAMPSIA, PRIOR PREGNANCY, CURRENTLY PREGNANT: ICD-10-CM

## 2025-02-14 LAB
CREAT UR-MCNC: 208 MG/DL (ref 15–325)
PROT UR-MCNC: 13 MG/DL (ref 0–15)
PROT/CREAT UR: 0.06 MG/G{CREAT} (ref 0–0.2)

## 2025-02-14 PROCEDURE — 87491 CHLMYD TRACH DNA AMP PROBE: CPT

## 2025-02-14 PROCEDURE — 84156 ASSAY OF PROTEIN URINE: CPT

## 2025-02-14 PROCEDURE — 76801 OB US < 14 WKS SINGLE FETUS: CPT

## 2025-02-14 PROCEDURE — 99999 PR PBB SHADOW E&M-EST. PATIENT-LVL III: CPT | Mod: PBBFAC,,,

## 2025-02-14 PROCEDURE — 87086 URINE CULTURE/COLONY COUNT: CPT

## 2025-02-14 RX ORDER — SERTRALINE HYDROCHLORIDE 25 MG/1
TABLET, FILM COATED ORAL
Qty: 30 TABLET | Refills: 6 | Status: SHIPPED | OUTPATIENT
Start: 2025-02-14

## 2025-02-14 RX ORDER — ASPIRIN 81 MG/1
81 TABLET ORAL DAILY
COMMUNITY
Start: 2025-02-14 | End: 2026-02-14

## 2025-02-14 NOTE — PATIENT INSTRUCTIONS
LABOR AND DELIVERY PHONE NUMBER, [phone] (OPEN , LOCATED ON 6TH FLOOR OF HOSPITAL)  [address] PHONE NUMBER, [phone] (OPEN MON-FRI, 8a-5p)     1) Eat small frequent meals through the day versus three large meals  2) Try ginger ale or sprite to help settle the stomach - you can also take ginger capsules (250mg 4 times per day)  3) Eat crackers or dry toast before getting out of bed in the morning   4) Stay hydrated by drinking plenty of water-do not immediately eat or drink something after vomiting. Give your stomach a rest for 20-30 minutes. Slowly reintroduce ice chips, small sips water, crackers, etc.    5) Try OTC unisom (1/2 tablet) and vitamin B6 - take the 25mg b6 twice a day and then both together at night before bed. This can help with the nausea in the morning and is safe to use during pregnancy.  6) Sea bands (Accupressure wrist bands)    If you are unable to keep anything down and constantly vomiting for more that 24 hours, let the office know so that dehydration can be avoided. We would recommend being seen in the emergency department if this is the case.       Topic  General Pregnancy Information Recommended   (Unless Otherwise Contraindicated Or Restrictions Given To You By Your OB Doctor)      1. Anticipated course of prenatal care      Visits: will be Every 4 wks until 28 weeks, then every 2 weeks until 36 weeks, and then weekly until delivery.   Urine will be collected at each Obstetric visit        2. Nutrition and weight gain    Daily pre-sami vitamin (recommend taking at night)   Additional 300 calories needed daily  No Sushi, hotdogs, unpasteurized products (milk/cheeses). No large fish such as: shark, gregor mackerel, tile, sword fish   Incorporate 12 ounces of smaller seafood/week and no more than 6oz of albacore tuna   Caffeine: 200 mg/day or 2 cups of caffeine/day   Weight gain recommendations are based off of BMI before pregnancy. Generally patients who with normal weight prior  pregnancy it is recommended 25-35 pounds of weight gain during the pregnancy with an estimated weekly gain of 1 pound/wk in 2nd and 3rd Trimester.    3. Toxoplasmosis precautions  If cats are in the home avoid changing litter boxes and if you need to change the litter box recommended you use gloves   4. Sexual Activity  Sexual activity is okay unless you are put on restrictions by your provider. I recommend urinating after intercourse    5. Exercise  Generally pre-pregnancy routine is okay to continue   Drink plenty fluids for hydration   Stop any activity that causes heavy cramping like a period or bright red bleeding and contact your provider  No extreme or contact sports   No exercise on your back for an extended period of time after 20 weeks    6. Hot Tub/Saunas  Avoid hot tubes and saunas    7. Hair Treatments  Because of the lack of scientific studies on the effects of chemical treatments on your hair, we must advise that you do it at your own risk. If you choose to treat your hair, we recommend that you wait until after 12 weeks gestation. At this time there is no reason to believe that normal hair treatment is associated with onsequences to the baby.    8. Vaccines  Influenza vaccine is recommended by CDC during flu season   Tdap (pertussis or whopping cough) recommended each pregnancy between 27 and 36 weeks   Tdap booster recommended for family and other planned direct caregivers    9. Water  Water is an important nutrient in a good diet. However, it cannot be stressed enough that during pregnancy water is essential. The body has increased circulation through blood vessels, and without a large increase in water, pregnant women will be dehydrated. It plays an important role in decreasing constipation, preventing  contractions, decreasing swelling, and preventing dizziness. We recommend that you drink 8-10 glasses of water per day.    10. Smoking/Alcohol/Illicit Drug Use  No safe Level   Can lead to  problems with pregnancy   Growth of the developing fetus    labor (delivery before 37 weeks)    rupture of the membranes (water breaking before 37 weeks)   Premature separation of the placenta (which may cause bleeding)   American College of Obstetricians and Gynecologists endorses abstinence   Can lead to babies with disabilities    11. Environmental or work hazards  Unless otherwise restricted you may continue work throughout the pregnancy   Notify your provider of any work hazards or chemical exposure concerns   12. Travel    Safe to travel up to 35 weeks   Continue to wear a seatbelt and airbags are still recommended   Drink plenty fluids   Blood clots are a concern during pregnancy with long travel. Recommend compression stockings and moving around at least every 2 hours and staying hydrated.    13. Use of medications, vitamins, herbs, OTC drugs    Any medications not on the list provided to you from our clinic or given to you by one of our providers we recommend calling to make sure the medication is safe for you and baby.    14. Domestic Violence    Please notify office immediately of any concerns or violence so that we can help direct you to assistance needed   [place] [employer]: [phone]    15. Childbirth classes    List of Childbirth classes from [facility] is available    16. Selecting a Pediatrician  Selecting a pediatrician before delivery is recommended  You can interview pediatricians before delivery    17. Fetal Monitoring    A simple test of your babys well-being is a kick count. After 26 weeks, fetal motion of any kind should be monitored. Further discussion at that time   18.  Labor Signs    Water break, leaking fluids from Vagina prior 37 weeks  Regular contractions, Contractions that are more than 5-6/hour, getting stronger and painful with lower back pain, does not go away with rest and fluids    19. Postpartum Family Planning    Multiple  options available from short term methods to long term reversible and irreversible methods   Discuss with provider as you get closer to delivery    20. Dental    It is recommended that you get an annual dental cleaning    21. Breastfeeding    Classes offered at [facility] and it is recommended to take a class    22. Lifting In 2013, the National [place] for Occupational Safety and Health (NIOSH) published clinical guidelines for occupational lifting in uncomplicated pregnancies. The recommended weight limits are based on gestational age, intermittent versus repetitive lifting, time (hours/day) spent lifting, and lifting height from floor and distance in 3 front of body. In this guideline, the maximum permissible weight for a woman less than 20 weeks of gestation performing infrequent lifting is 36 pounds (16 kgs) and the maximum permissible weight at >=20 weeks is 26 pounds (12 kgs). For repetitive lifting >=1 hour/day, the maximum weights in the first and second half of pregnancy are 18 pounds (8 kgs) and 13 pounds (6 kgs), respectively, and for repetitive lifting <1 hour/day, the maximum weights are 30 pounds (14 kgs) and 22 pounds (10 kgs), respectively. Although not based on high quality evidence, these guidelines are a reasonable reference for counseling pregnant women     23. Scheduling and Provider Availability    Your Obstetric Doctor is usually here weekly but not every day. We recommend you make 3-4 advanced appointments at a time to accommodate your personal needs and work/school obligations.   We ask that you come 15 minutes prior your scheduled appointment.   For same day appointments (not routine appointments) there is a Nurse Practitioner or another obstetric provider available. Please let the  aware you are an OB patient requesting a same day appointment.      24. Recommended Phone [facility]      MEDICATIONS IN PREGNANCY     While some medications are considered safe to take  during pregnancy, the effects of other medications on your unborn baby are unknown. Therefore, it is very important to pay special attention to medications you take while you are pregnant.   If you were taking prescription medications before you became pregnant, please ask your health care provider about continuing these medications as soon as you find out that you are pregnant. Do not stop taking any medications without discussing with your health care provider. Your health care provider will weigh the benefits and risks to your pregnancy when making his or her recommendation. With some medications, the risk of not taking them may be more serious than the potential risk of taking them.   If you are prescribed any new medication, please inform your health care provider that you are pregnant. Be sure to discuss the risks and benefits of the newly prescribed medication with your health care provider before taking the medication. We are always available to answer any questions about new medications and how they relate to your pregnancy.     Are Alternative Pregnancy Medicine Therapies Safe?   Many pregnant women believe natural products can be safely used to relieve nausea, backache, and other annoying symptoms of pregnancy, but many of these so-called natural products have not been tested for their safety and effectiveness. Therefore, it is very important to check with your health care provider before taking any alternative therapies. She will not recommend a product or therapy until it is shown to be safe and effective.     Which Over the Counter Drugs Are Safe?   Prenatal vitamins, now available without a prescription, are safe and important to take during pregnancy. Ask your health care provider about the safety of taking other vitamins, herbal remedies and supplements during pregnancy. Most herbal preparations and supplements have not been proven to be safe during pregnancy. Generally, you should not take any  over-the-counter medication unless it is necessary. The following medications and home remedies have no known harmful effects during pregnancy when taken according to the package directions. If you want to know about the safety of any other medications not listed here, please contact your health care provider.      Problem:  Safe to Take:    Pain relief, headache, and fever  Acetaminophen - Tylenol, Anacin Aspirin-Free    Heartburn  Acid neutralizers - Maalox, Mylanta, Rolaids, Tums, Gaviscon   Histamine-blockers - Pepcid, Zantac, Prilosec    Gas pains and bloating  Simethicone - Gas-X, Maalox Anti-Gas, Mylanta Gas, Mylicon    Nausea  Lore - beverages, tablets, candies   Vitamin B6   Emetrol (if not diabetic)   Sea bands   Anti-histamines - Sleep-shilpa, Benadryl, Bonnine, Dramamine    Cough  Guaifenesin (expectorant) - Hytuss, Mucinex, Robitussin   Dextromethorphan (antitussive) - Benylin, Delsym, Scot-Tussin DM   Guaifenesin plus dextromethorphan - Benylin Expectorant, Robitussin DM    Congestion  Pseudoephedrine - Sudafed, Actifed, Dristan, Neosynephrine   Vicks VapoRub   Saline nasal drops or spray    Sore throat  Throat lozenges - Sucrets, Cepacol, Cepastat, Ricola   Chloroseptic Spray   Warm salt/water gargle    Allergy relief  Chlorpheniramine - Chlor-Trimeton, Triaminic   Loratadine - Alavert, Claritin, Tavist ND, Triaminic Allerchews   Cetirizine - Zyrtec   Diphenhydramine -Benadryl, Diphenhist    Rashes  Hydrocortisone cream or ointment   Caladryl lotion or cream   Benadryl cream   Oatmeal bath (Aveeno)    Diarrhea  Loperamide - Imodium, Kaopectate, Maalox Anti-Diarrheal, Pepto Bismol    Constipation  Fiber supplements - Metamucil, Citrucel, Fiberall/Fibercon, Benefiber   Stool softeners - Colace, Senekot, Dulcolax   Milk of Magnesia    Hemorrhoids  Warm baths   Witch hazel preparations - Tucks medicated pads   Steroid preparations - Anusol-HC, Preparation H    Insomnia  Diphenhydramine - Benadryl, Unisom  SleepGels, Nytol, Sominex   Doxylamine succinate - Unisom Nighttime Sleep-Aid    First-aid ointments  Cortaid, Lanacort, Polysporin, Bacitracin, Neosporin    Yeast infections  Call office for appointment      Connected MOM   Using Wireless Technology to Manage Your Prenatal Health   Congratulations! Your team here at [facility] is excited for the upcoming addition to your family and is ready to support you over the course of your pregnancy. One of the ways we are prepared to help you is through our exciting new Digital Medicine Program, Connected MOM. Connected MOM stands for Connected Maternity Online Monitoring and is offered free of charge as a way to help our expectant mothers manage their pregnancy with fewer visits to the obstetrician.    In the fast-paced environment we live in, patients demand convenient, reliable access to healthcare at their fingertips. Recommended by The American College of Obstetricians and Gynecologists (ACOG), the standard prenatal care model for low-risk pregnancies can average anywhere between 12-14 in-office prenatal visits.    Through this innovative program, participating mothers-to-be receive a wireless scale, wireless blood pressure cuff and an at-home urine protein test kit. Through the use of a smartphone, patients can easily send their weight, blood pressure readings and urine protein test results digitally in real-time from the comfort of their own homes. Results are sent directly to their [facility] account, the systems online patient portal which connects directly to the patients Electronic Medical Record. A specialized Connected MOM care team - comprised of the patients obstetrician, a dedicated health  and a  - reviews the data and provides medical recommendations as needed. This allows expectant mothers to receive care proactively, wherever they are, while minimizing the need for in-person visits and thus minimizing  disruption to their regular daily activities.    How it Works At the initial prenatal visit, interested patients can work with their obstetrician to sign up for the program. After the appointment, expectant mothers can head to the [employer], a first-of-its-kind retail experience created by [facility] offering the latest in cutting-edge, interactive healthcare technology, to receive a Connected MOM kit containing all of the digital tools needed for remote monitoring. Once enrolled, patients weigh themselves regularly and take their blood pressure once a week. Instead of coming to three office appointments at weeks 20, 30, 37 the patient will have the appointment at home. They will take their blood pressure, weight and perform three at-home urine protein tests at these home visits. Results are directly transmitted into the EMR, and notifications and messages from the care team are communicated via [facility].     TECH SUPPORT [phone]  [email or web address]      Chromosomal testing     1.  Call [phone] to see about coverage and any out of pocket costs regarding the [id number] (MT21) testing. This is done any day after 10 weeks and is blood work only. It checks for any chromosomal abnormalities like Down Syndrome. You can also find out the sex of the baby if you choose to know. Once you find out coverage and decide to proceed, send either myself or your doctor a message and we can see what date you can do it. It is done at our second floor lab at [facility].      2. Integrated test-The Integrated Screening Test is a way to estimate the chance that a baby will be born with Down Syndrome, trisomy 18, or an open neural tube defect The two blood tests measure proteins that are made by the developing baby and placenta. The first blood test is taken between the 11th and 13th week of your pregnancy, and the second is taken ideally between 15 and 18 weeks, although this can be measured up to the 21st week. This test  has 86% accuracy.    3. Quad screen-The quad screen is a maternal blood screening test  that evaluates four specific substances (AFP, hCG,Inhibin-A and Estriol).The quad screen measures high and low levels of AFP, abnormal levels of hCG and estriol, and high levels of Inhibin-A. The results are combined with the mothers age and ethnicity in order to assess the probabilities of potential genetic disorders. High levels of AFP may suggest that the developing baby has a neural tube defect such as spina bifida or anencephaly.  However, the most common reason for elevated AFP levels is inaccurate dating of the pregnancy. Low levels of AFP and abnormal levels of hCG and estriol may indicate that the developing baby has Trisomy 21(Down syndrome), Trisomy 18 (Olivas Syndrome) or another type of chromosome abnormality. This test is done between 16-22 weeks of pregnancy.

## 2025-02-17 ENCOUNTER — RESULTS FOLLOW-UP (OUTPATIENT)
Dept: OBSTETRICS AND GYNECOLOGY | Facility: CLINIC | Age: 40
End: 2025-02-17
Payer: COMMERCIAL

## 2025-02-19 ENCOUNTER — TELEPHONE (OUTPATIENT)
Dept: PODIATRY | Facility: CLINIC | Age: 40
End: 2025-02-19
Payer: COMMERCIAL

## 2025-03-07 ENCOUNTER — PATIENT MESSAGE (OUTPATIENT)
Dept: ADMINISTRATIVE | Facility: OTHER | Age: 40
End: 2025-03-07
Payer: COMMERCIAL

## 2025-03-10 ENCOUNTER — TELEPHONE (OUTPATIENT)
Dept: INTERNAL MEDICINE | Facility: CLINIC | Age: 40
End: 2025-03-10
Payer: COMMERCIAL

## 2025-03-10 NOTE — TELEPHONE ENCOUNTER
----- Message from Health  Nathalie sent at 3/10/2025  1:20 PM CDT -----  Regarding: Digital Medicine Patient Discharge  Dr. Ferrari, Your patient, Bushra Kahn, no longer meets the eligibility requirements to participate in [facility] Digital Medicine due to pregnancy. We have confirmed with the patient and will proceed with discharge from [facility] at this time. Patient may be eligible to re-enroll post-partum. Please don't hesitate to reach out with any questions or concerns.  Thank you for your continued support of Digital Medicine! Sincerely, Nathalie Tavarez

## 2025-03-11 ENCOUNTER — PATIENT MESSAGE (OUTPATIENT)
Dept: OBSTETRICS AND GYNECOLOGY | Facility: CLINIC | Age: 40
End: 2025-03-11

## 2025-03-11 ENCOUNTER — INITIAL PRENATAL (OUTPATIENT)
Dept: OBSTETRICS AND GYNECOLOGY | Facility: CLINIC | Age: 40
End: 2025-03-11
Payer: COMMERCIAL

## 2025-03-11 VITALS
WEIGHT: 215.63 LBS | BODY MASS INDEX: 37.01 KG/M2 | DIASTOLIC BLOOD PRESSURE: 78 MMHG | SYSTOLIC BLOOD PRESSURE: 136 MMHG

## 2025-03-11 DIAGNOSIS — Z3A.12 12 WEEKS GESTATION OF PREGNANCY: ICD-10-CM

## 2025-03-11 DIAGNOSIS — Z87.59 HISTORY OF PRE-ECLAMPSIA: Primary | ICD-10-CM

## 2025-03-11 DIAGNOSIS — F32.A DEPRESSION AFFECTING PREGNANCY: ICD-10-CM

## 2025-03-11 DIAGNOSIS — O99.340 DEPRESSION AFFECTING PREGNANCY: ICD-10-CM

## 2025-03-11 DIAGNOSIS — O09.521 MULTIGRAVIDA OF ADVANCED MATERNAL AGE IN FIRST TRIMESTER: ICD-10-CM

## 2025-03-11 PROCEDURE — 99999 PR PBB SHADOW E&M-EST. PATIENT-LVL III: CPT | Mod: PBBFAC,,, | Performed by: OBSTETRICS & GYNECOLOGY

## 2025-03-11 NOTE — PROGRESS NOTES
Pregnancy dating, labs, ultrasound reports, prenatal testing, and problem list; prior records and results; and available outside records were reviewed and updated in EMR.  Pertinent findings were noted below.    Reason for Visit   Initial Prenatal Visit    HPI   39 y.o., at 12w4d by Estimated Date of Delivery: 25    She has depression and has felt overwhelmed.  She feels it is improving with weekly therapy.  No suicidal or homicidal ideations    Contractions: No   Bleeding: No   Loss of fluid: No   Fetal movement: No   Nausea: No   Vomiting: No   Headache: No     Exam   /78   Wt 97.8 kg (215 lb 9.8 oz)   LMP 2024   BMI 37.01 kg/m²     GENERAL: No acute distress  ABD: Gravid    Assessment and Plan   History of pre-eclampsia  -     Protein/Creatinine Ratio, Urine; Future; Expected date: 2025  -     US MFM Procedure ([facility])-Future; Future  -     Connected MOM Enrollment  -     Assign Connected MOM Program Consent Questionnaire    12 weeks gestation of pregnancy  -     US MFM Procedure ([facility])-Future; Future  -     Connected MOM Enrollment  -     Assign Connected MOM Program Consent Questionnaire    Depression affecting pregnancy    Multigravida of advanced maternal age in first trimester      Bleeding and pain precautions     labor and Pain and bleeding precautions given  Follow-up: 4 weeks

## 2025-03-20 ENCOUNTER — PATIENT MESSAGE (OUTPATIENT)
Dept: OBSTETRICS AND GYNECOLOGY | Facility: CLINIC | Age: 40
End: 2025-03-20
Payer: COMMERCIAL

## 2025-03-27 ENCOUNTER — PATIENT MESSAGE (OUTPATIENT)
Dept: OBSTETRICS AND GYNECOLOGY | Facility: CLINIC | Age: 40
End: 2025-03-27
Payer: COMMERCIAL

## 2025-04-02 ENCOUNTER — PATIENT MESSAGE (OUTPATIENT)
Dept: INTERNAL MEDICINE | Facility: CLINIC | Age: 40
End: 2025-04-02
Payer: COMMERCIAL

## 2025-04-04 ENCOUNTER — PATIENT MESSAGE (OUTPATIENT)
Dept: OTHER | Facility: OTHER | Age: 40
End: 2025-04-04
Payer: COMMERCIAL

## 2025-04-09 ENCOUNTER — LAB VISIT (OUTPATIENT)
Dept: LAB | Facility: HOSPITAL | Age: 40
End: 2025-04-09
Attending: OBSTETRICS & GYNECOLOGY
Payer: COMMERCIAL

## 2025-04-09 ENCOUNTER — PATIENT MESSAGE (OUTPATIENT)
Dept: OBSTETRICS AND GYNECOLOGY | Facility: CLINIC | Age: 40
End: 2025-04-09

## 2025-04-09 ENCOUNTER — ROUTINE PRENATAL (OUTPATIENT)
Dept: OBSTETRICS AND GYNECOLOGY | Facility: CLINIC | Age: 40
End: 2025-04-09
Payer: COMMERCIAL

## 2025-04-09 VITALS
SYSTOLIC BLOOD PRESSURE: 136 MMHG | DIASTOLIC BLOOD PRESSURE: 70 MMHG | BODY MASS INDEX: 38.26 KG/M2 | WEIGHT: 222.88 LBS

## 2025-04-09 DIAGNOSIS — Z87.59 HISTORY OF PRE-ECLAMPSIA: ICD-10-CM

## 2025-04-09 DIAGNOSIS — J32.9 SINUSITIS, UNSPECIFIED CHRONICITY, UNSPECIFIED LOCATION: Primary | ICD-10-CM

## 2025-04-09 DIAGNOSIS — Z34.81 ENCOUNTER FOR SUPERVISION OF OTHER NORMAL PREGNANCY IN FIRST TRIMESTER: Primary | ICD-10-CM

## 2025-04-09 LAB
CREAT UR-MCNC: 175 MG/DL (ref 15–325)
PROT UR-MCNC: 11 MG/DL
PROT/CREAT UR: 0.06 MG/G{CREAT}

## 2025-04-09 PROCEDURE — 0502F SUBSEQUENT PRENATAL CARE: CPT | Mod: CPTII,S$GLB,, | Performed by: NURSE PRACTITIONER

## 2025-04-09 PROCEDURE — 82570 ASSAY OF URINE CREATININE: CPT

## 2025-04-09 PROCEDURE — 99999 PR PBB SHADOW E&M-EST. PATIENT-LVL III: CPT | Mod: PBBFAC,,, | Performed by: NURSE PRACTITIONER

## 2025-04-10 ENCOUNTER — PATIENT MESSAGE (OUTPATIENT)
Dept: OBSTETRICS AND GYNECOLOGY | Facility: CLINIC | Age: 40
End: 2025-04-10
Payer: COMMERCIAL

## 2025-04-10 ENCOUNTER — TELEPHONE (OUTPATIENT)
Dept: OBSTETRICS AND GYNECOLOGY | Facility: CLINIC | Age: 40
End: 2025-04-10
Payer: COMMERCIAL

## 2025-04-10 DIAGNOSIS — Z3A.24 24 WEEKS GESTATION OF PREGNANCY: Primary | ICD-10-CM

## 2025-04-10 RX ORDER — AZITHROMYCIN 250 MG/1
500 TABLET, FILM COATED ORAL DAILY
Qty: 6 TABLET | Refills: 0 | Status: SHIPPED | OUTPATIENT
Start: 2025-04-10 | End: 2025-04-15

## 2025-04-11 ENCOUNTER — PATIENT MESSAGE (OUTPATIENT)
Dept: OTHER | Facility: OTHER | Age: 40
End: 2025-04-11
Payer: COMMERCIAL

## 2025-04-14 ENCOUNTER — OFFICE VISIT (OUTPATIENT)
Dept: PRIMARY CARE CLINIC | Facility: CLINIC | Age: 40
End: 2025-04-14
Payer: COMMERCIAL

## 2025-04-14 ENCOUNTER — PATIENT MESSAGE (OUTPATIENT)
Dept: OBSTETRICS AND GYNECOLOGY | Facility: CLINIC | Age: 40
End: 2025-04-14
Payer: COMMERCIAL

## 2025-04-14 VITALS
BODY MASS INDEX: 38.02 KG/M2 | DIASTOLIC BLOOD PRESSURE: 70 MMHG | HEIGHT: 64 IN | SYSTOLIC BLOOD PRESSURE: 134 MMHG | WEIGHT: 222.69 LBS | HEART RATE: 88 BPM | TEMPERATURE: 98 F | OXYGEN SATURATION: 98 %

## 2025-04-14 DIAGNOSIS — J06.9 UPPER RESPIRATORY TRACT INFECTION, UNSPECIFIED TYPE: Primary | ICD-10-CM

## 2025-04-14 PROCEDURE — 99499 UNLISTED E&M SERVICE: CPT | Mod: S$GLB,,, | Performed by: INTERNAL MEDICINE

## 2025-04-14 RX ORDER — PREDNISONE 20 MG/1
20 TABLET ORAL DAILY
Qty: 7 TABLET | Refills: 0 | Status: SHIPPED | OUTPATIENT
Start: 2025-04-14 | End: 2025-04-22

## 2025-04-14 NOTE — PATIENT INSTRUCTIONS
Thank you for visiting today.  You have an upper respiratory infection and I found no evidence for any additional antibiotics.  Please continue and finish her Z-Juan   Because of your cough in your discomfort related to the URI you may benefit from some prednisone for 5-7 days.    I have sent a prescription for prednisone to the pharmacy.  However some obstetricians prefer not to have any additional drugs.    Additional recommendations are the following:   Zyrtec 10 mg at bedtime   Continue the saline nasal spray but use it frequently 4 squirts each nostril every 2 hours   You can use plain Robitussin which you should not react to.  Based on the ingredients of Delsym I believe you reactive to dextromethorphan  Honey 1-2 tbsp directly off the spoon as often as necessary for cough and throat irritation   Halls cough drops the relief variety appears to work better.  Hydration   Sleep with your head elevated.

## 2025-04-24 ENCOUNTER — PATIENT MESSAGE (OUTPATIENT)
Dept: INTERNAL MEDICINE | Facility: CLINIC | Age: 40
End: 2025-04-24
Payer: COMMERCIAL

## 2025-04-24 ENCOUNTER — PATIENT MESSAGE (OUTPATIENT)
Dept: OBSTETRICS AND GYNECOLOGY | Facility: CLINIC | Age: 40
End: 2025-04-24
Payer: COMMERCIAL

## 2025-04-24 ENCOUNTER — PATIENT MESSAGE (OUTPATIENT)
Dept: PRIMARY CARE CLINIC | Facility: CLINIC | Age: 40
End: 2025-04-24
Payer: COMMERCIAL

## 2025-04-30 ENCOUNTER — OFFICE VISIT (OUTPATIENT)
Dept: PRIMARY CARE CLINIC | Facility: CLINIC | Age: 40
End: 2025-04-30
Payer: COMMERCIAL

## 2025-04-30 ENCOUNTER — PATIENT MESSAGE (OUTPATIENT)
Dept: OBSTETRICS AND GYNECOLOGY | Facility: CLINIC | Age: 40
End: 2025-04-30
Payer: COMMERCIAL

## 2025-04-30 VITALS
HEIGHT: 64 IN | OXYGEN SATURATION: 98 % | HEART RATE: 87 BPM | SYSTOLIC BLOOD PRESSURE: 141 MMHG | DIASTOLIC BLOOD PRESSURE: 87 MMHG | WEIGHT: 227.75 LBS | BODY MASS INDEX: 38.88 KG/M2 | TEMPERATURE: 98 F

## 2025-04-30 DIAGNOSIS — H10.9 CONJUNCTIVITIS, UNSPECIFIED CONJUNCTIVITIS TYPE, UNSPECIFIED LATERALITY: Primary | ICD-10-CM

## 2025-04-30 PROCEDURE — 99499 UNLISTED E&M SERVICE: CPT | Mod: S$GLB,,, | Performed by: INTERNAL MEDICINE

## 2025-04-30 RX ORDER — NEOMYCIN SULFATE, POLYMYXIN B SULFATE AND DEXAMETHASONE 3.5; 10000; 1 MG/ML; [USP'U]/ML; MG/ML
1 SUSPENSION/ DROPS OPHTHALMIC 4 TIMES DAILY
Qty: 5 ML | Refills: 0 | Status: SHIPPED | OUTPATIENT
Start: 2025-04-30 | End: 2025-05-12

## 2025-04-30 NOTE — PATIENT INSTRUCTIONS
Thank you for visiting today.  You have conjunctivitis of both eyes though it is worse on the right.  I have prescribed an eye drop for you called Maxitrol.  Please check with your obstetrician to make sure they are comfortable with this medication.  I did briefly check as you know in the office but different physicians have different ideas.  I recommend a warm soak of your I prior to instilling the medication by slashing either warm water or taking a cotton ball with warm water and swabbing your lids.  You can also add a drop or 2 of Garnt's baby shampoo to the solution which also seems to help.  Please do not put on your contacts until you have no symptoms for 48 hours.    Please do not wear eye makeup

## 2025-04-30 NOTE — PROGRESS NOTES
39-year-old woman with a history of hypertension, acid reflux, here with a complaint of pinkeye on the right.    History of present illness and pertinent review of systems:  The patient has a one-week history.  It began when she was in [facility].  The patient is 19 weeks pregnant.  She had a URI prior to this.  Those symptoms have resolved.  There was no associated fever, chills, or sweats.  There was no headache or earache.  She has no rhinorrhea, postnasal drip, cough.  The patient was seen at the [facility] at [facility] and was given over-the-counter drops for her eye.  She notes the last several days her eye has been fussy and has had a discharge and initially had a crusting when she awoke.  There is no changes in her vision.  The patient has I does not hurt when it moves from side to side.  The patient normally wears contacts but has not been wearing them for the last 1 day.  She had changed her disposable contacts after the initial symptoms.  She has no allergy symptoms.    Problem list, medication list, allergies have been reviewed.  The patient developed a rash from an acne medicine benzyl salicylate and has intolerance to cefdinir, nifedipine, and Sudafed    Review of systems is otherwise negative    Physical Exam  Vitals and nursing note reviewed.   Constitutional:       General: She is not in acute distress.     Appearance: She is not ill-appearing, toxic-appearing or diaphoretic.   HENT:      Head: Atraumatic.      Right Ear: Tympanic membrane and ear canal normal.      Left Ear: Tympanic membrane and ear canal normal.      Nose: Nose normal. No congestion or rhinorrhea.      Mouth/Throat:      Mouth: Mucous membranes are moist.      Pharynx: Oropharynx is clear. No oropharyngeal exudate or posterior oropharyngeal erythema.   Eyes:      General: No scleral icterus.     Extraocular Movements: Extraocular movements intact.      Pupils: Pupils are equal, round, and reactive to light.      Comments:  Bilateral conjunctivitis worse on the right.  Fundi are without exudate hemorrhage or scar.  Retinal vessels are normal.   Neck:      Vascular: No carotid bruit.   Cardiovascular:      Rate and Rhythm: Normal rate and regular rhythm.      Pulses: Normal pulses.      Heart sounds: Normal heart sounds. No murmur heard.     No gallop.   Pulmonary:      Effort: Pulmonary effort is normal. No respiratory distress.      Breath sounds: Normal breath sounds. No wheezing, rhonchi or rales.   Abdominal:      General: Bowel sounds are normal.      Palpations: Abdomen is soft.      Tenderness: There is no abdominal tenderness.   Musculoskeletal:         General: No tenderness. Normal range of motion.      Cervical back: Neck supple. No tenderness.      Right lower leg: No edema.      Left lower leg: No edema.   Lymphadenopathy:      Cervical: No cervical adenopathy.   Skin:     Coloration: Skin is not jaundiced or pale.   Neurological:      General: No focal deficit present.      Mental Status: She is alert and oriented to person, place, and time.   Psychiatric:         Mood and Affect: Mood normal.         Behavior: Behavior normal.      Assessment/plan:  Conjunctivitis:  The patient has conjunctivitis both by history and exam.  There was no evidence of URI or allergy based on history and exam as well.  The patient understands she can not use eye makeup or contacts for at least 48 hours after the condition resolves.  The patient is pregnant and the medication used was checked briefly and found to be safe.  Plan: Reviewed above with patient   The patient should check with her ophthalmologist regarding the medication  The patient should use warm water swabs of her eyelids prior to instilling the medication.  She may add a few drops of [employer]'s baby shampoo to the solution that she swabs with   The patient should not use makeup or where her context for 48 hours after completing her medication.

## 2025-05-02 ENCOUNTER — PATIENT MESSAGE (OUTPATIENT)
Dept: OTHER | Facility: OTHER | Age: 40
End: 2025-05-02
Payer: COMMERCIAL

## 2025-05-06 ENCOUNTER — LAB VISIT (OUTPATIENT)
Dept: LAB | Facility: HOSPITAL | Age: 40
End: 2025-05-06
Attending: OBSTETRICS & GYNECOLOGY
Payer: COMMERCIAL

## 2025-05-06 ENCOUNTER — ROUTINE PRENATAL (OUTPATIENT)
Dept: OBSTETRICS AND GYNECOLOGY | Facility: CLINIC | Age: 40
End: 2025-05-06
Payer: COMMERCIAL

## 2025-05-06 ENCOUNTER — PROCEDURE VISIT (OUTPATIENT)
Dept: MATERNAL FETAL MEDICINE | Facility: CLINIC | Age: 40
End: 2025-05-06
Payer: COMMERCIAL

## 2025-05-06 VITALS — BODY MASS INDEX: 39.39 KG/M2 | SYSTOLIC BLOOD PRESSURE: 128 MMHG | WEIGHT: 229.5 LBS | DIASTOLIC BLOOD PRESSURE: 70 MMHG

## 2025-05-06 DIAGNOSIS — Z36.89 ENCOUNTER FOR ULTRASOUND TO ASSESS FETAL GROWTH: Primary | ICD-10-CM

## 2025-05-06 DIAGNOSIS — Z3A.20 20 WEEKS GESTATION OF PREGNANCY: Primary | ICD-10-CM

## 2025-05-06 DIAGNOSIS — Z87.59 HISTORY OF PRE-ECLAMPSIA: ICD-10-CM

## 2025-05-06 DIAGNOSIS — Z3A.12 12 WEEKS GESTATION OF PREGNANCY: ICD-10-CM

## 2025-05-06 DIAGNOSIS — Z3A.20 20 WEEKS GESTATION OF PREGNANCY: ICD-10-CM

## 2025-05-06 DIAGNOSIS — O10.919 CHRONIC HYPERTENSION AFFECTING PREGNANCY: ICD-10-CM

## 2025-05-06 DIAGNOSIS — Z3A.24 24 WEEKS GESTATION OF PREGNANCY: ICD-10-CM

## 2025-05-06 LAB
ABSOLUTE EOSINOPHIL (OHS): 0.21 K/UL
ABSOLUTE MONOCYTE (OHS): 0.39 K/UL (ref 0.3–1)
ABSOLUTE NEUTROPHIL COUNT (OHS): 7.34 K/UL (ref 1.8–7.7)
ALBUMIN SERPL BCP-MCNC: 2.8 G/DL (ref 3.5–5.2)
ALP SERPL-CCNC: 56 UNIT/L (ref 40–150)
ALT SERPL W/O P-5'-P-CCNC: 23 UNIT/L (ref 10–44)
ANION GAP (OHS): 10 MMOL/L (ref 8–16)
AST SERPL-CCNC: 16 UNIT/L (ref 11–45)
BASOPHILS # BLD AUTO: 0.03 K/UL
BASOPHILS NFR BLD AUTO: 0.3 %
BILIRUB SERPL-MCNC: 0.3 MG/DL (ref 0.1–1)
BUN SERPL-MCNC: 7 MG/DL (ref 6–20)
CALCIUM SERPL-MCNC: 8.9 MG/DL (ref 8.7–10.5)
CHLORIDE SERPL-SCNC: 107 MMOL/L (ref 95–110)
CO2 SERPL-SCNC: 22 MMOL/L (ref 23–29)
CREAT SERPL-MCNC: 0.6 MG/DL (ref 0.5–1.4)
ERYTHROCYTE [DISTWIDTH] IN BLOOD BY AUTOMATED COUNT: 14.5 % (ref 11.5–14.5)
GFR SERPLBLD CREATININE-BSD FMLA CKD-EPI: >60 ML/MIN/1.73/M2
GLUCOSE SERPL-MCNC: 89 MG/DL (ref 70–110)
GLUCOSE SERPL-MCNC: 92 MG/DL (ref 70–140)
HCT VFR BLD AUTO: 34.4 % (ref 37–48.5)
HGB BLD-MCNC: 10.8 GM/DL (ref 12–16)
IMM GRANULOCYTES # BLD AUTO: 0.04 K/UL (ref 0–0.04)
IMM GRANULOCYTES NFR BLD AUTO: 0.4 % (ref 0–0.5)
LYMPHOCYTES # BLD AUTO: 1.31 K/UL (ref 1–4.8)
MCH RBC QN AUTO: 28.6 PG (ref 27–31)
MCHC RBC AUTO-ENTMCNC: 31.4 G/DL (ref 32–36)
MCV RBC AUTO: 91 FL (ref 82–98)
NUCLEATED RBC (/100WBC) (OHS): 0 /100 WBC
PLATELET # BLD AUTO: 196 K/UL (ref 150–450)
PMV BLD AUTO: 10.1 FL (ref 9.2–12.9)
POTASSIUM SERPL-SCNC: 3.9 MMOL/L (ref 3.5–5.1)
PROT SERPL-MCNC: 6.1 GM/DL (ref 6–8.4)
RBC # BLD AUTO: 3.78 M/UL (ref 4–5.4)
RELATIVE EOSINOPHIL (OHS): 2.3 %
RELATIVE LYMPHOCYTE (OHS): 14.1 % (ref 18–48)
RELATIVE MONOCYTE (OHS): 4.2 % (ref 4–15)
RELATIVE NEUTROPHIL (OHS): 78.7 % (ref 38–73)
SODIUM SERPL-SCNC: 139 MMOL/L (ref 136–145)
WBC # BLD AUTO: 9.32 K/UL (ref 3.9–12.7)

## 2025-05-06 PROCEDURE — 0502F SUBSEQUENT PRENATAL CARE: CPT | Mod: CPTII,S$GLB,, | Performed by: OBSTETRICS & GYNECOLOGY

## 2025-05-06 PROCEDURE — 85025 COMPLETE CBC W/AUTO DIFF WBC: CPT

## 2025-05-06 PROCEDURE — 76811 OB US DETAILED SNGL FETUS: CPT | Mod: S$GLB,,, | Performed by: OBSTETRICS & GYNECOLOGY

## 2025-05-06 PROCEDURE — 80053 COMPREHEN METABOLIC PANEL: CPT

## 2025-05-06 PROCEDURE — 99999 PR PBB SHADOW E&M-EST. PATIENT-LVL III: CPT | Mod: PBBFAC,,, | Performed by: OBSTETRICS & GYNECOLOGY

## 2025-05-06 PROCEDURE — 82950 GLUCOSE TEST: CPT

## 2025-05-06 PROCEDURE — 36415 COLL VENOUS BLD VENIPUNCTURE: CPT | Mod: PN

## 2025-05-06 NOTE — PROGRESS NOTES
Pregnancy dating, labs, ultrasound reports, prenatal testing, and problem list; prior records and results; and available outside records were reviewed and updated in EMR.  Pertinent findings were noted below.    Reason for Visit   Routine Prenatal Visit    HPI   40 y.o., at 20w4d by Estimated Date of Delivery: 25    She has some HA.  She is not able to tolerate much water due to nausea    Contractions: No   Bleeding: No   Loss of fluid: No   Fetal movement: Yes   Nausea: No   Vomiting: No   Headache: Yes     Exam   /70   Wt 104.1 kg (229 lb 8 oz)   LMP 2024   BMI 39.39 kg/m²     GENERAL: No acute distress  ABD: Gravid    Assessment and Plan   20 weeks gestation of pregnancy  -     Comprehensive Metabolic Panel; Future; Expected date: 2025    History of pre-eclampsia    Chronic hypertension affecting pregnancy      ASA 81 mg  daily'wants to plan R CS at 38 wks     labor and Pain and bleeding precautions given  Follow-up: 4 weeks

## 2025-05-09 ENCOUNTER — RESULTS FOLLOW-UP (OUTPATIENT)
Dept: OBSTETRICS AND GYNECOLOGY | Facility: CLINIC | Age: 40
End: 2025-05-09
Payer: COMMERCIAL

## 2025-05-19 ENCOUNTER — PATIENT MESSAGE (OUTPATIENT)
Dept: OBSTETRICS AND GYNECOLOGY | Facility: CLINIC | Age: 40
End: 2025-05-19
Payer: COMMERCIAL

## 2025-05-30 ENCOUNTER — PATIENT MESSAGE (OUTPATIENT)
Dept: OTHER | Facility: OTHER | Age: 40
End: 2025-05-30
Payer: COMMERCIAL

## 2025-05-30 ENCOUNTER — TELEPHONE (OUTPATIENT)
Dept: OBSTETRICS AND GYNECOLOGY | Facility: CLINIC | Age: 40
End: 2025-05-30
Payer: COMMERCIAL

## 2025-05-30 NOTE — TELEPHONE ENCOUNTER
I called pt in regards to the BP reading I received from [facility]. Pt did not answer, I left pt a detailed message.

## 2025-06-02 ENCOUNTER — LAB VISIT (OUTPATIENT)
Dept: LAB | Facility: HOSPITAL | Age: 40
End: 2025-06-02
Attending: OBSTETRICS & GYNECOLOGY
Payer: COMMERCIAL

## 2025-06-02 ENCOUNTER — ROUTINE PRENATAL (OUTPATIENT)
Dept: OBSTETRICS AND GYNECOLOGY | Facility: CLINIC | Age: 40
End: 2025-06-02
Payer: COMMERCIAL

## 2025-06-02 VITALS — DIASTOLIC BLOOD PRESSURE: 72 MMHG | BODY MASS INDEX: 40.68 KG/M2 | WEIGHT: 237 LBS | SYSTOLIC BLOOD PRESSURE: 138 MMHG

## 2025-06-02 DIAGNOSIS — Z34.80 SUPERVISION OF OTHER NORMAL PREGNANCY, ANTEPARTUM: Primary | ICD-10-CM

## 2025-06-02 DIAGNOSIS — O26.893 HEARTBURN DURING PREGNANCY IN THIRD TRIMESTER: ICD-10-CM

## 2025-06-02 DIAGNOSIS — R12 HEARTBURN DURING PREGNANCY IN THIRD TRIMESTER: ICD-10-CM

## 2025-06-02 DIAGNOSIS — Z34.80 SUPERVISION OF OTHER NORMAL PREGNANCY, ANTEPARTUM: ICD-10-CM

## 2025-06-02 DIAGNOSIS — R11.0 NAUSEA: ICD-10-CM

## 2025-06-02 DIAGNOSIS — E87.6 LOW SERUM POTASSIUM: ICD-10-CM

## 2025-06-02 DIAGNOSIS — K22.10 EROSIVE ESOPHAGITIS: ICD-10-CM

## 2025-06-02 LAB
ABSOLUTE EOSINOPHIL (OHS): 0.29 K/UL
ABSOLUTE MONOCYTE (OHS): 0.49 K/UL (ref 0.3–1)
ABSOLUTE NEUTROPHIL COUNT (OHS): 9.46 K/UL (ref 1.8–7.7)
ALBUMIN SERPL BCP-MCNC: 3.2 G/DL (ref 3.5–5.2)
ALP SERPL-CCNC: 57 UNIT/L (ref 40–150)
ALT SERPL W/O P-5'-P-CCNC: 23 UNIT/L (ref 10–44)
ANION GAP (OHS): 11 MMOL/L (ref 8–16)
ANION GAP (OHS): 9 MMOL/L (ref 8–16)
AST SERPL-CCNC: 19 UNIT/L (ref 11–45)
BASOPHILS # BLD AUTO: 0.03 K/UL
BASOPHILS NFR BLD AUTO: 0.3 %
BILIRUB SERPL-MCNC: 0.2 MG/DL (ref 0.1–1)
BUN SERPL-MCNC: 9 MG/DL (ref 6–20)
BUN SERPL-MCNC: 9 MG/DL (ref 6–20)
CALCIUM SERPL-MCNC: 8.2 MG/DL (ref 8.7–10.5)
CALCIUM SERPL-MCNC: 8.4 MG/DL (ref 8.7–10.5)
CHLORIDE SERPL-SCNC: 106 MMOL/L (ref 95–110)
CHLORIDE SERPL-SCNC: 107 MMOL/L (ref 95–110)
CO2 SERPL-SCNC: 19 MMOL/L (ref 23–29)
CO2 SERPL-SCNC: 19 MMOL/L (ref 23–29)
CREAT SERPL-MCNC: 0.6 MG/DL (ref 0.5–1.4)
CREAT SERPL-MCNC: 0.6 MG/DL (ref 0.5–1.4)
ERYTHROCYTE [DISTWIDTH] IN BLOOD BY AUTOMATED COUNT: 14.2 % (ref 11.5–14.5)
GFR SERPLBLD CREATININE-BSD FMLA CKD-EPI: >60 ML/MIN/1.73/M2
GFR SERPLBLD CREATININE-BSD FMLA CKD-EPI: >60 ML/MIN/1.73/M2
GLUCOSE SERPL-MCNC: 114 MG/DL (ref 70–110)
GLUCOSE SERPL-MCNC: 117 MG/DL (ref 70–140)
GLUCOSE SERPL-MCNC: 118 MG/DL (ref 70–110)
HCT VFR BLD AUTO: 33.7 % (ref 37–48.5)
HGB BLD-MCNC: 10.5 GM/DL (ref 12–16)
IMM GRANULOCYTES # BLD AUTO: 0.11 K/UL (ref 0–0.04)
IMM GRANULOCYTES NFR BLD AUTO: 0.9 % (ref 0–0.5)
IRON SATN MFR SERPL: 9 % (ref 20–50)
IRON SERPL-MCNC: 53 UG/DL (ref 30–160)
LYMPHOCYTES # BLD AUTO: 1.39 K/UL (ref 1–4.8)
MCH RBC QN AUTO: 27.7 PG (ref 27–31)
MCHC RBC AUTO-ENTMCNC: 31.2 G/DL (ref 32–36)
MCV RBC AUTO: 89 FL (ref 82–98)
NUCLEATED RBC (/100WBC) (OHS): 0 /100 WBC
PLATELET # BLD AUTO: 212 K/UL (ref 150–450)
PMV BLD AUTO: 10.2 FL (ref 9.2–12.9)
POTASSIUM SERPL-SCNC: 3.3 MMOL/L (ref 3.5–5.1)
POTASSIUM SERPL-SCNC: 3.4 MMOL/L (ref 3.5–5.1)
PROT SERPL-MCNC: 6.3 GM/DL (ref 6–8.4)
RBC # BLD AUTO: 3.79 M/UL (ref 4–5.4)
RELATIVE EOSINOPHIL (OHS): 2.5 %
RELATIVE LYMPHOCYTE (OHS): 11.8 % (ref 18–48)
RELATIVE MONOCYTE (OHS): 4.2 % (ref 4–15)
RELATIVE NEUTROPHIL (OHS): 80.3 % (ref 38–73)
SODIUM SERPL-SCNC: 134 MMOL/L (ref 136–145)
SODIUM SERPL-SCNC: 137 MMOL/L (ref 136–145)
TIBC SERPL-MCNC: 602 UG/DL (ref 250–450)
TRANSFERRIN SERPL-MCNC: 407 MG/DL (ref 200–375)
VIT B12 SERPL-MCNC: 149 PG/ML (ref 210–950)
WBC # BLD AUTO: 11.77 K/UL (ref 3.9–12.7)

## 2025-06-02 PROCEDURE — 82607 VITAMIN B-12: CPT

## 2025-06-02 PROCEDURE — 0502F SUBSEQUENT PRENATAL CARE: CPT | Mod: CPTII,S$GLB,, | Performed by: NURSE PRACTITIONER

## 2025-06-02 PROCEDURE — 82950 GLUCOSE TEST: CPT

## 2025-06-02 PROCEDURE — 82310 ASSAY OF CALCIUM: CPT

## 2025-06-02 PROCEDURE — 99999 PR PBB SHADOW E&M-EST. PATIENT-LVL III: CPT | Mod: PBBFAC,,, | Performed by: NURSE PRACTITIONER

## 2025-06-02 PROCEDURE — 83540 ASSAY OF IRON: CPT

## 2025-06-02 PROCEDURE — 85025 COMPLETE CBC W/AUTO DIFF WBC: CPT

## 2025-06-02 PROCEDURE — 36415 COLL VENOUS BLD VENIPUNCTURE: CPT | Mod: PN

## 2025-06-02 RX ORDER — ONDANSETRON 4 MG/1
4 TABLET, ORALLY DISINTEGRATING ORAL
Qty: 30 TABLET | Refills: 0 | Status: SHIPPED | OUTPATIENT
Start: 2025-06-02

## 2025-06-02 RX ORDER — PANTOPRAZOLE SODIUM 40 MG/1
40 TABLET, DELAYED RELEASE ORAL 2 TIMES DAILY
Qty: 180 TABLET | Refills: 3 | Status: SHIPPED | OUTPATIENT
Start: 2025-06-02 | End: 2026-06-02

## 2025-06-03 ENCOUNTER — PATIENT MESSAGE (OUTPATIENT)
Dept: GASTROENTEROLOGY | Facility: CLINIC | Age: 40
End: 2025-06-03
Payer: COMMERCIAL

## 2025-06-03 ENCOUNTER — RESULTS FOLLOW-UP (OUTPATIENT)
Dept: GASTROENTEROLOGY | Facility: CLINIC | Age: 40
End: 2025-06-03

## 2025-06-03 ENCOUNTER — PATIENT MESSAGE (OUTPATIENT)
Dept: OBSTETRICS AND GYNECOLOGY | Facility: CLINIC | Age: 40
End: 2025-06-03
Payer: COMMERCIAL

## 2025-06-03 DIAGNOSIS — E87.6 LOW SERUM POTASSIUM: Primary | ICD-10-CM

## 2025-06-03 RX ORDER — POTASSIUM CHLORIDE 750 MG/1
20 CAPSULE, EXTENDED RELEASE ORAL 2 TIMES DAILY
Qty: 12 CAPSULE | Refills: 0 | Status: SHIPPED | OUTPATIENT
Start: 2025-06-03 | End: 2025-06-03 | Stop reason: SDUPTHER

## 2025-06-03 RX ORDER — POTASSIUM CHLORIDE 750 MG/1
20 CAPSULE, EXTENDED RELEASE ORAL 2 TIMES DAILY
Qty: 12 CAPSULE | Refills: 0 | Status: SHIPPED | OUTPATIENT
Start: 2025-06-03 | End: 2025-06-07

## 2025-06-03 RX ORDER — FERROUS SULFATE 325(65) MG
325 TABLET, DELAYED RELEASE (ENTERIC COATED) ORAL
Qty: 90 TABLET | Refills: 11 | Status: SHIPPED | OUTPATIENT
Start: 2025-06-03

## 2025-06-03 NOTE — PROGRESS NOTES
Dr. Xiong and Bushra Tomlinson's lab work shows that her vitamin B12 level is low at 149 mcg per mL normal range being (210-950 mcg per mL) and her serum potassium seems just slightly low it looks like her sticky note says that she is currently 24 weeks pregnant just wanted to make sure you guys were aware of these labs to help correct them thank you

## 2025-06-04 ENCOUNTER — PATIENT MESSAGE (OUTPATIENT)
Dept: OBSTETRICS AND GYNECOLOGY | Facility: CLINIC | Age: 40
End: 2025-06-04
Payer: COMMERCIAL

## 2025-06-04 ENCOUNTER — DOCUMENTATION ONLY (OUTPATIENT)
Dept: GASTROENTEROLOGY | Facility: CLINIC | Age: 40
End: 2025-06-04
Payer: COMMERCIAL

## 2025-06-04 ENCOUNTER — PATIENT MESSAGE (OUTPATIENT)
Dept: INTERNAL MEDICINE | Facility: CLINIC | Age: 40
End: 2025-06-04
Payer: COMMERCIAL

## 2025-06-04 NOTE — TELEPHONE ENCOUNTER
LOV with Elsa Ferrari MD , 5/23/2024    Pt B12 on 6/2/25 was low at 149. Pt states Dr James said this is important for the baby and to talk to PCP. Pt is scheduled to see PCP in July. Asking if she should do anything sooner?

## 2025-06-05 ENCOUNTER — OFFICE VISIT (OUTPATIENT)
Dept: PRIMARY CARE CLINIC | Facility: CLINIC | Age: 40
End: 2025-06-05
Payer: COMMERCIAL

## 2025-06-05 VITALS
DIASTOLIC BLOOD PRESSURE: 80 MMHG | WEIGHT: 240.31 LBS | HEART RATE: 88 BPM | SYSTOLIC BLOOD PRESSURE: 130 MMHG | OXYGEN SATURATION: 99 % | TEMPERATURE: 98 F | HEIGHT: 64 IN | BODY MASS INDEX: 41.03 KG/M2

## 2025-06-05 DIAGNOSIS — E53.8 B12 DEFICIENCY: Primary | ICD-10-CM

## 2025-06-05 DIAGNOSIS — O26.812 PREGNANCY RELATED FATIGUE IN SECOND TRIMESTER: ICD-10-CM

## 2025-06-05 PROCEDURE — 99499 UNLISTED E&M SERVICE: CPT | Mod: S$GLB,,, | Performed by: INTERNAL MEDICINE

## 2025-06-06 ENCOUNTER — TELEPHONE (OUTPATIENT)
Dept: INTERNAL MEDICINE | Facility: CLINIC | Age: 40
End: 2025-06-06
Payer: COMMERCIAL

## 2025-06-06 ENCOUNTER — CLINICAL SUPPORT (OUTPATIENT)
Dept: INTERNAL MEDICINE | Facility: CLINIC | Age: 40
End: 2025-06-06
Payer: COMMERCIAL

## 2025-06-06 DIAGNOSIS — I10 HYPERTENSION, UNSPECIFIED TYPE: Primary | ICD-10-CM

## 2025-06-06 RX ORDER — CYANOCOBALAMIN 1000 UG/ML
1000 INJECTION, SOLUTION INTRAMUSCULAR; SUBCUTANEOUS ONCE
Status: COMPLETED | OUTPATIENT
Start: 2025-06-06 | End: 2025-06-06

## 2025-06-06 RX ADMIN — CYANOCOBALAMIN 1000 MCG: 1000 INJECTION, SOLUTION INTRAMUSCULAR; SUBCUTANEOUS at 04:06

## 2025-06-08 NOTE — TELEPHONE ENCOUNTER
Pt responding stating she was able to receive the B12 injection. Pt is asking how often she should have this done?     LOV with Elsa Ferrari MD , 5/23/2024

## 2025-06-10 ENCOUNTER — LAB VISIT (OUTPATIENT)
Dept: LAB | Facility: HOSPITAL | Age: 40
End: 2025-06-10
Payer: COMMERCIAL

## 2025-06-10 DIAGNOSIS — K22.10 EROSIVE ESOPHAGITIS: ICD-10-CM

## 2025-06-10 LAB — 25(OH)D3+25(OH)D2 SERPL-MCNC: 27 NG/ML (ref 30–96)

## 2025-06-10 PROCEDURE — 82306 VITAMIN D 25 HYDROXY: CPT

## 2025-06-10 PROCEDURE — 36415 COLL VENOUS BLD VENIPUNCTURE: CPT

## 2025-06-11 ENCOUNTER — PATIENT MESSAGE (OUTPATIENT)
Dept: GASTROENTEROLOGY | Facility: CLINIC | Age: 40
End: 2025-06-11
Payer: COMMERCIAL

## 2025-06-11 NOTE — PROGRESS NOTES
[facility] team - Please tell patient that their Vitamin D level is low and recommend that they take Over-The-Counter Vitamin D3 (2,000 units) over-the-counter ONCE DAILY.     [facility] team - please recheck their vitamin D level in 3 months - Orders placedChrissy Tomlinson and Dr. Xiong just wanted to make sure you were aware of Bushra's slightl low vitamin D level on recheck check in the above plan for over-the-counter vitamin D3 a 2000 IU once daily   Treated with insulin and glimepiride as well as metformin.  Monitored at primary office.

## 2025-06-13 ENCOUNTER — PATIENT MESSAGE (OUTPATIENT)
Dept: OBSTETRICS AND GYNECOLOGY | Facility: CLINIC | Age: 40
End: 2025-06-13
Payer: COMMERCIAL

## 2025-06-13 ENCOUNTER — PATIENT MESSAGE (OUTPATIENT)
Dept: OTHER | Facility: OTHER | Age: 40
End: 2025-06-13
Payer: COMMERCIAL

## 2025-06-13 DIAGNOSIS — D64.9 ANEMIA, UNSPECIFIED TYPE: Primary | ICD-10-CM

## 2025-06-25 ENCOUNTER — PATIENT MESSAGE (OUTPATIENT)
Dept: OBSTETRICS AND GYNECOLOGY | Facility: CLINIC | Age: 40
End: 2025-06-25
Payer: COMMERCIAL

## 2025-06-26 ENCOUNTER — PATIENT MESSAGE (OUTPATIENT)
Dept: OBSTETRICS AND GYNECOLOGY | Facility: CLINIC | Age: 40
End: 2025-06-26
Payer: COMMERCIAL

## 2025-06-27 ENCOUNTER — PATIENT MESSAGE (OUTPATIENT)
Dept: OTHER | Facility: OTHER | Age: 40
End: 2025-06-27
Payer: COMMERCIAL

## 2025-06-29 ENCOUNTER — PATIENT MESSAGE (OUTPATIENT)
Dept: GASTROENTEROLOGY | Facility: CLINIC | Age: 40
End: 2025-06-29
Payer: COMMERCIAL

## 2025-06-30 ENCOUNTER — LAB VISIT (OUTPATIENT)
Dept: LAB | Facility: HOSPITAL | Age: 40
End: 2025-06-30
Attending: NURSE PRACTITIONER
Payer: COMMERCIAL

## 2025-06-30 ENCOUNTER — ROUTINE PRENATAL (OUTPATIENT)
Dept: OBSTETRICS AND GYNECOLOGY | Facility: CLINIC | Age: 40
End: 2025-06-30
Payer: COMMERCIAL

## 2025-06-30 VITALS
BODY MASS INDEX: 41.47 KG/M2 | SYSTOLIC BLOOD PRESSURE: 150 MMHG | DIASTOLIC BLOOD PRESSURE: 84 MMHG | WEIGHT: 241.63 LBS

## 2025-06-30 DIAGNOSIS — O28.0 LOW MATERNAL SERUM VITAMIN B12: ICD-10-CM

## 2025-06-30 DIAGNOSIS — O16.3 ELEVATED BLOOD PRESSURE AFFECTING PREGNANCY IN THIRD TRIMESTER, ANTEPARTUM: Primary | ICD-10-CM

## 2025-06-30 DIAGNOSIS — O16.3 ELEVATED BLOOD PRESSURE AFFECTING PREGNANCY IN THIRD TRIMESTER, ANTEPARTUM: ICD-10-CM

## 2025-06-30 DIAGNOSIS — Z3A.28 28 WEEKS GESTATION OF PREGNANCY: ICD-10-CM

## 2025-06-30 LAB
ABSOLUTE EOSINOPHIL (OHS): 0.27 K/UL
ABSOLUTE MONOCYTE (OHS): 0.61 K/UL (ref 0.3–1)
ABSOLUTE NEUTROPHIL COUNT (OHS): 9.76 K/UL (ref 1.8–7.7)
ALBUMIN SERPL BCP-MCNC: 3.2 G/DL (ref 3.5–5.2)
ALP SERPL-CCNC: 74 UNIT/L (ref 40–150)
ALT SERPL W/O P-5'-P-CCNC: 21 UNIT/L (ref 10–44)
ANION GAP (OHS): 10 MMOL/L (ref 8–16)
AST SERPL-CCNC: 19 UNIT/L (ref 11–45)
BASOPHILS # BLD AUTO: 0.04 K/UL
BASOPHILS NFR BLD AUTO: 0.3 %
BILIRUB SERPL-MCNC: 0.3 MG/DL (ref 0.1–1)
BUN SERPL-MCNC: 9 MG/DL (ref 6–20)
CALCIUM SERPL-MCNC: 9 MG/DL (ref 8.7–10.5)
CHLORIDE SERPL-SCNC: 107 MMOL/L (ref 95–110)
CO2 SERPL-SCNC: 21 MMOL/L (ref 23–29)
CREAT SERPL-MCNC: 0.6 MG/DL (ref 0.5–1.4)
ERYTHROCYTE [DISTWIDTH] IN BLOOD BY AUTOMATED COUNT: 14.4 % (ref 11.5–14.5)
GFR SERPLBLD CREATININE-BSD FMLA CKD-EPI: >60 ML/MIN/1.73/M2
GLUCOSE SERPL-MCNC: 78 MG/DL (ref 70–110)
HCT VFR BLD AUTO: 33.3 % (ref 37–48.5)
HGB BLD-MCNC: 10.5 GM/DL (ref 12–16)
IMM GRANULOCYTES # BLD AUTO: 0.09 K/UL (ref 0–0.04)
IMM GRANULOCYTES NFR BLD AUTO: 0.7 % (ref 0–0.5)
LDH SERPL-CCNC: 215 U/L (ref 110–260)
LYMPHOCYTES # BLD AUTO: 1.7 K/UL (ref 1–4.8)
MCH RBC QN AUTO: 27.6 PG (ref 27–31)
MCHC RBC AUTO-ENTMCNC: 31.5 G/DL (ref 32–36)
MCV RBC AUTO: 87 FL (ref 82–98)
NUCLEATED RBC (/100WBC) (OHS): 0 /100 WBC
PLATELET # BLD AUTO: 200 K/UL (ref 150–450)
PMV BLD AUTO: 10.4 FL (ref 9.2–12.9)
POTASSIUM SERPL-SCNC: 4.1 MMOL/L (ref 3.5–5.1)
PROT SERPL-MCNC: 6.4 GM/DL (ref 6–8.4)
RBC # BLD AUTO: 3.81 M/UL (ref 4–5.4)
RELATIVE EOSINOPHIL (OHS): 2.2 %
RELATIVE LYMPHOCYTE (OHS): 13.6 % (ref 18–48)
RELATIVE MONOCYTE (OHS): 4.9 % (ref 4–15)
RELATIVE NEUTROPHIL (OHS): 78.3 % (ref 38–73)
SODIUM SERPL-SCNC: 138 MMOL/L (ref 136–145)
WBC # BLD AUTO: 12.47 K/UL (ref 3.9–12.7)

## 2025-06-30 PROCEDURE — 99999 PR PBB SHADOW E&M-EST. PATIENT-LVL III: CPT | Mod: PBBFAC,,, | Performed by: NURSE PRACTITIONER

## 2025-06-30 PROCEDURE — 36415 COLL VENOUS BLD VENIPUNCTURE: CPT | Mod: PN

## 2025-06-30 PROCEDURE — 82570 ASSAY OF URINE CREATININE: CPT | Performed by: NURSE PRACTITIONER

## 2025-06-30 PROCEDURE — 85025 COMPLETE CBC W/AUTO DIFF WBC: CPT

## 2025-06-30 PROCEDURE — 83615 LACTATE (LD) (LDH) ENZYME: CPT

## 2025-06-30 PROCEDURE — 80053 COMPREHEN METABOLIC PANEL: CPT

## 2025-06-30 PROCEDURE — 0502F SUBSEQUENT PRENATAL CARE: CPT | Mod: CPTII,S$GLB,, | Performed by: NURSE PRACTITIONER

## 2025-06-30 RX ORDER — CYANOCOBALAMIN 1000 UG/ML
1000 INJECTION, SOLUTION INTRAMUSCULAR; SUBCUTANEOUS ONCE
Status: DISCONTINUED | OUTPATIENT
Start: 2025-06-30 | End: 2025-06-30

## 2025-06-30 RX ORDER — CYANOCOBALAMIN 1000 UG/ML
1000 INJECTION, SOLUTION INTRAMUSCULAR; SUBCUTANEOUS
Qty: 1 ML | Refills: 3 | Status: SHIPPED | OUTPATIENT
Start: 2025-06-30

## 2025-06-30 NOTE — PROGRESS NOTES
Here for routine OB appt at 28w3d. She is still dealing with fatigue. She has follow up with PCP next week. Vitamin B12 refilled. BP today 150/84 with repeat 136/86. She denies any HA, vision changes or epigastric pain.  Reports good FM.  Denies LOF, denies VB, and reports irregular contractions.  Reviewed warning signs of Labor and Preeclampsia.  Daily FM counts reinforced.  Pre E labs/ PC ratio today.   F/U scheduled 2 weeks

## 2025-07-01 LAB
CREAT UR-MCNC: 254 MG/DL (ref 15–325)
PROT UR-MCNC: 23 MG/DL
PROT/CREAT UR: 0.09 MG/G{CREAT}

## 2025-07-02 ENCOUNTER — PATIENT MESSAGE (OUTPATIENT)
Dept: OBSTETRICS AND GYNECOLOGY | Facility: CLINIC | Age: 40
End: 2025-07-02
Payer: COMMERCIAL

## 2025-07-07 ENCOUNTER — PATIENT MESSAGE (OUTPATIENT)
Dept: INTERNAL MEDICINE | Facility: CLINIC | Age: 40
End: 2025-07-07

## 2025-07-07 ENCOUNTER — OFFICE VISIT (OUTPATIENT)
Dept: INTERNAL MEDICINE | Facility: CLINIC | Age: 40
End: 2025-07-07
Payer: COMMERCIAL

## 2025-07-07 ENCOUNTER — LAB VISIT (OUTPATIENT)
Dept: LAB | Facility: HOSPITAL | Age: 40
End: 2025-07-07
Attending: INTERNAL MEDICINE
Payer: COMMERCIAL

## 2025-07-07 ENCOUNTER — OFFICE VISIT (OUTPATIENT)
Dept: HEMATOLOGY/ONCOLOGY | Facility: CLINIC | Age: 40
End: 2025-07-07
Payer: COMMERCIAL

## 2025-07-07 VITALS
SYSTOLIC BLOOD PRESSURE: 124 MMHG | HEART RATE: 94 BPM | HEIGHT: 64 IN | TEMPERATURE: 98 F | DIASTOLIC BLOOD PRESSURE: 70 MMHG | BODY MASS INDEX: 41.77 KG/M2 | WEIGHT: 244.69 LBS | RESPIRATION RATE: 12 BRPM | OXYGEN SATURATION: 97 %

## 2025-07-07 VITALS
BODY MASS INDEX: 42.16 KG/M2 | OXYGEN SATURATION: 99 % | SYSTOLIC BLOOD PRESSURE: 134 MMHG | TEMPERATURE: 98 F | HEART RATE: 89 BPM | WEIGHT: 245.56 LBS | DIASTOLIC BLOOD PRESSURE: 67 MMHG

## 2025-07-07 DIAGNOSIS — R06.00 DYSPNEA, UNSPECIFIED TYPE: Primary | ICD-10-CM

## 2025-07-07 DIAGNOSIS — E53.8 B12 DEFICIENCY: ICD-10-CM

## 2025-07-07 DIAGNOSIS — O99.019 IRON DEFICIENCY ANEMIA DURING PREGNANCY: ICD-10-CM

## 2025-07-07 DIAGNOSIS — D50.9 IRON DEFICIENCY ANEMIA DURING PREGNANCY: ICD-10-CM

## 2025-07-07 DIAGNOSIS — F41.9 ANXIETY: ICD-10-CM

## 2025-07-07 DIAGNOSIS — Z71.89 OTHER SPECIFIED COUNSELING: ICD-10-CM

## 2025-07-07 DIAGNOSIS — K21.00 GASTROESOPHAGEAL REFLUX DISEASE WITH ESOPHAGITIS WITHOUT HEMORRHAGE: ICD-10-CM

## 2025-07-07 DIAGNOSIS — Z71.82 EXERCISE COUNSELING: ICD-10-CM

## 2025-07-07 DIAGNOSIS — D64.9 ANEMIA, UNSPECIFIED TYPE: Primary | ICD-10-CM

## 2025-07-07 DIAGNOSIS — Z71.89 ADVANCED DIRECTIVES, COUNSELING/DISCUSSION: ICD-10-CM

## 2025-07-07 DIAGNOSIS — E53.8 VITAMIN B12 DEFICIENCY: ICD-10-CM

## 2025-07-07 LAB
ABSOLUTE EOSINOPHIL (OHS): 0.3 K/UL
ABSOLUTE MONOCYTE (OHS): 0.56 K/UL (ref 0.3–1)
ABSOLUTE NEUTROPHIL COUNT (OHS): 10.5 K/UL (ref 1.8–7.7)
ALBUMIN SERPL BCP-MCNC: 3 G/DL (ref 3.5–5.2)
ALP SERPL-CCNC: 74 UNIT/L (ref 40–150)
ALT SERPL W/O P-5'-P-CCNC: 24 UNIT/L (ref 10–44)
ANION GAP (OHS): 7 MMOL/L (ref 8–16)
AST SERPL-CCNC: 19 UNIT/L (ref 11–45)
BASOPHILS # BLD AUTO: 0.04 K/UL
BASOPHILS NFR BLD AUTO: 0.3 %
BILIRUB SERPL-MCNC: 0.4 MG/DL (ref 0.1–1)
BUN SERPL-MCNC: 10 MG/DL (ref 6–20)
CALCIUM SERPL-MCNC: 8.4 MG/DL (ref 8.7–10.5)
CHLORIDE SERPL-SCNC: 107 MMOL/L (ref 95–110)
CO2 SERPL-SCNC: 21 MMOL/L (ref 23–29)
CREAT SERPL-MCNC: 0.6 MG/DL (ref 0.5–1.4)
ERYTHROCYTE [DISTWIDTH] IN BLOOD BY AUTOMATED COUNT: 14.6 % (ref 11.5–14.5)
FERRITIN SERPL-MCNC: 22.4 NG/ML (ref 20–300)
GFR SERPLBLD CREATININE-BSD FMLA CKD-EPI: >60 ML/MIN/1.73/M2
GLUCOSE SERPL-MCNC: 76 MG/DL (ref 70–110)
HCT VFR BLD AUTO: 33 % (ref 37–48.5)
HGB BLD-MCNC: 10.9 GM/DL (ref 12–16)
IMM GRANULOCYTES # BLD AUTO: 0.1 K/UL (ref 0–0.04)
IMM GRANULOCYTES NFR BLD AUTO: 0.8 % (ref 0–0.5)
LYMPHOCYTES # BLD AUTO: 1.69 K/UL (ref 1–4.8)
MCH RBC QN AUTO: 28 PG (ref 27–31)
MCHC RBC AUTO-ENTMCNC: 33 G/DL (ref 32–36)
MCV RBC AUTO: 85 FL (ref 82–98)
NUCLEATED RBC (/100WBC) (OHS): 0 /100 WBC
PLATELET # BLD AUTO: 212 K/UL (ref 150–450)
PMV BLD AUTO: 9.9 FL (ref 9.2–12.9)
POTASSIUM SERPL-SCNC: 3.9 MMOL/L (ref 3.5–5.1)
PROT SERPL-MCNC: 6.9 GM/DL (ref 6–8.4)
RBC # BLD AUTO: 3.89 M/UL (ref 4–5.4)
RELATIVE EOSINOPHIL (OHS): 2.3 %
RELATIVE LYMPHOCYTE (OHS): 12.8 % (ref 18–48)
RELATIVE MONOCYTE (OHS): 4.2 % (ref 4–15)
RELATIVE NEUTROPHIL (OHS): 79.6 % (ref 38–73)
SODIUM SERPL-SCNC: 135 MMOL/L (ref 136–145)
VIT B12 SERPL-MCNC: >2000 PG/ML (ref 210–950)
WBC # BLD AUTO: 13.19 K/UL (ref 3.9–12.7)

## 2025-07-07 PROCEDURE — 84075 ASSAY ALKALINE PHOSPHATASE: CPT

## 2025-07-07 PROCEDURE — 1159F MED LIST DOCD IN RCRD: CPT | Mod: CPTII,S$GLB,, | Performed by: INTERNAL MEDICINE

## 2025-07-07 PROCEDURE — 3074F SYST BP LT 130 MM HG: CPT | Mod: CPTII,S$GLB,, | Performed by: INTERNAL MEDICINE

## 2025-07-07 PROCEDURE — 85025 COMPLETE CBC W/AUTO DIFF WBC: CPT

## 2025-07-07 PROCEDURE — 3044F HG A1C LEVEL LT 7.0%: CPT | Mod: CPTII,S$GLB,, | Performed by: INTERNAL MEDICINE

## 2025-07-07 PROCEDURE — 36415 COLL VENOUS BLD VENIPUNCTURE: CPT | Mod: PO

## 2025-07-07 PROCEDURE — 99999 PR PBB SHADOW E&M-EST. PATIENT-LVL III: CPT | Mod: PBBFAC,,, | Performed by: INTERNAL MEDICINE

## 2025-07-07 PROCEDURE — 3078F DIAST BP <80 MM HG: CPT | Mod: CPTII,S$GLB,, | Performed by: INTERNAL MEDICINE

## 2025-07-07 PROCEDURE — 3008F BODY MASS INDEX DOCD: CPT | Mod: CPTII,S$GLB,, | Performed by: INTERNAL MEDICINE

## 2025-07-07 PROCEDURE — 83921 ORGANIC ACID SINGLE QUANT: CPT

## 2025-07-07 PROCEDURE — 99203 OFFICE O/P NEW LOW 30 MIN: CPT | Mod: S$GLB,,, | Performed by: INTERNAL MEDICINE

## 2025-07-07 PROCEDURE — 99999 PR PBB SHADOW E&M-EST. PATIENT-LVL IV: CPT | Mod: PBBFAC,,, | Performed by: INTERNAL MEDICINE

## 2025-07-07 PROCEDURE — 99214 OFFICE O/P EST MOD 30 MIN: CPT | Mod: S$GLB,,, | Performed by: INTERNAL MEDICINE

## 2025-07-07 PROCEDURE — 3075F SYST BP GE 130 - 139MM HG: CPT | Mod: CPTII,S$GLB,, | Performed by: INTERNAL MEDICINE

## 2025-07-07 PROCEDURE — 36415 COLL VENOUS BLD VENIPUNCTURE: CPT

## 2025-07-07 PROCEDURE — 82607 VITAMIN B-12: CPT

## 2025-07-07 PROCEDURE — 82728 ASSAY OF FERRITIN: CPT

## 2025-07-07 PROCEDURE — 1160F RVW MEDS BY RX/DR IN RCRD: CPT | Mod: CPTII,S$GLB,, | Performed by: INTERNAL MEDICINE

## 2025-07-07 RX ORDER — HEPARIN 100 UNIT/ML
500 SYRINGE INTRAVENOUS
Status: CANCELLED | OUTPATIENT
Start: 2025-07-14

## 2025-07-07 RX ORDER — EPINEPHRINE 0.3 MG/.3ML
0.3 INJECTION SUBCUTANEOUS ONCE AS NEEDED
Status: CANCELLED | OUTPATIENT
Start: 2025-07-14

## 2025-07-07 RX ORDER — SODIUM CHLORIDE 0.9 % (FLUSH) 0.9 %
10 SYRINGE (ML) INJECTION
Status: CANCELLED | OUTPATIENT
Start: 2025-07-14

## 2025-07-07 RX ORDER — EPINEPHRINE 0.3 MG/.3ML
0.3 INJECTION SUBCUTANEOUS ONCE AS NEEDED
OUTPATIENT
Start: 2025-07-14

## 2025-07-07 RX ORDER — HEPARIN 100 UNIT/ML
500 SYRINGE INTRAVENOUS
OUTPATIENT
Start: 2025-07-14

## 2025-07-07 RX ORDER — SODIUM CHLORIDE 0.9 % (FLUSH) 0.9 %
10 SYRINGE (ML) INJECTION
OUTPATIENT
Start: 2025-07-14

## 2025-07-07 NOTE — PROGRESS NOTES
PATIENT: Bushra Kahn  MRN: [id number]  DATE: 7/7/2025    Scribe Attestation: I, Melissa Flores, am scribing under the direction and in the presence of Jade Yee MD. I attest that this documentation is true, accurate and complete to the best of my knowledge.      Diagnosis:   1. Anemia, unspecified type    2. Other specified counseling    3. Iron deficiency anemia during pregnancy    4. Exercise counseling    5. Advanced directives, counseling/discussion    6. Vitamin B12 deficiency    7. Gastroesophageal reflux disease with esophagitis without hemorrhage    8. Anxiety      Chief Complaint: Anemia    Oncologic History:      Oncologic History     Oncologic Treatment     Pathology       Subjective:    History of Present Illness: Ms. Kahn is a 40 y.o. female who presents for evaluation and management of iron defiency anemia during pregnancy and B12 defiency. Patient failed oral iron supplements. She is approximately 29 weeks pregnant. She is currently on B12 injections.     Today patient endorses shortness of breath and severe fatigue. Her shortness of breath occurs while laying down flat. SOB improves with sitting up. She also endorses lower back pain and mild leg swelling compared to her first two pregnancies. Occasionally numberless occurs in her upper thigh area. She also endorses nausea, vomiting, and heartburn due to pregnancy. She notes that she has gained 30 lbs with her current pregnancy.    Patient has some diarrhea that she explained is from her food choice today. She is s/p cholecystectomy.    Patient switched to part time work after her second child. She has a big support system from her  and mother. Her mother plans to move with her to help with the kids once the baby arrives.     Patient expressed she is concerned with getting IV iron infusions because of her personal experience with patients she's encountered.     No abnormal lumps, bumps, or abnormal masses palpated. Pt has no fever,  chills, rigors, or night sweats. Appetite is good. Good energy levels. Pt denies headache, confusion, or Lhermitte symptomatology. Denies blurry vision. No changes in urinary habits. Denies signs and symptoms of bleeding.     NP ordered an echo test.    PSHx breast reduction.    Past medical, surgical, family, and social histories have been reviewed and updated below.    Past Medical History:   Past Medical History:   Diagnosis Date    Acid reflux     Allergy     Chronic hypertension with superimposed pre-eclampsia, post partum 10/21/2022    On chart review patient with essential HTN outside of pregnancy at initial [facility] visit     Fatty liver     Pre-eclampsia, severe, postpartum condition 2021    Severe hypertension affecting pregnancy in third trimester 2021    Severe pre-eclampsia in third trimester 2021     Past Surgical History:   Past Surgical History:   Procedure Laterality Date    ADENOIDECTOMY  age 16    BREAST BIOPSY Right     benign     SECTION N/A 2021    Procedure:  SECTION;  Surgeon: Denita Xiong MD;  [facility];  Service: OB/GYN;  Laterality: N/A;     SECTION N/A 10/10/2022    Procedure:  SECTION;  Surgeon: Denita Xiong MD;  Location: [facility];  Service: OB/GYN;  Laterality: N/A;     SECTION  21, 10/10/22    CHOLECYSTECTOMY  2013    Laparoscopic    ESOPHAGOGASTRODUODENOSCOPY N/A 2018    Procedure: ESOPHAGOGASTRODUODENOSCOPY (EGD);  Surgeon: Aurelio James MD;  Location: [address];  Service: Endoscopy;  Laterality: N/A;    ESOPHAGOGASTRODUODENOSCOPY N/A 2023    Procedure: EGD (ESOPHAGOGASTRODUODENOSCOPY);  Surgeon: Uziel Duran MD;  Location: [facility] ENDOSCOPY;  Service: Endoscopy;  Laterality: N/A;  8.18  inst sent via portal [facility]   9.25 pre call attempted; [facility]    ESOPHAGOGASTRODUODENOSCOPY N/A 2024    Procedure: EGD (ESOPHAGOGASTRODUODENOSCOPY);  Surgeon: Uziel Duran  MD;  Location: [facility] ENDOSCOPY;  Service: Endoscopy;  Laterality: N/A;  10/5 ref by betsey Rivera. to [place]-  1/2- pre call complete. DBM  1/5 Left VM. SG    REDUCTION OF BOTH BREASTS Bilateral 08/06/2024    Procedure: MAMMOPLASTY, REDUCTION, BILATERAL  ///bilateral with free nipple graft;  Surgeon: Luis Antonio Meek MD;  Location: [facility] OR;  Service: Plastics;  Laterality: Bilateral;  1822 grams removed from right breast, 1612 grams removed from left breast    TOTAL REDUCTION MAMMOPLASTY Bilateral 08/06/2024     Family History:   Family History   Problem Relation Name Age of Onset    Lupus Mother      Kidney disease Mother      Hypertension Father Silvano OlmosJr     Sleep apnea Father Silvano Olmos Jr     Breast cancer Paternal Aunt  50    Breast cancer Paternal Aunt      Cataracts Paternal Grandmother      Colon cancer Paternal Grandmother      Cataracts Paternal Grandfather      Cancer Paternal Grandfather          Lung cancer- smoker    Amblyopia Neg Hx      Blindness Neg Hx      Glaucoma Neg Hx      Macular degeneration Neg Hx      Retinal detachment Neg Hx      Strabismus Neg Hx      Stroke Neg Hx      Ovarian cancer Neg Hx      Diabetes Neg Hx      Melanoma Neg Hx      Psoriasis Neg Hx      Asthma Neg Hx      Allergies Neg Hx      Allergic rhinitis Neg Hx      Angioedema Neg Hx      Eczema Neg Hx      Immunodeficiency Neg Hx      Rhinitis Neg Hx      Urticaria Neg Hx      Atopy Neg Hx       Social History:  reports that she quit smoking about 6 years ago. Her smoking use included cigarettes. She started smoking about 19 years ago. She has a 1.3 pack-year smoking history. She has never been exposed to tobacco smoke. She has never used smokeless tobacco. She reports that she does not currently use alcohol. She reports that she does not use drugs.    Allergies:  Review of patient's allergies indicates:   Allergen Reactions    Benzyl salicylate Rash     acne    Cefdinir Other (See Comments)      Nausea/vomiting    Nifedipine Other (See Comments)     headache    Sudafed [pseudoephedrine hcl] Other (See Comments)     Emotional lability       Medications:  Current Medications[1]    Review of Systems  Comprehensive ROS is negative except for what was mentioned in HPI.     ECOG Performance Status: 0  ECOG SCORE             Objective:      Vitals:   Vitals:    07/07/25 1325   BP: 134/67   Pulse: 89   Temp: 97.7 °F (36.5 °C)   SpO2: 99%   Weight: 111.4 kg (245 lb 9.5 oz)     BMI: Body mass index is 42.16 kg/m².    Physical Exam  Vitals and nursing note reviewed.   Constitutional:       General: She is not in acute distress.     Appearance: She is not toxic-appearing or diaphoretic.      Comments: APPEARS ENERGETIC/NORMAL ENERGY  BMI 42 OBESE, PREGNANT   HENT:      Head: Normocephalic and atraumatic.      Right Ear: External ear normal.      Left Ear: External ear normal.      Nose: Nose normal. No congestion or rhinorrhea.      Right Nostril: No epistaxis.      Left Nostril: No epistaxis.      Mouth/Throat:      Mouth: Mucous membranes are moist.      Pharynx: No oropharyngeal exudate or posterior oropharyngeal erythema.      Comments: NO HALITOSIS  Eyes:      General: Lids are normal. Vision grossly intact.      Extraocular Movements: Extraocular movements intact.      Conjunctiva/sclera: Conjunctivae normal.      Right eye: Right conjunctiva is not injected. No exudate.     Left eye: Left conjunctiva is not injected. No exudate.     Pupils: Pupils are equal, round, and reactive to light.      Comments: No hyphema noted bilaterally.   Cardiovascular:      Rate and Rhythm: Normal rate and regular rhythm.      Pulses: Normal pulses.      Heart sounds: Murmur (faint flow murmur) heard.      Comments: Question of S3  Pulmonary:      Effort: Pulmonary effort is normal. No respiratory distress.      Breath sounds: Normal breath sounds. No stridor. No wheezing, rhonchi or rales.      Comments: NO PLEURITIC CHEST  PAIN  Chest:      Chest wall: No tenderness.   Abdominal:      General: Bowel sounds are normal. There is no distension.      Palpations: Abdomen is soft. There is no fluid wave, hepatomegaly, splenomegaly or mass.      Tenderness: There is no abdominal tenderness. There is no right CVA tenderness, left CVA tenderness, guarding or rebound.      Comments: NO ORGANOMEGALY   Gravid abdomen     BMI 42-OBESE   Musculoskeletal:         General: No swelling or tenderness. Normal range of motion.      Cervical back: Normal range of motion and neck supple. No rigidity.      Right lower leg: No edema.      Left lower leg: No edema.      Comments: NO CORDS PALPABLE, NEGATIVE HOMANS   Lymphadenopathy:      Head:      Right side of head: No submental, submandibular, tonsillar, preauricular, posterior auricular or occipital adenopathy.      Left side of head: No submental, submandibular, tonsillar, preauricular, posterior auricular or occipital adenopathy.      Cervical: No cervical adenopathy.      Right cervical: No superficial, deep or posterior cervical adenopathy.     Left cervical: No superficial, deep or posterior cervical adenopathy.      Upper Body:      Right upper body: No supraclavicular, axillary or epitrochlear adenopathy.      Left upper body: No supraclavicular, axillary or epitrochlear adenopathy.      Lower Body: No right inguinal adenopathy. No left inguinal adenopathy.      Comments: All franci basins are nontender   Skin:     General: Skin is warm and dry.      Coloration: Skin is not ashen, cyanotic, jaundiced, mottled or pale.      Findings: No bruising, erythema or rash.      Comments: -NO PETECHIAE  -NO OBVIOUS LESIONS BUT THOROUGH EXAM NOT CARRIED OUT   Neurological:      General: No focal deficit present.      Mental Status: She is alert and oriented to person, place, and time.      Motor: No weakness.      Gait: Gait normal.      Deep Tendon Reflexes: Reflexes are normal and symmetric. Reflexes normal.       Comments: NO CLONUS  ABSENT LHERMITTE'S   Psychiatric:         Mood and Affect: Mood normal.         Behavior: Behavior normal. Behavior is cooperative.         Judgment: Judgment normal.       Laboratory Data:  Labs have been reviewed.    Lab Results   Component Value Date    WBC 12.47 06/30/2025    RBC 3.81 (L) 06/30/2025    HGB 10.5 (L) 06/30/2025    HCT 33.3 (L) 06/30/2025    MCV 87 06/30/2025    MCH 27.6 06/30/2025    MCHC 31.5 (L) 06/30/2025    RDW 14.4 06/30/2025     06/30/2025    MPV 10.4 06/30/2025    GRAN 7.3 02/14/2025    GRAN 77.4 (H) 02/14/2025    LYMPH 13.6 (L) 06/30/2025    LYMPH 1.70 06/30/2025    MONO 4.9 06/30/2025    MONO 0.61 06/30/2025    EOS 2.2 06/30/2025    EOS 0.27 06/30/2025    BASO 0.02 02/14/2025    EOSINOPHIL 2.2 02/14/2025    BASOPHIL 0.3 06/30/2025    BASOPHIL 0.04 06/30/2025     Lab Results   Component Value Date    IRON 53 06/02/2025    TRANSFERRIN 407 (H) 06/02/2025    TIBC 602 (H) 06/02/2025    LABIRON 9 (L) 06/02/2025    FESATURATED 8 (L) 10/15/2021      Lab Results   Component Value Date    FERRITIN 34 10/15/2021     CMP  Sodium   Date Value Ref Range Status   06/30/2025 138 136 - 145 mmol/L Final   02/14/2025 137 136 - 145 mmol/L Final     Potassium   Date Value Ref Range Status   06/30/2025 4.1 3.5 - 5.1 mmol/L Final   02/14/2025 3.9 3.5 - 5.1 mmol/L Final     Chloride   Date Value Ref Range Status   06/30/2025 107 95 - 110 mmol/L Final   02/14/2025 106 95 - 110 mmol/L Final     CO2   Date Value Ref Range Status   06/30/2025 21 (L) 23 - 29 mmol/L Final   02/14/2025 22 (L) 23 - 29 mmol/L Final     Glucose   Date Value Ref Range Status   06/30/2025 78 70 - 110 mg/dL Final   02/14/2025 80 70 - 110 mg/dL Final     BUN   Date Value Ref Range Status   06/30/2025 9 6 - 20 mg/dL Final     Creatinine   Date Value Ref Range Status   06/30/2025 0.6 0.5 - 1.4 mg/dL Final     Calcium   Date Value Ref Range Status   06/30/2025 9.0 8.7 - 10.5 mg/dL Final   02/14/2025 9.4 8.7 -  10.5 mg/dL Final     Protein Total   Date Value Ref Range Status   06/30/2025 6.4 6.0 - 8.4 gm/dL Final     Total Protein   Date Value Ref Range Status   02/14/2025 7.7 6.0 - 8.4 g/dL Final     Albumin   Date Value Ref Range Status   06/30/2025 3.2 (L) 3.5 - 5.2 g/dL Final   02/14/2025 3.9 3.5 - 5.2 g/dL Final     Total Bilirubin   Date Value Ref Range Status   02/14/2025 0.5 0.1 - 1.0 mg/dL Final     Comment:     For infants and newborns, interpretation of results should be based  on gestational age, weight and in agreement with clinical  observations.    Premature Infant recommended reference ranges:  Up to 24 hours.............<8.0 mg/dL  Up to 48 hours............<12.0 mg/dL  3-5 days..................<15.0 mg/dL  6-29 days.................<15.0 mg/dL       Bilirubin Total   Date Value Ref Range Status   06/30/2025 0.3 0.1 - 1.0 mg/dL Final     Comment:     For infants and newborns, interpretation of results should be based   on gestational age, weight and in agreement with clinical   observations.    Premature Infant recommended reference ranges:   0-24 hours:  <8.0 mg/dL   24-48 hours: <12.0 mg/dL   3-5 days:    <15.0 mg/dL   6-29 days:   <15.0 mg/dL     Alkaline Phosphatase   Date Value Ref Range Status   02/14/2025 54 40 - 150 U/L Final     ALP   Date Value Ref Range Status   06/30/2025 74 40 - 150 unit/L Final     AST   Date Value Ref Range Status   06/30/2025 19 11 - 45 unit/L Final   02/14/2025 21 10 - 40 U/L Final     ALT   Date Value Ref Range Status   06/30/2025 21 10 - 44 unit/L Final   02/14/2025 38 10 - 44 U/L Final     Anion Gap   Date Value Ref Range Status   06/30/2025 10 8 - 16 mmol/L Final     eGFR   Date Value Ref Range Status   06/30/2025 >60 >60 mL/min/1.73/m2 Final     Comment:     Estimated GFR calculated using the CKD-EPI creatinine (2021) equation.   02/14/2025 >60 >60 mL/min/1.73 m^2 Final       Pathology:   None today    Imaging:    None today     Assessment:       1. Anemia,  unspecified type    2. Other specified counseling    3. Iron deficiency anemia during pregnancy    4. Exercise counseling    5. Advanced directives, counseling/discussion    6. Vitamin B12 deficiency    7. Gastroesophageal reflux disease with esophagitis without hemorrhage    8. Anxiety      Iron defiency anemia during pregnancy    -Patient failed oral iron supplements /partially effective however  - Patient is currently 29 weeks pregnant.   - Discussed risks and benefits of IV iron infusion treatments. Discussed receiving Venofer IV iron infusions in 5 doses over 2 weeks vs iron infusions in 2 doses over 3 weeks with one week between doses. Patient wishes to proceed with the 2 doses over the 3 week period. Patient acknowledges understanding and wishes to proceed with treatment. Informed patient that a nurse will be in contact once insurance has approved infusions.     Vitamin B12 defiency   - Patient is currently on B12 injections  - B12 level collected today by PCP; results pending    Anxiety/Meditation Counseling:  - Discussed meditation exercises such as sitting in a low light, calm environment and counting to four repeatedly to calm the mind for 20-30 minutes. Also discussed breathing exercises and muscle relaxation techniques to improve anxiety. Patient acknowledged understanding of counseling and seemed optimistic about trying techniques.    BMI 42--ultimatley healthy diet and exercise in order, of course some wt due to pregancy    Exercise Counseling:  -Counseled on cardio exercise 30 min/6 days a week as an anti-cancer, anti-diabetes, anti-stroke, mood lifting, etc. May start with a few minutes per day and build up. Also counseled on weight-bearing and muscle-strengthening exercises to maintain muscle mass. Both types of exercise are life-prolonging and improve quality of life.    - Discussed reaching target heart rate (HR) for cardio exercise. To find your target cardio heart rate, you'll need to  first estimate your maximum heart rate, which is generally calculated as 220 minus your age. Then, your target heart rate range for moderate intensity exercise is typically 50% to 70% of your maximum heart rate, and for vigorous intensity, it's 70% to 85%.   - First, find your estimated maximum HR (EMHR):  220 - your age = EMHR  - Next, multiply your estimated maximum heart rate by 50% and 70% for moderate intensity and 70% and 85% for vigorous intensity.    EMHR x 0.50 = Losest HR for moderate intensity workout  EMHR x 0.70 = Highest HR for moderate intensity workout    EMHR x 0.70 = Lowest HR for vigorous intensity workout  EMHR x 0.85 = Highest HR for vigorous intensity workout      Plan:     Today I counseled pt about the following:  DIET AND EXERCISE, MEDICATION INSTRUCTIONS, PSYCHOSOCIAL, TODAYS PLANS: FOLLOW UP, REFERRALS, TREATMENT DETAILS, LIVING WILL, and ADVANCED DIRECTIVES  Patient exhibits understanding of all counseling today.    RTC in 3 weeks with CBC, CMP, Ferratin    I spent a total of 31 minutes in a combination of focused physical exam and history, reviewing outside records, reviewing any available radiographs, counseling, communicating with other health care professionals regarding this patients medical condition, independently interpreting results, developing diagnostic and treatment plan and documenting in the EMR. Time counseling iv iron during pregnancy, see above cousneloing, entering iron regimen, comm w infusion center etc--far more time than computer recognized. F/up b12 later    Participating Clinicians:  PCP-see story board    Disclaimer: This note was prepared using voice recognition system and is likely to have sound alike errors.  Please interpret accordingly.    Jade Yee M.D.  Hematology/Oncology  [facility] - [address], [address]  [place] [address]  Phone: [phone]  Fax: [phone]         [1]   Current Outpatient Medications    Medication Sig Dispense Refill    aspirin (ECOTRIN) 81 MG EC tablet Take 1 tablet (81 mg total) by mouth once daily.      cyanocobalamin 1,000 mcg/mL injection Inject 1 mL (1,000 mcg total) into the muscle every 28 days. 1 mL 3    famotidine (PEPCID) 20 MG tablet Take 20 mg by mouth 2 (two) times daily.      ondansetron (ZOFRAN-ODT) 4 MG TbDL Take 1 tablet (4 mg total) by mouth every 6 to 8 hours as needed. 30 tablet 0     No current facility-administered medications for this visit.

## 2025-07-08 ENCOUNTER — TELEPHONE (OUTPATIENT)
Dept: INTERNAL MEDICINE | Facility: CLINIC | Age: 40
End: 2025-07-08
Payer: COMMERCIAL

## 2025-07-08 ENCOUNTER — PATIENT MESSAGE (OUTPATIENT)
Dept: HEMATOLOGY/ONCOLOGY | Facility: CLINIC | Age: 40
End: 2025-07-08
Payer: COMMERCIAL

## 2025-07-08 ENCOUNTER — HOSPITAL ENCOUNTER (OUTPATIENT)
Dept: RADIOLOGY | Facility: OTHER | Age: 40
Discharge: HOME | End: 2025-07-08
Attending: INTERNAL MEDICINE
Payer: COMMERCIAL

## 2025-07-08 ENCOUNTER — PATIENT MESSAGE (OUTPATIENT)
Dept: OBSTETRICS AND GYNECOLOGY | Facility: CLINIC | Age: 40
End: 2025-07-08
Payer: COMMERCIAL

## 2025-07-08 DIAGNOSIS — R06.00 DYSPNEA, UNSPECIFIED TYPE: ICD-10-CM

## 2025-07-08 PROCEDURE — 93970 EXTREMITY STUDY: CPT | Mod: TC

## 2025-07-08 PROCEDURE — 93970 EXTREMITY STUDY: CPT | Mod: 26,,, | Performed by: RADIOLOGY

## 2025-07-08 RX ORDER — VIT C/E/ZN/COPPR/LUTEIN/ZEAXAN 250MG-90MG
CAPSULE ORAL
Qty: 90 TABLET | Refills: 3
Start: 2025-07-08

## 2025-07-08 NOTE — PROGRESS NOTES
Subjective:       Patient ID: Bushra Kahn is a 40 y.o. female.    Chief Complaint: Annual Exam (Discuss B12 levels)    HPI Pt reports some intermittent dyspnea.  Last night she awoke and had to sit up in a chair for a time - heard some wheezing,  Was able to lie down later to sleep.  No SOB now.  No CP.  No edema.  She has some anxiety as well.  SHe is anemic and cannot take oral meds.  Seeing hematology regarding IV iron.  She has had two IM B12 injections and has been tolerating sublingual B12 for about one month.  She is very exhausted worse than her last two pregnancies. 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   Review of Systems   Constitutional:  Positive for fatigue.   Respiratory:  Positive for shortness of breath (PND or orthopnea).    Cardiovascular:  Negative for chest pain (arm pain or jaw pain).   Gastrointestinal:  Negative for abdominal pain, diarrhea, nausea and vomiting.   Genitourinary:  Negative for dysuria.   Neurological:  Negative for seizures, syncope and headaches.       Objective:      Physical Exam  Constitutional:       General: She is not in acute distress.     Appearance: She is well-developed.   HENT:      Head: Normocephalic.   Eyes:      Pupils: Pupils are equal, round, and reactive to light.   Neck:      Thyroid: No thyromegaly.      Vascular: No JVD.   Cardiovascular:      Rate and Rhythm: Normal rate and regular rhythm.      Heart sounds: Normal heart sounds. No murmur heard.     No friction rub. No gallop.   Pulmonary:      Effort: Pulmonary effort is normal.      Breath sounds: Normal breath sounds. No wheezing or rales.      Comments: No wheezes  Abdominal:      General: Bowel sounds are normal. There is no distension.      Palpations: Abdomen is soft. There is no mass.      Tenderness: There is no abdominal tenderness. There is no guarding or rebound.   Musculoskeletal:      Cervical back: Neck supple.   Lymphadenopathy:      Cervical: No  cervical adenopathy.   Skin:     General: Skin is warm and dry.   Neurological:      Mental Status: She is alert and oriented to person, place, and time.      Deep Tendon Reflexes: Reflexes are normal and symmetric.   Psychiatric:         Behavior: Behavior normal.         Thought Content: Thought content normal.         Judgment: Judgment normal.         Assessment:       1. Dyspnea, unspecified type    2. B12 deficiency        Plan:   Dyspnea, unspecified type  -     Echo Saline Bubble? No; Ultrasound enhancing contrast? No  -     US Lower Extremity Veins Bilateral; Future; Expected date: 07/07/2025  -     Complete PFT w/ bronchodilator; Future    B12 deficiency  -     Vitamin B12; Future; Expected date: 08/07/2025  -     Methylmalonic Acid, Serum; Future; Expected date: 07/07/2025    Sees hematology today regarding iron  If B12 and methylmalonic acid normal will have her continue sublingual.    Sending message to Dr. Xiong about symptoms.

## 2025-07-08 NOTE — TELEPHONE ENCOUNTER
LOV with Elsa Ferrari MD , 7/7/2025    Patient states B 12 is 5000mcg    Note dated 7/7/25:  B12 deficiency  -     Vitamin B12; Future; Expected date: 08/07/2025  -     Methylmalonic Acid, Serum; Future; Expected date: 07/07/2025     Sees hematology today regarding iron  If B12 and methylmalonic acid normal will have her continue sublingual.

## 2025-07-09 ENCOUNTER — TELEPHONE (OUTPATIENT)
Dept: INFUSION THERAPY | Facility: HOSPITAL | Age: 40
End: 2025-07-09
Payer: COMMERCIAL

## 2025-07-09 NOTE — TELEPHONE ENCOUNTER
Confirmed upcoming infusion appointments with the patient. Reviewed pre infusion instructions with the patient. Pt declined first available on 7/10. Pt requesting 7/14 or 7/15 morning appts. Pt added to the wait   7/25 at 130- dose 2 more than 1 week due to work schedule   8/4 at 11a

## 2025-07-10 ENCOUNTER — TELEPHONE (OUTPATIENT)
Dept: INFUSION THERAPY | Facility: HOSPITAL | Age: 40
End: 2025-07-10
Payer: COMMERCIAL

## 2025-07-10 NOTE — TELEPHONE ENCOUNTER
Called the patient with available appt for 7/14 at 9am. Pt declined. Pt stated she needs appt on 7/15 or 7/16. Will keep the patient on wait list

## 2025-07-11 ENCOUNTER — PATIENT MESSAGE (OUTPATIENT)
Dept: OTHER | Facility: OTHER | Age: 40
End: 2025-07-11
Payer: COMMERCIAL

## 2025-07-11 LAB — W METHYLMALONIC ACID: 0.1 UMOL/L

## 2025-07-15 ENCOUNTER — HOSPITAL ENCOUNTER (OUTPATIENT)
Dept: PULMONOLOGY | Facility: CLINIC | Age: 40
Discharge: HOME | End: 2025-07-15
Payer: COMMERCIAL

## 2025-07-15 ENCOUNTER — ROUTINE PRENATAL (OUTPATIENT)
Dept: OBSTETRICS AND GYNECOLOGY | Facility: CLINIC | Age: 40
End: 2025-07-15
Payer: COMMERCIAL

## 2025-07-15 ENCOUNTER — PATIENT MESSAGE (OUTPATIENT)
Dept: HEMATOLOGY/ONCOLOGY | Facility: CLINIC | Age: 40
End: 2025-07-15
Payer: COMMERCIAL

## 2025-07-15 ENCOUNTER — PATIENT MESSAGE (OUTPATIENT)
Dept: OBSTETRICS AND GYNECOLOGY | Facility: CLINIC | Age: 40
End: 2025-07-15

## 2025-07-15 VITALS
SYSTOLIC BLOOD PRESSURE: 136 MMHG | BODY MASS INDEX: 42.23 KG/M2 | WEIGHT: 246.06 LBS | DIASTOLIC BLOOD PRESSURE: 84 MMHG

## 2025-07-15 DIAGNOSIS — F41.9 ANXIETY: ICD-10-CM

## 2025-07-15 DIAGNOSIS — R06.00 DYSPNEA, UNSPECIFIED TYPE: ICD-10-CM

## 2025-07-15 DIAGNOSIS — O13.3 GESTATIONAL HYPERTENSION, THIRD TRIMESTER: ICD-10-CM

## 2025-07-15 DIAGNOSIS — E66.9 OBESITY, UNSPECIFIED CLASS, UNSPECIFIED OBESITY TYPE, UNSPECIFIED WHETHER SERIOUS COMORBIDITY PRESENT: ICD-10-CM

## 2025-07-15 DIAGNOSIS — Z3A.30 30 WEEKS GESTATION OF PREGNANCY: Primary | ICD-10-CM

## 2025-07-15 PROCEDURE — 94060 EVALUATION OF WHEEZING: CPT | Mod: S$GLB,,, | Performed by: INTERNAL MEDICINE

## 2025-07-15 PROCEDURE — 94729 DIFFUSING CAPACITY: CPT | Mod: S$GLB,,, | Performed by: INTERNAL MEDICINE

## 2025-07-15 PROCEDURE — 99999 PR PBB SHADOW E&M-EST. PATIENT-LVL IV: CPT | Mod: PBBFAC,,, | Performed by: OBSTETRICS & GYNECOLOGY

## 2025-07-15 PROCEDURE — 94727 GAS DIL/WSHOT DETER LNG VOL: CPT | Mod: S$GLB,,, | Performed by: INTERNAL MEDICINE

## 2025-07-15 NOTE — LETTER
July 15, 2025      [facility]  [address] [place] [address]  [address]  [place] [address]       Patient: Bushra Kahn   YOB: 1985  Date of Visit: 07/15/2025    To Whom It May Concern:    Julee Kahn  is currently pregnant, and will need to be excused from jury duty on 8/19/2025. The following patient will be in her final stages of her pregnancy and due to medical conditions will not be able to attend. If you have any questions or concerns, or if I can be of further assistance, please do not hesitate to contact me.    Sincerely,    Dr. Xiong

## 2025-07-15 NOTE — PROGRESS NOTES
Pregnancy dating, labs, ultrasound reports, prenatal testing, and problem list; prior records and results; and available outside records were reviewed and updated in EMR.  Pertinent findings were noted below.    Reason for Visit   Routine Prenatal Visit    HPI   40 y.o., at 30w4d by Estimated Date of Delivery: 25    She is concerned about her SE on Magnesium    Contractions: No   Bleeding: No   Loss of fluid: No   Fetal movement: Yes   Nausea: No   Vomiting: No   Headache: No     Exam   /84   Wt 111.6 kg (246 lb 0.5 oz)   LMP 2024   BMI 42.23 kg/m²     GENERAL: No acute distress  ABD: Gravid    Assessment and Plan   30 weeks gestation of pregnancy  -     Case Request Operating Room:  SECTION    Gestational hypertension, third trimester  -     Case Request Operating Room:  SECTION    Obesity, unspecified class, unspecified obesity type, unspecified whether serious comorbidity present    Anxiety      She wants to plan R CS before she has develops PRE E.  She has severe anxiety and has increased in BP as well with her anxiety  She is replacing Vit B 12 daily and is scheduled for iron infusion  Considering zoloft 50 mg daily     labor precautions given  Follow-up: 2 weeks

## 2025-07-22 ENCOUNTER — HOSPITAL ENCOUNTER (OUTPATIENT)
Dept: CARDIOLOGY | Facility: HOSPITAL | Age: 40
Discharge: HOME | End: 2025-07-22
Attending: INTERNAL MEDICINE
Payer: COMMERCIAL

## 2025-07-22 VITALS
DIASTOLIC BLOOD PRESSURE: 84 MMHG | BODY MASS INDEX: 42 KG/M2 | WEIGHT: 246 LBS | HEART RATE: 80 BPM | SYSTOLIC BLOOD PRESSURE: 128 MMHG | HEIGHT: 64 IN

## 2025-07-22 PROCEDURE — 93306 TTE W/DOPPLER COMPLETE: CPT | Mod: 26,,, | Performed by: STUDENT IN AN ORGANIZED HEALTH CARE EDUCATION/TRAINING PROGRAM

## 2025-07-22 PROCEDURE — 93306 TTE W/DOPPLER COMPLETE: CPT

## 2025-07-23 ENCOUNTER — TELEPHONE (OUTPATIENT)
Dept: OBSTETRICS AND GYNECOLOGY | Facility: CLINIC | Age: 40
End: 2025-07-23
Payer: COMMERCIAL

## 2025-07-23 NOTE — TELEPHONE ENCOUNTER
Parking accomodation letter written - patient notified and aditi myles ( Disability employee notified) Approved by Dr MACHELLE Xiong via Epic.

## 2025-07-24 ENCOUNTER — PATIENT MESSAGE (OUTPATIENT)
Dept: OBSTETRICS AND GYNECOLOGY | Facility: CLINIC | Age: 40
End: 2025-07-24
Payer: COMMERCIAL

## 2025-07-24 ENCOUNTER — TELEPHONE (OUTPATIENT)
Dept: OBSTETRICS AND GYNECOLOGY | Facility: CLINIC | Age: 40
End: 2025-07-24
Payer: COMMERCIAL

## 2025-07-25 ENCOUNTER — PATIENT MESSAGE (OUTPATIENT)
Dept: OTHER | Facility: OTHER | Age: 40
End: 2025-07-25
Payer: COMMERCIAL

## 2025-07-25 ENCOUNTER — TELEPHONE (OUTPATIENT)
Dept: OBSTETRICS AND GYNECOLOGY | Facility: CLINIC | Age: 40
End: 2025-07-25
Payer: COMMERCIAL

## 2025-07-25 ENCOUNTER — INFUSION (OUTPATIENT)
Dept: INFUSION THERAPY | Facility: HOSPITAL | Age: 40
End: 2025-07-25
Attending: INTERNAL MEDICINE
Payer: COMMERCIAL

## 2025-07-25 VITALS
SYSTOLIC BLOOD PRESSURE: 149 MMHG | OXYGEN SATURATION: 97 % | RESPIRATION RATE: 16 BRPM | WEIGHT: 246 LBS | HEART RATE: 85 BPM | DIASTOLIC BLOOD PRESSURE: 67 MMHG | TEMPERATURE: 98 F | BODY MASS INDEX: 42 KG/M2 | HEIGHT: 64 IN

## 2025-07-25 DIAGNOSIS — O99.019 IRON DEFICIENCY ANEMIA DURING PREGNANCY: Primary | ICD-10-CM

## 2025-07-25 DIAGNOSIS — D50.9 IRON DEFICIENCY ANEMIA DURING PREGNANCY: Primary | ICD-10-CM

## 2025-07-25 PROCEDURE — 25000003 PHARM REV CODE 250: Performed by: INTERNAL MEDICINE

## 2025-07-25 PROCEDURE — 63600175 PHARM REV CODE 636 W HCPCS: Mod: JZ,TB | Performed by: INTERNAL MEDICINE

## 2025-07-25 PROCEDURE — 96374 THER/PROPH/DIAG INJ IV PUSH: CPT

## 2025-07-25 RX ORDER — SODIUM CHLORIDE 0.9 % (FLUSH) 0.9 %
10 SYRINGE (ML) INJECTION
OUTPATIENT
Start: 2025-08-01

## 2025-07-25 RX ORDER — HEPARIN 100 UNIT/ML
500 SYRINGE INTRAVENOUS
OUTPATIENT
Start: 2025-08-01

## 2025-07-25 RX ORDER — EPINEPHRINE 0.3 MG/.3ML
0.3 INJECTION SUBCUTANEOUS ONCE AS NEEDED
OUTPATIENT
Start: 2025-08-01

## 2025-07-25 RX ADMIN — FERUMOXYTOL 510 MG: 510 INJECTION INTRAVENOUS at 01:07

## 2025-07-25 NOTE — NURSING
Tolerated infusion well. No s/s of reaction noted. Pt monitored x 30 minutes post infusion.IV removed. Ambulated off unit in good condition.

## 2025-07-28 ENCOUNTER — PATIENT MESSAGE (OUTPATIENT)
Dept: HEMATOLOGY/ONCOLOGY | Facility: CLINIC | Age: 40
End: 2025-07-28
Payer: COMMERCIAL

## 2025-07-29 ENCOUNTER — PROCEDURE VISIT (OUTPATIENT)
Dept: MATERNAL FETAL MEDICINE | Facility: CLINIC | Age: 40
End: 2025-07-29
Payer: COMMERCIAL

## 2025-07-29 ENCOUNTER — ROUTINE PRENATAL (OUTPATIENT)
Dept: OBSTETRICS AND GYNECOLOGY | Facility: CLINIC | Age: 40
End: 2025-07-29
Payer: COMMERCIAL

## 2025-07-29 ENCOUNTER — PATIENT MESSAGE (OUTPATIENT)
Dept: OBSTETRICS AND GYNECOLOGY | Facility: CLINIC | Age: 40
End: 2025-07-29

## 2025-07-29 VITALS — BODY MASS INDEX: 42.5 KG/M2 | WEIGHT: 247.56 LBS | SYSTOLIC BLOOD PRESSURE: 124 MMHG | DIASTOLIC BLOOD PRESSURE: 78 MMHG

## 2025-07-29 DIAGNOSIS — Z36.89 ENCOUNTER FOR ULTRASOUND TO ASSESS FETAL GROWTH: ICD-10-CM

## 2025-07-29 DIAGNOSIS — Z3A.32 32 WEEKS GESTATION OF PREGNANCY: Primary | ICD-10-CM

## 2025-07-29 DIAGNOSIS — O10.919 CHRONIC HYPERTENSION IN PREGNANCY: ICD-10-CM

## 2025-07-29 PROCEDURE — 0502F SUBSEQUENT PRENATAL CARE: CPT | Mod: CPTII,S$GLB,, | Performed by: NURSE PRACTITIONER

## 2025-07-29 PROCEDURE — 99999 PR PBB SHADOW E&M-EST. PATIENT-LVL III: CPT | Mod: PBBFAC,,, | Performed by: NURSE PRACTITIONER

## 2025-07-29 NOTE — PROGRESS NOTES
Here for routine OB appt at 32w4d, with complaints of irregular contractions.  Reports good FM.  Denies LOF, denies VB.  Reviewed warning signs of Labor and Preeclampsia.  Daily FM counts reinforced.  PNT starting next week for CHTN.   F/U scheduled 2 weeks

## 2025-08-04 ENCOUNTER — HOSPITAL ENCOUNTER (OUTPATIENT)
Dept: PERINATAL CARE | Facility: OTHER | Age: 40
Discharge: HOME | End: 2025-08-04
Attending: NURSE PRACTITIONER
Payer: COMMERCIAL

## 2025-08-04 ENCOUNTER — INFUSION (OUTPATIENT)
Dept: INFUSION THERAPY | Facility: HOSPITAL | Age: 40
End: 2025-08-04
Attending: INTERNAL MEDICINE
Payer: COMMERCIAL

## 2025-08-04 VITALS
RESPIRATION RATE: 18 BRPM | DIASTOLIC BLOOD PRESSURE: 65 MMHG | OXYGEN SATURATION: 100 % | HEART RATE: 91 BPM | TEMPERATURE: 98 F | SYSTOLIC BLOOD PRESSURE: 137 MMHG

## 2025-08-04 DIAGNOSIS — D50.9 IRON DEFICIENCY ANEMIA DURING PREGNANCY: Primary | ICD-10-CM

## 2025-08-04 DIAGNOSIS — O10.919 CHRONIC HYPERTENSION IN PREGNANCY: ICD-10-CM

## 2025-08-04 DIAGNOSIS — O99.019 IRON DEFICIENCY ANEMIA DURING PREGNANCY: Primary | ICD-10-CM

## 2025-08-04 PROCEDURE — 76815 OB US LIMITED FETUS(S): CPT

## 2025-08-04 PROCEDURE — 96374 THER/PROPH/DIAG INJ IV PUSH: CPT

## 2025-08-04 PROCEDURE — 25000003 PHARM REV CODE 250: Performed by: INTERNAL MEDICINE

## 2025-08-04 PROCEDURE — 59025 FETAL NON-STRESS TEST: CPT

## 2025-08-04 PROCEDURE — A4216 STERILE WATER/SALINE, 10 ML: HCPCS | Performed by: INTERNAL MEDICINE

## 2025-08-04 PROCEDURE — 63600175 PHARM REV CODE 636 W HCPCS: Mod: JZ,TB | Performed by: INTERNAL MEDICINE

## 2025-08-04 PROCEDURE — 76815 OB US LIMITED FETUS(S): CPT | Mod: 26,,, | Performed by: OBSTETRICS & GYNECOLOGY

## 2025-08-04 PROCEDURE — 59025 FETAL NON-STRESS TEST: CPT | Mod: 26,,, | Performed by: OBSTETRICS & GYNECOLOGY

## 2025-08-04 RX ORDER — HEPARIN 100 UNIT/ML
500 SYRINGE INTRAVENOUS
OUTPATIENT
Start: 2025-08-04

## 2025-08-04 RX ORDER — SODIUM CHLORIDE 0.9 % (FLUSH) 0.9 %
10 SYRINGE (ML) INJECTION
Status: DISCONTINUED | OUTPATIENT
Start: 2025-08-04 | End: 2025-08-04 | Stop reason: HOSPADM

## 2025-08-04 RX ORDER — SODIUM CHLORIDE 0.9 % (FLUSH) 0.9 %
10 SYRINGE (ML) INJECTION
OUTPATIENT
Start: 2025-08-04

## 2025-08-04 RX ORDER — EPINEPHRINE 0.3 MG/.3ML
0.3 INJECTION SUBCUTANEOUS ONCE AS NEEDED
OUTPATIENT
Start: 2025-08-04

## 2025-08-04 RX ADMIN — Medication 10 ML: at 11:08

## 2025-08-04 RX ADMIN — SODIUM CHLORIDE: 9 INJECTION, SOLUTION INTRAVENOUS at 11:08

## 2025-08-04 RX ADMIN — FERUMOXYTOL 510 MG: 510 INJECTION INTRAVENOUS at 11:08

## 2025-08-04 NOTE — NURSING
Patient tolerated Feraheme infusion well today. PIV removed, catheter tip intact. NAD noted upon discharge. Discharged home, ambulated independently.

## 2025-08-09 ENCOUNTER — PATIENT MESSAGE (OUTPATIENT)
Dept: INTERNAL MEDICINE | Facility: CLINIC | Age: 40
End: 2025-08-09
Payer: COMMERCIAL

## 2025-08-09 DIAGNOSIS — E53.8 B12 DEFICIENCY: ICD-10-CM

## 2025-08-10 DIAGNOSIS — R11.0 NAUSEA: ICD-10-CM

## 2025-08-10 DIAGNOSIS — Z34.80 SUPERVISION OF OTHER NORMAL PREGNANCY, ANTEPARTUM: ICD-10-CM

## 2025-08-10 RX ORDER — ONDANSETRON 4 MG/1
4 TABLET, ORALLY DISINTEGRATING ORAL
Qty: 30 TABLET | Refills: 0 | Status: CANCELLED | OUTPATIENT
Start: 2025-08-10

## 2025-08-11 ENCOUNTER — HOSPITAL ENCOUNTER (OUTPATIENT)
Dept: PERINATAL CARE | Facility: OTHER | Age: 40
Discharge: HOME | End: 2025-08-11
Attending: NURSE PRACTITIONER
Payer: COMMERCIAL

## 2025-08-11 ENCOUNTER — ROUTINE PRENATAL (OUTPATIENT)
Dept: OBSTETRICS AND GYNECOLOGY | Facility: CLINIC | Age: 40
End: 2025-08-11
Payer: COMMERCIAL

## 2025-08-11 ENCOUNTER — PATIENT MESSAGE (OUTPATIENT)
Dept: HEMATOLOGY/ONCOLOGY | Facility: CLINIC | Age: 40
End: 2025-08-11
Payer: COMMERCIAL

## 2025-08-11 VITALS — WEIGHT: 250 LBS | BODY MASS INDEX: 42.91 KG/M2 | DIASTOLIC BLOOD PRESSURE: 78 MMHG | SYSTOLIC BLOOD PRESSURE: 126 MMHG

## 2025-08-11 DIAGNOSIS — D50.9 IRON DEFICIENCY ANEMIA DURING PREGNANCY: ICD-10-CM

## 2025-08-11 DIAGNOSIS — R51.9 PREGNANCY HEADACHE IN THIRD TRIMESTER: ICD-10-CM

## 2025-08-11 DIAGNOSIS — Z3A.34 34 WEEKS GESTATION OF PREGNANCY: Primary | ICD-10-CM

## 2025-08-11 DIAGNOSIS — O26.893 PREGNANCY HEADACHE IN THIRD TRIMESTER: ICD-10-CM

## 2025-08-11 DIAGNOSIS — O10.919 CHRONIC HYPERTENSION IN PREGNANCY: ICD-10-CM

## 2025-08-11 DIAGNOSIS — R11.0 NAUSEA: ICD-10-CM

## 2025-08-11 DIAGNOSIS — O99.019 IRON DEFICIENCY ANEMIA DURING PREGNANCY: ICD-10-CM

## 2025-08-11 LAB
CREAT UR-MCNC: 138 MG/DL (ref 15–325)
PROT UR-MCNC: 11 MG/DL
PROT/CREAT UR: 0.08 MG/G{CREAT}

## 2025-08-11 PROCEDURE — 82570 ASSAY OF URINE CREATININE: CPT | Performed by: NURSE PRACTITIONER

## 2025-08-11 PROCEDURE — 59025 FETAL NON-STRESS TEST: CPT | Mod: 26,,, | Performed by: STUDENT IN AN ORGANIZED HEALTH CARE EDUCATION/TRAINING PROGRAM

## 2025-08-11 PROCEDURE — 76815 OB US LIMITED FETUS(S): CPT

## 2025-08-11 PROCEDURE — 0502F SUBSEQUENT PRENATAL CARE: CPT | Mod: CPTII,S$GLB,, | Performed by: NURSE PRACTITIONER

## 2025-08-11 PROCEDURE — 76815 OB US LIMITED FETUS(S): CPT | Mod: 26,,, | Performed by: STUDENT IN AN ORGANIZED HEALTH CARE EDUCATION/TRAINING PROGRAM

## 2025-08-11 PROCEDURE — 99999 PR PBB SHADOW E&M-EST. PATIENT-LVL II: CPT | Mod: PBBFAC,,, | Performed by: NURSE PRACTITIONER

## 2025-08-11 PROCEDURE — 59025 FETAL NON-STRESS TEST: CPT

## 2025-08-11 RX ORDER — ONDANSETRON 4 MG/1
4 TABLET, ORALLY DISINTEGRATING ORAL
Qty: 30 TABLET | Refills: 0 | Status: SHIPPED | OUTPATIENT
Start: 2025-08-11

## 2025-08-12 ENCOUNTER — PATIENT MESSAGE (OUTPATIENT)
Dept: OBSTETRICS AND GYNECOLOGY | Facility: CLINIC | Age: 40
End: 2025-08-12
Payer: COMMERCIAL

## 2025-08-15 ENCOUNTER — PATIENT MESSAGE (OUTPATIENT)
Dept: OTHER | Facility: OTHER | Age: 40
End: 2025-08-15
Payer: COMMERCIAL

## 2025-08-18 ENCOUNTER — OFFICE VISIT (OUTPATIENT)
Dept: HEMATOLOGY/ONCOLOGY | Facility: CLINIC | Age: 40
End: 2025-08-18
Payer: COMMERCIAL

## 2025-08-18 VITALS
HEART RATE: 103 BPM | BODY MASS INDEX: 43.22 KG/M2 | DIASTOLIC BLOOD PRESSURE: 90 MMHG | OXYGEN SATURATION: 96 % | WEIGHT: 251.75 LBS | TEMPERATURE: 97 F | SYSTOLIC BLOOD PRESSURE: 137 MMHG

## 2025-08-18 DIAGNOSIS — D50.9 IRON DEFICIENCY ANEMIA DURING PREGNANCY: Primary | ICD-10-CM

## 2025-08-18 DIAGNOSIS — D64.9 ANEMIA, UNSPECIFIED TYPE: ICD-10-CM

## 2025-08-18 DIAGNOSIS — E53.8 VITAMIN B12 DEFICIENCY: ICD-10-CM

## 2025-08-18 DIAGNOSIS — O99.019 IRON DEFICIENCY ANEMIA DURING PREGNANCY: Primary | ICD-10-CM

## 2025-08-18 DIAGNOSIS — Z71.89 OTHER SPECIFIED COUNSELING: ICD-10-CM

## 2025-08-18 PROCEDURE — 99213 OFFICE O/P EST LOW 20 MIN: CPT | Mod: S$GLB,,, | Performed by: INTERNAL MEDICINE

## 2025-08-18 PROCEDURE — 99999 PR PBB SHADOW E&M-EST. PATIENT-LVL III: CPT | Mod: PBBFAC,,, | Performed by: INTERNAL MEDICINE

## 2025-08-18 PROCEDURE — 3008F BODY MASS INDEX DOCD: CPT | Mod: CPTII,S$GLB,, | Performed by: INTERNAL MEDICINE

## 2025-08-18 PROCEDURE — 3075F SYST BP GE 130 - 139MM HG: CPT | Mod: CPTII,S$GLB,, | Performed by: INTERNAL MEDICINE

## 2025-08-18 PROCEDURE — 3044F HG A1C LEVEL LT 7.0%: CPT | Mod: CPTII,S$GLB,, | Performed by: INTERNAL MEDICINE

## 2025-08-18 PROCEDURE — 1160F RVW MEDS BY RX/DR IN RCRD: CPT | Mod: CPTII,S$GLB,, | Performed by: INTERNAL MEDICINE

## 2025-08-18 PROCEDURE — 1159F MED LIST DOCD IN RCRD: CPT | Mod: CPTII,S$GLB,, | Performed by: INTERNAL MEDICINE

## 2025-08-18 PROCEDURE — 3080F DIAST BP >= 90 MM HG: CPT | Mod: CPTII,S$GLB,, | Performed by: INTERNAL MEDICINE

## 2025-08-19 ENCOUNTER — HOSPITAL ENCOUNTER (OUTPATIENT)
Dept: PERINATAL CARE | Facility: OTHER | Age: 40
Discharge: HOME | End: 2025-08-19
Attending: NURSE PRACTITIONER
Payer: COMMERCIAL

## 2025-08-19 ENCOUNTER — ROUTINE PRENATAL (OUTPATIENT)
Dept: OBSTETRICS AND GYNECOLOGY | Facility: CLINIC | Age: 40
End: 2025-08-19
Payer: COMMERCIAL

## 2025-08-19 VITALS — SYSTOLIC BLOOD PRESSURE: 122 MMHG | DIASTOLIC BLOOD PRESSURE: 84 MMHG | WEIGHT: 250 LBS | BODY MASS INDEX: 42.91 KG/M2

## 2025-08-19 DIAGNOSIS — O10.919 CHRONIC HYPERTENSION IN PREGNANCY: ICD-10-CM

## 2025-08-19 DIAGNOSIS — Z3A.35 35 WEEKS GESTATION OF PREGNANCY: Primary | ICD-10-CM

## 2025-08-19 DIAGNOSIS — O34.219 HISTORY OF CESAREAN SECTION COMPLICATING PREGNANCY: ICD-10-CM

## 2025-08-19 DIAGNOSIS — Z87.59 HISTORY OF PRE-ECLAMPSIA: ICD-10-CM

## 2025-08-19 PROCEDURE — 99999 PR PBB SHADOW E&M-EST. PATIENT-LVL III: CPT | Mod: PBBFAC,,, | Performed by: OBSTETRICS & GYNECOLOGY

## 2025-08-19 PROCEDURE — 76815 OB US LIMITED FETUS(S): CPT | Mod: 26,,, | Performed by: STUDENT IN AN ORGANIZED HEALTH CARE EDUCATION/TRAINING PROGRAM

## 2025-08-19 PROCEDURE — 87653 STREP B DNA AMP PROBE: CPT | Performed by: OBSTETRICS & GYNECOLOGY

## 2025-08-19 PROCEDURE — 59025 FETAL NON-STRESS TEST: CPT | Mod: 26,,, | Performed by: STUDENT IN AN ORGANIZED HEALTH CARE EDUCATION/TRAINING PROGRAM

## 2025-08-21 ENCOUNTER — PATIENT MESSAGE (OUTPATIENT)
Dept: OBSTETRICS AND GYNECOLOGY | Facility: CLINIC | Age: 40
End: 2025-08-21
Payer: COMMERCIAL

## 2025-08-22 LAB — GROUP B STREPTOCOCCUS, PCR (OHS): NEGATIVE

## 2025-08-27 ENCOUNTER — PROCEDURE VISIT (OUTPATIENT)
Dept: MATERNAL FETAL MEDICINE | Facility: CLINIC | Age: 40
End: 2025-08-27
Payer: COMMERCIAL

## 2025-08-27 ENCOUNTER — HOSPITAL ENCOUNTER (EMERGENCY)
Facility: OTHER | Age: 40
Discharge: HOME | End: 2025-08-27
Attending: OBSTETRICS & GYNECOLOGY
Payer: COMMERCIAL

## 2025-08-27 ENCOUNTER — PATIENT MESSAGE (OUTPATIENT)
Dept: OBSTETRICS AND GYNECOLOGY | Facility: CLINIC | Age: 40
End: 2025-08-27
Payer: COMMERCIAL

## 2025-08-27 VITALS
SYSTOLIC BLOOD PRESSURE: 147 MMHG | DIASTOLIC BLOOD PRESSURE: 72 MMHG | RESPIRATION RATE: 14 BRPM | OXYGEN SATURATION: 99 % | HEART RATE: 87 BPM | TEMPERATURE: 97 F

## 2025-08-27 DIAGNOSIS — Z36.89 ENCOUNTER FOR ULTRASOUND TO ASSESS FETAL GROWTH: ICD-10-CM

## 2025-08-27 DIAGNOSIS — O36.8390 FETAL TACHYCARDIA AFFECTING MANAGEMENT OF MOTHER: Primary | ICD-10-CM

## 2025-08-27 DIAGNOSIS — Z3A.36 36 WEEKS GESTATION OF PREGNANCY: ICD-10-CM

## 2025-08-27 LAB
ABSOLUTE EOSINOPHIL (OHS): 0.22 K/UL
ABSOLUTE MONOCYTE (OHS): 0.81 K/UL (ref 0.3–1)
ABSOLUTE NEUTROPHIL COUNT (OHS): 12.47 K/UL (ref 1.8–7.7)
ALBUMIN SERPL BCP-MCNC: 3 G/DL (ref 3.5–5.2)
ALP SERPL-CCNC: 91 UNIT/L (ref 40–150)
ALT SERPL W/O P-5'-P-CCNC: 28 UNIT/L (ref 10–44)
ANION GAP (OHS): 8 MMOL/L (ref 8–16)
AST SERPL-CCNC: 27 UNIT/L (ref 11–45)
BASOPHILS # BLD AUTO: 0.04 K/UL
BASOPHILS NFR BLD AUTO: 0.3 %
BILIRUB SERPL-MCNC: 0.4 MG/DL (ref 0.1–1)
BILIRUBIN, POC UA: ABNORMAL
BLOOD, POC UA: NEGATIVE
BUN SERPL-MCNC: 9 MG/DL (ref 6–20)
CALCIUM SERPL-MCNC: 8.8 MG/DL (ref 8.7–10.5)
CHLORIDE SERPL-SCNC: 108 MMOL/L (ref 95–110)
CLARITY, UA: CLEAR
CO2 SERPL-SCNC: 20 MMOL/L (ref 23–29)
COLOR, UA: ABNORMAL
CREAT SERPL-MCNC: 0.5 MG/DL (ref 0.5–1.4)
CREAT UR-MCNC: 163.2 MG/DL (ref 15–325)
ERYTHROCYTE [DISTWIDTH] IN BLOOD BY AUTOMATED COUNT: 17.2 % (ref 11.5–14.5)
GFR SERPLBLD CREATININE-BSD FMLA CKD-EPI: >60 ML/MIN/1.73/M2
GLUCOSE SERPL-MCNC: 87 MG/DL (ref 70–110)
GLUCOSE, POC UA: NEGATIVE
HCT VFR BLD AUTO: 33.7 % (ref 37–48.5)
HGB BLD-MCNC: 11.2 GM/DL (ref 12–16)
IMM GRANULOCYTES # BLD AUTO: 0.17 K/UL (ref 0–0.04)
IMM GRANULOCYTES NFR BLD AUTO: 1.1 % (ref 0–0.5)
KETONES, POC UA: ABNORMAL
LEUKOCYTE EST, POC UA: NEGATIVE
LYMPHOCYTES # BLD AUTO: 1.76 K/UL (ref 1–4.8)
MCH RBC QN AUTO: 28.9 PG (ref 27–31)
MCHC RBC AUTO-ENTMCNC: 33.2 G/DL (ref 32–36)
MCV RBC AUTO: 87 FL (ref 82–98)
NITRITE, POC UA: NEGATIVE
NUCLEATED RBC (/100WBC) (OHS): 0 /100 WBC
PH UR STRIP: 7 [PH] (ref 5–8)
PLATELET # BLD AUTO: 203 K/UL (ref 150–450)
PMV BLD AUTO: 9.9 FL (ref 9.2–12.9)
POTASSIUM SERPL-SCNC: 4.1 MMOL/L (ref 3.5–5.1)
PROT SERPL-MCNC: 6.2 GM/DL (ref 6–8.4)
PROT UR-MCNC: 17 MG/DL
PROT/CREAT UR: 0.1 MG/G{CREAT}
PROTEIN, POC UA: 100 MG/DL
RBC # BLD AUTO: 3.88 M/UL (ref 4–5.4)
RELATIVE EOSINOPHIL (OHS): 1.4 %
RELATIVE LYMPHOCYTE (OHS): 11.4 % (ref 18–48)
RELATIVE MONOCYTE (OHS): 5.2 % (ref 4–15)
RELATIVE NEUTROPHIL (OHS): 80.6 % (ref 38–73)
SODIUM SERPL-SCNC: 136 MMOL/L (ref 136–145)
SPECIFIC GRAVITY, POC UA: 1.02 (ref 1–1.03)
UROBILINOGEN, POC UA: 0.2 E.U./DL
WBC # BLD AUTO: 15.47 K/UL (ref 3.9–12.7)

## 2025-08-27 PROCEDURE — 59025 FETAL NON-STRESS TEST: CPT | Mod: 26,,, | Performed by: OBSTETRICS & GYNECOLOGY

## 2025-08-27 PROCEDURE — 84156 ASSAY OF PROTEIN URINE: CPT | Performed by: OBSTETRICS & GYNECOLOGY

## 2025-08-27 PROCEDURE — 85025 COMPLETE CBC W/AUTO DIFF WBC: CPT | Performed by: OBSTETRICS & GYNECOLOGY

## 2025-08-27 PROCEDURE — 59025 FETAL NON-STRESS TEST: CPT

## 2025-08-27 PROCEDURE — 76819 FETAL BIOPHYS PROFIL W/O NST: CPT | Mod: S$GLB,,, | Performed by: STUDENT IN AN ORGANIZED HEALTH CARE EDUCATION/TRAINING PROGRAM

## 2025-08-27 PROCEDURE — 80053 COMPREHEN METABOLIC PANEL: CPT | Performed by: OBSTETRICS & GYNECOLOGY

## 2025-08-27 PROCEDURE — 76816 OB US FOLLOW-UP PER FETUS: CPT | Mod: S$GLB,,, | Performed by: STUDENT IN AN ORGANIZED HEALTH CARE EDUCATION/TRAINING PROGRAM

## 2025-08-27 PROCEDURE — 99284 EMERGENCY DEPT VISIT MOD MDM: CPT | Mod: 25,,, | Performed by: OBSTETRICS & GYNECOLOGY

## 2025-08-27 PROCEDURE — 99284 EMERGENCY DEPT VISIT MOD MDM: CPT

## 2025-08-27 RX ORDER — DOCUSATE SODIUM 100 MG/1
100 CAPSULE, LIQUID FILLED ORAL 2 TIMES DAILY
Qty: 60 CAPSULE | Refills: 0 | Status: SHIPPED | OUTPATIENT
Start: 2025-08-27

## 2025-09-01 PROBLEM — R06.00 DYSPNEA: Status: RESOLVED | Noted: 2025-07-15 | Resolved: 2025-09-01

## 2025-09-03 ENCOUNTER — HOSPITAL ENCOUNTER (OUTPATIENT)
Dept: PERINATAL CARE | Facility: OTHER | Age: 40
Discharge: HOME | End: 2025-09-03
Attending: NURSE PRACTITIONER
Payer: COMMERCIAL

## 2025-09-03 ENCOUNTER — ROUTINE PRENATAL (OUTPATIENT)
Dept: OBSTETRICS AND GYNECOLOGY | Facility: CLINIC | Age: 40
End: 2025-09-03
Payer: COMMERCIAL

## 2025-09-03 VITALS
SYSTOLIC BLOOD PRESSURE: 136 MMHG | DIASTOLIC BLOOD PRESSURE: 84 MMHG | WEIGHT: 253.06 LBS | BODY MASS INDEX: 43.44 KG/M2

## 2025-09-03 DIAGNOSIS — O10.919 CHRONIC HYPERTENSION IN PREGNANCY: ICD-10-CM

## 2025-09-03 DIAGNOSIS — Z3A.37 37 WEEKS GESTATION OF PREGNANCY: Primary | ICD-10-CM

## 2025-09-03 PROCEDURE — 99999 PR PBB SHADOW E&M-EST. PATIENT-LVL III: CPT | Mod: PBBFAC,,, | Performed by: NURSE PRACTITIONER

## 2025-09-03 PROCEDURE — 59025 FETAL NON-STRESS TEST: CPT

## 2025-09-03 PROCEDURE — 0502F SUBSEQUENT PRENATAL CARE: CPT | Mod: CPTII,S$GLB,, | Performed by: NURSE PRACTITIONER

## 2025-09-03 PROCEDURE — 76815 OB US LIMITED FETUS(S): CPT | Mod: 26,,, | Performed by: OBSTETRICS & GYNECOLOGY

## 2025-09-03 PROCEDURE — 59025 FETAL NON-STRESS TEST: CPT | Mod: 26,,, | Performed by: OBSTETRICS & GYNECOLOGY

## 2025-09-03 PROCEDURE — 76815 OB US LIMITED FETUS(S): CPT

## 2025-09-04 ENCOUNTER — OFFICE VISIT (OUTPATIENT)
Dept: HEMATOLOGY/ONCOLOGY | Facility: CLINIC | Age: 40
End: 2025-09-04
Payer: COMMERCIAL

## 2025-09-04 DIAGNOSIS — Z71.89 OTHER SPECIFIED COUNSELING: ICD-10-CM

## 2025-09-04 DIAGNOSIS — D64.9 ANEMIA, UNSPECIFIED TYPE: ICD-10-CM

## 2025-09-04 DIAGNOSIS — O99.019 IRON DEFICIENCY ANEMIA DURING PREGNANCY: Primary | ICD-10-CM

## 2025-09-04 DIAGNOSIS — D50.9 IRON DEFICIENCY ANEMIA DURING PREGNANCY: Primary | ICD-10-CM

## 2025-09-05 ENCOUNTER — TELEPHONE (OUTPATIENT)
Dept: OBSTETRICS AND GYNECOLOGY | Facility: OTHER | Age: 40
End: 2025-09-05
Payer: COMMERCIAL

## (undated) DEVICE — SYS CLSR DERMABOND PRINEO 22CM

## (undated) DEVICE — SUT STRATAFIX MCRYL 27IN 2-0

## (undated) DEVICE — NDL SAFETY 21G X 1 1/2 ECLPSE

## (undated) DEVICE — Device

## (undated) DEVICE — APPLICATOR CHLORAPREP ORN 26ML

## (undated) DEVICE — GLOVE SIGNATURE MICRO LTX 8

## (undated) DEVICE — PAD PREP CUFFED NS 24X48IN

## (undated) DEVICE — GLOVE SENSICARE PI SURG 6

## (undated) DEVICE — SUT STRATAFIX 3-0 30CM

## (undated) DEVICE — SOL NACL IRR 1000ML BTL

## (undated) DEVICE — SURGICAL BRA

## (undated) DEVICE — TOWEL OR DISP STRL BLUE 4/PK

## (undated) DEVICE — SUT SILK 3-0 SH 18IN BLACK

## (undated) DEVICE — SUT CTD VICRYL 3-0 CR/SH

## (undated) DEVICE — SUT CHROMIC 3-0 SH 27IN GUT

## (undated) DEVICE — DRESSING GAUZE XEROFORM 5X9

## (undated) DEVICE — PAD ABDOMINAL STERILE 8X10IN

## (undated) DEVICE — PENCIL SMOKE EVAC ROCKER 70MM

## (undated) DEVICE — SPONGE LAP 18X18 PREWASHED

## (undated) DEVICE — COTTONBALL LARGE STRL

## (undated) DEVICE — NDL SYR 10ML 18X1.5 LL BLUNT

## (undated) DEVICE — BLADE SURG STAINLESS STEEL #10

## (undated) DEVICE — COVER PROXIMA MAYO STAND

## (undated) DEVICE — SUPPORT ULNA NERVE PROTECTOR

## (undated) DEVICE — GLOVE SENSICARE PI GRN 6.5

## (undated) DEVICE — BLANKET SNUGGLE WARM LOWER BDY

## (undated) DEVICE — GOWN POLY REINF BRTH SLV XL

## (undated) DEVICE — COUNT NDL FOAM MAGNET 40COUNT

## (undated) DEVICE — ELECTRODE BLADE INSULATED 1 IN

## (undated) DEVICE — STAPLER SKIN PROXIMATE WIDE

## (undated) DEVICE — BANDAGE GAUZE COT STRL 4.5X4.1

## (undated) DEVICE — ELECTRODE PATIENT RETURN DISP

## (undated) DEVICE — SUT CTD VICRYL 0 UND BR

## (undated) DEVICE — BLADE SURG STAINLESS STEEL #15